# Patient Record
Sex: FEMALE | Race: WHITE | NOT HISPANIC OR LATINO | Employment: OTHER | URBAN - METROPOLITAN AREA
[De-identification: names, ages, dates, MRNs, and addresses within clinical notes are randomized per-mention and may not be internally consistent; named-entity substitution may affect disease eponyms.]

---

## 2017-01-04 ENCOUNTER — HOSPITAL ENCOUNTER (OUTPATIENT)
Dept: PHYSICAL THERAPY | Facility: CLINIC | Age: 62
Setting detail: THERAPIES SERIES
End: 2017-01-04
Attending: ORTHOPAEDIC SURGERY
Payer: MEDICARE

## 2017-01-04 PROCEDURE — 97110 THERAPEUTIC EXERCISES: CPT | Mod: GP | Performed by: PHYSICAL THERAPIST

## 2017-01-04 PROCEDURE — 40000718 ZZHC STATISTIC PT DEPARTMENT ORTHO VISIT: Performed by: PHYSICAL THERAPIST

## 2017-02-13 ENCOUNTER — COMMUNICATION - HEALTHEAST (OUTPATIENT)
Dept: FAMILY MEDICINE | Facility: CLINIC | Age: 62
End: 2017-02-13

## 2017-02-13 DIAGNOSIS — E03.9 HYPOTHYROIDISM: ICD-10-CM

## 2017-03-31 NOTE — ADDENDUM NOTE
Encounter addended by: Hoenk, Kris, PT on: 3/31/2017  9:34 AM<BR>     Actions taken: Pend clinical note, Flowsheet accepted, Sign clinical note, Episode resolved

## 2017-03-31 NOTE — PROGRESS NOTES
Jackie BULLOCK Andrez   PHYSICAL THERAPY EVALUATION    01/04/17 1100   Signing Clinician's Name / Credentials   Signing clinician's name / credentials Kris Hoenk, PT   Session Number   Session Number 8 , seen from 11/16/16 to 1/4/17   Progress Note/Recertification   Recertification Due Date 01/11/17   Adult Goals   PT Ortho Eval Goals 1;2;3;4   Ortho Goal 1   Goal Identifier HEP   Goal Description Patient will be independent and 75% complaint in progressive HEP to continue improving strength outside of therapy session   Target Date 01/11/17   Date Met 01/04/17   Ortho Goal 2   Goal Identifier ROM   Goal Description Patient will demonstrate at least 110* shoulder flexion in order to perform overhead reach  (to 105*)   Target Date 12/14/16 not met   Ortho Goal 3   Goal Identifier PSFS Hair   Goal Description Patient will report improvement in ability to reach overhead and style hair to at least 7/10 on Patient Specific Functional Scale  (able to blow dry hair with min/mod difficulty)   Target Date 01/11/17   Date Met 12/14/16   Ortho Goal 4   Goal Identifier DC goal   Goal Description Patient will report improvement on Shoulder Pain and Disability Index to less than 40% to decrease level of disability   Target Date 01/11/17   Subjective Report   Subjective Report can reach into cupboards, do hair easier, does not think it will ever get as strong as the other   Objective Measure 1   Details ROM shld flex 105*a/115*p, abd 90* p   Treatment Interventions   Interventions Manual Therapy   Therapeutic Procedure/exercise   Minutes 25   Skilled Intervention progression of strength and ROM to iprove function   Patient Response ER fairly easy, tolerated a weight   Treatment Detail UBE 3min, pulleys, active flex x10,, ADD SL ER `1# x20,  , PROM L shld all planes,  wrist supin, elbow ext   Manual Therapy   Minutes 4   Skilled Intervention jt mobs   Patient Response no pain   Treatment Detail gentle L GH mobs post and inf lgides GR  III   Plan   Plan for next session see next week decide if wnat to extend referral, pt scheduled no further appts following this one.  WIll DC to HEP , goals partially met   Total Session Time   Total Session Time 29   Kris Hoenk, PT #[id number]  [facility]

## 2017-04-26 PROBLEM — G24.1 DYSTONIA, TORSION, FRAGMENTS OF: Status: ACTIVE | Noted: 2017-04-26

## 2017-04-26 PROBLEM — G24.3 CERVICAL DYSTONIA: Status: ACTIVE | Noted: 2017-04-26

## 2017-05-25 ENCOUNTER — TELEPHONE (OUTPATIENT)
Dept: RHEUMATOLOGY | Facility: CLINIC | Age: 62
End: 2017-05-25

## 2017-05-25 NOTE — TELEPHONE ENCOUNTER
Spoke with pt and discussed that pt has not been seen in clinic since 2011 and to follow up with her PCP to see if they were able to refill her prednisone until she can be seen in clinic. Also advised that pt have her medical records sent to clinic prior to her appt. Pt verbalized understanding.  Karla BOLTON RN BSN PHN  [facility]

## 2017-05-25 NOTE — TELEPHONE ENCOUNTER
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 She will be seeing Dr Chinchilla on . Sh would like to know if she can get a refill on the prednisone 5 mg daily until her appt.     Please call    Jess Ibarra  Specialty CSS

## 2017-06-01 ENCOUNTER — OFFICE VISIT - HEALTHEAST (OUTPATIENT)
Dept: FAMILY MEDICINE | Facility: CLINIC | Age: 62
End: 2017-06-01

## 2017-06-01 DIAGNOSIS — M21.962 FOOT DEFORMITY, LEFT: ICD-10-CM

## 2017-06-01 DIAGNOSIS — L40.50 PSORIATIC ARTHROPATHY (H): ICD-10-CM

## 2017-06-01 ASSESSMENT — MIFFLIN-ST. JEOR: SCORE: 1435.39

## 2017-07-13 ENCOUNTER — OFFICE VISIT (OUTPATIENT)
Dept: RHEUMATOLOGY | Facility: CLINIC | Age: 62
End: 2017-07-13

## 2017-07-13 ENCOUNTER — TELEPHONE (OUTPATIENT)
Dept: RHEUMATOLOGY | Facility: CLINIC | Age: 62
End: 2017-07-13

## 2017-07-13 VITALS
TEMPERATURE: 98.1 F | WEIGHT: 185 LBS | SYSTOLIC BLOOD PRESSURE: 132 MMHG | HEART RATE: 103 BPM | BODY MASS INDEX: 28.97 KG/M2 | DIASTOLIC BLOOD PRESSURE: 79 MMHG

## 2017-07-13 DIAGNOSIS — M06.09 RHEUMATOID ARTHRITIS OF MULTIPLE SITES WITHOUT RHEUMATOID FACTOR (H): Primary | ICD-10-CM

## 2017-07-13 DIAGNOSIS — L40.9 PSORIASIS: ICD-10-CM

## 2017-07-13 PROCEDURE — 99204 OFFICE O/P NEW MOD 45 MIN: CPT | Performed by: INTERNAL MEDICINE

## 2017-07-13 RX ORDER — DIPHENHYDRAMINE HCL 25 MG
25 CAPSULE ORAL ONCE
Status: CANCELLED
Start: 2017-07-17 | End: 2017-07-17

## 2017-07-13 RX ORDER — ACETAMINOPHEN 325 MG/1
650 TABLET ORAL ONCE
Status: CANCELLED
Start: 2017-07-17 | End: 2017-07-17

## 2017-07-13 RX ORDER — PREDNISONE 1 MG/1
5 TABLET ORAL DAILY
Qty: 90 TABLET | Refills: 1 | Status: SHIPPED | OUTPATIENT
Start: 2017-07-13 | End: 2018-02-26

## 2017-07-13 NOTE — NURSING NOTE
"Chief Complaint   Patient presents with     Consult     Ref. self  Initial /79  Pulse 103  Temp 98.1  F (36.7  C)  Wt 185 lb (83.9 kg)  BMI 28.97 kg/m2 Estimated body mass index is 28.97 kg/(m^2) as calculated from the following:    Height as of 9/27/16: 5' 7.01\" (1.702 m).    Weight as of this encounter: 185 lb (83.9 kg).      Patient prefers to be contacted via at Home.   Okay to leave detailed message: Yes  Patient uses MyChart: No    Lacy VOGEL LPN    "

## 2017-07-13 NOTE — PATIENT INSTRUCTIONS
Abatacept Solution for injection  What is this medicine?  ABATACEPT (a ba TA sept) is used to treat moderate to severe active rheumatoid arthritis in adults. This medicine is also used to treat juvenile idiopathic arthritis.  This medicine may be used for other purposes; ask your health care provider or pharmacist if you have questions.  What should I tell my health care provider before I take this medicine?  They need to know if you have any of these conditions:    are taking other medicines to treat rheumatoid arthritis    COPD    diabetes    infection or history of infections    recently received or scheduled to receive a vaccine    scheduled to have surgery    tuberculosis, a positive skin test for tuberculosis or have recently been in close contact with someone who has tuberculosis    viral hepatitis    an unusual or allergic reaction to abatacept, other medicines, foods, dyes, or preservatives    pregnant or trying to get pregnant    breast-feeding  How should I use this medicine?  This medicine is for infusion into a vein or for injection under the skin. Infusions are given by a health care professional in a hospital or clinic setting. If you are to give your own medicine at home, you will be taught how to prepare and give this medicine under the skin. Use exactly as directed. Take your medicine at regular intervals. Do not take your medicine more often than directed.  It is important that you put your used needles and syringes in a special sharps container. Do not put them in a trash can. If you do not have a sharps container, call your pharmacist or healthcare provider to get one.  Talk to your pediatrician regarding the use of this medicine in children. While infusions in a clinic may be prescribed for children as young as 6 years for selected conditions, precautions do apply.  Overdosage: If you think you've taken too much of this medicine contact a poison control center or emergency room at  once.  NOTE: This medicine is only for you. Do not share this medicine with others.  What if I miss a dose?  This medicine is used once a week if given by injection under the skin. If you miss a dose, take it as soon as you can. If it is almost time for your next dose, take only that dose. Do not take double or extra doses.  If you are to be given an infusion, it is important not to miss your dose. Doses are usually every 4 weeks. Call your doctor or health care professional if you are unable to keep an appointment.  What may interact with this medicine?  Do not take this medicine with any of the following medications:    adalimumab    anakinra    certolizumab    etanercept    golimumab    infliximab    live virus vaccines    rituximab    tocilizumab  This medicine may also interact with the following medications:    vaccines  This list may not describe all possible interactions. Give your health care provider a list of all the medicines, herbs, non-prescription drugs, or dietary supplements you use. Also tell them if you smoke, drink alcohol, or use illegal drugs. Some items may interact with your medicine.  What should I watch for while using this medicine?  Visit your doctor for regular check ups while you are taking this medicine. Tell your doctor or healthcare professional if your symptoms do not start to get better or if they get worse.  Call your doctor or health care professional if you get a cold or other infection while receiving this medicine. Do not treat yourself. This medicine may decrease your body's ability to fight infection. Try to avoid being around people who are sick.  What side effects may I notice from receiving this medicine?  Side effects that you should report to your doctor or health care professional as soon as possible:    allergic reactions like skin rash, itching or hives, swelling of the face, lips, or tongue    breathing problems    chest pain    signs of infection - fever or chills,  cough, unusual tiredness, pain or trouble passing urine, or warm, red or painful skin  Side effects that usually do not require medical attention (Report these to your doctor or health care professional if they continue or are bothersome.):    dizziness    headache    nausea, vomiting    sore throat    stomach upset  This list may not describe all possible side effects. Call your doctor for medical advice about side effects. You may report side effects to FDA at 1-103-FDA-3513.  Where should I keep my medicine?  Infusions will be given in a hospital or clinic and will not be stored at home.  Storage for syringes given under the skin and stored at home:  Keep out of the reach of children. Store in a refrigerator between 2 and 8 degrees C (36 and 46 degrees F). Keep this medicine in the original container. Protect from light. Do not freeze. Throw away any unused medicine after the expiration date.  NOTE: This sheet is a summary. It may not cover all possible information. If you have questions about this medicine, talk to your doctor, pharmacist, or health care provider.  NOTE:This sheet is a summary. It may not cover all possible information. If you have questions about this medicine, talk to your doctor, pharmacist, or health care provider. Copyright  2016 Gold Standard

## 2017-07-13 NOTE — TELEPHONE ENCOUNTER
"Call from Janell in infusion,    Patient Orencia infusion is covered under her Medicare B  However cost is >$1000   and will go higher once she reaches [facility].  They verified patient has \"Specialty Benefits\"  And will most likely be covered at a lower cost if patient does self injection and patient would like to go that route.    Please send an Rx to [facility] Inject Orencia to begin benefit coverage process for patient.    Tamika Humphrey RN  [facility]    "

## 2017-07-13 NOTE — MR AVS SNAPSHOT
After Visit Summary   7/13/2017    Jackie Maguire    MRN: [id number]           Patient Information     Date Of Birth          1955        Visit Information        Provider Department      7/13/2017 10:00 AM Cabrera Chinchilla MD [facility]        Today's Diagnoses     Rheumatoid arthritis of multiple sites without rheumatoid factor (H)    -  1    Psoriasis          Care Instructions      Abatacept Solution for injection  What is this medicine?  ABATACEPT (a ba TA sept) is used to treat moderate to severe active rheumatoid arthritis in adults. This medicine is also used to treat juvenile idiopathic arthritis.  This medicine may be used for other purposes; ask your health care provider or pharmacist if you have questions.  What should I tell my health care provider before I take this medicine?  They need to know if you have any of these conditions:    are taking other medicines to treat rheumatoid arthritis    COPD    diabetes    infection or history of infections    recently received or scheduled to receive a vaccine    scheduled to have surgery    tuberculosis, a positive skin test for tuberculosis or have recently been in close contact with someone who has tuberculosis    viral hepatitis    an unusual or allergic reaction to abatacept, other medicines, foods, dyes, or preservatives    pregnant or trying to get pregnant    breast-feeding  How should I use this medicine?  This medicine is for infusion into a vein or for injection under the skin. Infusions are given by a health care professional in a hospital or clinic setting. If you are to give your own medicine at home, you will be taught how to prepare and give this medicine under the skin. Use exactly as directed. Take your medicine at regular intervals. Do not take your medicine more often than directed.  It is important that you put your used needles and syringes in a special sharps container. Do not put them in a trash  can. If you do not have a sharps container, call your pharmacist or healthcare provider to get one.  Talk to your pediatrician regarding the use of this medicine in children. While infusions in a clinic may be prescribed for children as young as 6 years for selected conditions, precautions do apply.  Overdosage: If you think you've taken too much of this medicine contact a poison control center or emergency room at once.  NOTE: This medicine is only for you. Do not share this medicine with others.  What if I miss a dose?  This medicine is used once a week if given by injection under the skin. If you miss a dose, take it as soon as you can. If it is almost time for your next dose, take only that dose. Do not take double or extra doses.  If you are to be given an infusion, it is important not to miss your dose. Doses are usually every 4 weeks. Call your doctor or health care professional if you are unable to keep an appointment.  What may interact with this medicine?  Do not take this medicine with any of the following medications:    adalimumab    anakinra    certolizumab    etanercept    golimumab    infliximab    live virus vaccines    rituximab    tocilizumab  This medicine may also interact with the following medications:    vaccines  This list may not describe all possible interactions. Give your health care provider a list of all the medicines, herbs, non-prescription drugs, or dietary supplements you use. Also tell them if you smoke, drink alcohol, or use illegal drugs. Some items may interact with your medicine.  What should I watch for while using this medicine?  Visit your doctor for regular check ups while you are taking this medicine. Tell your doctor or healthcare professional if your symptoms do not start to get better or if they get worse.  Call your doctor or health care professional if you get a cold or other infection while receiving this medicine. Do not treat yourself. This medicine may decrease  your body's ability to fight infection. Try to avoid being around people who are sick.  What side effects may I notice from receiving this medicine?  Side effects that you should report to your doctor or health care professional as soon as possible:    allergic reactions like skin rash, itching or hives, swelling of the face, lips, or tongue    breathing problems    chest pain    signs of infection - fever or chills, cough, unusual tiredness, pain or trouble passing urine, or warm, red or painful skin  Side effects that usually do not require medical attention (Report these to your doctor or health care professional if they continue or are bothersome.):    dizziness    headache    nausea, vomiting    sore throat    stomach upset  This list may not describe all possible side effects. Call your doctor for medical advice about side effects. You may report side effects to FDA at 5-159-FDA-6093.  Where should I keep my medicine?  Infusions will be given in a hospital or clinic and will not be stored at home.  Storage for syringes given under the skin and stored at home:  Keep out of the reach of children. Store in a refrigerator between 2 and 8 degrees C (36 and 46 degrees F). Keep this medicine in the original container. Protect from light. Do not freeze. Throw away any unused medicine after the expiration date.  NOTE: This sheet is a summary. It may not cover all possible information. If you have questions about this medicine, talk to your doctor, pharmacist, or health care provider.  NOTE:This sheet is a summary. It may not cover all possible information. If you have questions about this medicine, talk to your doctor, pharmacist, or health care provider. Copyright  2016 Gold Standard                Follow-ups after your visit        Follow-up notes from your care team     Return in about 2 months (around 9/13/2017).      Future tests that were ordered for you today     Open Standing Orders        Priority Remaining  "Interval Expires Ordered    CBC with platelets differential Routine  4 months 2018    Comprehensive metabolic panel Routine 3/3 4 months 2018            Who to contact     If you have questions or need follow up information about today's clinic visit or your schedule please contact [facility] directly at [phone].  Normal or non-critical lab and imaging results will be communicated to you by MyChart, letter or phone within 4 business days after the clinic has received the results. If you do not hear from us within 7 days, please contact the clinic through [facility] or phone. If you have a critical or abnormal lab result, we will notify you by phone as soon as possible.  Submit refill requests through [facility] or call your pharmacy and they will forward the refill request to us. Please allow 3 business days for your refill to be completed.          Additional Information About Your Visit        [facility] Information     [facility] lets you send messages to your doctor, view your test results, renew your prescriptions, schedule appointments and more. To sign up, go to [email or web address] . Click on \"Log in\" on the left side of the screen, which will take you to the Welcome page. Then click on \"Sign up Now\" on the right side of the page.     You will be asked to enter the access code listed below, as well as some personal information. Please follow the directions to create your username and password.     Your access code is: [email or web address]  Expires: 10/11/2017 10:25 AM     Your access code will  in 90 days. If you need help or a new code, please call your [facility] or [phone].        Care EveryWhere ID     This is your Care EveryWhere ID. This could be used by other organizations to access your [place] medical records  [id number]        Your Vitals Were     Pulse Temperature BMI (Body Mass Index)             103 98.1  F (36.7  C) 28.97 kg/m2          " Blood Pressure from Last 3 Encounters:   07/13/17 132/79   10/17/16 146/88   10/06/16 122/79    Weight from Last 3 Encounters:   07/13/17 185 lb (83.9 kg)   10/17/16 197 lb (89.4 kg)   10/06/16 188 lb (85.3 kg)                 Where to get your medicines      These medications were sent to [facility] [address]   [address] [facility] [address]     Phone:  [phone]     predniSONE 1 MG tablet          Primary Care Provider Office Phone # Fax #    Talia Mejía [phone]       [facility] [id number] [place] [address]        Equal Access to Services     RAMEZ MARIN : Yany perezo Soarmando, waaxda luqadaha, qaybta kaalmada adeegyada, yarely molina. So [facility] [phone].    ATENCIÓN: Si habla español, tiene a uribe disposición servicios gratuitos de asistencia lingüística. [facility] [phone].    We comply with applicable federal civil rights laws and [place] laws. We do not discriminate on the basis of race, color, national origin, age, disability sex, sexual orientation or gender identity.            Thank you!     Thank you for choosing [facility]  for your care. Our goal is always to provide you with excellent care. Hearing back from our patients is one way we can continue to improve our services. Please take a few minutes to complete the written survey that you may receive in the mail after your visit with us. Thank you!             Your Updated Medication List - Protect others around you: Learn how to safely use, store and throw away your medicines at www.disposemymeds.org.          This list is accurate as of: 7/13/17 10:25 AM.  Always use your most recent med list.                   Brand Name Dispense Instructions for use Diagnosis    acetaminophen 325 MG tablet    TYLENOL    100 tablet    650 mg po QID and BId PRN    Fracture of left shoulder, closed, initial encounter, Closed  fracture of distal ends of left radius and ulna, initial encounter       aspirin 81 MG tablet     42 tablet    Take 1 tablet (81 mg) by mouth daily    Fracture of left shoulder, closed, initial encounter, Closed fracture of distal ends of left radius and ulna, initial encounter       LEVOTHYROXINE SODIUM PO      Take 137 mcg by mouth daily        melatonin 1 MG Tabs tablet     30 tablet    Take 1 tablet (1 mg) by mouth nightly as needed for sleep    Adjustment insomnia       predniSONE 1 MG tablet    DELTASONE    90 tablet    Take 5 tablets (5 mg) by mouth daily    Rheumatoid arthritis of multiple sites without rheumatoid factor (H)       senna-docusate 8.6-50 MG per tablet    SENOKOT-S;PERICOLACE    60 tablet    1 tab po q day and 1 tab po q day PRN    Narcotic drug use       VITAMIN D (CHOLECALCIFEROL) PO      Take by mouth daily

## 2017-07-19 ENCOUNTER — TELEPHONE (OUTPATIENT)
Dept: RHEUMATOLOGY | Facility: CLINIC | Age: 62
End: 2017-07-19

## 2017-07-19 NOTE — TELEPHONE ENCOUNTER
Prior Authorization Approval    Authorization Effective Date: 7/19/2017  Authorization Expiration Date: 12/31/2017  Medication: orencia ( approved until 12/31/2017)  Approved Dose/Quantity:qs  Reference #: Verbal auth   Insurance Company: [employer] [phone] Fax [phone]  Expected CoPay:       CoPay Card Available:      Foundation Assistance Needed:    Which Pharmacy is filling the prescription (Not needed for infusion/clinic administered): [facility] - [place], [place] - [facility] [address]  Pharmacy Notified:  Yes   Patient Notified:  yes

## 2017-07-19 NOTE — PROGRESS NOTES
PRIMARY CARE PHYSICIAN:  Talia Mejía MD    CHIEF COMPLAINT:  Establish care for rheumatoid arthritis.      History of present illness:  This 62-year-old female with an inflammatory arthritis that could be either psoriatic arthritis or rheumatoid arthritis, first treated by Dr. Tony dumont in 2011.  Subsequently seen by Dr. James.  Dr. James unfortunately has retired and she is here now to consider further treatment options.  Unfortunately her history is complicated by the use of multiple oral medications and side effects to many of them.  She most recently was on Plaquenil but cannot afford the 100 dollar co-pay.  She was on methotrexate which began in 02/2011 which was not effective, leflunomide in 09/2012 which caused mouth sores, azathioprine in 10/2012 which caused itching, Humira not effective, and then Remicade which caused 2 episodes of syncope.  She currently is only on prednisone 5 mg daily and obviously still having active disease.  She has some small patches of psoriasis but these were controlled with the use of topical medication, steroid creams.    I should clarify that the problem with the Humira actually was a lupus-like reaction.    She is stiff in the morning for an hour.  She has had pain and swelling involving her hands and feet and is becoming more and more disabled.  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  No signs of inflammation or pannus formation on the CT scan that was performed in 2016.    She denies having any history of heart or lung problems.  There are no kidney or liver problems.  She does have a history of malignant melanoma that was resected and felt to be cured.    Past medical history:  Hypothyroidism, inflammatory arthritis, malignant melanoma, hyperlipidemia, left proximal humeral fracture, osteoporosis, cervical dystonia, psoriasis.    MEDICATIONS:     1.  Prednisone 5 mg daily.   2.  Tylenol.   3.  Aspirin.   4.  Melatonin.   5.  Levothyroxine 137 mcg daily.    DRUG ALLERGIES:  Azathioprine, leflunomide, Compazine, _____ and sulfa.      SOCIAL HISTORY:  Does not smoke or drink.    FAMILY HISTORY:  There is nobody in her family with rheumatoid arthritis.    REVIEW OF SYSTEMS:  Ten point review of system otherwise negative.    PHYSICAL EXAM:    Vital signs:  Blood pressure 132/79, pulse 103, temperature 98.1.  Weight 185.    HEENT:  She is normocephalic and atraumatic.  Sclerae are clear and moist.  Oropharynx is without lesions.    Neck:  Supple without lymphadenopathy.    Lungs:  Clear to auscultation bilaterally.    Heart:  Regular rate and rhythm without murmur or rub.    Abdomen:  Nontender.  Normal bowel sounds.    Joints:  There is a large synovial cyst over the right wrist.  There is synovitis, moderate-to-severe degree, of bilateral 2 and 3 MCP joints, also the right third and fourth PIP joint.  There is no synovitis of the elbows or shoulders or the knees.  There is mild-to-moderate synovitis both ankles.  Skin:  Without lesions.  Neurologic:  Strength reveals a weak .    IMPRESSION:    1.  Rheumatoid arthritis/psoriatic arthritis.    2.  Multiple medication failures and/or intolerances.    Recommendation:    1.  Continue prednisone 5 mg daily.  So unfortunately we really do need to do something otherwise she is certainly not going to get any better than this and will have ongoing progression of the disease.  I certainly understand her reluctance to be on any further medications but she does agree we need to do something.  I think the drug that would have the least risk associated would be Orencia given as an infusion monthly, 750 mg.  Side effects of infusion reactions, infection, malignancies discussed.  First I would like to see her back in approximately 2 months.    2.  She then tells me she is going to be having surgery on her feet which do  have significant deformities.  I suggest we hold off on starting the Orencia until after the surgery.        Cabrera Chinchilla MD    D:  07/13/2017 12:12 T:  07/19/2017 09:06  Document:  [id number] \[id number]    cc: Talia Mejía MD

## 2017-08-08 ENCOUNTER — TELEPHONE (OUTPATIENT)
Dept: RHEUMATOLOGY | Facility: CLINIC | Age: 62
End: 2017-08-08

## 2017-08-08 NOTE — TELEPHONE ENCOUNTER
Called patient to offer the Orencia IV and she remembers being told that it would be more expensive than the plain injections.  She is unable to afford the injections and doesn't qualify for the Orencia assitance due to being on Medicare D.  Patient was told that Dr. Chinchilla would be notified and to see what the next step would be.  Talia Magana LPN

## 2017-08-08 NOTE — TELEPHONE ENCOUNTER
Reason for Call:  Other     Detailed comments: Pt said the co-pay for the Orencia is $1900. She was referred to a place to see if she could get funding, but was told she was not eligible because she is on Medicare (contact them the end of July). They told her they would send her something to fill out to try to get assistance, but she has not received anything yet and doesn't have the phone # to contact them again.     Phone Number Patient can be reached at: Home number on file [phone] (home)    Best Time: Any    Can we leave a detailed message on this number? YES    Call taken on 8/8/2017 at 1:03 PM by Denise Behrendt

## 2017-08-09 ENCOUNTER — TELEPHONE (OUTPATIENT)
Dept: RHEUMATOLOGY | Facility: CLINIC | Age: 62
End: 2017-08-09

## 2017-08-09 NOTE — TELEPHONE ENCOUNTER
Patient advised.  Given [place] [facility] assistance contact number as she may be able to get some assistance to reduce the high monthly cost of her MTx or Plaquenil as that was still $100 or more per month as well.  Tamika Humphrey RN  [facility] [place]

## 2017-08-09 NOTE — TELEPHONE ENCOUNTER
Forms mailed to patient.  I've advised her to watch for them.  She will return them to us when complete, and we can have Dr. Chinchilla sign and fax them.  I've kept a copy just in case the form is lost in transit.

## 2017-08-09 NOTE — TELEPHONE ENCOUNTER
There aren't a lot of choices---go back on the plaquenil  Try methotrexate again  Maybe [employer] will have a better assistance program but all the injections or intravenous are going to be expensive

## 2017-08-10 ENCOUNTER — OFFICE VISIT - HEALTHEAST (OUTPATIENT)
Dept: FAMILY MEDICINE | Facility: CLINIC | Age: 62
End: 2017-08-10

## 2017-08-10 DIAGNOSIS — E03.9 HYPOTHYROID: ICD-10-CM

## 2017-08-10 DIAGNOSIS — Z01.818 PREOP EXAMINATION: ICD-10-CM

## 2017-08-10 DIAGNOSIS — M21.969 FOOT DEFORMITY: ICD-10-CM

## 2017-08-10 ASSESSMENT — MIFFLIN-ST. JEOR: SCORE: 1417.25

## 2017-08-16 ENCOUNTER — ANESTHESIA EVENT (OUTPATIENT)
Dept: SURGERY | Facility: CLINIC | Age: 62
End: 2017-08-16
Payer: MEDICARE

## 2017-08-16 NOTE — ANESTHESIA PREPROCEDURE EVALUATION
Anesthesia Evaluation     . Pt has had prior anesthetic. Type: General    No history of anesthetic complications          ROS/MED HX    ENT/Pulmonary:  - neg pulmonary ROS     Neurologic:  - neg neurologic ROS     Cardiovascular:     (+) Dyslipidemia, ----. : . . . :. . Previous cardiac testing date:results:date: results:ECG reviewed date:9/26/16 results:Sinus Rhythm   -Old anterior infarct.     ABNORMAL    date: results:          METS/Exercise Tolerance:     Hematologic:         Musculoskeletal:   (+) arthritis, , , other musculoskeletal-       GI/Hepatic:  - neg GI/hepatic ROS       Renal/Genitourinary:  - ROS Renal section negative       Endo:     (+) thyroid problem hypothyroidism, .      Psychiatric:  - neg psychiatric ROS       Infectious Disease:  - neg infectious disease ROS       Malignancy:   (+) Malignancy History of Skin  Skin CA Remission status post Surgery,         Other:    - neg other ROS                 Physical Exam  Normal systems: cardiovascular, pulmonary and dental    Airway   Mallampati: II  TM distance: >3 FB  Neck ROM: full    Dental     Cardiovascular   Rhythm and rate: regular and normal      Pulmonary    breath sounds clear to auscultation                    Anesthesia Plan      History & Physical Review  History and physical reviewed and following examination; no interval change.    ASA Status:  2 .    NPO Status:  > 8 hours    Plan for MAC Reason for MAC:  Deep or markedly invasive procedure (G8)         Postoperative Care      Consents  Anesthetic plan, risks, benefits and alternatives discussed with:  Patient..                          .

## 2017-08-17 ENCOUNTER — ANESTHESIA (OUTPATIENT)
Dept: SURGERY | Facility: CLINIC | Age: 62
End: 2017-08-17
Payer: MEDICARE

## 2017-08-17 ENCOUNTER — APPOINTMENT (OUTPATIENT)
Dept: GENERAL RADIOLOGY | Facility: CLINIC | Age: 62
End: 2017-08-17
Attending: PODIATRIST
Payer: MEDICARE

## 2017-08-17 ENCOUNTER — HOSPITAL ENCOUNTER (OUTPATIENT)
Facility: CLINIC | Age: 62
Discharge: HOME | End: 2017-08-17
Attending: PODIATRIST | Admitting: PODIATRIST
Payer: MEDICARE

## 2017-08-17 ENCOUNTER — SURGERY (OUTPATIENT)
Age: 62
End: 2017-08-17

## 2017-08-17 VITALS
SYSTOLIC BLOOD PRESSURE: 148 MMHG | RESPIRATION RATE: 16 BRPM | DIASTOLIC BLOOD PRESSURE: 88 MMHG | OXYGEN SATURATION: 96 % | WEIGHT: 185 LBS | HEIGHT: 66 IN | TEMPERATURE: 98 F | BODY MASS INDEX: 29.73 KG/M2

## 2017-08-17 PROCEDURE — C1713 ANCHOR/SCREW BN/BN,TIS/BN: HCPCS | Performed by: PODIATRIST

## 2017-08-17 PROCEDURE — 25000125 ZZHC RX 250: Performed by: PODIATRIST

## 2017-08-17 PROCEDURE — A9270 NON-COVERED ITEM OR SERVICE: HCPCS | Mod: GY | Performed by: PODIATRIST

## 2017-08-17 PROCEDURE — 40000277 XR SURGERY CARM FLUORO LESS THAN 5 MIN W STILLS

## 2017-08-17 PROCEDURE — 25000128 H RX IP 250 OP 636: Performed by: NURSE ANESTHETIST, CERTIFIED REGISTERED

## 2017-08-17 PROCEDURE — 25000128 H RX IP 250 OP 636: Performed by: PODIATRIST

## 2017-08-17 PROCEDURE — 40000306 ZZH STATISTIC PRE PROC ASSESS II: Performed by: PODIATRIST

## 2017-08-17 PROCEDURE — 37000009 ZZH ANESTHESIA TECHNICAL FEE, EACH ADDTL 15 MIN: Performed by: PODIATRIST

## 2017-08-17 PROCEDURE — S0020 INJECTION, BUPIVICAINE HYDRO: HCPCS | Performed by: PODIATRIST

## 2017-08-17 PROCEDURE — 37000008 ZZH ANESTHESIA TECHNICAL FEE, 1ST 30 MIN: Performed by: PODIATRIST

## 2017-08-17 PROCEDURE — 71000027 ZZH RECOVERY PHASE 2 EACH 15 MINS: Performed by: PODIATRIST

## 2017-08-17 PROCEDURE — 25000125 ZZHC RX 250: Performed by: NURSE ANESTHETIST, CERTIFIED REGISTERED

## 2017-08-17 PROCEDURE — 27210794 ZZH OR GENERAL SUPPLY STERILE: Performed by: PODIATRIST

## 2017-08-17 PROCEDURE — 36000060 ZZH SURGERY LEVEL 3 W FLUORO 1ST 30 MIN: Performed by: PODIATRIST

## 2017-08-17 PROCEDURE — 25000132 ZZH RX MED GY IP 250 OP 250 PS 637: Mod: GY | Performed by: PODIATRIST

## 2017-08-17 PROCEDURE — 36000058 ZZH SURGERY LEVEL 3 EA 15 ADDTL MIN: Performed by: PODIATRIST

## 2017-08-17 DEVICE — IMP SCR ARTHREX MTP LP CORTICAL 3X16MM TI AR-8933-16: Type: IMPLANTABLE DEVICE | Site: FOOT | Status: FUNCTIONAL

## 2017-08-17 DEVICE — IMP PIN ARTHREX BIO TRIM IT 2.0X100MM AR-4152DS: Type: IMPLANTABLE DEVICE | Site: FOOT | Status: FUNCTIONAL

## 2017-08-17 DEVICE — IMPLANTABLE DEVICE: Type: IMPLANTABLE DEVICE | Site: FOOT | Status: FUNCTIONAL

## 2017-08-17 DEVICE — IMP SCR ARTHREX CORTICAL LP 3X14MM TI AR-8933-14: Type: IMPLANTABLE DEVICE | Site: FOOT | Status: FUNCTIONAL

## 2017-08-17 DEVICE — IMP SCR ARTHREX VAL 3.0X14MM TI AR-8933V-14: Type: IMPLANTABLE DEVICE | Site: FOOT | Status: FUNCTIONAL

## 2017-08-17 DEVICE — IMP SCR ARTHREX QUICKFIX CANC PT 3.0X24MM TI AR-8730-24PT: Type: IMPLANTABLE DEVICE | Site: FOOT | Status: FUNCTIONAL

## 2017-08-17 RX ORDER — ONDANSETRON 2 MG/ML
4 INJECTION INTRAMUSCULAR; INTRAVENOUS EVERY 30 MIN PRN
Status: DISCONTINUED | OUTPATIENT
Start: 2017-08-17 | End: 2017-08-17 | Stop reason: HOSPADM

## 2017-08-17 RX ORDER — SODIUM CHLORIDE, SODIUM LACTATE, POTASSIUM CHLORIDE, CALCIUM CHLORIDE 600; 310; 30; 20 MG/100ML; MG/100ML; MG/100ML; MG/100ML
INJECTION, SOLUTION INTRAVENOUS CONTINUOUS
Status: DISCONTINUED | OUTPATIENT
Start: 2017-08-17 | End: 2017-08-17 | Stop reason: HOSPADM

## 2017-08-17 RX ORDER — LIDOCAINE HYDROCHLORIDE 20 MG/ML
INJECTION, SOLUTION INFILTRATION; PERINEURAL PRN
Status: DISCONTINUED | OUTPATIENT
Start: 2017-08-17 | End: 2017-08-17 | Stop reason: HOSPADM

## 2017-08-17 RX ORDER — FENTANYL CITRATE 50 UG/ML
INJECTION, SOLUTION INTRAMUSCULAR; INTRAVENOUS PRN
Status: DISCONTINUED | OUTPATIENT
Start: 2017-08-17 | End: 2017-08-17

## 2017-08-17 RX ORDER — KETOROLAC TROMETHAMINE 30 MG/ML
30 INJECTION, SOLUTION INTRAMUSCULAR; INTRAVENOUS EVERY 6 HOURS PRN
Status: DISCONTINUED | OUTPATIENT
Start: 2017-08-17 | End: 2017-08-17 | Stop reason: HOSPADM

## 2017-08-17 RX ORDER — FENTANYL CITRATE 50 UG/ML
25-50 INJECTION, SOLUTION INTRAMUSCULAR; INTRAVENOUS
Status: DISCONTINUED | OUTPATIENT
Start: 2017-08-17 | End: 2017-08-17 | Stop reason: HOSPADM

## 2017-08-17 RX ORDER — BUPIVACAINE HYDROCHLORIDE 5 MG/ML
INJECTION, SOLUTION PERINEURAL PRN
Status: DISCONTINUED | OUTPATIENT
Start: 2017-08-17 | End: 2017-08-17 | Stop reason: HOSPADM

## 2017-08-17 RX ORDER — PROPOFOL 10 MG/ML
INJECTION, EMULSION INTRAVENOUS CONTINUOUS PRN
Status: DISCONTINUED | OUTPATIENT
Start: 2017-08-17 | End: 2017-08-17

## 2017-08-17 RX ORDER — CEFAZOLIN SODIUM 1 G/3ML
1 INJECTION, POWDER, FOR SOLUTION INTRAMUSCULAR; INTRAVENOUS SEE ADMIN INSTRUCTIONS
Status: DISCONTINUED | OUTPATIENT
Start: 2017-08-17 | End: 2017-08-17 | Stop reason: HOSPADM

## 2017-08-17 RX ORDER — MEPERIDINE HYDROCHLORIDE 25 MG/ML
12.5 INJECTION INTRAMUSCULAR; INTRAVENOUS; SUBCUTANEOUS
Status: DISCONTINUED | OUTPATIENT
Start: 2017-08-17 | End: 2017-08-17 | Stop reason: HOSPADM

## 2017-08-17 RX ORDER — CEPHALEXIN 500 MG/1
500 CAPSULE ORAL 3 TIMES DAILY
Qty: 30 CAPSULE | Refills: 0 | Status: SHIPPED | OUTPATIENT
Start: 2017-08-17 | End: 2017-10-12

## 2017-08-17 RX ORDER — LIDOCAINE 40 MG/G
CREAM TOPICAL
Status: DISCONTINUED | OUTPATIENT
Start: 2017-08-17 | End: 2017-08-17 | Stop reason: HOSPADM

## 2017-08-17 RX ORDER — FENTANYL CITRATE 50 UG/ML
25-50 INJECTION, SOLUTION INTRAMUSCULAR; INTRAVENOUS
Status: CANCELLED | OUTPATIENT
Start: 2017-08-17

## 2017-08-17 RX ORDER — ONDANSETRON 4 MG/1
4 TABLET, ORALLY DISINTEGRATING ORAL EVERY 30 MIN PRN
Status: DISCONTINUED | OUTPATIENT
Start: 2017-08-17 | End: 2017-08-17 | Stop reason: HOSPADM

## 2017-08-17 RX ORDER — OXYCODONE AND ACETAMINOPHEN 5; 325 MG/1; MG/1
1-2 TABLET ORAL
Status: COMPLETED | OUTPATIENT
Start: 2017-08-17 | End: 2017-08-17

## 2017-08-17 RX ORDER — CEFAZOLIN SODIUM 2 G/100ML
2 INJECTION, SOLUTION INTRAVENOUS
Status: COMPLETED | OUTPATIENT
Start: 2017-08-17 | End: 2017-08-17

## 2017-08-17 RX ORDER — NALOXONE HYDROCHLORIDE 0.4 MG/ML
.1-.4 INJECTION, SOLUTION INTRAMUSCULAR; INTRAVENOUS; SUBCUTANEOUS
Status: DISCONTINUED | OUTPATIENT
Start: 2017-08-17 | End: 2017-08-17 | Stop reason: HOSPADM

## 2017-08-17 RX ORDER — HYDROXYZINE HYDROCHLORIDE 25 MG/1
25 TABLET, FILM COATED ORAL EVERY 4 HOURS PRN
Qty: 36 TABLET | Refills: 0 | Status: SHIPPED | OUTPATIENT
Start: 2017-08-17 | End: 2023-10-05

## 2017-08-17 RX ORDER — HYDROMORPHONE HYDROCHLORIDE 1 MG/ML
.3-.5 INJECTION, SOLUTION INTRAMUSCULAR; INTRAVENOUS; SUBCUTANEOUS EVERY 10 MIN PRN
Status: DISCONTINUED | OUTPATIENT
Start: 2017-08-17 | End: 2017-08-17 | Stop reason: HOSPADM

## 2017-08-17 RX ORDER — OXYCODONE AND ACETAMINOPHEN 5; 325 MG/1; MG/1
1-2 TABLET ORAL EVERY 4 HOURS PRN
Qty: 38 TABLET | Refills: 0 | Status: SHIPPED | OUTPATIENT
Start: 2017-08-17 | End: 2023-10-05

## 2017-08-17 RX ADMIN — FENTANYL CITRATE 50 MCG: 50 INJECTION, SOLUTION INTRAMUSCULAR; INTRAVENOUS at 13:48

## 2017-08-17 RX ADMIN — OXYCODONE HYDROCHLORIDE AND ACETAMINOPHEN 1 TABLET: 5; 325 TABLET ORAL at 14:58

## 2017-08-17 RX ADMIN — FENTANYL CITRATE 100 MCG: 50 INJECTION, SOLUTION INTRAMUSCULAR; INTRAVENOUS at 12:32

## 2017-08-17 RX ADMIN — CEFAZOLIN SODIUM 2 G: 2 INJECTION, SOLUTION INTRAVENOUS at 12:26

## 2017-08-17 RX ADMIN — LIDOCAINE HYDROCHLORIDE 1 ML: 10 INJECTION, SOLUTION EPIDURAL; INFILTRATION; INTRACAUDAL; PERINEURAL at 11:53

## 2017-08-17 RX ADMIN — MIDAZOLAM HYDROCHLORIDE 1 MG: 1 INJECTION, SOLUTION INTRAMUSCULAR; INTRAVENOUS at 12:32

## 2017-08-17 RX ADMIN — MIDAZOLAM HYDROCHLORIDE 2 MG: 1 INJECTION, SOLUTION INTRAMUSCULAR; INTRAVENOUS at 12:28

## 2017-08-17 RX ADMIN — BUPIVACAINE HYDROCHLORIDE 15 ML: 5 INJECTION, SOLUTION PERINEURAL at 12:54

## 2017-08-17 RX ADMIN — MIDAZOLAM HYDROCHLORIDE 2 MG: 1 INJECTION, SOLUTION INTRAMUSCULAR; INTRAVENOUS at 12:30

## 2017-08-17 RX ADMIN — FENTANYL CITRATE 50 MCG: 50 INJECTION, SOLUTION INTRAMUSCULAR; INTRAVENOUS at 12:59

## 2017-08-17 RX ADMIN — SODIUM CHLORIDE, POTASSIUM CHLORIDE, SODIUM LACTATE AND CALCIUM CHLORIDE: 600; 310; 30; 20 INJECTION, SOLUTION INTRAVENOUS at 11:53

## 2017-08-17 RX ADMIN — PROPOFOL 100 MCG/KG/MIN: 10 INJECTION, EMULSION INTRAVENOUS at 12:32

## 2017-08-17 RX ADMIN — KETOROLAC TROMETHAMINE 30 MG: 30 INJECTION, SOLUTION INTRAMUSCULAR at 14:54

## 2017-08-17 RX ADMIN — Medication 15 ML: at 12:54

## 2017-08-17 NOTE — BRIEF OP NOTE
[facility] OR   Brief Operative Note    Pre-operative diagnosis: Left  hallux rigidus, 2nd & 3rd toe contractures   Post-operative diagnosis * No post-op diagnosis entered *   Procedure: Procedure(s):  Left foot 1st metatarsophalangeal realignment fusion, 2nd & 3rd digital corrections - Wound Class: I-Clean   Surgeon: Jason Lamar DPM   Anesthesia: Monitor Anesthesia Care    Estimated blood loss: Less than 10 ml   Blood transfusion: No transfusion was given during surgery   Drains: None   Specimens: None   Findings: Advanced hallux valgus with 1st MTPJ degenerative changes appreciated.  Lesser digital contractures and abduction appreciated with MTPJ contractures secondary to force of 1st MTPJ.   Complications: None   Condition: Stable   Comments: See dictated operative report for full details.           Jason Lamar DPM, [facility]  Foot & Ankle Surgeon/Specialist  [facility]

## 2017-08-17 NOTE — IP AVS SNAPSHOT
[facility] PreOP/Phase II    [address] [address]    Phone:  [phone]    Fax:  [phone]                                       After Visit Summary   8/17/2017    Jackie Maguire    MRN: [id number]           After Visit Summary Signature Page     I have received my discharge instructions, and my questions have been answered. I have discussed any challenges I see with this plan with the nurse or doctor.    ..........................................................................................................................................  Patient/Patient Representative Signature      ..........................................................................................................................................  Patient Representative Print Name and Relationship to Patient    ..................................................               ................................................  Date                                            Time    ..........................................................................................................................................  Reviewed by Signature/Title    ...................................................              ..............................................  Date                                                            Time

## 2017-08-17 NOTE — OP NOTE
[facility] ORTHOPEDICS OPERATIVE REPORT  Operative Report - Orthopedics  Jackie Maguire,  1955, MRN [id number]    Surgery Date: 17    PCP: Talia Mejía, [phone]   Code status:  Prior       OPERATION SITE:  [facility] Operating Room       OPERATIVE REPORT  DR. JASON SALCEDO  FOOT & ANKLE SURGEON  [facility] ORTHOPEDICS    DATE OF PROCEDURE: 17    SITE: [facility] Operating Room    SURGEON: Dr. Jason Salcedo - [facility]    ASSISTANT: Sharita Crocker, PGY III    PREOPERATIVE DIAGNOSES:  1. Left foot advanced hallux valgus and arthrosis in setting of rheumatoid arthritis  2. Left lesser digital abduction contractures at the MTPJ level, primarily second and third    POSTOPERATIVE DIAGNOSES:  1. Left foot advanced hallux valgus and arthrosis in setting of rheumatoid arthritis  2. Left lesser digital abduction contractures at the MTPJ level, primarily second and third    PROCEDURES PERFORMED:  1. Left first MTPJ realignment arthrodesis and bone grafting  2. Left second and third distal metatarsal partial excision/resection  3. Left second MTPJ capsular release and capsulorrhaphy  4. Left third MTPJ capsular release and capsulorrhaphy  5. Left second toe flexor tenotomy/release  6. Intraoperative fluoroscopy    ANESTHESIA: Monitored anesthesia care with local and regional    POSITION: Supine    HEMOSTASIS: Left ankle tourniquet applied at 250 mmHg for the duration of the procedure    ESTIMATED BLOOD LOSS: 10 mL    FINDINGS: Advanced hallux valgus on the left side secondary to rheumatoid arthritis. Also significant degenerative changes and hallux abductus contracture about the first MTPJ. Second and third MTPJ also demonstrated abduction contracture with capsular involvement. To improve alignment lesser metatarsal head partial excisions/resections were necessary.    IMPLANTS: Arthrex first MTPJ fusion system utilized with a 3.0 compression screw and dorsal anatomic recon  plate to stabilize  First MTPJ in the setting of osteoporosis. Additionally, 2.0 bio absorbable trim it pins in utilized to stabilize the digital and MTPJ margins within all inside technique.    SPECIMENS: None.    INDICATIONS FOR THE OPERATION:   62 year old female has been seen and evaluated for the previously listed diagnoses.   Both operative and non operative care options have been reviewed for this condition.  They are aware of the operation implications and associated pros, cons, risks and benefits.  They were made aware of the post-operative demands and details.  She gives verbal and written consent to the above mentioned procedures. She is aware of how her history contributes to the current situation. She is aware of the direct and associated risks with such condition. She is also aware of her limited bone quality.      DESCRIPTION OF THE PROCEDURE:  The patient was identified in the preoperative holding area.  The surgical site was marked.  The operative extremity was initialed.  The History and Physical examination was reviewed and/or updated as necessary.  The operative consent was reviewed, confirmed and signed by the patient and procedural details were again reviewed with patient and family members present.    The patient was then taken to the operating room assigned and was positioned on the operative table.  Anesthesia was then administered and the airway was maintained.  IV antibiotics were administered.  The tourniquet was applied.  Left foot and ankle margins were then prepped and draped in a sterile manner/aseptic technique.  A surgical time-out was then performed to identify the proper patient, surgical site(s) and the procedures to be performed.  Following this, local anesthetic was administered about the operative site utilizing a mixture of 1:1 2% Lidocaine plain and 0.5% Marcaine plain, for a total of 35 mL's.  The esmarch was then utilized to exsanguinate the patient's extremity and the  tourniquet was inflated for the duration of the procedures.    Attention was then directed to the left foot. Dorsal medial first MTP incision was made, with a 15 blade approximately 10 cm in linear length. This was dissected in layers with neurovascular identification and retraction. The dorsomedial and medial capsular structures and periosteal tissues were reflected. The first MTPJ was inspected and found to be significantly contracted into abduction with osteophytic changes about the first MTPJ. The joint was then prepared for arthrodesis. A sagittal saw was utilized to remove the articular and subchondral bone plates about the proximal phalanx perpendicular to its long axis. Then the joint was released in the first MTPJ was aligned. The distal aspect of the first metatarsal was removed with a sagittal saw removing the bone plate and allowing for alignment of first MTPJ. Hypertrophic bone was removed about the first metatarsal and some of this bone was then utilized for otitis bone grafting and the first MTPJ site. With intraoperative fluoroscopic assistance the first MTPJ was aligned. A compression screw was placed from medial distal to more proximal lateral. A dorsal anatomic alignment reconstruction plate was then applied. Patient did have significant osteoporotic bone assess it hitting the long construct with locking and the locking screws. Intraoperative fluoroscopic assistance revealed the first MTPJ with adequate screw placement and alignment. The site was thoroughly irrigated and closed in anatomic layers with 2-0 & 3-0 Vicryl and 3-0 nylon. Attention is then directed to the interval between the second and third metatarsophalangeal joints. #15 blade utilized to make another 10 cm incision. This was then dissected down in layers with neurovascular identification and retraction. The second MTPJ was opened up sparing the extensor tendon. The was found to be significantly contracted into abduction with flexor  tendon involvement. It was determined that distal partial metatarsal head excisions were necessary. These were conducted, measured it with the parabolic length with the sagittal saw and proximal release was McGlamry elevator. This was done about both the second metatarsal head and the third metatarsal head. The second metatarsal plantarly was found to be with flexor contracture. This was released through a central plantar incision about the second toe. To stabilize the second MTP 2.0 bio composite trim it pins utilized with an all inside technique about the MTPJ. It was retrograded through the cortical margins of the toe and placed back into the second and third metatarsals retrospectively. The cortical margin remained intact to prevent backing out. This was checked with intraoperative fluoroscopic assistance and anatomic alignment was found to be within reasonable limits given her preoperative deformity. This site was then irrigated capsular raphe for closure of both the second MTPJ and 30 cm conducted with 2-0 Vicryl. Closure with 3-0 Vicryl and 3-0 nylon.    A sterile compressive, layered dressing was then applied.  Vascular status was intact after deflation of the tourniquet.    COMPLICATIONS: No direct complications were  encountered throughout the procedures.    She tolerated these procedures well and was transferred from the operative table to the transport cart and taken from the OR to the PACU.  In PACU, patient and staff given orders and instructions for post-operative care in the following areas: post op pain management, weight-bearing status, dressing/splint care, weight-bearing assistive devices, elevation of the extremity, ice/cold therapy, DVT/blood clot prevention, nutrition and post-operative concerns to be aware.  Case and post-operative care measures were reviewed with the patient and family members present.  A post operative instruction sheet was provided.  If concerns or questions arise they  will contact our clinic.  If acute concerns develop they will present emergently to a nearby medical center.            Dr. Jason Lamar DPM, [facility] HospitalFAS  Foot & Ankle Surgeon/Specialist  [facility] ORTH[facility]

## 2017-08-17 NOTE — IP AVS SNAPSHOT
MRN:[id number]                      After Visit Summary   8/17/2017    Jackie Maguire    MRN: [id number]           Thank you!     Thank you for choosing [facility] for your care. Our goal is always to provide you with excellent care. Hearing back from our patients is one way we can continue to improve our services. Please take a few minutes to complete the written survey that you may receive in the mail after you visit with us. Thank you!        Patient Information     Date Of Birth          1955        About your hospital stay     You were admitted on:  August 17, 2017 You last received care in the:  [facility] PreOP/Phase II    You were discharged on:  August 17, 2017       Who to Call     For medical emergencies, please call [phone].  For non-urgent questions about your medical care, please call your primary care provider or clinic, [phone]  For questions related to your surgery, please call your surgery clinic        Attending Provider     Provider Jason Carreno DPM Podiatry       Primary Care Provider Office Phone # Fax #    Talia GRIJALVA Alfonzo [phone]      After Care Instructions     Discharge Instructions       Review discharge instructions as directed by Provider.            Discharge Instructions       Patient to be seen in 1 week.    Please call [facility] at [phone] to make/confirm appointment.            Elevate affected extremity           Ice to affected area       Ice pack to affected area PRN (as needed).            No dressing change       until follow up clinic appointment.            Weight bearing status - As tolerated           Weight bearing status - Foot flat                 Your next 10 appointments already scheduled     Oct 12, 2017 11:00 AM CDT   Return Visit with Cabrera Chinchilla MD   [facility]    [address] [address] [phone]               Further instructions from your care team                           Same Day Surgery Discharge Instructions  Special Precautions After Surgery - Adult    1. It is not unusual to feel lightheaded or faint, up to 24 hours after surgery or while taking pain medication.  If you have these symptoms; sit for a few minutes before standing and have someone assist you when getting up.  2. You should rest and relax for the next 24 hours and must have someone stay with you for at least 24 hours after your discharge.  3. DO NOT DRIVE any vehicle or operate mechanical equipment for 24 hours following the end of your surgery.  DO NOT DRIVE while taking narcotic pain medications that have been prescribed by your physician.  If you had a limb operated on, you must be able to use it fully to drive.  4. DO NOT drink alcoholic beverages for 24 hours following surgery or while taking prescription pain medication.  5. Drink clear liquids (apple juice, ginger ale, broth, 7-Up, etc.).  Progress to your regular diet as you feel able.  6. Any questions call your physician and do not make important decisions for 24 hours.    ACTIVITY  ? Off your feet as much as possible for a few days then up as tolerated, elevate foot near or above heart level when sitting down     INCISIONAL CARE  ? Cover food with a plastic bag to keep dry to shower.     Call for an appointment to return to the clinic.    Medications:  ? Had 1 pain pill at 3PM.  May want to set an alarm thru the 1st night for every 4 hours to check on patient & give a pain pill, then may take them as necessary the next day.  May have some Ibuprofen or Aleve anytime after 9pm to help with pain control if needed.     Additional discharge instructions: Cold pack behind knee off & on to cool blood going to the knee to decrease swelling & pain.  __________________________________________________________________________________________________________________________________  IMPORTANT  "NUMBERS:    [facility] Main Number:  [phone], [phone]  Pharmacy:  [phone]  Same Day Surgery:  [phone], Monday - Friday until 8:30 p.m.  Urgent Care:  [phone]  Emergency Room:  [phone]      [facility]:  [phone]                                                                             [facility]:  [phone] option 1  [facility]:  [phone]     OB Clinic:  [phone]   Surgery Specialty Clinic:  [phone]   Home Medical Equipment: [phone]  [facility]:  [phone]        Pending Results     Date and Time Order Name Status Description    2017 0300 XR Surgery DANIEL Fluoro L/T 5 Min w Stills In process             Admission Information     Date & Time Provider Department Dept. Phone    2017 Jason Lamar, ANNEL [facility] PreOP/Phase [phone]      Your Vitals Were     Blood Pressure Temperature Respirations Height Weight Pulse Oximetry    148/88 98  F (36.7  C) (Oral) 16 1.676 m (5' 6\") 83.9 kg (185 lb) 96%    BMI (Body Mass Index)                   29.86 kg/m2           [facility] Information     [facility] lets you send messages to your doctor, view your test results, renew your prescriptions, schedule appointments and more. To sign up, go to [email or web address] . Click on \"Log in\" on the left side of the screen, which will take you to the Welcome page. Then click on \"Sign up Now\" on the right side of the page.     You will be asked to enter the access code listed below, as well as some personal information. Please follow the directions to create your username and password.     Your access code is: [email or web address]  Expires: 10/11/2017 10:25 AM     Your access code will  in 90 days. If you need help or a new code, please call your [facility] or [phone].        Care EveryWhere ID     This is your Care EveryWhere ID. This could be used by other organizations to access your [place] " medical records  [id number]        Equal Access to Services     RAMEZ MARIN : Hadii aad ku hadvashtimorris Dangelo, walichada lukvngadaha, qaybta maidatamaratavon ornelas, yarely molina. So [facility] [phone].    ATENCIÓN: Si habla español, tiene a uribe disposición servicios gratuitos de asistencia lingüística. Llame al [phone].    We comply with applicable federal civil rights laws and [place] laws. We do not discriminate on the basis of race, color, national origin, age, disability sex, sexual orientation or gender identity.               Review of your medicines      START taking        Dose / Directions    cephALEXin 500 MG capsule   Commonly known as:  KEFLEX        Dose:  500 mg   Take 1 capsule (500 mg) by mouth 3 times daily   Quantity:  30 capsule   Refills:  0       hydrOXYzine 25 MG tablet   Commonly known as:  ATARAX        Dose:  25 mg   Take 1 tablet (25 mg) by mouth every 4 hours as needed for itching (and nausea)   Quantity:  36 tablet   Refills:  0       oxyCODONE-acetaminophen 5-325 MG per tablet   Commonly known as:  PERCOCET        Dose:  1-2 tablet   Take 1-2 tablets by mouth every 4 hours as needed for pain (moderate to severe)   Quantity:  38 tablet   Refills:  0         CONTINUE these medicines which have NOT CHANGED        Dose / Directions    acetaminophen 325 MG tablet   Commonly known as:  TYLENOL   Used for:  Fracture of left shoulder, closed, initial encounter, Closed fracture of distal ends of left radius and ulna, initial encounter        650 mg po QID and BId PRN   Quantity:  100 tablet   Refills:  0       aspirin 81 MG tablet   Used for:  Fracture of left shoulder, closed, initial encounter, Closed fracture of distal ends of left radius and ulna, initial encounter        Dose:  81 mg   Take 1 tablet (81 mg) by mouth daily   Quantity:  42 tablet   Refills:  0       LEVOTHYROXINE SODIUM PO        Dose:  137 mcg   Take 137 mcg by mouth daily   Refills:  0        predniSONE 1 MG tablet   Commonly known as:  DELTASONE   Used for:  Rheumatoid arthritis of multiple sites without rheumatoid factor (H)        Dose:  5 mg   Take 5 tablets (5 mg) by mouth daily   Quantity:  90 tablet   Refills:  1       VITAMIN D (CHOLECALCIFEROL) PO        Take by mouth daily   Refills:  0            Where to get your medicines      Some of these will need a paper prescription and others can be bought over the counter. Ask your nurse if you have questions.     Bring a paper prescription for each of these medications     cephALEXin 500 MG capsule    hydrOXYzine 25 MG tablet    oxyCODONE-acetaminophen 5-325 MG per tablet                Protect others around you: Learn how to safely use, store and throw away your medicines at www.disposemymeds.org.             Medication List: This is a list of all your medications and when to take them. Check marks below indicate your daily home schedule. Keep this list as a reference.      Medications           Morning Afternoon Evening Bedtime As Needed    acetaminophen 325 MG tablet   Commonly known as:  TYLENOL   650 mg po QID and BId PRN                                aspirin 81 MG tablet   Take 1 tablet (81 mg) by mouth daily                                cephALEXin 500 MG capsule   Commonly known as:  KEFLEX   Take 1 capsule (500 mg) by mouth 3 times daily                                hydrOXYzine 25 MG tablet   Commonly known as:  ATARAX   Take 1 tablet (25 mg) by mouth every 4 hours as needed for itching (and nausea)                                LEVOTHYROXINE SODIUM PO   Take 137 mcg by mouth daily                                oxyCODONE-acetaminophen 5-325 MG per tablet   Commonly known as:  PERCOCET   Take 1-2 tablets by mouth every 4 hours as needed for pain (moderate to severe)   Last time this was given:  1 tablet on 8/17/2017  2:58 PM                                predniSONE 1 MG tablet   Commonly known as:  DELTASONE   Take 5 tablets (5 mg) by  mouth daily                                VITAMIN D (CHOLECALCIFEROL) PO   Take by mouth daily

## 2017-08-17 NOTE — ANESTHESIA POSTPROCEDURE EVALUATION
Patient: Jackie Maguire    Procedure(s):  Left foot 1st metatarsophalangeal realignment fusion, 2nd & 3rd digital corrections - Wound Class: I-Clean    Diagnosis:Left  hallux rigidus, 2nd & 3rd toe contractures  Diagnosis Additional Information: No value filed.    Anesthesia Type:  MAC    Note:  Anesthesia Post Evaluation    Patient location during evaluation: Phase 2 and Bedside  Patient participation: Able to fully participate in evaluation  Level of consciousness: awake and alert  Pain management: adequate  Airway patency: patent  Cardiovascular status: acceptable  Respiratory status: acceptable  Hydration status: acceptable  PONV: none     Anesthetic complications: None          Last vitals:  Vitals:    08/17/17 1117 08/17/17 1357 08/17/17 1421   BP: (!) 168/101 (!) 142/91 150/80   Resp: 18 16 16   Temp: 36.7  C (98  F)     SpO2: 98% 94% 95%         Electronically Signed By: Praneeth Barrios CRNA, APRN CRNA  August 17, 2017  2:34 PM

## 2017-08-17 NOTE — ANESTHESIA CARE TRANSFER NOTE
Patient: Jackie Maguire    Procedure(s):  Left foot 1st metatarsophalangeal realignment fusion, 2nd & 3rd digital corrections - Wound Class: I-Clean    Diagnosis: Left  hallux rigidus, 2nd & 3rd toe contractures  Diagnosis Additional Information: No value filed.    Anesthesia Type:   MAC     Note:  Airway :Nasal Cannula  Patient transferred to:Phase II        Vitals: (Last set prior to Anesthesia Care Transfer)    CRNA VITALS  8/17/2017 1325 - 8/17/2017 1358      8/17/2017             Pulse: 89    SpO2: 96 %                Electronically Signed By: Praneeth Barrios CRNA, APRN CRNA  August 17, 2017  1:58 PM

## 2017-08-17 NOTE — DISCHARGE INSTRUCTIONS
Same Day Surgery Discharge Instructions  Special Precautions After Surgery - Adult    1. It is not unusual to feel lightheaded or faint, up to 24 hours after surgery or while taking pain medication.  If you have these symptoms; sit for a few minutes before standing and have someone assist you when getting up.  2. You should rest and relax for the next 24 hours and must have someone stay with you for at least 24 hours after your discharge.  3. DO NOT DRIVE any vehicle or operate mechanical equipment for 24 hours following the end of your surgery.  DO NOT DRIVE while taking narcotic pain medications that have been prescribed by your physician.  If you had a limb operated on, you must be able to use it fully to drive.  4. DO NOT drink alcoholic beverages for 24 hours following surgery or while taking prescription pain medication.  5. Drink clear liquids (apple juice, ginger ale, broth, 7-Up, etc.).  Progress to your regular diet as you feel able.  6. Any questions call your physician and do not make important decisions for 24 hours.    ACTIVITY  ? Off your feet as much as possible for a few days then up as tolerated, elevate foot near or above heart level when sitting down     INCISIONAL CARE  ? Cover food with a plastic bag to keep dry to shower.     Call for an appointment to return to the clinic.    Medications:  ? Had 1 pain pill at 3PM.  May want to set an alarm thru the 1st night for every 4 hours to check on patient & give a pain pill, then may take them as necessary the next day.  May have some Ibuprofen or Aleve anytime after 9pm to help with pain control if needed.     Additional discharge instructions: Cold pack behind knee off & on to cool blood going to the knee to decrease swelling & pain.  __________________________________________________________________________________________________________________________________  IMPORTANT NUMBERS:    [facility] [place]:  [phone],  [phone]  Pharmacy:  [phone]  Same Day Surgery:  [phone], Monday - Friday until 8:30 p.m.  Urgent Care:  [phone]  Emergency Room:  [phone]      [facility]:  [phone]                                                                             [facility]:  [phone] option 1  [facility]:  [phone]     OB Clinic:  [phone]   Surgery Specialty Clinic:  [phone]   Home Medical Equipment: [phone]  [facility]:  [phone]

## 2017-08-17 NOTE — TELEPHONE ENCOUNTER
Forms completed and faxed back to [facility] Access Support for Orencia.  Talia Magana LPN

## 2017-10-03 ENCOUNTER — TELEPHONE (OUTPATIENT)
Dept: RHEUMATOLOGY | Facility: CLINIC | Age: 62
End: 2017-10-03

## 2017-10-03 NOTE — TELEPHONE ENCOUNTER
Reason for Call:  Other appointment    Detailed comments: pt calling stating she is scheduled to be seen on 10/12. she was to have had a couple of orencia infusions at this time however has not been able to get any funding for it so she has not had them done. She would like to know if she should still keep her appt on the 12th?    Phone Number Patient can be reached at: Home number on file [phone] (home)    Best Time: any    Can we leave a detailed message on this number? YES    Call taken on 10/3/2017 at 9:35 AM by Jess Ibarra

## 2017-10-03 NOTE — TELEPHONE ENCOUNTER
Depends on how she is doing----if not well, then should come in to discuss other options as she is likely not going to get the orencia

## 2017-10-04 ENCOUNTER — TELEPHONE (OUTPATIENT)
Dept: RHEUMATOLOGY | Facility: CLINIC | Age: 62
End: 2017-10-04

## 2017-10-04 NOTE — TELEPHONE ENCOUNTER
Pt assist rep from [facility] needs script for Orencia sub Q, also wrong application sent to them, they are faxing the correct one, call [phone] , fax [phone].

## 2017-10-05 ENCOUNTER — RECORDS - HEALTHEAST (OUTPATIENT)
Dept: ADMINISTRATIVE | Facility: OTHER | Age: 62
End: 2017-10-05

## 2017-10-05 NOTE — TELEPHONE ENCOUNTER
Orencia infusion order and physician statement and treatment plan faxed back to [employer] for patient assistance.  Talia Magana LPN

## 2017-10-10 ENCOUNTER — AMBULATORY - HEALTHEAST (OUTPATIENT)
Dept: NURSING | Facility: CLINIC | Age: 62
End: 2017-10-10

## 2017-10-10 DIAGNOSIS — Z23 NEEDS FLU SHOT: ICD-10-CM

## 2017-10-12 ENCOUNTER — OFFICE VISIT (OUTPATIENT)
Dept: RHEUMATOLOGY | Facility: CLINIC | Age: 62
End: 2017-10-12
Payer: MEDICARE

## 2017-10-12 VITALS
SYSTOLIC BLOOD PRESSURE: 135 MMHG | WEIGHT: 188 LBS | HEART RATE: 98 BPM | BODY MASS INDEX: 30.34 KG/M2 | DIASTOLIC BLOOD PRESSURE: 79 MMHG

## 2017-10-12 DIAGNOSIS — M06.09 RHEUMATOID ARTHRITIS OF MULTIPLE SITES WITHOUT RHEUMATOID FACTOR (H): Primary | ICD-10-CM

## 2017-10-12 PROCEDURE — 99214 OFFICE O/P EST MOD 30 MIN: CPT | Performed by: INTERNAL MEDICINE

## 2017-10-12 NOTE — NURSING NOTE
Patient completed her portion of assistance application.  Forms and rx faxed to [employer].  Talia Magana LPN

## 2017-10-12 NOTE — MR AVS SNAPSHOT
"              After Visit Summary   10/12/2017    Jackie Maguire    MRN: [id number]           Patient Information     Date Of Birth          1955        Visit Information        Provider Department      10/12/2017 11:00 AM Cabrera Chinchilla MD [facility]        Today's Diagnoses     Rheumatoid arthritis of multiple sites without rheumatoid factor (H)    -  1       Follow-ups after your visit        Who to contact     If you have questions or need follow up information about today's clinic visit or your schedule please contact [facility] directly at [phone].  Normal or non-critical lab and imaging results will be communicated to you by enrich-inhart, letter or phone within 4 business days after the clinic has received the results. If you do not hear from us within 7 days, please contact the clinic through enrich-inhart or phone. If you have a critical or abnormal lab result, we will notify you by phone as soon as possible.  Submit refill requests through [employer] or call your pharmacy and they will forward the refill request to us. Please allow 3 business days for your refill to be completed.          Additional Information About Your Visit        MyChart Information     [facility] lets you send messages to your doctor, view your test results, renew your prescriptions, schedule appointments and more. To sign up, go to [email or web address] . Click on \"Log in\" on the left side of the screen, which will take you to the Welcome page. Then click on \"Sign up Now\" on the right side of the page.     You will be asked to enter the access code listed below, as well as some personal information. Please follow the directions to create your username and password.     Your access code is: [email or web address]  Expires: 1/10/2018  2:15 PM     Your access code will  in 90 days. If you need help or a new code, please call your [facility] or [phone].        Care EveryWhere ID     This is your " Care EveryWhere ID. This could be used by other organizations to access your [place] medical records  [id number]        Your Vitals Were     Pulse BMI (Body Mass Index)                98 30.34 kg/m2           Blood Pressure from Last 3 Encounters:   10/12/17 135/79   08/17/17 (!) (P) 155/97   07/13/17 132/79    Weight from Last 3 Encounters:   10/12/17 188 lb (85.3 kg)   08/17/17 185 lb (83.9 kg)   07/13/17 185 lb (83.9 kg)              Today, you had the following     No orders found for display         Today's Medication Changes          These changes are accurate as of: 10/12/17  2:15 PM.  If you have any questions, ask your nurse or doctor.               Start taking these medicines.        Dose/Directions    Abatacept 125 MG/ML Soaj   Used for:  Rheumatoid arthritis of multiple sites without rheumatoid factor (H)        Dose:  125 mg   Inject 125 mg Subcutaneous every 7 days   Quantity:  4 mL   Refills:  3            Where to get your medicines      Some of these will need a paper prescription and others can be bought over the counter.  Ask your nurse if you have questions.     Bring a paper prescription for each of these medications     Abatacept 125 MG/ML Soaj                Primary Care Provider Office Phone # Fax #    Talia GRIJALVA Alfonzo [phone]       [place] [id number]        Equal Access to Services     RAMEZ MARIN AH: Hadii rashid perezo Soarmando, waaxda luqadaha, qaybta kaalmada johnson, yarely molina. So [facility] [phone].    ATENCIÓN: Si habla español, tiene a uribe disposición servicios gratuitos de asistencia lingüística. Osiris al [phone].    We comply with applicable federal civil rights laws and [place] laws. We do not discriminate on the basis of race, color, national origin, age, disability, sex, sexual orientation, or gender identity.            Thank you!     Thank you for choosing [facility]   for your care. Our goal is always to provide you with excellent care. Hearing back from our patients is one way we can continue to improve our services. Please take a few minutes to complete the written survey that you may receive in the mail after your visit with us. Thank you!             Your Updated Medication List - Protect others around you: Learn how to safely use, store and throw away your medicines at www.disposemymeds.org.          This list is accurate as of: 10/12/17  2:15 PM.  Always use your most recent med list.                   Brand Name Dispense Instructions for use Diagnosis    Abatacept 125 MG/ML Soaj     4 mL    Inject 125 mg Subcutaneous every 7 days    Rheumatoid arthritis of multiple sites without rheumatoid factor (H)       acetaminophen 325 MG tablet    TYLENOL    100 tablet    650 mg po QID and BId PRN    Fracture of left shoulder, closed, initial encounter, Closed fracture of distal ends of left radius and ulna, initial encounter       aspirin 81 MG tablet     42 tablet    Take 1 tablet (81 mg) by mouth daily    Fracture of left shoulder, closed, initial encounter, Closed fracture of distal ends of left radius and ulna, initial encounter       hydrOXYzine 25 MG tablet    ATARAX    36 tablet    Take 1 tablet (25 mg) by mouth every 4 hours as needed for itching (and nausea)        LEVOTHYROXINE SODIUM PO      Take 137 mcg by mouth daily        oxyCODONE-acetaminophen 5-325 MG per tablet    PERCOCET    38 tablet    Take 1-2 tablets by mouth every 4 hours as needed for pain (moderate to severe)        predniSONE 1 MG tablet    DELTASONE    90 tablet    Take 5 tablets (5 mg) by mouth daily    Rheumatoid arthritis of multiple sites without rheumatoid factor (H)       VITAMIN D (CHOLECALCIFEROL) PO      Take by mouth daily

## 2017-10-12 NOTE — NURSING NOTE
"Chief Complaint   Patient presents with     RECHECK       Initial /79  Pulse 98  Wt 188 lb (85.3 kg)  BMI 30.34 kg/m2 Estimated body mass index is 30.34 kg/(m^2) as calculated from the following:    Height as of 8/17/17: 5' 6\" (1.676 m).    Weight as of this encounter: 188 lb (85.3 kg).      Patient prefers to be contacted via at Home.   Okay to leave detailed message: Yes  Patient uses MyChart: Caroline ROJAS LPN    "

## 2017-10-13 NOTE — PROGRESS NOTES
HISTORY:  Ms. Damon Yañez is seen back in followup for rheumatoid arthritis.  We are still not doing well.  She has not been able to get the Orencia because of cost, so basically she is only on 5 mg a day of prednisone.  On this dose she is having significant swelling and pain involving her wrists, her hands, her knees and her feet.  She has paperwork for me today to fill out in an attempt to get her Orencia hopefully from [employer].  Apparently the injection is going to be to the cheaper way to go.      PHYSICAL EXAMINATION:   VITAL SIGNS:  Blood pressure 135/79, pulse 98, weight 188.   NECK:  Supple, without lymphadenopathy.   LUNGS:  Clear.   HEART:  Regular.   MUSCULOSKELETAL:  Joint exam there is clearly moderate synovitis of both wrists, mild to moderate synovitis of bilateral 2, 3, 4 MCP joints.  Bilateral third and fourth PIP joints.   SKIN:  Without lesions.      IMPRESSION:  Seropositive rheumatoid arthritis with multiple treatment failures.      RECOMMENDATIONS:   1.  I filled out paperwork to hopefully get her Orencia from the company at low cost.   2.  Continue Prednisone 5 mg daily.   3.  If we cannot get the Orencia I am not certain what we are going to be able to treat her with as everything is prohibitively expensive she basically has tried all of the cheaper oral medications.   4.  Follow up with me in 2-3 months.         BAKARI VICZAINO MD             D: 10/12/2017 14:12   T: 10/13/2017 07:12   MT: [id number]      Name:     RUI YAÑEZ   MRN:      [id number]        Account:      [id number]   :      1955           Visit Date:   10/12/2017      Document: [id number]

## 2017-10-17 ENCOUNTER — TELEPHONE (OUTPATIENT)
Dept: RHEUMATOLOGY | Facility: CLINIC | Age: 62
End: 2017-10-17

## 2017-10-17 NOTE — TELEPHONE ENCOUNTER
Patient was denied for patient assistance due to being covered by insurance and income being to high.  Talia Magana LPN

## 2017-10-17 NOTE — TELEPHONE ENCOUNTER
Unfortunately then she's left with going back on plaquenil with or without methotrexate again or things like that.  I don't know how to get any of the drugs at an affordable price

## 2017-10-18 NOTE — TELEPHONE ENCOUNTER
Spoke to patient let her know that if she wants to go back on methotrexate or plaquenil to let us know.  Also that she could be fit in the schedule if she wanted to discuss options.  Patient will think about it and let us know.  Talia Magana LPN

## 2017-11-13 ENCOUNTER — COMMUNICATION - HEALTHEAST (OUTPATIENT)
Dept: FAMILY MEDICINE | Facility: CLINIC | Age: 62
End: 2017-11-13

## 2017-11-13 DIAGNOSIS — E03.9 HYPOTHYROIDISM: ICD-10-CM

## 2017-12-22 ENCOUNTER — COMMUNICATION - HEALTHEAST (OUTPATIENT)
Dept: FAMILY MEDICINE | Facility: CLINIC | Age: 62
End: 2017-12-22

## 2017-12-22 DIAGNOSIS — L40.50 PSORIATIC ARTHROPATHY (H): ICD-10-CM

## 2018-02-26 ENCOUNTER — OFFICE VISIT (OUTPATIENT)
Dept: RHEUMATOLOGY | Facility: CLINIC | Age: 63
End: 2018-02-26
Payer: MEDICARE

## 2018-02-26 VITALS
SYSTOLIC BLOOD PRESSURE: 135 MMHG | WEIGHT: 181 LBS | HEART RATE: 86 BPM | DIASTOLIC BLOOD PRESSURE: 86 MMHG | BODY MASS INDEX: 29.09 KG/M2 | HEIGHT: 66 IN | OXYGEN SATURATION: 96 %

## 2018-02-26 DIAGNOSIS — M06.09 RHEUMATOID ARTHRITIS OF MULTIPLE SITES WITHOUT RHEUMATOID FACTOR (H): Primary | ICD-10-CM

## 2018-02-26 DIAGNOSIS — Z79.899 HIGH RISK MEDICATIONS (NOT ANTICOAGULANTS) LONG-TERM USE: ICD-10-CM

## 2018-02-26 LAB
ALBUMIN SERPL-MCNC: 4 G/DL (ref 3.4–5)
ALP SERPL-CCNC: 79 U/L (ref 40–150)
ALT SERPL W P-5'-P-CCNC: 24 U/L (ref 0–50)
ANION GAP SERPL CALCULATED.3IONS-SCNC: 6 MMOL/L (ref 3–14)
AST SERPL W P-5'-P-CCNC: 20 U/L (ref 0–45)
BILIRUB SERPL-MCNC: 0.4 MG/DL (ref 0.2–1.3)
BUN SERPL-MCNC: 19 MG/DL (ref 7–30)
CALCIUM SERPL-MCNC: 9.1 MG/DL (ref 8.5–10.1)
CHLORIDE SERPL-SCNC: 107 MMOL/L (ref 94–109)
CO2 SERPL-SCNC: 27 MMOL/L (ref 20–32)
CREAT SERPL-MCNC: 0.78 MG/DL (ref 0.52–1.04)
CRP SERPL-MCNC: 8.6 MG/L (ref 0–8)
ERYTHROCYTE [SEDIMENTATION RATE] IN BLOOD BY WESTERGREN METHOD: 7 MM/H (ref 0–30)
GFR SERPL CREATININE-BSD FRML MDRD: 75 ML/MIN/1.7M2
GLUCOSE SERPL-MCNC: 93 MG/DL (ref 70–99)
HBV CORE AB SERPL QL IA: NONREACTIVE
HBV SURFACE AG SERPL QL IA: NONREACTIVE
HCV AB SERPL QL IA: NONREACTIVE
POTASSIUM SERPL-SCNC: 3.7 MMOL/L (ref 3.4–5.3)
PROT SERPL-MCNC: 7.1 G/DL (ref 6.8–8.8)
SODIUM SERPL-SCNC: 140 MMOL/L (ref 133–144)

## 2018-02-26 PROCEDURE — 86038 ANTINUCLEAR ANTIBODIES: CPT | Performed by: INTERNAL MEDICINE

## 2018-02-26 PROCEDURE — 80053 COMPREHEN METABOLIC PANEL: CPT | Performed by: INTERNAL MEDICINE

## 2018-02-26 PROCEDURE — 86235 NUCLEAR ANTIGEN ANTIBODY: CPT | Performed by: INTERNAL MEDICINE

## 2018-02-26 PROCEDURE — 99214 OFFICE O/P EST MOD 30 MIN: CPT | Performed by: INTERNAL MEDICINE

## 2018-02-26 PROCEDURE — 85025 COMPLETE CBC W/AUTO DIFF WBC: CPT | Performed by: INTERNAL MEDICINE

## 2018-02-26 PROCEDURE — 85652 RBC SED RATE AUTOMATED: CPT | Performed by: INTERNAL MEDICINE

## 2018-02-26 PROCEDURE — G0472 HEP C SCREEN HIGH RISK/OTHER: HCPCS | Performed by: INTERNAL MEDICINE

## 2018-02-26 PROCEDURE — 86704 HEP B CORE ANTIBODY TOTAL: CPT | Performed by: INTERNAL MEDICINE

## 2018-02-26 PROCEDURE — G0499 HEPB SCREEN HIGH RISK INDIV: HCPCS | Performed by: INTERNAL MEDICINE

## 2018-02-26 PROCEDURE — 86225 DNA ANTIBODY NATIVE: CPT | Performed by: INTERNAL MEDICINE

## 2018-02-26 PROCEDURE — 86140 C-REACTIVE PROTEIN: CPT | Performed by: INTERNAL MEDICINE

## 2018-02-26 PROCEDURE — 36415 COLL VENOUS BLD VENIPUNCTURE: CPT | Performed by: INTERNAL MEDICINE

## 2018-02-26 RX ORDER — PREDNISONE 5 MG/1
5 TABLET ORAL DAILY
Qty: 90 TABLET | Refills: 1 | Status: SHIPPED | OUTPATIENT
Start: 2018-02-26 | End: 2018-06-11

## 2018-02-26 RX ORDER — FOLIC ACID 1 MG/1
1 TABLET ORAL DAILY
Qty: 100 TABLET | Refills: 3 | Status: SHIPPED | OUTPATIENT
Start: 2018-02-26 | End: 2018-11-05

## 2018-02-26 RX ORDER — METHOTREXATE 2.5 MG/1
TABLET ORAL
Qty: 32 TABLET | Refills: 3 | Status: SHIPPED | OUTPATIENT
Start: 2018-02-26 | End: 2018-06-11

## 2018-02-26 NOTE — MR AVS SNAPSHOT
After Visit Summary   2018    Jackie Maguire    MRN: [id number]           Patient Information     Date Of Birth          1955        Visit Information        Provider Department      2018 8:00 AM Maurilio Hayes MD [place] [facility] [place]        Today's Diagnoses     Rheumatoid arthritis of multiple sites without rheumatoid factor (H)          Care Instructions    Dr. Hayes s [facility] Clinics  Locations Clinic Hours Telephone Number     [place]  [address]  [place] [address]     Wednesday: 7:20AM - 4:00PM  Thursday:     7:20AM - 4:00PM     Friday:          7:20AM - 11:00AM       To schedule an appointment with  Dr. Hayes,  please contact  Specialty [phone]       [place]  [address]  [place] [address]       Monday:       7:20AM - 4:00PM        [place]  [address]  [place] [address]       Tuesday:      7:20AM - 4:00PM          Thank you!    Elizabeth Haley CMA              Follow-ups after your visit        Your next 10 appointments already scheduled     Mar 28, 2018  9:15 AM CDT   LAB with BE LAB   [facility] [place] ([facility]    [address]  [place] [address]   [phone]           Please do not eat 10-12 hours before your appointment if you are coming in fasting for labs on lipids, cholesterol, or glucose (sugar). This does not apply to pregnant women. Water, hot tea and black coffee (with nothing added) are okay. Do not drink other fluids, diet soda or chew gum.            2018  9:15 AM CDT   LAB with BE LAB   [facility] [place] ([facility])    [address] [address]   [phone]           Please do not eat 10-12 hours before your appointment if you are coming in fasting for labs on lipids, cholesterol, or glucose (sugar). This does not apply to pregnant women. Water, hot tea and black coffee  (with nothing added) are okay. Do not drink other fluids, diet soda or chew gum.            May 30, 2018  9:15 AM CDT   LAB with BE LAB   [place] [facility] ([facility])    [address]  [place] [address]   [phone]           Please do not eat 10-12 hours before your appointment if you are coming in fasting for labs on lipids, cholesterol, or glucose (sugar). This does not apply to pregnant women. Water, hot tea and black coffee (with nothing added) are okay. Do not drink other fluids, diet soda or chew gum.            Jun 11, 2018 10:40 AM CDT   Return Visit with MD [facility] [place] [facility] ([facility] [place])    [address]  [place] [address]   [phone]              Future tests that were ordered for you today     Open Standing Orders        Priority Remaining Interval Expires Ordered    CBC with platelets differential Routine 2/2 Every 4 Weeks 8/25/2018 2/26/2018    Creatinine Routine 2/2 Every 4 Weeks 8/25/2018 2/26/2018    Hepatic panel Routine 2/2 Every 4 Weeks 8/25/2018 2/26/2018          Open Future Orders        Priority Expected Expires Ordered    Erythrocyte sedimentation rate auto Routine 5/23/2018 6/11/2018 2/26/2018    CRP inflammation Routine 5/23/2018 6/11/2018 2/26/2018    Hepatic panel Routine 5/23/2018 6/11/2018 2/26/2018    CBC with platelets differential Routine 5/23/2018 6/11/2018 2/26/2018    Creatinine Routine 5/23/2018 6/11/2018 2/26/2018            Who to contact     If you have questions or need follow up information about today's clinic visit or your schedule please contact [facility] directly at [phone].  Normal or non-critical lab and imaging results will be communicated to you by MyChart, letter or phone within 4 business days after the clinic has received the results. If you do not hear from us within 7 days, please contact the clinic through MyChart or phone. If you have a critical or  "abnormal lab result, we will notify you by phone as soon as possible.  Submit refill requests through "[employer] or call your pharmacy and they will forward the refill request to us. Please allow 3 business days for your refill to be completed.          Additional Information About Your Visit        [employer] Information     "[employer] lets you send messages to your doctor, view your test results, renew your prescriptions, schedule appointments and more. To sign up, go to [email or web address]/"[email or web address] . Click on \"Log in\" on the left side of the screen, which will take you to the Welcome page. Then click on \"Sign up Now\" on the right side of the page.     You will be asked to enter the access code listed below, as well as some personal information. Please follow the directions to create your username and password.     Your access code is: [email or web address]  Expires: 2018  8:44 AM     Your access code will  in 90 days. If you need help or a new code, please call your [facility] clinic or [phone].        Care EveryWhere ID     This is your Care EveryWhere ID. This could be used by other organizations to access your [facility] medical records  [id number]        Your Vitals Were     Pulse Height Pulse Oximetry BMI (Body Mass Index)          86 1.676 m (5' 6\") 96% 29.21 kg/m2         Blood Pressure from Last 3 Encounters:   18 135/86   10/12/17 135/79   17 (!) (P) 155/97    Weight from Last 3 Encounters:   18 82.1 kg (181 lb)   10/12/17 85.3 kg (188 lb)   17 83.9 kg (185 lb)              We Performed the Following     Anti Nuclear Sonia IgG by IFA with Reflex     CBC with platelets differential     Comprehensive metabolic panel     CRP inflammation     DNA double stranded antibodies     ALEXY antibody panel     Erythrocyte sedimentation rate auto     Hepatitis B core antibody     Hepatitis B surface antigen     Hepatitis C Screen Reflex to HCV RNA Quant and Genotype          Today's Medication Changes       "    These changes are accurate as of 2/26/18  8:44 AM.  If you have any questions, ask your nurse or doctor.               Start taking these medicines.        Dose/Directions    folic acid 1 MG tablet   Commonly known as:  FOLVITE   Used for:  Rheumatoid arthritis of multiple sites without rheumatoid factor (H)   Started by:  Maurilio Hayes MD        Dose:  1 mg   Take 1 tablet (1 mg) by mouth daily   Quantity:  100 tablet   Refills:  3       methotrexate sodium 2.5 MG Tabs   Used for:  Rheumatoid arthritis of multiple sites without rheumatoid factor (H)   Started by:  Maurilio Hayes MD        10mg (4 tabs) once weekly x1 week, then increase by 2.5mg (1 tab) each week until taking the goal weekly dose of 20mg (8 tabs) once weekly   Quantity:  32 tablet   Refills:  3         These medicines have changed or have updated prescriptions.        Dose/Directions    predniSONE 5 MG tablet   Commonly known as:  DELTASONE   This may have changed:  medication strength   Used for:  Rheumatoid arthritis of multiple sites without rheumatoid factor (H)   Changed by:  Maurilio Hayes MD        Dose:  5 mg   Take 1 tablet (5 mg) by mouth daily   Quantity:  90 tablet   Refills:  1         Stop taking these medicines if you haven't already. Please contact your care team if you have questions.     Abatacept 125 MG/ML Soaj auto-injector   Commonly known as:  ORENCIA   Stopped by:  Maurilio Hayes MD                Where to get your medicines      These medications were sent to [facility] [place], [place] - [address]   [address] [facility] [address]     Phone:  [phone]     folic acid 1 MG tablet    methotrexate sodium 2.5 MG Tabs    predniSONE 5 MG tablet                Primary Care Provider Office Phone # Fax #    Talia Mejía [phone]       [address] [address]        Equal Access to Services     RAMEZ MARIN AH: judson Gutierrez  atif laenjennifer beyyarely berg ah. [facility] [phone].    ATENCIÓN: Si padmaja salas, tiene a uribe disposición servicios gratuitos de asistencia lingüística. Osiris al [phone].    We comply with applicable federal civil rights laws and [place] laws. We do not discriminate on the basis of race, color, national origin, age, disability, sex, sexual orientation, or gender identity.            Thank you!     Thank you for choosing [facility]  for your care. Our goal is always to provide you with excellent care. Hearing back from our patients is one way we can continue to improve our services. Please take a few minutes to complete the written survey that you may receive in the mail after your visit with us. Thank you!             Your Updated Medication List - Protect others around you: Learn how to safely use, store and throw away your medicines at www.disposemymeds.org.          This list is accurate as of 2/26/18  8:44 AM.  Always use your most recent med list.                   Brand Name Dispense Instructions for use Diagnosis    acetaminophen 325 MG tablet    TYLENOL    100 tablet    650 mg po QID and BId PRN    Fracture of left shoulder, closed, initial encounter, Closed fracture of distal ends of left radius and ulna, initial encounter       aspirin 81 MG tablet     42 tablet    Take 1 tablet (81 mg) by mouth daily    Fracture of left shoulder, closed, initial encounter, Closed fracture of distal ends of left radius and ulna, initial encounter       folic acid 1 MG tablet    FOLVITE    100 tablet    Take 1 tablet (1 mg) by mouth daily    Rheumatoid arthritis of multiple sites without rheumatoid factor (H)       hydrOXYzine 25 MG tablet    ATARAX    36 tablet    Take 1 tablet (25 mg) by mouth every 4 hours as needed for itching (and nausea)        LEVOTHYROXINE SODIUM PO      Take 137 mcg by mouth daily        methotrexate sodium 2.5 MG Tabs     32  tablet    10mg (4 tabs) once weekly x1 week, then increase by 2.5mg (1 tab) each week until taking the goal weekly dose of 20mg (8 tabs) once weekly    Rheumatoid arthritis of multiple sites without rheumatoid factor (H)       oxyCODONE-acetaminophen 5-325 MG per tablet    PERCOCET    38 tablet    Take 1-2 tablets by mouth every 4 hours as needed for pain (moderate to severe)        predniSONE 5 MG tablet    DELTASONE    90 tablet    Take 1 tablet (5 mg) by mouth daily    Rheumatoid arthritis of multiple sites without rheumatoid factor (H)       VITAMIN D (CHOLECALCIFEROL) PO      Take by mouth daily

## 2018-02-26 NOTE — PROGRESS NOTES
Rheumatology Clinic Visit      Jackie Maguire MRN# [id number]   YOB: 1955 Age: 62 year old      Date of visit: 2/26/18   PCP: Dr. Talia Mejía at [facility]    Chief Complaint   Patient presents with:  Consult: RA, patient states she has tried several medications but nothing has worked. Prednisone 5 mg a day.       Assessment and Plan     1. Rheumatoid Arthritis (CCP low positive, then negative): From record review: dx'd 2012; has followed with [facility] Cano, and [facility]; hx of +DULCE, +dsDNA, CCP low positive then negative; hx of psoriasis; hx of C-spine fusion; initial presentation of pain/stiff/swollen joints involving the knees, feet, ankles, neck, back.  Previously on methotrexate (ineffective per patient but from record review it was effective and reduced only when biologic DMARDs were being added), leflunomide (mouth sores), HCQ (ineffective), azathioprine (itching), Humira (ineffective at q7day and q14 days dosings), Remicade (syncopes).  Sulfa allergy.  Currently on prednisone 5mg daily.  Active symmetric synovitis on exam today.  Start MTX given that she responded to it in the past.  Reportedly all biologic DMARDs are too expensive, and she makes too much money to receive free medication.   - Start Methotrexate 10mg once weekly for 1 week, then increase by 2.5mg each week until taking 20mg once weekly; then continue 20mg once weekly thereafter.  - Start folic acid 1mg daily  - Continue prednisone 5mg daily  - Labs today: CBC, Creatinine, Hepatic panel, ESR, CRP, hepatitis B/C  - Labs monthly ×2 months: CBC, creatinine, hepatic panel  - Labs 2-3 days prior to the next rheumatology clinic visit: CBC, Creatinine, Hepatic Panel, ESR, CRP    # Methotrexate Risks and Benefits: The risks and benefits of methotrexate were discussed in detail and the patient verbalized understanding.  The risks discussed include, but are not limited to, the risk for hypersensitivity, anaphylaxis, anaphylactoid  reactions, infections, bone marrow suppression, renal toxicity, hepatotoxicity, pulmonary toxicity, malignancy, impaired fertility, GI upset, alopecia, and oral and nasal sores.  Folic acid supplementation is recommended during methotrexate therapy to help prevent some of the side effects. Pregnancy prevention and planning was discussed; it is recommended that women of childbearing potential use reliable contraception during therapy.  The risks of taking both methotrexate and alcohol were reviewed; complete alcohol avoidance was discussed.  Routine laboratory monitoring is required during methotrexate therapy. Taking MTX once weekly, all within a 24 hour period was stressed and the patient verbalized this instruction back to me.  I encouraged reviewing the package insert and asking any questions about the medication.    # Prednisone Risks and Benefits: The risks and benefits of prednisone were discussed in detail and the patient verbalized understanding.  The risks discussed include, but are not limited to, weight gain, fluid retention, impaired wound healing, hyperglycemia, adrenal suppression, GI upset, peptic ulcer, hepatotoxicity, aseptic necrosis of the femoral and humeral heads, osteoporosis, myopathy, tendon rupture (particularly Achilles tendon), ocular changes including an increased intraocular pressure.  I encouraged reviewing the package insert and asking any questions about the medication.      2. History of Positive DULCE and dsDNA: with inflammatory arthritis, will re-evaluate with labs as noted below.  - Labs today: DULCE, ALEXY, dsDNA, CBC, CMP    Ms. Maguire verbalized agreement with and understanding of the rational for the diagnosis and treatment plan.  All questions were answered to best of my ability and the patient's satisfaction. Ms. Maguire was advised to contact the clinic with any questions that may arise after the clinic visit.      Thank you for involving me in the care of the patient    Return  to clinic: 3 months      HPI   Jackie Maguire is a 62 year old female with a past medical history significant for hyperlipidemia, hypothyroidism, history of melanoma, history of humerus fracture, osteoporosis, psoriasis, and rheumatoid arthritis who presents for follow-up of rheumatoid arthritis. Note that this is Ms. Maguire's first clinic visit with me.      10/12/2017 rheumatology clinic note by Dr. Cabrera Chinchilla documents that the patient has rheumatoid arthritis with multiple treatment failures.  He discusses Orencia and prednisone 5 mg daily.    A consult note from Dr. Wendy James documents that the patient is a 61-year-old female with positive DULCE, positive dsDNA, positive CCP (low positive in 2012, then negative), psoriasis, fusion of some of the cervical facet levels.  Initially evaluated 9/2012 for history of acute onset swollen joints, stiffness; joints affected: Knees, feet, hands, ankles, neck, back.  No preceding illness or trauma.  Failed methotrexate (ineffective), leflunomide (mouth sores), azathioprine (itching), Humira (ineffective at q7day and q14 days dosings).  Hydroxychloroquine and prednisone 10 mg daily with some improvement.    Additional chart review shows that MTX was effective and only reduced when a biologic DMARD was being added.     Today, Ms. Maguire reports that she has RA, first thought to be PsA because of psoriasis that has since used a steroid cream that resolved the psoriasis.  Has tried many medications as noted above.  She also notes Remicade that caused syncope.  Current joints involved: everything.  Cannot walk very far because of her knees.  If on her feet too long then her feet hurts and her back hurts too. Reduced  strength.  Currently on prednisone 5mg daily that is helpful; was on higher doses in the past that was effective. Recently tried for Orencia but could not get funding for either SQ or IV.  HCQ was stopped because of $100 copay; per patient it was  ineffective.    Denies fevers, chills, nausea, vomiting, constipation, diarrhea. No abdominal pain. No chest pain/pressure, palpitations, or shortness of breath. No LE swelling. No neck pain. No oral or nasal sores.  No rash. No sicca symptoms. No photosensitivity or photophobia. No eye pain or redness. No history of inflammatory eye disease.  No history of DVT or pulmonary embolism; one still birth, 3 grown children.  No history of serositis.  No history of Raynaud's Phenomenon.  No seizure history.  No known renal disorder.      Tobacco: none  EtOH: none  Drugs: none    ROS   GEN: No fevers, chills, night sweats, or weight change  SKIN: No itching, rashes, sores  HEENT: No oral or nasal ulcers.  CV: No chest pain, pressure, palpitations, or dyspnea on exertion.  PULM: No SOB, wheeze, cough.  GI: No nausea, vomiting, constipation, diarrhea. No blood in stool. No abdominal pain.  : No blood in urine.  MSK: See HPI.  NEURO: No numbness, tingling, or weakness.  ENDO: No heat/cold intolerance.  EXT: No LE swelling  PSYCH: Negative    Active Problem List     Patient Active Problem List   Diagnosis     Hypothyroidism     Hyperlipidemia LDL goal <160     Inflammatory arthritis     Fracture, pelvis closed (H)     Malignant melanoma of skin (H)     Distal radius fracture     Closed fracture of part of humerus     Proximal humerus fracture     Aftercare for healing traumatic fracture of upper arm     Osteopetrosis     Cervical dystonia     Dystonia, torsion, fragments of     Psoriasis     Rheumatoid arthritis of multiple sites without rheumatoid factor (H)     Past Medical History     Past Medical History:   Diagnosis Date     Hyperlipidemia LDL goal <160 1/5/2011     Hypothyroidism 10/1/2010     Inflammatory arthritis 1/20/2011     Past Surgical History     Past Surgical History:   Procedure Laterality Date     APPENDECTOMY       ARTHRODESIS TOE(S) Left 8/17/2017    Procedure: ARTHRODESIS TOE(S);  Left foot 1st  metatarsophalangeal realignment fusion, 2nd & 3rd digital corrections;  Surgeon: Jason Lamar DPM;  Location: [facility]     OPEN REDUCTION INTERNAL FIXATION HUMERUS PROXIMAL Left 9/30/2016    Procedure: OPEN REDUCTION INTERNAL FIXATION HUMERUS PROXIMAL;  Surgeon: Charly Bernal MD;  Location: [facility]     Allergy     Allergies   Allergen Reactions     Azathioprine Itching     Leflunomide Other (See Comments)     Mouth sores     Compazine Anxiety     Restless, anxious     Prochlorperazine Anxiety     Restless, anxious     Sulfa Drugs Rash     Current Medication List     Current Outpatient Prescriptions   Medication Sig     predniSONE (DELTASONE) 1 MG tablet Take 5 tablets (5 mg) by mouth daily     VITAMIN D, CHOLECALCIFEROL, PO Take by mouth daily     LEVOTHYROXINE SODIUM PO Take 137 mcg by mouth daily      Abatacept 125 MG/ML SOAJ Inject 125 mg Subcutaneous every 7 days (Patient not taking: Reported on 2/26/2018)     oxyCODONE-acetaminophen (PERCOCET) 5-325 MG per tablet Take 1-2 tablets by mouth every 4 hours as needed for pain (moderate to severe) (Patient not taking: Reported on 2/26/2018)     hydrOXYzine (ATARAX) 25 MG tablet Take 1 tablet (25 mg) by mouth every 4 hours as needed for itching (and nausea) (Patient not taking: Reported on 2/26/2018)     acetaminophen (TYLENOL) 325 MG tablet 650 mg po QID and BId PRN (Patient not taking: Reported on 2/26/2018)     aspirin 81 MG tablet Take 1 tablet (81 mg) by mouth daily (Patient not taking: Reported on 2/26/2018)     No current facility-administered medications for this visit.          Social History   See HPI    Family History     Family History   Problem Relation Age of Onset     CANCER Father        Physical Exam     Temp Readings from Last 3 Encounters:   08/17/17 98  F (36.7  C) (Oral)   07/13/17 98.1  F (36.7  C)   10/17/16 98  F (36.7  C)     BP Readings from Last 5 Encounters:   02/26/18 135/86   10/12/17 135/79   08/17/17 (!) (P) 155/97   07/13/17  "132/79   10/17/16 146/88     Pulse Readings from Last 1 Encounters:   02/26/18 86     Resp Readings from Last 1 Encounters:   08/17/17 16     Estimated body mass index is 29.21 kg/(m^2) as calculated from the following:    Height as of this encounter: 1.676 m (5' 6\").    Weight as of this encounter: 82.1 kg (181 lb).    GEN: NAD  HEENT: MMM. No oral lesions.  Anicteric, noninjected sclera.  Eyes appear to have good moisture by gross examination.  CV: S1, S2. RRR. No m/r/g.  PULM: CTA bilaterally. No w/c.  ABD: +BS.  MSK: Synovial swelling and tenderness palpation of the bilateral second-fifth MCPs.  Tenderness palpation with synovial swelling of the bilateral second PIPs.  Third-fifth PIPs tender to palpation but without swelling.  Wrists, elbows, and shoulders mildly tender to palpation but without swelling.  Hips nontender to palpation.  Knees, ankles, and MTPs without swelling or tenderness to palpation.   NEURO: UE and LE strengths 5/5 and equal bilaterally; except for  strength that is reduced bilaterally  SKIN: No rash  EXT: No LE edema  PSYCH: Alert. Appropriate.    Labs / Imaging (select studies)     RF/CCP  Recent Labs   Lab Test  09/29/10   0938   CYCLICCITPEP  <2 Interpretation:  Negative   RHF  12     DULCE  Recent Labs   Lab Test  09/29/10   0938   FALLON  <1.0 Interpretation:  Negative     CBC  Recent Labs   Lab Test  10/04/16   0745  10/01/16   0625  09/30/16   0640  09/29/16   0626  09/28/16   0630  09/27/16   0605  09/26/16   0615   WBC   --    --   5.3  6.4  8.6  7.1  8.2   RBC   --    --   4.07  4.14  4.33  3.90  3.76*   HGB  11.0*  10.5*  12.4  12.5  13.4  12.1  11.6*   HCT   --    --   38.4  39.0  39.8  37.2  35.8   MCV   --    --   94  94  92  95  95   RDW   --    --   12.5  12.6  12.4  12.4  12.4   PLT   --    --   185  173  91*  136*  165   MCH   --    --   30.5  30.2  30.9  31.0  30.9   MCHC   --    --   32.3  32.1  33.7  32.5  32.4   NEUTROPHIL   --    --    --    --   67.0  69.5  75.0 "   LYMPH   --    --    --    --   10.8  11.2  10.4   MONOCYTE   --    --    --    --   15.2  13.2  12.0   EOSINOPHIL   --    --    --    --   6.2  4.9  2.2   BASOPHIL   --    --    --    --   0.2  0.6  0.2   ANEU   --    --    --    --   5.8  5.0  6.1   ALYM   --    --    --    --   0.9  0.8  0.9   KAREN   --    --    --    --   1.3  0.9  1.0   AEOS   --    --    --    --   0.5  0.4  0.2   ABAS   --    --    --    --   0.0  0.0  0.0     CMP  Recent Labs   Lab Test  10/01/16   0625 09/27/16   0605 09/26/16   0615  09/25/16   1700  04/13/13   1530  04/02/13   0955  10/12/11   0858   09/29/10   0938   NA   --   140  140  139  138  140   --    < >   --    POTASSIUM   --   3.9  3.8  3.9  4.1  4.1   --    < >   --    CHLORIDE   --   106  107  106  100  104   --    < >   --    CO2   --   29  26  25  27  26   --    < >   --    ANIONGAP   --   5  7  8  10  10   --    < >   --    GLC  112*  100*  90  133*  119*  101*   --    < >   --    BUN   --   11  14  18  15  14   --    < >   --    CR   --   0.65  0.66  0.64  0.81  0.63  0.78   < >  0.80   GFRESTIMATED   --   >90  Non  GFR Calc    >90  Non  GFR Calc    >90  Non  GFR Calc    73  >90  77   < >  75   GFRESTBLACK   --   >90   GFR Calc    >90   GFR Calc    >90   GFR Calc    89  >90  >90   < >  >90   HAZEL   --   8.7  8.5  9.1  9.2  9.1   --    < >   --    BILITOTAL   --    --   0.4  0.3   --   0.5   --    --   0.3   ALBUMIN   --    --   3.0*  3.6   --   4.0   --    --   4.5   PROTTOTAL   --    --   5.9*  6.6*   --   7.3   --    --   7.7   ALKPHOS   --    --   63  73   --   100   --    --   97   AST   --    --   18  20   --   28  28   < >  31   ALT   --    --   21  26   --   31  26   < >  22    < > = values in this interval not displayed.     Calcium/VitaminD  Recent Labs   Lab Test  10/05/16   0831  09/27/16   0605  09/26/16   0615  09/25/16   1700   HAZEL   --   8.7  8.5  9.1   VITDT   29   --    --    --      ESR/CRP  Recent Labs   Lab Test  04/13/13   1530  04/02/13   0955  09/29/10   0938   SED  10  13  17   CRP   --   19.0*  9.9*     TSH/T4  Recent Labs   Lab Test  10/05/16   0831  09/22/11   0958  03/01/11   0953  01/04/11   0940  11/09/10   1050  09/29/10   0940   TSH  4.58*  0.20*  0.55  9.99*  56.50*   --    T4   --    --    --   1.21  0.79  0.15*     Recent Labs   Lab Test  07/13/11   1318   HCVAB  Negative     Tuberculosis Screening  Recent Labs   Lab Test  07/13/11   1318   TBRSLT  Negative   TBAGN  0.00     UA  Recent Labs   Lab Test  09/25/16   2120  10/21/12   1807   COLOR  Yellow  Yellow   APPEARANCE  Clear  Clear   URINEGLC  Negative  Negative   URINEBILI  Negative  Negative   SG  1.020  1.026   URINEPH  6.0  6.0   PROTEIN  30*  30*   NITRITE  Negative  Negative   UBLD  Negative  Negative   LEUKEST  Negative  Trace*   WBCU  <1  2   RBCU  1  0   MUCOUS  Present*  Present*     Urine Microscopic  Recent Labs   Lab Test  09/25/16   2120  10/21/12   1807   WBCU  <1  2   RBCU  1  0   MUCOUS  Present*  Present*     Immunization History     Immunization History   Administered Date(s) Administered     Influenza Vaccine IM 3yrs+ 4 Valent IIV4 10/01/2016          Chart documentation done in part with Dragon Voice recognition Software. Although reviewed after completion, some word and grammatical error may remain.    Maurilio Hayes MD

## 2018-02-26 NOTE — PATIENT INSTRUCTIONS
Dr. Hayes s Rheumatology Clinics  Locations Clinic Hours Telephone Number     [facility] [place]  [address]  [place] [address]     Wednesday: 7:20AM - 4:00PM  Thursday:     7:20AM - 4:00PM     Friday:          7:20AM - 11:00AM       To schedule an appointment with  Dr. Hayes,  please contact  Specialty [phone]       [facility] [place]  [address]  [place] [id number]       Monday:       7:20AM - 4:00PM        [facility] [place]  [address]  [place] [address]       Tuesday:      7:20AM - 4:00PM          Thank you!    Elizabeth Haley CMA

## 2018-02-26 NOTE — NURSING NOTE
"Chief Complaint   Patient presents with     Consult     RA, patient states she has tried several medications but nothing has worked. Prednisone 5 mg a day.        Initial /86 (BP Location: Left arm, Patient Position: Chair, Cuff Size: Adult Regular)  Pulse 86  Ht 1.676 m (5' 6\")  Wt 82.1 kg (181 lb)  SpO2 96%  BMI 29.21 kg/m2 Estimated body mass index is 29.21 kg/(m^2) as calculated from the following:    Height as of this encounter: 1.676 m (5' 6\").    Weight as of this encounter: 82.1 kg (181 lb).  BP completed using cuff size: regular         RAPID3 (0-30) Cumulative Score  9.4          RAPID3 Weighted Score (divide #4 by 3 and that is the weighted score)  3.13         "

## 2018-02-27 LAB
ANA SER QL IF: NEGATIVE
BASOPHILS # BLD AUTO: 0.1 10E9/L (ref 0–0.2)
BASOPHILS NFR BLD AUTO: 0.9 %
DIFFERENTIAL METHOD BLD: ABNORMAL
ENA RNP IGG SER IA-ACNC: 0.2 AI (ref 0–0.9)
ENA SCL70 IGG SER IA-ACNC: <0.2 AI (ref 0–0.9)
ENA SM IGG SER-ACNC: <0.2 AI (ref 0–0.9)
ENA SS-A IGG SER IA-ACNC: <0.2 AI (ref 0–0.9)
ENA SS-B IGG SER IA-ACNC: <0.2 AI (ref 0–0.9)
EOSINOPHIL # BLD AUTO: 0.3 10E9/L (ref 0–0.7)
EOSINOPHIL NFR BLD AUTO: 2.4 %
ERYTHROCYTE [DISTWIDTH] IN BLOOD BY AUTOMATED COUNT: 14 % (ref 10–15)
HCT VFR BLD AUTO: 47.5 % (ref 35–47)
HGB BLD-MCNC: 15.2 G/DL (ref 11.7–15.7)
LYMPHOCYTES # BLD AUTO: 1.1 10E9/L (ref 0.8–5.3)
LYMPHOCYTES NFR BLD AUTO: 10.5 %
MCH RBC QN AUTO: 30.5 PG (ref 26.5–33)
MCHC RBC AUTO-ENTMCNC: 32 G/DL (ref 31.5–36.5)
MCV RBC AUTO: 95 FL (ref 78–100)
MONOCYTES # BLD AUTO: 0.9 10E9/L (ref 0–1.3)
MONOCYTES NFR BLD AUTO: 8.3 %
NEUTROPHILS # BLD AUTO: 8.3 10E9/L (ref 1.6–8.3)
NEUTROPHILS NFR BLD AUTO: 77.9 %
PLATELET # BLD AUTO: 237 10E9/L (ref 150–450)
RBC # BLD AUTO: 4.98 10E12/L (ref 3.8–5.2)
WBC # BLD AUTO: 10.7 10E9/L (ref 4–11)

## 2018-02-28 LAB — DSDNA AB SER-ACNC: 35 IU/ML

## 2018-03-01 NOTE — PROGRESS NOTES
Rheumatology team: Please call to notify Ms. Maguire that her labs show a persistently positive double stranded DNA antibody, but other labs were okay and therefore less likely to have a connective tissue disease in addition to rheumatoid arthritis.  No change to management based on these labs.    Maurilio Hayes MD  3/1/2018 12:54 PM

## 2018-03-29 DIAGNOSIS — M06.09 RHEUMATOID ARTHRITIS OF MULTIPLE SITES WITHOUT RHEUMATOID FACTOR (H): ICD-10-CM

## 2018-03-29 LAB
ALBUMIN SERPL-MCNC: 3.8 G/DL (ref 3.4–5)
ALP SERPL-CCNC: 72 U/L (ref 40–150)
ALT SERPL W P-5'-P-CCNC: 22 U/L (ref 0–50)
AST SERPL W P-5'-P-CCNC: 21 U/L (ref 0–45)
BASOPHILS # BLD AUTO: 0 10E9/L (ref 0–0.2)
BASOPHILS NFR BLD AUTO: 0.4 %
BILIRUB DIRECT SERPL-MCNC: <0.1 MG/DL (ref 0–0.2)
BILIRUB SERPL-MCNC: 0.6 MG/DL (ref 0.2–1.3)
CREAT SERPL-MCNC: 0.81 MG/DL (ref 0.52–1.04)
DIFFERENTIAL METHOD BLD: NORMAL
EOSINOPHIL # BLD AUTO: 0.2 10E9/L (ref 0–0.7)
EOSINOPHIL NFR BLD AUTO: 2.9 %
ERYTHROCYTE [DISTWIDTH] IN BLOOD BY AUTOMATED COUNT: 13.9 % (ref 10–15)
GFR SERPL CREATININE-BSD FRML MDRD: 72 ML/MIN/1.7M2
HCT VFR BLD AUTO: 45.2 % (ref 35–47)
HGB BLD-MCNC: 14.8 G/DL (ref 11.7–15.7)
LYMPHOCYTES # BLD AUTO: 1.2 10E9/L (ref 0.8–5.3)
LYMPHOCYTES NFR BLD AUTO: 18.2 %
MCH RBC QN AUTO: 30.9 PG (ref 26.5–33)
MCHC RBC AUTO-ENTMCNC: 32.7 G/DL (ref 31.5–36.5)
MCV RBC AUTO: 94 FL (ref 78–100)
MONOCYTES # BLD AUTO: 0.7 10E9/L (ref 0–1.3)
MONOCYTES NFR BLD AUTO: 10.9 %
NEUTROPHILS # BLD AUTO: 4.6 10E9/L (ref 1.6–8.3)
NEUTROPHILS NFR BLD AUTO: 67.6 %
PLATELET # BLD AUTO: 207 10E9/L (ref 150–450)
PROT SERPL-MCNC: 7 G/DL (ref 6.8–8.8)
RBC # BLD AUTO: 4.79 10E12/L (ref 3.8–5.2)
WBC # BLD AUTO: 6.8 10E9/L (ref 4–11)

## 2018-03-29 PROCEDURE — 36415 COLL VENOUS BLD VENIPUNCTURE: CPT | Performed by: INTERNAL MEDICINE

## 2018-03-29 PROCEDURE — 80076 HEPATIC FUNCTION PANEL: CPT | Performed by: INTERNAL MEDICINE

## 2018-03-29 PROCEDURE — 82565 ASSAY OF CREATININE: CPT | Performed by: INTERNAL MEDICINE

## 2018-03-29 PROCEDURE — 85025 COMPLETE CBC W/AUTO DIFF WBC: CPT | Performed by: INTERNAL MEDICINE

## 2018-03-29 NOTE — LETTER
[address] [place] [address]    April 5, 2018    Jackie Maguire  [address] [place] [phone]          Dear Jackie,    Your labs are normal.     Please let me know if you have any questions.     Enclosed is a copy of your results.     Results for orders placed or performed in visit on 03/29/18   CBC with platelets differential   Result Value Ref Range    WBC 6.8 4.0 - 11.0 10e9/L    RBC Count 4.79 3.8 - 5.2 10e12/L    Hemoglobin 14.8 11.7 - 15.7 g/dL    Hematocrit 45.2 35.0 - 47.0 %    MCV 94 78 - 100 fl    MCH 30.9 26.5 - 33.0 pg    MCHC 32.7 31.5 - 36.5 g/dL    RDW 13.9 10.0 - 15.0 %    Platelet Count 207 150 - 450 10e9/L    Diff Method Automated Method     % Neutrophils 67.6 %    % Lymphocytes 18.2 %    % Monocytes 10.9 %    % Eosinophils 2.9 %    % Basophils 0.4 %    Absolute Neutrophil 4.6 1.6 - 8.3 10e9/L    Absolute Lymphocytes 1.2 0.8 - 5.3 10e9/L    Absolute Monocytes 0.7 0.0 - 1.3 10e9/L    Absolute Eosinophils 0.2 0.0 - 0.7 10e9/L    Absolute Basophils 0.0 0.0 - 0.2 10e9/L   Creatinine   Result Value Ref Range    Creatinine 0.81 0.52 - 1.04 mg/dL    GFR Estimate 72 >60 mL/min/1.7m2    GFR Estimate If Black 87 >60 mL/min/1.7m2   Hepatic panel   Result Value Ref Range    Bilirubin Direct <0.1 0.0 - 0.2 mg/dL    Bilirubin Total 0.6 0.2 - 1.3 mg/dL    Albumin 3.8 3.4 - 5.0 g/dL    Protein Total 7.0 6.8 - 8.8 g/dL    Alkaline Phosphatase 72 40 - 150 U/L    ALT 22 0 - 50 U/L    AST 21 0 - 45 U/L       If you have any questions or concerns, please call myself or my nurse at [phone].      Sincerely,        Maurilio Hayes MD/singh

## 2018-04-05 NOTE — PROGRESS NOTES
"Please mail Ms. Maguire her results with the following message.    \"Ms. Maguire,    Your labs are normal.    Please let me know if you have any questions.    Sincerely,  Maurilio Hayes MD  4/5/2018 12:28 AM\"  "

## 2018-04-25 DIAGNOSIS — M06.09 RHEUMATOID ARTHRITIS OF MULTIPLE SITES WITHOUT RHEUMATOID FACTOR (H): ICD-10-CM

## 2018-04-25 LAB
ALBUMIN SERPL-MCNC: 3.8 G/DL (ref 3.4–5)
ALP SERPL-CCNC: 83 U/L (ref 40–150)
ALT SERPL W P-5'-P-CCNC: 28 U/L (ref 0–50)
AST SERPL W P-5'-P-CCNC: 23 U/L (ref 0–45)
BASOPHILS # BLD AUTO: 0.1 10E9/L (ref 0–0.2)
BASOPHILS NFR BLD AUTO: 1 %
BILIRUB DIRECT SERPL-MCNC: <0.1 MG/DL (ref 0–0.2)
BILIRUB SERPL-MCNC: 0.5 MG/DL (ref 0.2–1.3)
CREAT SERPL-MCNC: 0.81 MG/DL (ref 0.52–1.04)
DIFFERENTIAL METHOD BLD: NORMAL
EOSINOPHIL # BLD AUTO: 0.2 10E9/L (ref 0–0.7)
EOSINOPHIL NFR BLD AUTO: 3.6 %
ERYTHROCYTE [DISTWIDTH] IN BLOOD BY AUTOMATED COUNT: 14.5 % (ref 10–15)
GFR SERPL CREATININE-BSD FRML MDRD: 72 ML/MIN/1.7M2
HCT VFR BLD AUTO: 44.8 % (ref 35–47)
HGB BLD-MCNC: 14.7 G/DL (ref 11.7–15.7)
LYMPHOCYTES # BLD AUTO: 1.2 10E9/L (ref 0.8–5.3)
LYMPHOCYTES NFR BLD AUTO: 19.9 %
MCH RBC QN AUTO: 31.4 PG (ref 26.5–33)
MCHC RBC AUTO-ENTMCNC: 32.8 G/DL (ref 31.5–36.5)
MCV RBC AUTO: 96 FL (ref 78–100)
MONOCYTES # BLD AUTO: 0.7 10E9/L (ref 0–1.3)
MONOCYTES NFR BLD AUTO: 11.6 %
NEUTROPHILS # BLD AUTO: 3.9 10E9/L (ref 1.6–8.3)
NEUTROPHILS NFR BLD AUTO: 63.9 %
PLATELET # BLD AUTO: 208 10E9/L (ref 150–450)
PROT SERPL-MCNC: 6.9 G/DL (ref 6.8–8.8)
RBC # BLD AUTO: 4.68 10E12/L (ref 3.8–5.2)
WBC # BLD AUTO: 6 10E9/L (ref 4–11)

## 2018-04-25 PROCEDURE — 36415 COLL VENOUS BLD VENIPUNCTURE: CPT | Performed by: INTERNAL MEDICINE

## 2018-04-25 PROCEDURE — 82565 ASSAY OF CREATININE: CPT | Performed by: INTERNAL MEDICINE

## 2018-04-25 PROCEDURE — 80076 HEPATIC FUNCTION PANEL: CPT | Performed by: INTERNAL MEDICINE

## 2018-04-25 PROCEDURE — 85025 COMPLETE CBC W/AUTO DIFF WBC: CPT | Performed by: INTERNAL MEDICINE

## 2018-04-25 NOTE — LETTER
[facility]  [address] [place] [address]. [place] [address]    May 1, 2018    Jackie Maguire  [address] [place] [phone]          Dear Ms. Maguire,     Your labs are normal.     Enclosed is a copy of your results.     Results for orders placed or performed in visit on 04/25/18   CBC with platelets differential   Result Value Ref Range    WBC 6.0 4.0 - 11.0 10e9/L    RBC Count 4.68 3.8 - 5.2 10e12/L    Hemoglobin 14.7 11.7 - 15.7 g/dL    Hematocrit 44.8 35.0 - 47.0 %    MCV 96 78 - 100 fl    MCH 31.4 26.5 - 33.0 pg    MCHC 32.8 31.5 - 36.5 g/dL    RDW 14.5 10.0 - 15.0 %    Platelet Count 208 150 - 450 10e9/L    Diff Method Automated Method     % Neutrophils 63.9 %    % Lymphocytes 19.9 %    % Monocytes 11.6 %    % Eosinophils 3.6 %    % Basophils 1.0 %    Absolute Neutrophil 3.9 1.6 - 8.3 10e9/L    Absolute Lymphocytes 1.2 0.8 - 5.3 10e9/L    Absolute Monocytes 0.7 0.0 - 1.3 10e9/L    Absolute Eosinophils 0.2 0.0 - 0.7 10e9/L    Absolute Basophils 0.1 0.0 - 0.2 10e9/L   Creatinine   Result Value Ref Range    Creatinine 0.81 0.52 - 1.04 mg/dL    GFR Estimate 72 >60 mL/min/1.7m2    GFR Estimate If Black 87 >60 mL/min/1.7m2   Hepatic panel   Result Value Ref Range    Bilirubin Direct <0.1 0.0 - 0.2 mg/dL    Bilirubin Total 0.5 0.2 - 1.3 mg/dL    Albumin 3.8 3.4 - 5.0 g/dL    Protein Total 6.9 6.8 - 8.8 g/dL    Alkaline Phosphatase 83 40 - 150 U/L    ALT 28 0 - 50 U/L    AST 23 0 - 45 U/L       If you have any questions or concerns, please call myself or my nurse at [phone].      Sincerely,        Maurilio Hayes MD /pb

## 2018-04-30 NOTE — PROGRESS NOTES
"Please mail Ms. Maguire her results with the following message.    \"Ms. Maguire,    Your labs are normal.    Please let me know if you have any questions.    Sincerely,  Maurilio Hayes MD  4/30/2018 5:51 PM\"  "

## 2018-05-07 ENCOUNTER — OFFICE VISIT - HEALTHEAST (OUTPATIENT)
Dept: FAMILY MEDICINE | Facility: CLINIC | Age: 63
End: 2018-05-07

## 2018-05-07 DIAGNOSIS — R22.0 FACIAL SWELLING: ICD-10-CM

## 2018-05-07 ASSESSMENT — MIFFLIN-ST. JEOR: SCORE: 1378.69

## 2018-05-30 DIAGNOSIS — M06.09 RHEUMATOID ARTHRITIS OF MULTIPLE SITES WITHOUT RHEUMATOID FACTOR (H): ICD-10-CM

## 2018-05-30 LAB
ALBUMIN SERPL-MCNC: 3.7 G/DL (ref 3.4–5)
ALP SERPL-CCNC: 78 U/L (ref 40–150)
ALT SERPL W P-5'-P-CCNC: 26 U/L (ref 0–50)
AST SERPL W P-5'-P-CCNC: 19 U/L (ref 0–45)
BASOPHILS # BLD AUTO: 0 10E9/L (ref 0–0.2)
BASOPHILS NFR BLD AUTO: 0.7 %
BILIRUB DIRECT SERPL-MCNC: 0.1 MG/DL (ref 0–0.2)
BILIRUB SERPL-MCNC: 0.6 MG/DL (ref 0.2–1.3)
CREAT SERPL-MCNC: 0.82 MG/DL (ref 0.52–1.04)
CRP SERPL-MCNC: <2.9 MG/L (ref 0–8)
DIFFERENTIAL METHOD BLD: NORMAL
EOSINOPHIL # BLD AUTO: 0.3 10E9/L (ref 0–0.7)
EOSINOPHIL NFR BLD AUTO: 4.1 %
ERYTHROCYTE [DISTWIDTH] IN BLOOD BY AUTOMATED COUNT: 14.5 % (ref 10–15)
ERYTHROCYTE [SEDIMENTATION RATE] IN BLOOD BY WESTERGREN METHOD: 9 MM/H (ref 0–30)
GFR SERPL CREATININE-BSD FRML MDRD: 71 ML/MIN/1.7M2
HCT VFR BLD AUTO: 42.3 % (ref 35–47)
HGB BLD-MCNC: 14.1 G/DL (ref 11.7–15.7)
LYMPHOCYTES # BLD AUTO: 1.3 10E9/L (ref 0.8–5.3)
LYMPHOCYTES NFR BLD AUTO: 20.9 %
MCH RBC QN AUTO: 32.3 PG (ref 26.5–33)
MCHC RBC AUTO-ENTMCNC: 33.3 G/DL (ref 31.5–36.5)
MCV RBC AUTO: 97 FL (ref 78–100)
MONOCYTES # BLD AUTO: 0.7 10E9/L (ref 0–1.3)
MONOCYTES NFR BLD AUTO: 11.6 %
NEUTROPHILS # BLD AUTO: 3.8 10E9/L (ref 1.6–8.3)
NEUTROPHILS NFR BLD AUTO: 62.7 %
PLATELET # BLD AUTO: 204 10E9/L (ref 150–450)
PROT SERPL-MCNC: 6.9 G/DL (ref 6.8–8.8)
RBC # BLD AUTO: 4.36 10E12/L (ref 3.8–5.2)
WBC # BLD AUTO: 6 10E9/L (ref 4–11)

## 2018-05-30 PROCEDURE — 80076 HEPATIC FUNCTION PANEL: CPT | Performed by: INTERNAL MEDICINE

## 2018-05-30 PROCEDURE — 36415 COLL VENOUS BLD VENIPUNCTURE: CPT | Performed by: INTERNAL MEDICINE

## 2018-05-30 PROCEDURE — 85652 RBC SED RATE AUTOMATED: CPT | Performed by: INTERNAL MEDICINE

## 2018-05-30 PROCEDURE — 82565 ASSAY OF CREATININE: CPT | Performed by: INTERNAL MEDICINE

## 2018-05-30 PROCEDURE — 86140 C-REACTIVE PROTEIN: CPT | Performed by: INTERNAL MEDICINE

## 2018-05-30 PROCEDURE — 85025 COMPLETE CBC W/AUTO DIFF WBC: CPT | Performed by: INTERNAL MEDICINE

## 2018-06-11 ENCOUNTER — RECORDS - HEALTHEAST (OUTPATIENT)
Dept: ADMINISTRATIVE | Facility: OTHER | Age: 63
End: 2018-06-11

## 2018-06-11 ENCOUNTER — OFFICE VISIT (OUTPATIENT)
Dept: RHEUMATOLOGY | Facility: CLINIC | Age: 63
End: 2018-06-11
Payer: MEDICARE

## 2018-06-11 VITALS
WEIGHT: 174.2 LBS | SYSTOLIC BLOOD PRESSURE: 125 MMHG | BODY MASS INDEX: 28 KG/M2 | OXYGEN SATURATION: 98 % | RESPIRATION RATE: 16 BRPM | HEART RATE: 76 BPM | DIASTOLIC BLOOD PRESSURE: 85 MMHG | HEIGHT: 66 IN | TEMPERATURE: 98.4 F

## 2018-06-11 DIAGNOSIS — M06.09 RHEUMATOID ARTHRITIS OF MULTIPLE SITES WITHOUT RHEUMATOID FACTOR (H): Primary | ICD-10-CM

## 2018-06-11 DIAGNOSIS — Z79.899 HIGH RISK MEDICATION USE: ICD-10-CM

## 2018-06-11 PROCEDURE — 99213 OFFICE O/P EST LOW 20 MIN: CPT | Performed by: INTERNAL MEDICINE

## 2018-06-11 RX ORDER — METHOTREXATE 2.5 MG/1
20 TABLET ORAL WEEKLY
Qty: 96 TABLET | Refills: 1 | Status: SHIPPED | OUTPATIENT
Start: 2018-06-11 | End: 2018-11-05

## 2018-06-11 RX ORDER — PREDNISONE 2.5 MG/1
2.5 TABLET ORAL DAILY
Qty: 30 TABLET | Refills: 0 | Status: SHIPPED | OUTPATIENT
Start: 2018-06-11 | End: 2019-03-11

## 2018-06-11 NOTE — MR AVS SNAPSHOT
After Visit Summary   6/11/2018    Jackie Maguire    MRN: [id number]           Patient Information     Date Of Birth          1955        Visit Information        Provider Department      6/11/2018 10:40 AM Maurilio Hayes MD [facility]        Today's Diagnoses     Rheumatoid arthritis of multiple sites without rheumatoid factor (H)    -  1    High risk medication use          Care Instructions    Rheumatology    Dr. Maurilio Hayes         [facility]   (Monday)  [address]  [place] [address]       [facility]   (Tuesday)  [address] [place] [address]    [facility]   (Wed., Thurs., and Friday)  [address]  [place] [address]    Phone number: [phone]  Thank you for choosing [facility].  Elizabeth Haley CMA            Follow-ups after your visit        Follow-up notes from your care team     Return in about 4 months (around 10/11/2018).      Your next 10 appointments already scheduled     Sep 12, 2018  8:30 AM CDT   LAB with [facility]   [facility] ([facility])    [address] [address]   [phone]           Please do not eat 10-12 hours before your appointment if you are coming in fasting for labs on lipids, cholesterol, or glucose (sugar). This does not apply to pregnant women. Water, hot tea and black coffee (with nothing added) are okay. Do not drink other fluids, diet soda or chew gum.            Nov 05, 2018 11:40 AM CST   Return Visit with Maurilio Hayes MD   [facility] ([facility])    [address] [address]   [phone]              Future tests that were ordered for you today     Open Future Orders        Priority Expected Expires Ordered    CBC with platelets differential Routine 9/5/2018 10/9/2018 6/11/2018    Comprehensive metabolic panel Routine 9/5/2018 10/9/2018 6/11/2018    UA reflex to Microscopic and Culture  "Routine 2018 10/9/2018 2018    Protein  random urine with Creat Ratio Routine 2018 10/9/2018 2018            Who to contact     If you have questions or need follow up information about today's clinic visit or your schedule please contact [facility] directly at [phone].  Normal or non-critical lab and imaging results will be communicated to you by MyChart, letter or phone within 4 business days after the clinic has received the results. If you do not hear from us within 7 days, please contact the clinic through Fromographyhart or phone. If you have a critical or abnormal lab result, we will notify you by phone as soon as possible.  Submit refill requests through LUVHAN or call your pharmacy and they will forward the refill request to us. Please allow 3 business days for your refill to be completed.          Additional Information About Your Visit        LUVHAN Information     LUVHAN lets you send messages to your doctor, view your test results, renew your prescriptions, schedule appointments and more. To sign up, go to [email or web address] . Click on \"Log in\" on the left side of the screen, which will take you to the Welcome page. Then click on \"Sign up Now\" on the right side of the page.     You will be asked to enter the access code listed below, as well as some personal information. Please follow the directions to create your username and password.     Your access code is: [email or web address]  Expires: 2018 11:09 AM     Your access code will  in 90 days. If you need help or a new code, please call your [facility] clinic or [phone].        Care EveryWhere ID     This is your Care EveryWhere ID. This could be used by other organizations to access your [facility] medical records  [id number]        Your Vitals Were     Pulse Temperature Respirations Height Pulse Oximetry BMI (Body Mass Index)    76 98.4  F (36.9  C) (Oral) 16 1.676 m (5' 6\") 98% 28.12 kg/m2       Blood Pressure " from Last 3 Encounters:   06/11/18 125/85   02/26/18 135/86   10/12/17 135/79    Weight from Last 3 Encounters:   06/11/18 79 kg (174 lb 3.2 oz)   02/26/18 82.1 kg (181 lb)   10/12/17 85.3 kg (188 lb)                 Today's Medication Changes          These changes are accurate as of 6/11/18 11:15 AM.  If you have any questions, ask your nurse or doctor.               These medicines have changed or have updated prescriptions.        Dose/Directions    methotrexate sodium 2.5 MG Tabs   This may have changed:    - how much to take  - how to take this  - when to take this  - additional instructions   Used for:  Rheumatoid arthritis of multiple sites without rheumatoid factor (H), High risk medication use   Changed by:  Maurilio Hayes MD        Dose:  20 mg   Take 20 mg by mouth once a week . Take all 8 tablets on the same day of each week.   Quantity:  96 tablet   Refills:  1       predniSONE 2.5 MG tablet   Commonly known as:  DELTASONE   This may have changed:    - medication strength  - how much to take   Used for:  Rheumatoid arthritis of multiple sites without rheumatoid factor (H), High risk medication use   Changed by:  Maurilio Hayes MD        Dose:  2.5 mg   Take 1 tablet (2.5 mg) by mouth daily   Quantity:  30 tablet   Refills:  0            Where to get your medicines      These medications were sent to [facility] - [place], [place] - [address]  [address] [facility] [address]     Phone:  [phone]     methotrexate sodium 2.5 MG Tabs    predniSONE 2.5 MG tablet                Primary Care Provider Office Phone # Fax #    Talia GRIJALVA Alfonzo [phone]       [facility] [address] [place] [address]        Equal Access to Services     HARESH MARIN AH: Yany Dangelo, judson srivastava, melvin ornelas, yarely molina. So [facility] [phone].    ATENCIÓN: Si habla español, tiene a uribe disposición servicios  hieu de asistencia lingüística. Osiris lugo [phone].    We comply with applicable federal civil rights laws and [place] laws. We do not discriminate on the basis of race, color, national origin, age, disability, sex, sexual orientation, or gender identity.            Thank you!     Thank you for choosing [facility]  for your care. Our goal is always to provide you with excellent care. Hearing back from our patients is one way we can continue to improve our services. Please take a few minutes to complete the written survey that you may receive in the mail after your visit with us. Thank you!             Your Updated Medication List - Protect others around you: Learn how to safely use, store and throw away your medicines at www.disposemymeds.org.          This list is accurate as of 6/11/18 11:15 AM.  Always use your most recent med list.                   Brand Name Dispense Instructions for use Diagnosis    acetaminophen 325 MG tablet    TYLENOL    100 tablet    650 mg po QID and BId PRN    Fracture of left shoulder, closed, initial encounter, Closed fracture of distal ends of left radius and ulna, initial encounter       aspirin 81 MG tablet     42 tablet    Take 1 tablet (81 mg) by mouth daily    Fracture of left shoulder, closed, initial encounter, Closed fracture of distal ends of left radius and ulna, initial encounter       folic acid 1 MG tablet    FOLVITE    100 tablet    Take 1 tablet (1 mg) by mouth daily    Rheumatoid arthritis of multiple sites without rheumatoid factor (H)       hydrOXYzine 25 MG tablet    ATARAX    36 tablet    Take 1 tablet (25 mg) by mouth every 4 hours as needed for itching (and nausea)        LEVOTHYROXINE SODIUM PO      Take 137 mcg by mouth daily        methotrexate sodium 2.5 MG Tabs     96 tablet    Take 20 mg by mouth once a week . Take all 8 tablets on the same day of each week.    Rheumatoid arthritis of multiple sites without rheumatoid factor (H),  High risk medication use       oxyCODONE-acetaminophen 5-325 MG per tablet    PERCOCET    38 tablet    Take 1-2 tablets by mouth every 4 hours as needed for pain (moderate to severe)        predniSONE 2.5 MG tablet    DELTASONE    30 tablet    Take 1 tablet (2.5 mg) by mouth daily    Rheumatoid arthritis of multiple sites without rheumatoid factor (H), High risk medication use       VITAMIN D (CHOLECALCIFEROL) PO      Take by mouth daily

## 2018-06-11 NOTE — PROGRESS NOTES
Rheumatology Clinic Visit      Jackie Maguire MRN# [id number]   YOB: 1955 Age: 63 year old      Date of visit: 6/11/18   PCP: Dr. Talia Mejía at [facility]    Chief Complaint   Patient presents with:  Arthritis: RA, patients neck is painful and stiff, and knees also hurt      Assessment and Plan     1. Rheumatoid Arthritis (CCP low positive, then negative): From record review: dx'd 2012; has followed with [facility], and [facility]; hx of +DULCE, +dsDNA, CCP low positive then negative; hx of psoriasis; hx of C-spine fusion; initial presentation of pain/stiff/swollen joints involving the knees, feet, ankles, neck, back.  Previously on methotrexate (ineffective per patient but from record review it was effective and reduced only when biologic DMARDs were being added), leflunomide (mouth sores), HCQ (ineffective), azathioprine (itching), Humira (ineffective at q7day and q14 days dosings), Remicade (syncopes).  Sulfa allergy.  Currently on prednisone 5mg daily.  Active symmetric synovitis on exam at the last clinic visit that has improved significantly since starting methotrexate.  Continue methotrexate and taper off prednisone.  - Continue methotrexate 20 mg once weekly   - Continue folic acid 1mg daily  - Reduce prednisone to 2.5mg daily x30days, then stop  - Labs in 3 months: CBC, CMP, UA, Uprotein:creatinine    2. History of Positive DULCE and dsDNA: with inflammatory arthritis.  Symptoms fit better for seropositive rheumatoid arthritis.  See #1.  Labs as noted in #1.    Ms. Maguire verbalized agreement with and understanding of the rational for the diagnosis and treatment plan.  All questions were answered to best of my ability and the patient's satisfaction. Ms. Maguire was advised to contact the clinic with any questions that may arise after the clinic visit.      Thank you for involving me in the care of the patient    Return to clinic: 3-4 months      HPI   Jackie Maguire is a 63 year old  female with a past medical history significant for hyperlipidemia, hypothyroidism, history of melanoma, history of humerus fracture, osteoporosis, psoriasis, and rheumatoid arthritis who presents for follow-up of rheumatoid arthritis.     Previously reviewed hx:    10/12/2017 rheumatology clinic note by Dr. Cabrera Chinchilla documents that the patient has rheumatoid arthritis with multiple treatment failures.  He discusses Orencia and prednisone 5 mg daily.    A consult note from Dr. Wendy James documents that the patient is a 61-year-old female with positive DULCE, positive dsDNA, positive CCP (low positive in 2012, then negative), psoriasis, fusion of some of the cervical facet levels.  Initially evaluated 9/2012 for history of acute onset swollen joints, stiffness; joints affected: Knees, feet, hands, ankles, neck, back.  No preceding illness or trauma.  Failed methotrexate (ineffective), leflunomide (mouth sores), azathioprine (itching), Humira (ineffective at q7day and q14 days dosings).  Hydroxychloroquine and prednisone 10 mg daily with some improvement.    Additional chart review shows that MTX was effective and only reduced when a biologic DMARD was being added.     Today, Ms. Maguire reports that she had neck spasms several years ago that was dx'd as dystonia at the [facility]; had several imaging studies especially given the RA dx where degenerative changes were seen.  Also saw a spine surgeon at [facility].  PT ineffective.  Neck pain has been stable.  She says that her knees don't bother her much; but if walking a longer distance they may hurt.  Morning stiffness for about 20 minutes. No gelling phenomenon.  Overall feels like she is doing much better since starting methotrexate.    Denies fevers, chills, nausea, vomiting, constipation, diarrhea. No abdominal pain. No chest pain/pressure, palpitations, or shortness of breath. No LE swelling. No neck pain. No oral or nasal sores.  No rash. No sicca symptoms.  No photosensitivity or photophobia. No eye pain or redness. No history of inflammatory eye disease.  No history of DVT or pulmonary embolism; one still birth, 3 grown children.  No history of serositis.  No history of Raynaud's Phenomenon.  No seizure history.  No known renal disorder.      Tobacco: none  EtOH: none  Drugs: none    ROS   GEN: No fevers, chills, night sweats, or weight change  SKIN: No itching, rashes, sores  HEENT: No oral or nasal ulcers.  CV: No chest pain, pressure, palpitations, or dyspnea on exertion.  PULM: No SOB, wheeze, cough.  GI: No nausea, vomiting, constipation, diarrhea. No blood in stool. No abdominal pain.  : No blood in urine.  MSK: See HPI.  NEURO: No numbness, tingling, or weakness.  ENDO: No heat/cold intolerance.  EXT: No LE swelling  PSYCH: Negative    Active Problem List     Patient Active Problem List   Diagnosis     Hypothyroidism     Hyperlipidemia LDL goal <160     Inflammatory arthritis     Fracture, pelvis closed (H)     Malignant melanoma of skin (H)     Distal radius fracture     Closed fracture of part of humerus     Proximal humerus fracture     Aftercare for healing traumatic fracture of upper arm     Osteopetrosis     Cervical dystonia     Dystonia, torsion, fragments of     Psoriasis     Rheumatoid arthritis of multiple sites without rheumatoid factor (H)     Past Medical History     Past Medical History:   Diagnosis Date     Hyperlipidemia LDL goal <160 1/5/2011     Hypothyroidism 10/1/2010     Inflammatory arthritis 1/20/2011     Past Surgical History     Past Surgical History:   Procedure Laterality Date     APPENDECTOMY       ARTHRODESIS TOE(S) Left 8/17/2017    Procedure: ARTHRODESIS TOE(S);  Left foot 1st metatarsophalangeal realignment fusion, 2nd & 3rd digital corrections;  Surgeon: Jason Lamar DPM;  Location: [facility]     OPEN REDUCTION INTERNAL FIXATION HUMERUS PROXIMAL Left 9/30/2016    Procedure: OPEN REDUCTION INTERNAL FIXATION HUMERUS  PROXIMAL;  Surgeon: Charly Bernal MD;  Location: [facility] OR     Allergy     Allergies   Allergen Reactions     Azathioprine Itching     Leflunomide Other (See Comments)     Mouth sores     Compazine Anxiety     Restless, anxious     Prochlorperazine Anxiety     Restless, anxious     Sulfa Drugs Rash     Current Medication List     Current Outpatient Prescriptions   Medication Sig     folic acid (FOLVITE) 1 MG tablet Take 1 tablet (1 mg) by mouth daily     LEVOTHYROXINE SODIUM PO Take 137 mcg by mouth daily      methotrexate sodium 2.5 MG TABS 10mg (4 tabs) once weekly x1 week, then increase by 2.5mg (1 tab) each week until taking the goal weekly dose of 20mg (8 tabs) once weekly     predniSONE (DELTASONE) 5 MG tablet Take 1 tablet (5 mg) by mouth daily     VITAMIN D, CHOLECALCIFEROL, PO Take by mouth daily     acetaminophen (TYLENOL) 325 MG tablet 650 mg po QID and BId PRN (Patient not taking: Reported on 2/26/2018)     aspirin 81 MG tablet Take 1 tablet (81 mg) by mouth daily (Patient not taking: Reported on 2/26/2018)     hydrOXYzine (ATARAX) 25 MG tablet Take 1 tablet (25 mg) by mouth every 4 hours as needed for itching (and nausea) (Patient not taking: Reported on 6/11/2018)     oxyCODONE-acetaminophen (PERCOCET) 5-325 MG per tablet Take 1-2 tablets by mouth every 4 hours as needed for pain (moderate to severe) (Patient not taking: Reported on 2/26/2018)     No current facility-administered medications for this visit.          Social History   See HPI    Family History     Family History   Problem Relation Age of Onset     CANCER Father        Physical Exam     Temp Readings from Last 3 Encounters:   06/11/18 98.4  F (36.9  C) (Oral)   08/17/17 98  F (36.7  C) (Oral)   07/13/17 98.1  F (36.7  C)     BP Readings from Last 5 Encounters:   06/11/18 125/85   02/26/18 135/86   10/12/17 135/79   08/17/17 (!) (P) 155/97   07/13/17 132/79     Pulse Readings from Last 1 Encounters:   06/11/18 76     Resp Readings from  "Last 1 Encounters:   06/11/18 16     Estimated body mass index is 28.12 kg/(m^2) as calculated from the following:    Height as of this encounter: 1.676 m (5' 6\").    Weight as of this encounter: 79 kg (174 lb 3.2 oz).    GEN: NAD  HEENT: MMM. No oral lesions.  Anicteric, noninjected sclera.  Eyes appear to have good moisture by gross examination.  CV: S1, S2. RRR. No m/r/g.  PULM: CTA bilaterally. No w/c.  ABD: +BS.  MSK: Synovial swelling without tenderness palpation of the right second-fourth MCPs and right wrist, that were nontender to palpation.  Left second MCP with synovial swelling but no tenderness to palpation.  Left wrist without swelling or tenderness to palpation.  Elbows, shoulders, knees, ankles, and MTPs without swelling or tenderness to palpation.  Hips nontender to palpation.    SKIN: No rash  EXT: No LE edema  PSYCH: Alert. Appropriate.    Labs / Imaging (select studies)   RF/CCP  Recent Labs   Lab Test  09/29/10   0938   CYCLICCITPEP  <2 Interpretation:  Negative   RHF  12     DULCE  Recent Labs   Lab Test  02/26/18   0846  09/29/10   0938   FALLON   --   <1.0 Interpretation:  Negative   SHALINI  Negative   --      RNP/Sm/SSA/SSB  Recent Labs   Lab Test  02/26/18   0846   RNPIGG  0.2   SMIGG  <0.2   SSAIGG  <0.2   SSBIGG  <0.2   SCLIGG  <0.2     dsDNA  Recent Labs   Lab Test  02/26/18   0846   DNA  35*     CBC  Recent Labs   Lab Test  05/30/18   0913  04/25/18   0906  03/29/18   0946   WBC  6.0  6.0  6.8   RBC  4.36  4.68  4.79   HGB  14.1  14.7  14.8   HCT  42.3  44.8  45.2   MCV  97  96  94   RDW  14.5  14.5  13.9   PLT  204  208  207   MCH  32.3  31.4  30.9   MCHC  33.3  32.8  32.7   NEUTROPHIL  62.7  63.9  67.6   LYMPH  20.9  19.9  18.2   MONOCYTE  11.6  11.6  10.9   EOSINOPHIL  4.1  3.6  2.9   BASOPHIL  0.7  1.0  0.4   ANEU  3.8  3.9  4.6   ALYM  1.3  1.2  1.2   KAREN  0.7  0.7  0.7   AEOS  0.3  0.2  0.2   ABAS  0.0  0.1  0.0     CMP  Recent Labs   Lab Test  05/30/18   0913  04/25/18   0906  " 03/29/18   0946  02/26/18   0846  10/01/16   0625  09/27/16   0605  09/26/16   0615  09/25/16   1700  04/13/13   1530   NA   --    --    --   140   --   140  140  139  138   POTASSIUM   --    --    --   3.7   --   3.9  3.8  3.9  4.1   CHLORIDE   --    --    --   107   --   106  107  106  100   CO2   --    --    --   27   --   29  26  25  27   ANIONGAP   --    --    --   6   --   5  7  8  10   GLC   --    --    --   93  112*  100*  90  133*  119*   BUN   --    --    --   19   --   11  14  18  15   CR  0.82  0.81  0.81  0.78   --   0.65  0.66  0.64  0.81   GFRESTIMATED  71  72  72  75   --   >90  Non  GFR Calc    >90  Non  GFR Calc    >90  Non  GFR Calc    73   GFRESTBLACK  86  87  87  >90   --   >90   GFR Calc    >90   GFR Calc    >90   GFR Calc    89   HAZEL   --    --    --   9.1   --   8.7  8.5  9.1  9.2   BILITOTAL  0.6  0.5  0.6  0.4   --    --   0.4  0.3   --    ALBUMIN  3.7  3.8  3.8  4.0   --    --   3.0*  3.6   --    PROTTOTAL  6.9  6.9  7.0  7.1   --    --   5.9*  6.6*   --    ALKPHOS  78  83  72  79   --    --   63  73   --    AST  19  23  21  20   --    --   18  20   --    ALT  26  28  22  24   --    --   21  26   --      Calcium/VitaminD  Recent Labs   Lab Test  02/26/18   0846  10/05/16   0831  09/27/16   0605  09/26/16   0615   HAZEL  9.1   --   8.7  8.5   VITDT   --   29   --    --      ESR/CRP  Recent Labs   Lab Test  05/30/18   0913  02/26/18   0846  04/13/13   1530  04/02/13   0955   SED  9  7  10  13   CRP  <2.9  8.6*   --   19.0*     TSH/T4  Recent Labs   Lab Test  10/05/16   0831  09/22/11   0958  03/01/11   0953  01/04/11   0940  11/09/10   1050  09/29/10   0940   TSH  4.58*  0.20*  0.55  9.99*  56.50*   --    T4   --    --    --   1.21  0.79  0.15*     Hepatitis B  Recent Labs   Lab Test  02/26/18   0846   HBCAB  Nonreactive   HEPBANG  Nonreactive     Hepatitis C  Recent Labs   Lab Test  02/26/18   0846   07/13/11   1318   HCVAB  Nonreactive  Negative     Tuberculosis Screening  Recent Labs   Lab Test  07/13/11   1318   TBRSLT  Negative   TBAGN  0.00     Immunization History     Immunization History   Administered Date(s) Administered     Influenza Vaccine IM 3yrs+ 4 Valent IIV4 10/01/2016          Chart documentation done in part with Dragon Voice recognition Software. Although reviewed after completion, some word and grammatical error may remain.    Maurilio Hayes MD

## 2018-06-11 NOTE — PATIENT INSTRUCTIONS
Rheumatology    Dr. Maurilio Hayes         [facility]   (Monday)  [address]  [place] [address]       [facility]   (Tuesday)  [address]  [place] [address]    [facility]   (Wed., Thurs., and Friday)  [address] [place] [address]    Phone number: [phone]  Thank you for choosing [facility].  Elizabeth Haley CMA

## 2018-06-18 ENCOUNTER — COMMUNICATION - HEALTHEAST (OUTPATIENT)
Dept: SCHEDULING | Facility: CLINIC | Age: 63
End: 2018-06-18

## 2018-06-20 ENCOUNTER — OFFICE VISIT - HEALTHEAST (OUTPATIENT)
Dept: FAMILY MEDICINE | Facility: CLINIC | Age: 63
End: 2018-06-20

## 2018-06-20 ENCOUNTER — COMMUNICATION - HEALTHEAST (OUTPATIENT)
Dept: SCHEDULING | Facility: CLINIC | Age: 63
End: 2018-06-20

## 2018-06-20 DIAGNOSIS — L03.211 CELLULITIS, FACE: ICD-10-CM

## 2018-06-20 ASSESSMENT — MIFFLIN-ST. JEOR
SCORE: 1357.14
SCORE: 1372.17

## 2018-06-21 ASSESSMENT — MIFFLIN-ST. JEOR: SCORE: 1358.73

## 2018-06-22 ENCOUNTER — SURGERY - HEALTHEAST (OUTPATIENT)
Dept: SURGERY | Facility: HOSPITAL | Age: 63
End: 2018-06-22

## 2018-06-22 ENCOUNTER — ANESTHESIA - HEALTHEAST (OUTPATIENT)
Dept: SURGERY | Facility: HOSPITAL | Age: 63
End: 2018-06-22

## 2018-06-22 ASSESSMENT — MIFFLIN-ST. JEOR: SCORE: 1355.1

## 2018-06-23 ASSESSMENT — MIFFLIN-ST. JEOR: SCORE: 1353.74

## 2018-06-24 ASSESSMENT — MIFFLIN-ST. JEOR: SCORE: 1369.62

## 2018-06-25 ENCOUNTER — COMMUNICATION - HEALTHEAST (OUTPATIENT)
Dept: FAMILY MEDICINE | Facility: CLINIC | Age: 63
End: 2018-06-25

## 2018-06-25 DIAGNOSIS — L02.91 ABSCESS: ICD-10-CM

## 2018-06-28 ENCOUNTER — OFFICE VISIT - HEALTHEAST (OUTPATIENT)
Dept: FAMILY MEDICINE | Facility: CLINIC | Age: 63
End: 2018-06-28

## 2018-06-28 DIAGNOSIS — M06.9 RA (RHEUMATOID ARTHRITIS) (H): ICD-10-CM

## 2018-06-28 DIAGNOSIS — Z09 HOSPITAL DISCHARGE FOLLOW-UP: ICD-10-CM

## 2018-06-28 DIAGNOSIS — M27.2 ABSCESS OF LEFT JAW: ICD-10-CM

## 2018-06-28 DIAGNOSIS — E03.9 HYPOTHYROIDISM: ICD-10-CM

## 2018-06-28 ASSESSMENT — MIFFLIN-ST. JEOR: SCORE: 1346.94

## 2018-07-02 ENCOUNTER — RECORDS - HEALTHEAST (OUTPATIENT)
Dept: ADMINISTRATIVE | Facility: OTHER | Age: 63
End: 2018-07-02

## 2018-07-12 ENCOUNTER — TELEPHONE (OUTPATIENT)
Dept: RHEUMATOLOGY | Facility: CLINIC | Age: 63
End: 2018-07-12

## 2018-07-12 NOTE — TELEPHONE ENCOUNTER
Reason for Call:  Other prescription    Detailed comments: Patient was at [facility] and they took her off Methotrexate. She had a tooth infection. She has been taking 8 pills every Wednesday and she has not taken medication for the past 3 weeks and wondering when she should start up again and how many to start with?    Phone Number Patient can be reached at: Home number on file [phone] (home)    Best Time: any    Can we leave a detailed message on this number? YES    Call taken on 7/12/2018 at 11:48 AM by Vandana Argueta

## 2018-07-13 NOTE — TELEPHONE ENCOUNTER
Rheumatology team: Please call to notify Ms. Maguire that from what I can see in the records they want her off methotrexate until the infection is completely resolved.  Therefore, do not restart methotrexate until the infection is determined to be resolved by either her PCP or dentist, and that the antibiotic course has been completed for at least a week without recurrence of infection symptoms.    Maurilio Hayes MD  7/13/2018 6:58 AM

## 2018-07-24 ENCOUNTER — COMMUNICATION - HEALTHEAST (OUTPATIENT)
Dept: FAMILY MEDICINE | Facility: CLINIC | Age: 63
End: 2018-07-24

## 2018-07-24 ENCOUNTER — OFFICE VISIT - HEALTHEAST (OUTPATIENT)
Dept: FAMILY MEDICINE | Facility: CLINIC | Age: 63
End: 2018-07-24

## 2018-07-24 DIAGNOSIS — E03.9 HYPOTHYROIDISM: ICD-10-CM

## 2018-07-24 DIAGNOSIS — M06.9 RA (RHEUMATOID ARTHRITIS) (H): ICD-10-CM

## 2018-07-24 DIAGNOSIS — M27.2 ABSCESS OF LEFT JAW: ICD-10-CM

## 2018-07-24 LAB — TSH SERPL DL<=0.005 MIU/L-ACNC: 0.37 UIU/ML (ref 0.3–5)

## 2018-07-24 ASSESSMENT — MIFFLIN-ST. JEOR: SCORE: 1356.01

## 2018-08-03 ENCOUNTER — COMMUNICATION - HEALTHEAST (OUTPATIENT)
Dept: FAMILY MEDICINE | Facility: CLINIC | Age: 63
End: 2018-08-03

## 2018-08-03 ENCOUNTER — TELEPHONE (OUTPATIENT)
Dept: RHEUMATOLOGY | Facility: CLINIC | Age: 63
End: 2018-08-03

## 2018-08-03 DIAGNOSIS — M19.90 INFLAMMATORY ARTHRITIS: Primary | Chronic | ICD-10-CM

## 2018-08-03 NOTE — TELEPHONE ENCOUNTER
Reason for Call:  Other call back    Detailed comments: Patient states she had an infection and taken off methotrexate awhile ago. She started up on Wednesday and states she is stiff now. She is wondering if there is a way she can be on prednisone for a couple of weeks as it is hard for patient to walk.     Pharmacy: [facility] in [place]    Phone Number Patient can be reached at: Home number on file [phone] (home)    Best Time: any    Can we leave a detailed message on this number? YES    Call taken on 8/3/2018 at 9:54 AM by Roger Curiel

## 2018-08-03 NOTE — TELEPHONE ENCOUNTER
Pt c/o stiffness, swelling, and pain in wrists, hands, knees, and ankles x 2-3 wks in AM. Has resulted in difficulty walking. She restarted MTX this week and is wondering if she can get some prednisone to help w/symptoms. Advised her that Dr. Hayes is out of office today however her request will be forwarded to him and we will call her back next week. Patient verbalized understanding.    (vm ok)    Lenny Shahid RN....8/3/2018 10:59 AM

## 2018-08-06 RX ORDER — PREDNISONE 5 MG/1
TABLET ORAL
Qty: 75 TABLET | Refills: 0 | Status: SHIPPED | OUTPATIENT
Start: 2018-08-06 | End: 2018-10-19

## 2018-08-06 NOTE — TELEPHONE ENCOUNTER
Patient is calling to check her refill request to get her swelling down and to move again. Please call patient to discuss further. VM OK.   Thank-you,  .Carisa Peña

## 2018-08-06 NOTE — TELEPHONE ENCOUNTER
Rheumatology Telephone Note:    I called and spoke with Ms. Maguire.    Infection cleared. She has restarted MTX.  More joint pain and swelling affective ambulation and daily activities.     - Start Prednisone 20mg daily x5days, then 15mg daily x5days, then 10mg daily x5days, then 5mg daily x5days, then stop    # Prednisone Risks and Benefits: The risks and benefits of prednisone were discussed in detail and the patient verbalized understanding.  The risks discussed include, but are not limited to, weight gain, fluid retention, impaired wound healing, hyperglycemia, adrenal suppression, GI upset, peptic ulcer, hepatotoxicity, aseptic necrosis of the femoral and humeral heads, osteoporosis, myopathy, tendon rupture (particularly Achilles tendon), ocular changes including an increased intraocular pressure.  I encouraged reviewing the package insert and asking any questions about the medication.      All questions were answered and she thanked me for the call.     Maurilio Hayes MD  8/6/2018 4:44 PM

## 2018-09-12 DIAGNOSIS — Z79.899 HIGH RISK MEDICATION USE: ICD-10-CM

## 2018-09-12 DIAGNOSIS — M06.09 RHEUMATOID ARTHRITIS OF MULTIPLE SITES WITHOUT RHEUMATOID FACTOR (H): ICD-10-CM

## 2018-09-12 LAB
ALBUMIN SERPL-MCNC: 3.6 G/DL (ref 3.4–5)
ALBUMIN UR-MCNC: NEGATIVE MG/DL
ALP SERPL-CCNC: 94 U/L (ref 40–150)
ALT SERPL W P-5'-P-CCNC: 35 U/L (ref 0–50)
ANION GAP SERPL CALCULATED.3IONS-SCNC: 7 MMOL/L (ref 3–14)
APPEARANCE UR: CLEAR
AST SERPL W P-5'-P-CCNC: 27 U/L (ref 0–45)
BASOPHILS # BLD AUTO: 0.1 10E9/L (ref 0–0.2)
BASOPHILS NFR BLD AUTO: 0.8 %
BILIRUB SERPL-MCNC: 0.5 MG/DL (ref 0.2–1.3)
BILIRUB UR QL STRIP: NEGATIVE
BUN SERPL-MCNC: 15 MG/DL (ref 7–30)
CALCIUM SERPL-MCNC: 9.4 MG/DL (ref 8.5–10.1)
CHLORIDE SERPL-SCNC: 109 MMOL/L (ref 94–109)
CO2 SERPL-SCNC: 28 MMOL/L (ref 20–32)
COLOR UR AUTO: YELLOW
CREAT SERPL-MCNC: 0.91 MG/DL (ref 0.52–1.04)
CREAT UR-MCNC: 268 MG/DL
DIFFERENTIAL METHOD BLD: NORMAL
EOSINOPHIL # BLD AUTO: 0.2 10E9/L (ref 0–0.7)
EOSINOPHIL NFR BLD AUTO: 2.7 %
ERYTHROCYTE [DISTWIDTH] IN BLOOD BY AUTOMATED COUNT: 14.1 % (ref 10–15)
GFR SERPL CREATININE-BSD FRML MDRD: 63 ML/MIN/1.7M2
GLUCOSE SERPL-MCNC: 102 MG/DL (ref 70–99)
GLUCOSE UR STRIP-MCNC: NEGATIVE MG/DL
HCT VFR BLD AUTO: 44.1 % (ref 35–47)
HGB BLD-MCNC: 14.2 G/DL (ref 11.7–15.7)
HGB UR QL STRIP: NEGATIVE
KETONES UR STRIP-MCNC: NEGATIVE MG/DL
LEUKOCYTE ESTERASE UR QL STRIP: NEGATIVE
LYMPHOCYTES # BLD AUTO: 1.3 10E9/L (ref 0.8–5.3)
LYMPHOCYTES NFR BLD AUTO: 14.7 %
MCH RBC QN AUTO: 31.4 PG (ref 26.5–33)
MCHC RBC AUTO-ENTMCNC: 32.2 G/DL (ref 31.5–36.5)
MCV RBC AUTO: 98 FL (ref 78–100)
MONOCYTES # BLD AUTO: 0.8 10E9/L (ref 0–1.3)
MONOCYTES NFR BLD AUTO: 9.1 %
NEUTROPHILS # BLD AUTO: 6.5 10E9/L (ref 1.6–8.3)
NEUTROPHILS NFR BLD AUTO: 72.7 %
NITRATE UR QL: NEGATIVE
PH UR STRIP: 5.5 PH (ref 5–7)
PLATELET # BLD AUTO: 198 10E9/L (ref 150–450)
POTASSIUM SERPL-SCNC: 3.8 MMOL/L (ref 3.4–5.3)
PROT SERPL-MCNC: 6.9 G/DL (ref 6.8–8.8)
PROT UR-MCNC: 0.15 G/L
PROT/CREAT 24H UR: 0.05 G/G CR (ref 0–0.2)
RBC # BLD AUTO: 4.52 10E12/L (ref 3.8–5.2)
SODIUM SERPL-SCNC: 144 MMOL/L (ref 133–144)
SOURCE: NORMAL
SP GR UR STRIP: >1.03 (ref 1–1.03)
UROBILINOGEN UR STRIP-ACNC: 0.2 EU/DL (ref 0.2–1)
WBC # BLD AUTO: 9 10E9/L (ref 4–11)

## 2018-09-12 PROCEDURE — 80053 COMPREHEN METABOLIC PANEL: CPT | Performed by: INTERNAL MEDICINE

## 2018-09-12 PROCEDURE — 85025 COMPLETE CBC W/AUTO DIFF WBC: CPT | Performed by: INTERNAL MEDICINE

## 2018-09-12 PROCEDURE — 36415 COLL VENOUS BLD VENIPUNCTURE: CPT | Performed by: INTERNAL MEDICINE

## 2018-09-12 PROCEDURE — 81003 URINALYSIS AUTO W/O SCOPE: CPT | Performed by: INTERNAL MEDICINE

## 2018-09-12 PROCEDURE — 84156 ASSAY OF PROTEIN URINE: CPT | Performed by: INTERNAL MEDICINE

## 2018-09-12 NOTE — LETTER
[facility]  [address] [place] [address]    September 17, 2018    Jackie Maguire  [address] [place] [id number]          Dear Jackie,    Your labs are normal except for an elevated glucose that is just above the normal range and likely due to prednisone use.     Please let me know if you have any questions    Enclosed is a copy of your results.     Results for orders placed or performed in visit on 09/12/18   CBC with platelets differential   Result Value Ref Range    WBC 9.0 4.0 - 11.0 10e9/L    RBC Count 4.52 3.8 - 5.2 10e12/L    Hemoglobin 14.2 11.7 - 15.7 g/dL    Hematocrit 44.1 35.0 - 47.0 %    MCV 98 78 - 100 fl    MCH 31.4 26.5 - 33.0 pg    MCHC 32.2 31.5 - 36.5 g/dL    RDW 14.1 10.0 - 15.0 %    Platelet Count 198 150 - 450 10e9/L    % Neutrophils 72.7 %    % Lymphocytes 14.7 %    % Monocytes 9.1 %    % Eosinophils 2.7 %    % Basophils 0.8 %    Absolute Neutrophil 6.5 1.6 - 8.3 10e9/L    Absolute Lymphocytes 1.3 0.8 - 5.3 10e9/L    Absolute Monocytes 0.8 0.0 - 1.3 10e9/L    Absolute Eosinophils 0.2 0.0 - 0.7 10e9/L    Absolute Basophils 0.1 0.0 - 0.2 10e9/L    Diff Method Automated Method    Comprehensive metabolic panel   Result Value Ref Range    Sodium 144 133 - 144 mmol/L    Potassium 3.8 3.4 - 5.3 mmol/L    Chloride 109 94 - 109 mmol/L    Carbon Dioxide 28 20 - 32 mmol/L    Anion Gap 7 3 - 14 mmol/L    Glucose 102 (H) 70 - 99 mg/dL    Urea Nitrogen 15 7 - 30 mg/dL    Creatinine 0.91 0.52 - 1.04 mg/dL    GFR Estimate 63 >60 mL/min/1.7m2    GFR Estimate If Black 76 >60 mL/min/1.7m2    Calcium 9.4 8.5 - 10.1 mg/dL    Bilirubin Total 0.5 0.2 - 1.3 mg/dL    Albumin 3.6 3.4 - 5.0 g/dL    Protein Total 6.9 6.8 - 8.8 g/dL    Alkaline Phosphatase 94 40 - 150 U/L    ALT 35 0 - 50 U/L    AST 27 0 - 45 U/L   UA reflex to Microscopic and Culture   Result Value Ref Range    Color Urine Yellow     Appearance Urine Clear      Glucose Urine Negative NEG^Negative mg/dL    Bilirubin Urine Negative NEG^Negative    Ketones Urine Negative NEG^Negative mg/dL    Specific Gravity Urine >1.030 1.003 - 1.035    Blood Urine Negative NEG^Negative    pH Urine 5.5 5.0 - 7.0 pH    Protein Albumin Urine Negative NEG^Negative mg/dL    Urobilinogen Urine 0.2 0.2 - 1.0 EU/dL    Nitrite Urine Negative NEG^Negative    Leukocyte Esterase Urine Negative NEG^Negative    Source Midstream Urine    Protein  random urine with Creat Ratio   Result Value Ref Range    Protein Random Urine 0.15 g/L    Protein Total Urine g/gr Creatinine 0.05 0 - 0.2 g/g Cr   Creatinine urine calculation only   Result Value Ref Range    Creatinine Urine 268 mg/dL       If you have any questions or concerns, please call myself or my nurse at [phone].      Sincerely,        Maurilio Hayes MD /singh

## 2018-09-15 NOTE — PROGRESS NOTES
"Please mail Ms. Maguire her results with the following message.    \"Ms. Maguire,    Your labs are normal except for an elevated glucose that is just above the normal range and likely due to prednisone use.     Please let me know if you have any questions.    Sincerely,  Maurilio Hayes MD  9/15/2018 6:20 AM\"  "

## 2018-09-26 ENCOUNTER — OFFICE VISIT - HEALTHEAST (OUTPATIENT)
Dept: FAMILY MEDICINE | Facility: CLINIC | Age: 63
End: 2018-09-26

## 2018-09-26 ENCOUNTER — COMMUNICATION - HEALTHEAST (OUTPATIENT)
Dept: FAMILY MEDICINE | Facility: CLINIC | Age: 63
End: 2018-09-26

## 2018-09-26 DIAGNOSIS — R05.9 COUGH: ICD-10-CM

## 2018-09-26 DIAGNOSIS — J06.9 URI, ACUTE: ICD-10-CM

## 2018-10-18 ENCOUNTER — TELEPHONE (OUTPATIENT)
Dept: RHEUMATOLOGY | Facility: CLINIC | Age: 63
End: 2018-10-18

## 2018-10-18 ENCOUNTER — AMBULATORY - HEALTHEAST (OUTPATIENT)
Dept: NURSING | Facility: CLINIC | Age: 63
End: 2018-10-18

## 2018-10-18 DIAGNOSIS — M19.90 INFLAMMATORY ARTHRITIS: Chronic | ICD-10-CM

## 2018-10-18 NOTE — TELEPHONE ENCOUNTER
Pt reports stiffness, pain and swelling of wrists, knees, and feet x2-3 wks  The swelling in her feet has resulted in difficulty walking  Pt has an apt on 11/5/18 and is wondering if she could get a course of prednisone to help until then  Will discuss with provider to see if this is a possibility   Did mention that he will be out of town for the next week  Recommended she elevate and ice the affected areas and take Ibuprofen PRN  Pt verbalized understanding, has no other questions    (vm okay)    Lenny Shahid RN....10/18/2018 11:52 AM

## 2018-10-18 NOTE — TELEPHONE ENCOUNTER
Patient has a question about her medication.  She is stating that she is not able to walk very good.  Please call to advise.  Thank you

## 2018-10-19 RX ORDER — PREDNISONE 5 MG/1
TABLET ORAL
Qty: 50 TABLET | Refills: 0 | Status: SHIPPED | OUTPATIENT
Start: 2018-10-19 | End: 2018-11-05

## 2018-10-19 NOTE — TELEPHONE ENCOUNTER
Per verbal okay from Dr. Hayes, prednisone rx was sent to [facility] pharmacy in [place].  Patient was informed.    Lenny Shahid RN....10/19/2018 10:26 AM

## 2018-11-05 ENCOUNTER — OFFICE VISIT (OUTPATIENT)
Dept: RHEUMATOLOGY | Facility: CLINIC | Age: 63
End: 2018-11-05
Payer: MEDICARE

## 2018-11-05 VITALS
HEART RATE: 79 BPM | SYSTOLIC BLOOD PRESSURE: 141 MMHG | WEIGHT: 171 LBS | HEIGHT: 66 IN | OXYGEN SATURATION: 98 % | DIASTOLIC BLOOD PRESSURE: 90 MMHG | BODY MASS INDEX: 27.48 KG/M2

## 2018-11-05 DIAGNOSIS — Z79.899 HIGH RISK MEDICATION USE: ICD-10-CM

## 2018-11-05 DIAGNOSIS — M06.09 RHEUMATOID ARTHRITIS OF MULTIPLE SITES WITHOUT RHEUMATOID FACTOR (H): ICD-10-CM

## 2018-11-05 PROCEDURE — 99213 OFFICE O/P EST LOW 20 MIN: CPT | Performed by: INTERNAL MEDICINE

## 2018-11-05 RX ORDER — PREDNISONE 5 MG/1
5 TABLET ORAL DAILY
Qty: 30 TABLET | Refills: 3 | Status: SHIPPED | OUTPATIENT
Start: 2018-11-05 | End: 2019-03-06

## 2018-11-05 RX ORDER — FOLIC ACID 1 MG/1
1 TABLET ORAL DAILY
Qty: 100 TABLET | Refills: 3 | Status: SHIPPED | OUTPATIENT
Start: 2018-11-05 | End: 2019-07-22

## 2018-11-05 RX ORDER — METHOTREXATE 2.5 MG/1
20 TABLET ORAL WEEKLY
Qty: 32 TABLET | Refills: 3 | Status: SHIPPED | OUTPATIENT
Start: 2018-11-05 | End: 2019-03-11

## 2018-11-05 NOTE — MR AVS SNAPSHOT
After Visit Summary   11/5/2018    Jackie Maguire    MRN: [id number]           Patient Information     Date Of Birth          1955        Visit Information        Provider Department      11/5/2018 11:40 AM Maurilio Hayes MD [facility]        Today's Diagnoses     Rheumatoid arthritis of multiple sites without rheumatoid factor (H)        High risk medication use           Follow-ups after your visit        Your next 10 appointments already scheduled     Dec 19, 2018  9:00 AM CST   LAB with [facility] LAB   [facility] ([facility] [place])    [address]  [place] [address]   [phone]           Please do not eat 10-12 hours before your appointment if you are coming in fasting for labs on lipids, cholesterol, or glucose (sugar). This does not apply to pregnant women. Water, hot tea and black coffee (with nothing added) are okay. Do not drink other fluids, diet soda or chew gum.            Mar 06, 2019  9:00 AM CST   LAB with BE LAB   [facility] ([facility] [place])    [address]  [place] [address]   [phone]           Please do not eat 10-12 hours before your appointment if you are coming in fasting for labs on lipids, cholesterol, or glucose (sugar). This does not apply to pregnant women. Water, hot tea and black coffee (with nothing added) are okay. Do not drink other fluids, diet soda or chew gum.            Mar 11, 2019  3:40 PM CDT   Return Visit with [facility] ([facility] [place])    [address]  [place] [address]   [phone]              Future tests that were ordered for you today     Open Standing Orders        Priority Remaining Interval Expires Ordered    CBC with platelets differential Routine 4/4 Every 3 Months 10/31/2019 11/5/2018    Creatinine Routine 4/4 Every 3 Months 10/31/2019 11/5/2018    Hepatic panel Routine 4/4 Every 3 Months  "10/31/2019 11/5/2018            Who to contact     If you have questions or need follow up information about today's clinic visit or your schedule please contact [facility] directly at [phone].  Normal or non-critical lab and imaging results will be communicated to you by MyChart, letter or phone within 4 business days after the clinic has received the results. If you do not hear from us within 7 days, please contact the clinic through MyChart or phone. If you have a critical or abnormal lab result, we will notify you by phone as soon as possible.  Submit refill requests through [employer] or call your pharmacy and they will forward the refill request to us. Please allow 3 business days for your refill to be completed.          Additional Information About Your Visit        Care EveryWhere ID     This is your Care EveryWhere ID. This could be used by other organizations to access your [facility] medical records  [id number]        Your Vitals Were     Pulse Height Pulse Oximetry BMI (Body Mass Index)          79 1.676 m (5' 6\") 98% 27.6 kg/m2         Blood Pressure from Last 3 Encounters:   11/05/18 141/90   06/11/18 125/85   02/26/18 135/86    Weight from Last 3 Encounters:   11/05/18 77.6 kg (171 lb)   06/11/18 79 kg (174 lb 3.2 oz)   02/26/18 82.1 kg (181 lb)                 Today's Medication Changes          These changes are accurate as of 11/5/18 12:17 PM.  If you have any questions, ask your nurse or doctor.               These medicines have changed or have updated prescriptions.        Dose/Directions    predniSONE 5 MG tablet   Commonly known as:  DELTASONE   This may have changed:    - how much to take  - how to take this  - when to take this  - additional instructions   Used for:  Rheumatoid arthritis of multiple sites without rheumatoid factor (H), High risk medication use   Changed by:  Maurilio Hayes MD        Dose:  5 mg   Take 1 tablet (5 mg) by mouth daily   Quantity:  30 tablet "   Refills:  3            Where to get your medicines      These medications were sent to [id number] - [place], [place] - [address]  [address], [facility] [address]     Phone:  [phone]     folic acid 1 MG tablet    methotrexate sodium 2.5 MG Tabs    predniSONE 5 MG tablet                Primary Care Provider Office Phone # Fax #    Talia Mejía [phone]       [facility] [address] [address]        Equal Access to Services     RAMEZ MARIN : Hadii aad ku hadasho Soomaali, waaxda luqadaha, qaybta kaalmada adeegyada, waxay idiin hayaan adeeg kharatyler mata . So [facility] [phone].    ATENCIÓN: Si habla español, tiene a uribe disposición servicios gratuitos de asistencia lingüística. [facility] [phone].    We comply with applicable federal civil rights laws and [place] laws. We do not discriminate on the basis of race, color, national origin, age, disability, sex, sexual orientation, or gender identity.            Thank you!     Thank you for choosing [facility]  for your care. Our goal is always to provide you with excellent care. Hearing back from our patients is one way we can continue to improve our services. Please take a few minutes to complete the written survey that you may receive in the mail after your visit with us. Thank you!             Your Updated Medication List - Protect others around you: Learn how to safely use, store and throw away your medicines at www.disposemymeds.org.          This list is accurate as of 11/5/18 12:17 PM.  Always use your most recent med list.                   Brand Name Dispense Instructions for use Diagnosis    acetaminophen 325 MG tablet    TYLENOL    100 tablet    650 mg po QID and BId PRN    Fracture of left shoulder, closed, initial encounter, Closed fracture of distal ends of left radius and ulna, initial encounter       aspirin 81 MG tablet     42 tablet    Take 1 tablet (81 mg) by  mouth daily    Fracture of left shoulder, closed, initial encounter, Closed fracture of distal ends of left radius and ulna, initial encounter       folic acid 1 MG tablet    FOLVITE    100 tablet    Take 1 tablet (1 mg) by mouth daily    Rheumatoid arthritis of multiple sites without rheumatoid factor (H), High risk medication use       hydrOXYzine 25 MG tablet    ATARAX    36 tablet    Take 1 tablet (25 mg) by mouth every 4 hours as needed for itching (and nausea)        LEVOTHYROXINE SODIUM PO      Take 137 mcg by mouth daily        methotrexate sodium 2.5 MG Tabs     32 tablet    Take 8 tablets (20 mg) by mouth once a week . Take all 8 tablets on the same day of each week.    Rheumatoid arthritis of multiple sites without rheumatoid factor (H), High risk medication use       oxyCODONE-acetaminophen 5-325 MG per tablet    PERCOCET    38 tablet    Take 1-2 tablets by mouth every 4 hours as needed for pain (moderate to severe)        predniSONE 5 MG tablet    DELTASONE    30 tablet    Take 1 tablet (5 mg) by mouth daily    Rheumatoid arthritis of multiple sites without rheumatoid factor (H), High risk medication use       VITAMIN D (CHOLECALCIFEROL) PO      Take by mouth daily

## 2018-11-05 NOTE — PROGRESS NOTES
"Chief Complaint   Patient presents with     Arthritis     RA follow up       Initial /90  Pulse 79  Ht 1.676 m (5' 6\")  Wt 77.6 kg (171 lb)  SpO2 98%  BMI 27.6 kg/m2 Estimated body mass index is 27.6 kg/(m^2) as calculated from the following:    Height as of this encounter: 1.676 m (5' 6\").    Weight as of this encounter: 77.6 kg (171 lb).  BP completed using cuff size: regular         RAPID3 (0-30) Cumulative Score  5.3          RAPID3 Weighted Score (divide #4 by 3 and that is the weighted score)  1.76         "

## 2018-11-05 NOTE — PROGRESS NOTES
Rheumatology Clinic Visit      Jackie Maguire MRN# [id number]   YOB: 1955 Age: 63 year old      Date of visit: 11/05/18   PCP: Dr. Talia Mejía at [facility]    Chief Complaint   Patient presents with:  Arthritis: RA follow up      Assessment and Plan     1. Rheumatoid Arthritis (CCP low positive, then negative): From record review: dx'd 2012; has followed with Kayley Jimenez, Jacob, and mAor; hx of +DULCE, +dsDNA, CCP low positive then negative; hx of psoriasis; hx of C-spine fusion; initial presentation of pain/stiff/swollen joints involving the knees, feet, ankles, neck, back.  Previously on methotrexate (ineffective per patient but from record review it was effective and reduced only when biologic DMARDs were being added), leflunomide (mouth sores), HCQ (ineffective), azathioprine (itching), Humira (ineffective at q7day and q14 days dosings), Remicade (syncopes).  Sulfa allergy.  Currently on MTX 20mg wkly. Changes from RA and mild synovitis on exam today.  She prefers to restart prednisone 5mg daily with plans to consider a biologic such as Orencia or Xeljanz at follow-up  Note that she has a hx of trying to get Orencia but found it was cost-prohibitive, but was a while ago when last investigated.   - Continue methotrexate 20 mg once weekly   - Continue folic acid 1mg daily  - Start prednisone 5mg daily  - Labs every 3 months: CBC, Creatinine, Hepatic Panel, ESR, CRP    # Prednisone Risks and Benefits: The risks and benefits of prednisone were discussed in detail and the patient verbalized understanding.  The risks discussed include, but are not limited to, weight gain, fluid retention, impaired wound healing, hyperglycemia, adrenal suppression, GI upset, peptic ulcer, hepatotoxicity, aseptic necrosis of the femoral and humeral heads, osteoporosis, myopathy, tendon rupture (particularly Achilles tendon), ocular changes including an increased intraocular pressure.  I encouraged reviewing the  package insert and asking any questions about the medication.      2. History of Positive DULCE and dsDNA: with inflammatory arthritis.  Symptoms fit better for seropositive rheumatoid arthritis.  See #1.  Labs as noted in #1.    3. Neck pain hx: Neck spasms several years ago that was dx'd as dystonia at the [facility]; had several imaging studies especially given the RA dx where degenerative changes were seen.  Also saw a spine surgeon at [place].  PT ineffective.  Neck pain has been stable.    4. Elevated blood pressure: Patient to follow up with Primary Care provider regarding elevated blood pressure.     Ms. Maguire verbalized agreement with and understanding of the rational for the diagnosis and treatment plan.  All questions were answered to best of my ability and the patient's satisfaction. Ms. Maguire was advised to contact the clinic with any questions that may arise after the clinic visit.      Thank you for involving me in the care of the patient    Return to clinic: 3-4 months      HPI   Jackie Maguire is a 63 year old female with a past medical history significant for hyperlipidemia, hypothyroidism, history of melanoma, history of humerus fracture, osteoporosis, psoriasis, and rheumatoid arthritis who presents for follow-up of rheumatoid arthritis.     Previously reviewed hx:    10/12/2017 rheumatology clinic note by Dr. Cabrera Chinchilla documents that the patient has rheumatoid arthritis with multiple treatment failures.  He discusses Orencia and prednisone 5 mg daily.    A consult note from Dr. Wendy James documents that the patient is a 61-year-old female with positive DULCE, positive dsDNA, positive CCP (low positive in 2012, then negative), psoriasis, fusion of some of the cervical facet levels.  Initially evaluated 9/2012 for history of acute onset swollen joints, stiffness; joints affected: Knees, feet, hands, ankles, neck, back.  No preceding illness or trauma.  Failed methotrexate (ineffective),  leflunomide (mouth sores), azathioprine (itching), Humira (ineffective at q7day and q14 days dosings).  Hydroxychloroquine and prednisone 10 mg daily with some improvement.    Additional chart review shows that MTX was effective and only reduced when a biologic DMARD was being added.     Today, Ms. Maguire reports that she does not tolerate prednisone dose reductions; feels much better with prednisone 5mg daily.  Jaw infection resolved s/p abx and 3 teeth removed.  Felt much better with recent prednisone taper.     Denies fevers, chills, nausea, vomiting, constipation, diarrhea. No abdominal pain. No chest pain/pressure, palpitations, or shortness of breath. No LE swelling. No neck pain. No oral or nasal sores.  No rash. No sicca symptoms. No photosensitivity or photophobia. No eye pain or redness. No history of inflammatory eye disease.  No history of DVT or pulmonary embolism; one still birth, 3 grown children.  No history of serositis.  No history of Raynaud's Phenomenon.  No seizure history.  No known renal disorder.      Tobacco: none  EtOH: none  Drugs: none    ROS   GEN: No fevers, chills, night sweats, or weight change  SKIN: No itching, rashes, sores  HEENT: No oral or nasal ulcers.  CV: No chest pain, pressure, palpitations, or dyspnea on exertion.  PULM: No SOB, wheeze, cough.  GI: No nausea, vomiting, constipation, diarrhea. No blood in stool. No abdominal pain.  : No blood in urine.  MSK: See HPI.  NEURO: No numbness, tingling, or weakness.  ENDO: No heat/cold intolerance.  EXT: No LE swelling  PSYCH: Negative    Active Problem List     Patient Active Problem List   Diagnosis     Hypothyroidism     Hyperlipidemia LDL goal <160     Inflammatory arthritis     Fracture, pelvis closed (H)     Malignant melanoma of skin (H)     Distal radius fracture     Closed fracture of part of humerus     Proximal humerus fracture     Aftercare for healing traumatic fracture of upper arm     Osteopetrosis     Cervical  dystonia     Dystonia, torsion, fragments of     Psoriasis     Rheumatoid arthritis of multiple sites without rheumatoid factor (H)     Past Medical History     Past Medical History:   Diagnosis Date     Hyperlipidemia LDL goal <160 1/5/2011     Hypothyroidism 10/1/2010     Inflammatory arthritis 1/20/2011     Past Surgical History     Past Surgical History:   Procedure Laterality Date     APPENDECTOMY       ARTHRODESIS TOE(S) Left 8/17/2017    Procedure: ARTHRODESIS TOE(S);  Left foot 1st metatarsophalangeal realignment fusion, 2nd & 3rd digital corrections;  Surgeon: Jason Lamar DPM;  Location: [facility]     OPEN REDUCTION INTERNAL FIXATION HUMERUS PROXIMAL Left 9/30/2016    Procedure: OPEN REDUCTION INTERNAL FIXATION HUMERUS PROXIMAL;  Surgeon: Charly Bernal MD;  Location: [facility] OR     Allergy     Allergies   Allergen Reactions     Azathioprine Itching     Leflunomide Other (See Comments)     Mouth sores     Compazine Anxiety     Restless, anxious     Prochlorperazine Anxiety     Restless, anxious     Sulfa Drugs Rash     Current Medication List     Current Outpatient Prescriptions   Medication Sig     acetaminophen (TYLENOL) 325 MG tablet 650 mg po QID and BId PRN     aspirin 81 MG tablet Take 1 tablet (81 mg) by mouth daily     folic acid (FOLVITE) 1 MG tablet Take 1 tablet (1 mg) by mouth daily     hydrOXYzine (ATARAX) 25 MG tablet Take 1 tablet (25 mg) by mouth every 4 hours as needed for itching (and nausea)     LEVOTHYROXINE SODIUM PO Take 137 mcg by mouth daily      methotrexate sodium 2.5 MG TABS Take 20 mg by mouth once a week . Take all 8 tablets on the same day of each week.     oxyCODONE-acetaminophen (PERCOCET) 5-325 MG per tablet Take 1-2 tablets by mouth every 4 hours as needed for pain (moderate to severe)     predniSONE (DELTASONE) 5 MG tablet Prednisone 20mg daily x5days, then 15mg daily x5days, then 10mg daily x5days, then 5mg daily x5days,     VITAMIN D, CHOLECALCIFEROL, PO Take by  "mouth daily     No current facility-administered medications for this visit.          Social History   See HPI    Family History     Family History   Problem Relation Age of Onset     Cancer Father        Physical Exam     Temp Readings from Last 3 Encounters:   06/11/18 98.4  F (36.9  C) (Oral)   08/17/17 98  F (36.7  C) (Oral)   07/13/17 98.1  F (36.7  C)     BP Readings from Last 5 Encounters:   11/05/18 141/90   06/11/18 125/85   02/26/18 135/86   10/12/17 135/79   08/17/17 (!) (P) 155/97     Pulse Readings from Last 1 Encounters:   11/05/18 79     Resp Readings from Last 1 Encounters:   06/11/18 16     Estimated body mass index is 27.6 kg/(m^2) as calculated from the following:    Height as of this encounter: 1.676 m (5' 6\").    Weight as of this encounter: 77.6 kg (171 lb).    GEN: NAD  HEENT: MMM. No oral lesions.  Anicteric, noninjected sclera.  Eyes appear to have good moisture by gross examination.  CV: S1, S2. RRR. No m/r/g.  PULM: CTA bilaterally. No w/c.  ABD: +BS.  MSK: Synovial swelling without tenderness palpation of the right second-fourth MCPs and right wrist, that were nontender to palpation.  Left second MCP with synovial swelling but no tenderness to palpation.  Left wrist without swelling or tenderness to palpation.  Elbows, shoulders, knees, ankles, and MTPs without swelling or tenderness to palpation.  Hips nontender to palpation.    SKIN: No rash  EXT: No LE edema  PSYCH: Alert. Appropriate.    Labs / Imaging (select studies)   RF/CCP  Recent Labs   Lab Test  09/29/10   0938   CYCLICCITPEP  <2 Interpretation:  Negative   RHF  12     DULCE  Recent Labs   Lab Test  02/26/18   0846  09/29/10   0938   FALLON   --   <1.0 Interpretation:  Negative   SHALINI  Negative   --      RNP/Sm/SSA/SSB  Recent Labs   Lab Test  02/26/18   0846   RNPIGG  0.2   SMIGG  <0.2   SSAIGG  <0.2   SSBIGG  <0.2   SCLIGG  <0.2     dsDNA  Recent Labs   Lab Test  02/26/18   0846   DNA  35*     CBC  Recent Labs   Lab Test  " 09/12/18 0923 05/30/18   0913  04/25/18   0906   WBC  9.0  6.0  6.0   RBC  4.52  4.36  4.68   HGB  14.2  14.1  14.7   HCT  44.1  42.3  44.8   MCV  98  97  96   RDW  14.1  14.5  14.5   PLT  198  204  208   MCH  31.4  32.3  31.4   MCHC  32.2  33.3  32.8   NEUTROPHIL  72.7  62.7  63.9   LYMPH  14.7  20.9  19.9   MONOCYTE  9.1  11.6  11.6   EOSINOPHIL  2.7  4.1  3.6   BASOPHIL  0.8  0.7  1.0   ANEU  6.5  3.8  3.9   ALYM  1.3  1.3  1.2   KAREN  0.8  0.7  0.7   AEOS  0.2  0.3  0.2   ABAS  0.1  0.0  0.1     CMP  Recent Labs   Lab Test  09/12/18   0923  05/30/18   0913  04/25/18   0906  03/29/18   0946  02/26/18   0846  10/01/16   0625  09/27/16   0605  09/26/16   0615  09/25/16   1700   NA  144   --    --    --   140   --   140  140  139   POTASSIUM  3.8   --    --    --   3.7   --   3.9  3.8  3.9   CHLORIDE  109   --    --    --   107   --   106  107  106   CO2  28   --    --    --   27   --   29  26  25   ANIONGAP  7   --    --    --   6   --   5  7  8   GLC  102*   --    --    --   93  112*  100*  90  133*   BUN  15   --    --    --   19   --   11  14  18   CR  0.91  0.82  0.81  0.81  0.78   --   0.65  0.66  0.64   GFRESTIMATED  63  71  72  72  75   --   >90  Non  GFR Calc    >90  Non  GFR Calc    >90  Non  GFR Calc     GFRESTBLACK  76  86  87  87  >90   --   >90   GFR Calc    >90   GFR Calc    >90   GFR Calc     HAZEL  9.4   --    --    --   9.1   --   8.7  8.5  9.1   BILITOTAL  0.5  0.6  0.5  0.6  0.4   --    --   0.4  0.3   ALBUMIN  3.6  3.7  3.8  3.8  4.0   --    --   3.0*  3.6   PROTTOTAL  6.9  6.9  6.9  7.0  7.1   --    --   5.9*  6.6*   ALKPHOS  94  78  83  72  79   --    --   63  73   AST  27  19  23  21  20   --    --   18  20   ALT  35  26  28  22  24   --    --   21  26     Calcium/VitaminD  Recent Labs   Lab Test  09/12/18   0923  02/26/18   0846  10/05/16   0831  09/27/16   0605   HAZEL  9.4  9.1   --   8.7   VITDT    --    --   29   --      ESR/CRP  Recent Labs   Lab Test  05/30/18   0913  02/26/18   0846  04/13/13   1530  04/02/13   0955   SED  9  7  10  13   CRP  <2.9  8.6*   --   19.0*     TSH/T4  Recent Labs   Lab Test  10/05/16   0831  09/22/11   0958  03/01/11   0953  01/04/11   0940  11/09/10   1050  09/29/10   0940   TSH  4.58*  0.20*  0.55  9.99*  56.50*   --    T4   --    --    --   1.21  0.79  0.15*     Hepatitis B  Recent Labs   Lab Test  02/26/18   0846   HBCAB  Nonreactive   HEPBANG  Nonreactive     Hepatitis C  Recent Labs   Lab Test  02/26/18   0846  07/13/11   1318   HCVAB  Nonreactive  Negative     Tuberculosis Screening  Recent Labs   Lab Test  07/13/11   1318   TBRSLT  Negative   TBAGN  0.00     UA  Recent Labs   Lab Test  09/12/18   0936  09/25/16 2120  10/21/12   1807   COLOR  Yellow  Yellow  Yellow   APPEARANCE  Clear  Clear  Clear   URINEGLC  Negative  Negative  Negative   URINEBILI  Negative  Negative  Negative   SG  >1.030  1.020  1.026   URINEPH  5.5  6.0  6.0   PROTEIN  Negative  30*  30*   UROBILINOGEN  0.2   --    --    NITRITE  Negative  Negative  Negative   UBLD  Negative  Negative  Negative   LEUKEST  Negative  Negative  Trace*   WBCU   --   <1  2   RBCU   --   1  0   MUCOUS   --   Present*  Present*     Urine Microscopic  Recent Labs   Lab Test  09/25/16   2120  10/21/12   1807   WBCU  <1  2   RBCU  1  0   MUCOUS  Present*  Present*     Urine Protein  Recent Labs   Lab Test  09/12/18   0936   UTP  0.15   UTPG  0.05   UCRR  268     Immunization History     Immunization History   Administered Date(s) Administered     Influenza Quad, Recombinant, p-free (RIV4) 10/18/2018     Influenza Vaccine IM 3yrs+ 4 Valent IIV4 10/01/2016          Chart documentation done in part with Dragon Voice recognition Software. Although reviewed after completion, some word and grammatical error may remain.    Maurilio Hayes MD

## 2018-12-19 DIAGNOSIS — M06.09 RHEUMATOID ARTHRITIS OF MULTIPLE SITES WITHOUT RHEUMATOID FACTOR (H): ICD-10-CM

## 2018-12-19 DIAGNOSIS — Z79.899 HIGH RISK MEDICATION USE: ICD-10-CM

## 2018-12-19 LAB
ALBUMIN SERPL-MCNC: 3.6 G/DL (ref 3.4–5)
ALP SERPL-CCNC: 91 U/L (ref 40–150)
ALT SERPL W P-5'-P-CCNC: 30 U/L (ref 0–50)
AST SERPL W P-5'-P-CCNC: 21 U/L (ref 0–45)
BASOPHILS # BLD AUTO: 0.1 10E9/L (ref 0–0.2)
BASOPHILS NFR BLD AUTO: 0.8 %
BILIRUB DIRECT SERPL-MCNC: 0.1 MG/DL (ref 0–0.2)
BILIRUB SERPL-MCNC: 0.5 MG/DL (ref 0.2–1.3)
CREAT SERPL-MCNC: 0.87 MG/DL (ref 0.52–1.04)
DIFFERENTIAL METHOD BLD: NORMAL
EOSINOPHIL # BLD AUTO: 0.2 10E9/L (ref 0–0.7)
EOSINOPHIL NFR BLD AUTO: 3.7 %
ERYTHROCYTE [DISTWIDTH] IN BLOOD BY AUTOMATED COUNT: 14.8 % (ref 10–15)
GFR SERPL CREATININE-BSD FRML MDRD: 71 ML/MIN/{1.73_M2}
HCT VFR BLD AUTO: 44.4 % (ref 35–47)
HGB BLD-MCNC: 14.3 G/DL (ref 11.7–15.7)
LYMPHOCYTES # BLD AUTO: 1.3 10E9/L (ref 0.8–5.3)
LYMPHOCYTES NFR BLD AUTO: 19.2 %
MCH RBC QN AUTO: 31 PG (ref 26.5–33)
MCHC RBC AUTO-ENTMCNC: 32.2 G/DL (ref 31.5–36.5)
MCV RBC AUTO: 96 FL (ref 78–100)
MONOCYTES # BLD AUTO: 0.7 10E9/L (ref 0–1.3)
MONOCYTES NFR BLD AUTO: 11.1 %
NEUTROPHILS # BLD AUTO: 4.3 10E9/L (ref 1.6–8.3)
NEUTROPHILS NFR BLD AUTO: 65.2 %
PLATELET # BLD AUTO: 216 10E9/L (ref 150–450)
PROT SERPL-MCNC: 6.8 G/DL (ref 6.8–8.8)
RBC # BLD AUTO: 4.61 10E12/L (ref 3.8–5.2)
WBC # BLD AUTO: 6.6 10E9/L (ref 4–11)

## 2018-12-19 PROCEDURE — 82565 ASSAY OF CREATININE: CPT | Performed by: INTERNAL MEDICINE

## 2018-12-19 PROCEDURE — 85025 COMPLETE CBC W/AUTO DIFF WBC: CPT | Performed by: INTERNAL MEDICINE

## 2018-12-19 PROCEDURE — 36415 COLL VENOUS BLD VENIPUNCTURE: CPT | Performed by: INTERNAL MEDICINE

## 2018-12-19 PROCEDURE — 80076 HEPATIC FUNCTION PANEL: CPT | Performed by: INTERNAL MEDICINE

## 2018-12-19 NOTE — LETTER
[address] [place] [address]    December 26, 2018    Jackie Maguire  [address] [place] [id number]          Dear Jackie,    Your labs are normal.     Please let me know if you have any questions    Enclosed is a copy of your results.     Results for orders placed or performed in visit on 12/19/18   Hepatic panel   Result Value Ref Range    Bilirubin Direct 0.1 0.0 - 0.2 mg/dL    Bilirubin Total 0.5 0.2 - 1.3 mg/dL    Albumin 3.6 3.4 - 5.0 g/dL    Protein Total 6.8 6.8 - 8.8 g/dL    Alkaline Phosphatase 91 40 - 150 U/L    ALT 30 0 - 50 U/L    AST 21 0 - 45 U/L   Creatinine   Result Value Ref Range    Creatinine 0.87 0.52 - 1.04 mg/dL    GFR Estimate 71 >60 mL/min/[1.73_m2]    GFR Estimate If Black 83 >60 mL/min/[1.73_m2]   CBC with platelets differential   Result Value Ref Range    WBC 6.6 4.0 - 11.0 10e9/L    RBC Count 4.61 3.8 - 5.2 10e12/L    Hemoglobin 14.3 11.7 - 15.7 g/dL    Hematocrit 44.4 35.0 - 47.0 %    MCV 96 78 - 100 fl    MCH 31.0 26.5 - 33.0 pg    MCHC 32.2 31.5 - 36.5 g/dL    RDW 14.8 10.0 - 15.0 %    Platelet Count 216 150 - 450 10e9/L    % Neutrophils 65.2 %    % Lymphocytes 19.2 %    % Monocytes 11.1 %    % Eosinophils 3.7 %    % Basophils 0.8 %    Absolute Neutrophil 4.3 1.6 - 8.3 10e9/L    Absolute Lymphocytes 1.3 0.8 - 5.3 10e9/L    Absolute Monocytes 0.7 0.0 - 1.3 10e9/L    Absolute Eosinophils 0.2 0.0 - 0.7 10e9/L    Absolute Basophils 0.1 0.0 - 0.2 10e9/L    Diff Method Automated Method        If you have any questions or concerns, please call myself or my nurse at [phone].      Sincerely,        Maurilio Hayes MD/singh

## 2018-12-21 NOTE — RESULT ENCOUNTER NOTE
"Please mail Ms. Maguire her results with the following message.    \"Ms. Maguire,    Your labs are normal.    Please let me know if you have any questions.    Sincerely,  Maurilio Hayes MD  12/21/2018 2:51 PM\"  "

## 2019-03-04 DIAGNOSIS — M06.09 RHEUMATOID ARTHRITIS OF MULTIPLE SITES WITHOUT RHEUMATOID FACTOR (H): ICD-10-CM

## 2019-03-04 DIAGNOSIS — Z79.899 HIGH RISK MEDICATION USE: ICD-10-CM

## 2019-03-04 NOTE — TELEPHONE ENCOUNTER
Prednisone 5MG      Last Written Prescription Date:  01/31/19  Last Fill Quantity: 30,   # refills: 3  Last Office Visit: 11/05/18  AMOS Hayes  Future Office visit:    Next 5 appointments (look out 90 days)    Mar 11, 2019  3:40 PM CDT  Return Visit with Maurilio Hayes MD  [facility] [place] ([facility] [address] [facility]  [place] [address]  [phone]           Routing refill request to provider for review/approval because:  Drug not on the [facility], [facility] or [facility] refill protocol or controlled substance

## 2019-03-06 DIAGNOSIS — M06.09 RHEUMATOID ARTHRITIS OF MULTIPLE SITES WITHOUT RHEUMATOID FACTOR (H): ICD-10-CM

## 2019-03-06 DIAGNOSIS — Z79.899 HIGH RISK MEDICATION USE: ICD-10-CM

## 2019-03-06 LAB
ALBUMIN SERPL-MCNC: 3.7 G/DL (ref 3.4–5)
ALP SERPL-CCNC: 87 U/L (ref 40–150)
ALT SERPL W P-5'-P-CCNC: 41 U/L (ref 0–50)
AST SERPL W P-5'-P-CCNC: 26 U/L (ref 0–45)
BASOPHILS # BLD AUTO: 0 10E9/L (ref 0–0.2)
BASOPHILS NFR BLD AUTO: 0.6 %
BILIRUB DIRECT SERPL-MCNC: <0.1 MG/DL (ref 0–0.2)
BILIRUB SERPL-MCNC: 0.5 MG/DL (ref 0.2–1.3)
CREAT SERPL-MCNC: 0.76 MG/DL (ref 0.52–1.04)
DIFFERENTIAL METHOD BLD: NORMAL
EOSINOPHIL # BLD AUTO: 0.2 10E9/L (ref 0–0.7)
EOSINOPHIL NFR BLD AUTO: 2.8 %
ERYTHROCYTE [DISTWIDTH] IN BLOOD BY AUTOMATED COUNT: 14.7 % (ref 10–15)
GFR SERPL CREATININE-BSD FRML MDRD: 85 ML/MIN/{1.73_M2}
HCT VFR BLD AUTO: 44.6 % (ref 35–47)
HGB BLD-MCNC: 14.4 G/DL (ref 11.7–15.7)
LYMPHOCYTES # BLD AUTO: 1.2 10E9/L (ref 0.8–5.3)
LYMPHOCYTES NFR BLD AUTO: 18.1 %
MCH RBC QN AUTO: 31.5 PG (ref 26.5–33)
MCHC RBC AUTO-ENTMCNC: 32.3 G/DL (ref 31.5–36.5)
MCV RBC AUTO: 98 FL (ref 78–100)
MONOCYTES # BLD AUTO: 0.8 10E9/L (ref 0–1.3)
MONOCYTES NFR BLD AUTO: 12.3 %
NEUTROPHILS # BLD AUTO: 4.4 10E9/L (ref 1.6–8.3)
NEUTROPHILS NFR BLD AUTO: 66.2 %
PLATELET # BLD AUTO: 208 10E9/L (ref 150–450)
PROT SERPL-MCNC: 6.8 G/DL (ref 6.8–8.8)
RBC # BLD AUTO: 4.57 10E12/L (ref 3.8–5.2)
WBC # BLD AUTO: 6.7 10E9/L (ref 4–11)

## 2019-03-06 PROCEDURE — 85025 COMPLETE CBC W/AUTO DIFF WBC: CPT | Performed by: INTERNAL MEDICINE

## 2019-03-06 PROCEDURE — 80076 HEPATIC FUNCTION PANEL: CPT | Performed by: INTERNAL MEDICINE

## 2019-03-06 PROCEDURE — 82565 ASSAY OF CREATININE: CPT | Performed by: INTERNAL MEDICINE

## 2019-03-06 PROCEDURE — 36415 COLL VENOUS BLD VENIPUNCTURE: CPT | Performed by: INTERNAL MEDICINE

## 2019-03-06 RX ORDER — PREDNISONE 5 MG/1
5 TABLET ORAL DAILY
Qty: 30 TABLET | Refills: 0 | Status: SHIPPED | OUTPATIENT
Start: 2019-03-06 | End: 2019-03-11

## 2019-03-06 NOTE — TELEPHONE ENCOUNTER
Rheumatology team: Please call to notify Ms. Maguire that prednisone has been refilled.    Maurilio Hayes MD  3/6/2019 5:17 PM

## 2019-03-06 NOTE — TELEPHONE ENCOUNTER
Contacted Pt, letting her know Prednisone has been refilled.  Pt had no questions or concerns, agrees and understands.    Jenn Dougherty, ARDEN  3/6/2019  5:46 PM

## 2019-03-06 NOTE — TELEPHONE ENCOUNTER
Spoke with patient who has 1 more pill left. She has an upcoming apt on 3/11/19 and needs a refill to last her up until her apt.    Lenny Shahid RN....3/6/2019 12:45 PM

## 2019-03-11 ENCOUNTER — OFFICE VISIT (OUTPATIENT)
Dept: RHEUMATOLOGY | Facility: CLINIC | Age: 64
End: 2019-03-11
Payer: MEDICARE

## 2019-03-11 VITALS
DIASTOLIC BLOOD PRESSURE: 84 MMHG | HEART RATE: 86 BPM | OXYGEN SATURATION: 92 % | WEIGHT: 178.8 LBS | SYSTOLIC BLOOD PRESSURE: 138 MMHG | BODY MASS INDEX: 28.73 KG/M2 | HEIGHT: 66 IN

## 2019-03-11 DIAGNOSIS — M54.50 MIDLINE LOW BACK PAIN WITHOUT SCIATICA, UNSPECIFIED CHRONICITY: ICD-10-CM

## 2019-03-11 DIAGNOSIS — Z23 NEED FOR PROPHYLACTIC VACCINATION AGAINST STREPTOCOCCUS PNEUMONIAE (PNEUMOCOCCUS): ICD-10-CM

## 2019-03-11 DIAGNOSIS — M06.09 RHEUMATOID ARTHRITIS OF MULTIPLE SITES WITHOUT RHEUMATOID FACTOR (H): Primary | ICD-10-CM

## 2019-03-11 DIAGNOSIS — Z79.899 HIGH RISK MEDICATION USE: ICD-10-CM

## 2019-03-11 PROCEDURE — G0009 ADMIN PNEUMOCOCCAL VACCINE: HCPCS | Performed by: INTERNAL MEDICINE

## 2019-03-11 PROCEDURE — 90732 PPSV23 VACC 2 YRS+ SUBQ/IM: CPT | Performed by: INTERNAL MEDICINE

## 2019-03-11 PROCEDURE — 99214 OFFICE O/P EST MOD 30 MIN: CPT | Mod: 25 | Performed by: INTERNAL MEDICINE

## 2019-03-11 RX ORDER — PREDNISONE 5 MG/1
5 TABLET ORAL DAILY
Qty: 90 TABLET | Refills: 1 | Status: SHIPPED | OUTPATIENT
Start: 2019-03-11 | End: 2019-07-22

## 2019-03-11 RX ORDER — METHOTREXATE 25 MG/ML
20 INJECTION, SOLUTION INTRA-ARTERIAL; INTRAMUSCULAR; INTRAVENOUS
Qty: 4 VIAL | Refills: 3 | Status: SHIPPED | OUTPATIENT
Start: 2019-03-11 | End: 2019-06-20

## 2019-03-11 RX ORDER — SYRINGE-NEEDLE,INSULIN,0.5 ML 27GX1/2"
SYRINGE, EMPTY DISPOSABLE MISCELLANEOUS
Qty: 10 EACH | Refills: 3 | Status: SHIPPED | OUTPATIENT
Start: 2019-03-11 | End: 2019-07-22

## 2019-03-11 ASSESSMENT — MIFFLIN-ST. JEOR: SCORE: 1387.78

## 2019-03-11 NOTE — NURSING NOTE
Noomie to follow up with Primary Care provider regarding elevated blood pressure.    Blood pressure rechecked after visit 138/84  Elizabeth Haley CMA Rheumatology  3/11/2019 4:20 PM

## 2019-03-11 NOTE — PROGRESS NOTES
Rheumatology Clinic Visit      Jackie Maguire MRN# [id number]   YOB: 1955 Age: 64 year old      Date of visit: 3/11/19   PCP: Dr. Talia Mejía at [facility]    Chief Complaint   Patient presents with:  Arthritis: RA, patient staters she feels about the same, back has been getting worse.      Assessment and Plan     1. Rheumatoid Arthritis (CCP low positive, then negative): From record review: dx'd 2012; has followed with Jacob Cano, and [facility]; hx of +DULCE, +dsDNA, CCP low positive then negative; hx of psoriasis; hx of C-spine fusion; initial presentation of pain/stiff/swollen joints involving the knees, feet, ankles, neck, back.  Previously on leflunomide (mouth sores), HCQ (ineffective), AZA (itching), Humira (ineffective at q7day and q14 days dosings), Remicade (syncopes).  Sulfa allergy.  Currently on MTX 20mg wkly and prednisone 5mg daily.  Active synovitis and chronic changes of RA.  I advised that we start a biologic DMARD but she is resistant to this idea because time.  We discussed other option of changing methotrexate to a subcutaneous route to increase bioavailability and she would like to try this first.  If active disease at her next clinic visit then she says that she will plan to start a biologic DMARD.   - Change methotrexate from 20 mg PO once weekly; to 20mg SQ once weekly  - Continue folic acid 1mg daily  - Continue prednisone 5mg daily  - Labs monthly [id number]: CBC, Cr, Hepatic Panel  - Labs every 3 months: CBC, Creatinine, Hepatic Panel, ESR, CRP    2. History of Positive DULCE and dsDNA: with inflammatory arthritis.  Symptoms fit better for seropositive rheumatoid arthritis.  See #1.  Labs as noted in #1.    3. Neck pain hx: Neck spasms several years ago that was dx'd as dystonia at the [facility]; had several imaging studies especially given the RA dx where degenerative changes were seen.  Also saw a spine surgeon at [place].  PT ineffective.  Neck pain has been  stable.    4.  Lower back pain: She reports getting lower back pain when she is picking up her grandchild, and this improves with rest.  No radiation of pain.  Suspect degenerative changes and have advised physical therapy.  She says that she is going to try doing exercises on her own at home and if it does not improve then she will consider formal physical therapy.      5.  Vaccinations: Vaccinations reviewed with Ms. Maguire.  Risks and benefits of vaccinations were discussed.   CDC stance on shingrix when on moderate to high immunosuppression was reviewed.   - Influenza: encouraged yearly vaccination  - [id number]: received 10/5/2016 per [place] Immunization Information [employer]   - [id number]: received 10/31/2013 per [place] Immunization Information [employer], repeat today  - Shingrix: Patient plans to receive at the pharmacy; Rx called to the [facility] pharmacy in [place], [place] at her request    Ms. Maguire verbalized agreement with and understanding of the rational for the diagnosis and treatment plan.  All questions were answered to best of my ability and the patient's satisfaction. Ms. Maguire was advised to contact the clinic with any questions that may arise after the clinic visit.      Thank you for involving me in the care of the patient    Return to clinic: 3-4 months      HPI   Jackie Maguire is a 64 year old female with a past medical history significant for hyperlipidemia, hypothyroidism, history of melanoma, history of humerus fracture, osteoporosis, psoriasis, and rheumatoid arthritis who presents for follow-up of rheumatoid arthritis.     Previously reviewed hx:    10/12/2017 rheumatology clinic note by Dr. Cabrera Chinchilla documents that the patient has rheumatoid arthritis with multiple treatment failures.  He discusses Orencia and prednisone 5 mg daily.    A consult note from Dr. Wendy James documents that the patient is a 61-year-old female with positive DULCE, positive dsDNA,  positive CCP (low positive in 2012, then negative), psoriasis, fusion of some of the cervical facet levels.  Initially evaluated 9/2012 for history of acute onset swollen joints, stiffness; joints affected: Knees, feet, hands, ankles, neck, back.  No preceding illness or trauma.  Failed methotrexate (ineffective), leflunomide (mouth sores), azathioprine (itching), Humira (ineffective at q7day and q14 days dosings).  Hydroxychloroquine and prednisone 10 mg daily with some improvement.    Additional chart review shows that MTX was effective and only reduced when a biologic DMARD was being added.     Today, Ms. Maguire reports that she feels better on prednisone but still has active joint pain.  Joint pain is worse in her hands and wrists.  Hand wrist pain is worse in the morning and improves with activity.  Positive gelling phenomenon.  Morning stiffness for at least 1 hour, generally lasting all day to a low level.  She also reports having lower back pain when she is picking up her grandchild repeatedly and this improves with rest and not picking up her grandchild.  No radiation of her back pain.    Denies fevers, chills, nausea, vomiting, constipation, diarrhea. No abdominal pain. No chest pain/pressure, palpitations, or shortness of breath. No LE swelling. No neck pain. No oral or nasal sores.  No rash. No sicca symptoms. No photosensitivity or photophobia. No eye pain or redness. No history of inflammatory eye disease.  No history of DVT or pulmonary embolism; one still birth, 3 grown children.  No history of serositis.  No history of Raynaud's Phenomenon.  No seizure history.  No known renal disorder.      Tobacco: none  EtOH: none  Drugs: none    ROS   GEN: No fevers, chills, night sweats, or weight change  SKIN: No itching, rashes, sores  HEENT: No oral or nasal ulcers.  CV: No chest pain, pressure, palpitations, or dyspnea on exertion.  PULM: No SOB, wheeze, cough.  GI: No nausea, vomiting, constipation,  diarrhea. No blood in stool. No abdominal pain.  : No blood in urine.  MSK: See HPI.  NEURO: No numbness, tingling, or weakness.  ENDO: No heat/cold intolerance.  EXT: No LE swelling  PSYCH: Negative    Active Problem List     Patient Active Problem List   Diagnosis     Hypothyroidism     Hyperlipidemia LDL goal <160     Inflammatory arthritis     Fracture, pelvis closed (H)     Malignant melanoma of skin (H)     Distal radius fracture     Closed fracture of part of humerus     Proximal humerus fracture     Aftercare for healing traumatic fracture of upper arm     Osteopetrosis     Cervical dystonia     Dystonia, torsion, fragments of     Psoriasis     Rheumatoid arthritis of multiple sites without rheumatoid factor (H)     Past Medical History     Past Medical History:   Diagnosis Date     Hyperlipidemia LDL goal <160 1/5/2011     Hypothyroidism 10/1/2010     Inflammatory arthritis 1/20/2011     Past Surgical History     Past Surgical History:   Procedure Laterality Date     APPENDECTOMY       ARTHRODESIS TOE(S) Left 8/17/2017    Procedure: ARTHRODESIS TOE(S);  Left foot 1st metatarsophalangeal realignment fusion, 2nd & 3rd digital corrections;  Surgeon: Jason Lamar DPM;  Location: [facility]     OPEN REDUCTION INTERNAL FIXATION HUMERUS PROXIMAL Left 9/30/2016    Procedure: OPEN REDUCTION INTERNAL FIXATION HUMERUS PROXIMAL;  Surgeon: Charly Bernal MD;  Location: [facility] OR     Allergy     Allergies   Allergen Reactions     Azathioprine Itching     Leflunomide Other (See Comments)     Mouth sores     Compazine Anxiety     Restless, anxious     Prochlorperazine Anxiety     Restless, anxious     Sulfa Drugs Rash     Current Medication List     Current Outpatient Medications   Medication Sig     acetaminophen (TYLENOL) 325 MG tablet 650 mg po QID and BId PRN     aspirin 81 MG tablet Take 1 tablet (81 mg) by mouth daily     folic acid (FOLVITE) 1 MG tablet Take 1 tablet (1 mg) by mouth daily     hydrOXYzine  "(ATARAX) 25 MG tablet Take 1 tablet (25 mg) by mouth every 4 hours as needed for itching (and nausea)     LEVOTHYROXINE SODIUM PO Take 137 mcg by mouth daily      methotrexate sodium 2.5 MG TABS Take 8 tablets (20 mg) by mouth once a week . Take all 8 tablets on the same day of each week.     oxyCODONE-acetaminophen (PERCOCET) 5-325 MG per tablet Take 1-2 tablets by mouth every 4 hours as needed for pain (moderate to severe)     predniSONE (DELTASONE) 2.5 MG tablet Take 1 tablet (2.5 mg) by mouth daily     predniSONE (DELTASONE) 5 MG tablet Take 5 mg by mouth daily.     VITAMIN D, CHOLECALCIFEROL, PO Take by mouth daily     No current facility-administered medications for this visit.          Social History   See HPI    Family History     Family History   Problem Relation Age of Onset     Cancer Father        Physical Exam     Temp Readings from Last 3 Encounters:   06/11/18 98.4  F (36.9  C) (Oral)   08/17/17 98  F (36.7  C) (Oral)   07/13/17 98.1  F (36.7  C)     BP Readings from Last 5 Encounters:   11/05/18 141/90   06/11/18 125/85   02/26/18 135/86   10/12/17 135/79   08/17/17 (!) (P) 155/97     Pulse Readings from Last 1 Encounters:   11/05/18 79     Resp Readings from Last 1 Encounters:   06/11/18 16     Estimated body mass index is 27.6 kg/m  as calculated from the following:    Height as of 11/5/18: 1.676 m (5' 6\").    Weight as of 11/5/18: 77.6 kg (171 lb).    GEN: NAD  HEENT: MMM. No oral lesions.  Anicteric, noninjected sclera.  Eyes appear to have good moisture by gross examination.  CV: S1, S2. RRR. No m/r/g.  PULM: CTA bilaterally. No w/c.  MSK: Synovial swelling and tenderness to palpation of the bilateral second-fourth MCPs and wrists.  Bilateral wrists with subluxation.  PIPs, elbows, shoulders, knees, ankles, and MTPs without swelling or tenderness to palpation.  Hips nontender to palpation.    SKIN: No rash  EXT: No LE edema  PSYCH: Alert. Appropriate.    Labs / Imaging (select studies) "     DULCE  Recent Labs   Lab Test 02/26/18  0846   SHALINI Negative     RNP/Sm/SSA/SSB  Recent Labs   Lab Test 02/26/18  0846   RNPIGG 0.2   SMIGG <0.2   SSAIGG <0.2   SSBIGG <0.2   SCLIGG <0.2     dsDNA  Recent Labs   Lab Test 02/26/18  0846   DNA 35*     CBC  Recent Labs   Lab Test 03/06/19  0847 12/19/18  0852 09/12/18  0923   WBC 6.7 6.6 9.0   RBC 4.57 4.61 4.52   HGB 14.4 14.3 14.2   HCT 44.6 44.4 44.1   MCV 98 96 98   RDW 14.7 14.8 14.1    216 198   MCH 31.5 31.0 31.4   MCHC 32.3 32.2 32.2   NEUTROPHIL 66.2 65.2 72.7   LYMPH 18.1 19.2 14.7   MONOCYTE 12.3 11.1 9.1   EOSINOPHIL 2.8 3.7 2.7   BASOPHIL 0.6 0.8 0.8   ANEU 4.4 4.3 6.5   ALYM 1.2 1.3 1.3   KAREN 0.8 0.7 0.8   AEOS 0.2 0.2 0.2   ABAS 0.0 0.1 0.1     CMP  Recent Labs   Lab Test 03/06/19  0847 12/19/18  0852 09/12/18  0923 05/30/18  0913  02/26/18  0846 10/01/16  0625 09/27/16  0605 09/26/16  0615 09/25/16  1700   NA  --   --  144  --   --  140  --  140 140 139   POTASSIUM  --   --  3.8  --   --  3.7  --  3.9 3.8 3.9   CHLORIDE  --   --  109  --   --  107  --  106 107 106   CO2  --   --  28  --   --  27  --  29 26 25   ANIONGAP  --   --  7  --   --  6  --  5 7 8   GLC  --   --  102*  --   --  93 112* 100* 90 133*   BUN  --   --  15  --   --  19  --  11 14 18   CR 0.76 0.87 0.91 0.82   < > 0.78  --  0.65 0.66 0.64   GFRESTIMATED 85 71 63 71   < > 75  --  >90  Non  GFR Calc   >90  Non  GFR Calc   >90  Non  GFR Calc     GFRESTBLACK >90 83 76 86   < > >90  --  >90   GFR Calc   >90   GFR Calc   >90   GFR Calc     HAZEL  --   --  9.4  --   --  9.1  --  8.7 8.5 9.1   BILITOTAL 0.5 0.5 0.5 0.6   < > 0.4  --   --  0.4 0.3   ALBUMIN 3.7 3.6 3.6 3.7   < > 4.0  --   --  3.0* 3.6   PROTTOTAL 6.8 6.8 6.9 6.9   < > 7.1  --   --  5.9* 6.6*   ALKPHOS 87 91 94 78   < > 79  --   --  63 73   AST 26 21 27 19   < > 20  --   --  18 20   ALT 41 30 35 26   < > 24  --   --  21 26    <  > = values in this interval not displayed.     Calcium/VitaminD  Recent Labs   Lab Test 09/12/18  0923 02/26/18  0846 10/05/16  0831 09/27/16  0605   HAZEL 9.4 9.1  --  8.7   VITDT  --   --  29  --      ESR/CRP  Recent Labs   Lab Test 05/30/18  0913 02/26/18  0846 04/13/13  1530 04/02/13  0955   SED 9 7 10 13   CRP <2.9 8.6*  --  19.0*     TSH/T4  Recent Labs   Lab Test 10/05/16  0831 09/22/11  0958 03/01/11  0953 01/04/11  0940   TSH 4.58* 0.20* 0.55 9.99*   T4  --   --   --  1.21     Hepatitis B  Recent Labs   Lab Test 02/26/18  0846   HBCAB Nonreactive   HEPBANG Nonreactive     Hepatitis C  Recent Labs   Lab Test 02/26/18  0846 07/13/11  1318   HCVAB Nonreactive Negative     Tuberculosis Screening  Recent Labs   Lab Test 07/13/11  1318   TBRSLT Negative   TBAGN 0.00     UA  Recent Labs   Lab Test 09/12/18  0936 09/25/16 2120 10/21/12  1807   COLOR Yellow Yellow Yellow   APPEARANCE Clear Clear Clear   URINEGLC Negative Negative Negative   URINEBILI Negative Negative Negative   SG >1.030 1.020 1.026   URINEPH 5.5 6.0 6.0   PROTEIN Negative 30* 30*   UROBILINOGEN 0.2  --   --    NITRITE Negative Negative Negative   UBLD Negative Negative Negative   LEUKEST Negative Negative Trace*   WBCU  --  <1 2   RBCU  --  1 0   MUCOUS  --  Present* Present*     Urine Microscopic  Recent Labs   Lab Test 09/25/16  2120 10/21/12  1807   WBCU <1 2   RBCU 1 0   MUCOUS Present* Present*     Urine Protein  Recent Labs   Lab Test 09/12/18  0936   UTP 0.15   UTPG 0.05   UCRR 268     Immunization History     Immunization History   Administered Date(s) Administered     Influenza Quad, Recombinant, p-free (RIV4) 10/18/2018     Influenza Vaccine IM 3yrs+ 4 Valent IIV4 10/01/2016          Chart documentation done in part with Dragon Voice recognition Software. Although reviewed after completion, some word and grammatical error may remain.    Maurilio Hayes MD

## 2019-04-09 DIAGNOSIS — M06.09 RHEUMATOID ARTHRITIS OF MULTIPLE SITES WITHOUT RHEUMATOID FACTOR (H): ICD-10-CM

## 2019-04-09 DIAGNOSIS — Z79.899 HIGH RISK MEDICATION USE: ICD-10-CM

## 2019-04-09 LAB
ALBUMIN SERPL-MCNC: 4 G/DL (ref 3.4–5)
ALP SERPL-CCNC: 87 U/L (ref 40–150)
ALT SERPL W P-5'-P-CCNC: 34 U/L (ref 0–50)
AST SERPL W P-5'-P-CCNC: 21 U/L (ref 0–45)
BASOPHILS # BLD AUTO: 0 10E9/L (ref 0–0.2)
BASOPHILS NFR BLD AUTO: 0.4 %
BILIRUB DIRECT SERPL-MCNC: <0.1 MG/DL (ref 0–0.2)
BILIRUB SERPL-MCNC: 0.5 MG/DL (ref 0.2–1.3)
CREAT SERPL-MCNC: 0.78 MG/DL (ref 0.52–1.04)
DIFFERENTIAL METHOD BLD: NORMAL
EOSINOPHIL # BLD AUTO: 0.1 10E9/L (ref 0–0.7)
EOSINOPHIL NFR BLD AUTO: 1.3 %
ERYTHROCYTE [DISTWIDTH] IN BLOOD BY AUTOMATED COUNT: 14.4 % (ref 10–15)
GFR SERPL CREATININE-BSD FRML MDRD: 81 ML/MIN/{1.73_M2}
HCT VFR BLD AUTO: 44.9 % (ref 35–47)
HGB BLD-MCNC: 14.7 G/DL (ref 11.7–15.7)
LYMPHOCYTES # BLD AUTO: 0.9 10E9/L (ref 0.8–5.3)
LYMPHOCYTES NFR BLD AUTO: 9 %
MCH RBC QN AUTO: 32.2 PG (ref 26.5–33)
MCHC RBC AUTO-ENTMCNC: 32.7 G/DL (ref 31.5–36.5)
MCV RBC AUTO: 98 FL (ref 78–100)
MONOCYTES # BLD AUTO: 0.8 10E9/L (ref 0–1.3)
MONOCYTES NFR BLD AUTO: 7.8 %
NEUTROPHILS # BLD AUTO: 8.3 10E9/L (ref 1.6–8.3)
NEUTROPHILS NFR BLD AUTO: 81.5 %
PLATELET # BLD AUTO: 208 10E9/L (ref 150–450)
PROT SERPL-MCNC: 7 G/DL (ref 6.8–8.8)
RBC # BLD AUTO: 4.57 10E12/L (ref 3.8–5.2)
WBC # BLD AUTO: 10.2 10E9/L (ref 4–11)

## 2019-04-09 PROCEDURE — 82565 ASSAY OF CREATININE: CPT | Performed by: INTERNAL MEDICINE

## 2019-04-09 PROCEDURE — 36415 COLL VENOUS BLD VENIPUNCTURE: CPT | Performed by: INTERNAL MEDICINE

## 2019-04-09 PROCEDURE — 85025 COMPLETE CBC W/AUTO DIFF WBC: CPT | Performed by: INTERNAL MEDICINE

## 2019-04-09 PROCEDURE — 80076 HEPATIC FUNCTION PANEL: CPT | Performed by: INTERNAL MEDICINE

## 2019-04-19 NOTE — RESULT ENCOUNTER NOTE
Rheumatology team: Please call to inform Ms. Maguire that her labs do not show evidence for medication toxicity.    Maurilio Hayes MD  4/19/2019 2:36 AM

## 2019-05-07 ENCOUNTER — COMMUNICATION - HEALTHEAST (OUTPATIENT)
Dept: FAMILY MEDICINE | Facility: CLINIC | Age: 64
End: 2019-05-07

## 2019-05-07 DIAGNOSIS — M06.09 RHEUMATOID ARTHRITIS OF MULTIPLE SITES WITHOUT RHEUMATOID FACTOR (H): ICD-10-CM

## 2019-05-07 DIAGNOSIS — Z79.899 HIGH RISK MEDICATION USE: ICD-10-CM

## 2019-05-07 LAB
ALBUMIN SERPL-MCNC: 3.9 G/DL (ref 3.4–5)
ALP SERPL-CCNC: 84 U/L (ref 40–150)
ALT SERPL W P-5'-P-CCNC: 27 U/L (ref 0–50)
AST SERPL W P-5'-P-CCNC: 19 U/L (ref 0–45)
BASOPHILS # BLD AUTO: 0.1 10E9/L (ref 0–0.2)
BASOPHILS NFR BLD AUTO: 0.7 %
BILIRUB DIRECT SERPL-MCNC: <0.1 MG/DL (ref 0–0.2)
BILIRUB SERPL-MCNC: 0.5 MG/DL (ref 0.2–1.3)
CREAT SERPL-MCNC: 0.71 MG/DL (ref 0.52–1.04)
DIFFERENTIAL METHOD BLD: ABNORMAL
EOSINOPHIL # BLD AUTO: 0.1 10E9/L (ref 0–0.7)
EOSINOPHIL NFR BLD AUTO: 1.1 %
ERYTHROCYTE [DISTWIDTH] IN BLOOD BY AUTOMATED COUNT: 14.2 % (ref 10–15)
GFR SERPL CREATININE-BSD FRML MDRD: >90 ML/MIN/{1.73_M2}
HCT VFR BLD AUTO: 43.7 % (ref 35–47)
HGB BLD-MCNC: 14.4 G/DL (ref 11.7–15.7)
LYMPHOCYTES # BLD AUTO: 0.7 10E9/L (ref 0.8–5.3)
LYMPHOCYTES NFR BLD AUTO: 8 %
MCH RBC QN AUTO: 32.3 PG (ref 26.5–33)
MCHC RBC AUTO-ENTMCNC: 33 G/DL (ref 31.5–36.5)
MCV RBC AUTO: 98 FL (ref 78–100)
MONOCYTES # BLD AUTO: 0.6 10E9/L (ref 0–1.3)
MONOCYTES NFR BLD AUTO: 7.3 %
NEUTROPHILS # BLD AUTO: 7.1 10E9/L (ref 1.6–8.3)
NEUTROPHILS NFR BLD AUTO: 82.9 %
PLATELET # BLD AUTO: 204 10E9/L (ref 150–450)
PROT SERPL-MCNC: 6.9 G/DL (ref 6.8–8.8)
RBC # BLD AUTO: 4.46 10E12/L (ref 3.8–5.2)
WBC # BLD AUTO: 8.5 10E9/L (ref 4–11)

## 2019-05-07 PROCEDURE — 82565 ASSAY OF CREATININE: CPT | Performed by: INTERNAL MEDICINE

## 2019-05-07 PROCEDURE — 80076 HEPATIC FUNCTION PANEL: CPT | Performed by: INTERNAL MEDICINE

## 2019-05-07 PROCEDURE — 36415 COLL VENOUS BLD VENIPUNCTURE: CPT | Performed by: INTERNAL MEDICINE

## 2019-05-07 PROCEDURE — 85025 COMPLETE CBC W/AUTO DIFF WBC: CPT | Performed by: INTERNAL MEDICINE

## 2019-06-04 DIAGNOSIS — M06.09 RHEUMATOID ARTHRITIS OF MULTIPLE SITES WITHOUT RHEUMATOID FACTOR (H): ICD-10-CM

## 2019-06-04 DIAGNOSIS — Z79.899 HIGH RISK MEDICATION USE: ICD-10-CM

## 2019-06-04 LAB
ALBUMIN SERPL-MCNC: 3.9 G/DL (ref 3.4–5)
ALP SERPL-CCNC: 87 U/L (ref 40–150)
ALT SERPL W P-5'-P-CCNC: 26 U/L (ref 0–50)
AST SERPL W P-5'-P-CCNC: 21 U/L (ref 0–45)
BASOPHILS # BLD AUTO: 0 10E9/L (ref 0–0.2)
BASOPHILS NFR BLD AUTO: 0.4 %
BILIRUB DIRECT SERPL-MCNC: <0.1 MG/DL (ref 0–0.2)
BILIRUB SERPL-MCNC: 0.4 MG/DL (ref 0.2–1.3)
CREAT SERPL-MCNC: 0.84 MG/DL (ref 0.52–1.04)
CRP SERPL-MCNC: <2.9 MG/L (ref 0–8)
DIFFERENTIAL METHOD BLD: NORMAL
EOSINOPHIL # BLD AUTO: 0.1 10E9/L (ref 0–0.7)
EOSINOPHIL NFR BLD AUTO: 1.4 %
ERYTHROCYTE [DISTWIDTH] IN BLOOD BY AUTOMATED COUNT: 14.2 % (ref 10–15)
ERYTHROCYTE [SEDIMENTATION RATE] IN BLOOD BY WESTERGREN METHOD: 8 MM/H (ref 0–30)
GFR SERPL CREATININE-BSD FRML MDRD: 74 ML/MIN/{1.73_M2}
HCT VFR BLD AUTO: 42.9 % (ref 35–47)
HGB BLD-MCNC: 13.8 G/DL (ref 11.7–15.7)
LYMPHOCYTES # BLD AUTO: 0.8 10E9/L (ref 0.8–5.3)
LYMPHOCYTES NFR BLD AUTO: 9.3 %
MCH RBC QN AUTO: 31.9 PG (ref 26.5–33)
MCHC RBC AUTO-ENTMCNC: 32.2 G/DL (ref 31.5–36.5)
MCV RBC AUTO: 99 FL (ref 78–100)
MONOCYTES # BLD AUTO: 0.8 10E9/L (ref 0–1.3)
MONOCYTES NFR BLD AUTO: 8.8 %
NEUTROPHILS # BLD AUTO: 6.8 10E9/L (ref 1.6–8.3)
NEUTROPHILS NFR BLD AUTO: 80.1 %
PLATELET # BLD AUTO: 201 10E9/L (ref 150–450)
PROT SERPL-MCNC: 6.7 G/DL (ref 6.8–8.8)
RBC # BLD AUTO: 4.33 10E12/L (ref 3.8–5.2)
WBC # BLD AUTO: 8.5 10E9/L (ref 4–11)

## 2019-06-04 PROCEDURE — 85025 COMPLETE CBC W/AUTO DIFF WBC: CPT | Performed by: INTERNAL MEDICINE

## 2019-06-04 PROCEDURE — 36415 COLL VENOUS BLD VENIPUNCTURE: CPT | Performed by: INTERNAL MEDICINE

## 2019-06-04 PROCEDURE — 82565 ASSAY OF CREATININE: CPT | Performed by: INTERNAL MEDICINE

## 2019-06-04 PROCEDURE — 86140 C-REACTIVE PROTEIN: CPT | Performed by: INTERNAL MEDICINE

## 2019-06-04 PROCEDURE — 85652 RBC SED RATE AUTOMATED: CPT | Performed by: INTERNAL MEDICINE

## 2019-06-04 PROCEDURE — 80076 HEPATIC FUNCTION PANEL: CPT | Performed by: INTERNAL MEDICINE

## 2019-06-04 NOTE — LETTER
[address] [place] [address]    June 5, 2019    Jackie Maguire  [address] [place] [id number]          Dear Jackie,    Your labs are normal.     Please let me know if you have any questions.     Enclosed is a copy of your results.     Results for orders placed or performed in visit on 06/04/19   Hepatic panel   Result Value Ref Range    Bilirubin Direct <0.1 0.0 - 0.2 mg/dL    Bilirubin Total 0.4 0.2 - 1.3 mg/dL    Albumin 3.9 3.4 - 5.0 g/dL    Protein Total 6.7 (L) 6.8 - 8.8 g/dL    Alkaline Phosphatase 87 40 - 150 U/L    ALT 26 0 - 50 U/L    AST 21 0 - 45 U/L   CRP inflammation   Result Value Ref Range    CRP Inflammation <2.9 0.0 - 8.0 mg/L   Erythrocyte sedimentation rate auto   Result Value Ref Range    Sed Rate 8 0 - 30 mm/h   Creatinine   Result Value Ref Range    Creatinine 0.84 0.52 - 1.04 mg/dL    GFR Estimate 74 >60 mL/min/[1.73_m2]    GFR Estimate If Black 86 >60 mL/min/[1.73_m2]   CBC with platelets differential   Result Value Ref Range    WBC 8.5 4.0 - 11.0 10e9/L    RBC Count 4.33 3.8 - 5.2 10e12/L    Hemoglobin 13.8 11.7 - 15.7 g/dL    Hematocrit 42.9 35.0 - 47.0 %    MCV 99 78 - 100 fl    MCH 31.9 26.5 - 33.0 pg    MCHC 32.2 31.5 - 36.5 g/dL    RDW 14.2 10.0 - 15.0 %    Platelet Count 201 150 - 450 10e9/L    % Neutrophils 80.1 %    % Lymphocytes 9.3 %    % Monocytes 8.8 %    % Eosinophils 1.4 %    % Basophils 0.4 %    Absolute Neutrophil 6.8 1.6 - 8.3 10e9/L    Absolute Lymphocytes 0.8 0.8 - 5.3 10e9/L    Absolute Monocytes 0.8 0.0 - 1.3 10e9/L    Absolute Eosinophils 0.1 0.0 - 0.7 10e9/L    Absolute Basophils 0.0 0.0 - 0.2 10e9/L    Diff Method Automated Method        If you have any questions or concerns, please call myself or my nurse at [phone].      Sincerely,        Maurilio Hayes MD /samantha

## 2019-06-05 NOTE — RESULT ENCOUNTER NOTE
"Please mail Ms. Maguire her results with the following message.    \"Ms. Maguire,    Your labs are normal.    Please let me know if you have any questions.    Sincerely,  Maurilio Hayes MD  6/5/2019 1:01 AM\"  "

## 2019-06-19 DIAGNOSIS — M06.09 RHEUMATOID ARTHRITIS OF MULTIPLE SITES WITHOUT RHEUMATOID FACTOR (H): ICD-10-CM

## 2019-06-19 NOTE — TELEPHONE ENCOUNTER
Requested Prescriptions   Pending Prescriptions Disp Refills     methotrexate 50 MG/2ML injection CHEMO 4 vial 3     Sig: Inject 0.8 mLs (20 mg) Subcutaneous every 7 days       There is no refill protocol information for this order      Last Written Prescription Date:  1-23-19  Last Fill Quantity: 32,  # refills: 3   Last office visit: 3/11/2019 with prescribing provider:  3-11-19   Future Office Visit:   Next 5 appointments (look out 90 days)    Jul 22, 2019  7:40 AM CDT  Return Visit with Maurilio Hayes MD  [facility] [place] ([facility] [place]) [address] [address]  [place] [address]  [phone]

## 2019-06-20 RX ORDER — METHOTREXATE 25 MG/ML
20 INJECTION, SOLUTION INTRA-ARTERIAL; INTRAMUSCULAR; INTRAVENOUS
Qty: 4 VIAL | Refills: 1 | Status: SHIPPED | OUTPATIENT
Start: 2019-06-20 | End: 2019-07-22

## 2019-06-20 NOTE — TELEPHONE ENCOUNTER
Contacted Pt, notified Ms. Maguire that methotrexate has been refilled. Pt had no questions or concerns, agrees and understands.    Jenn Dougherty, [place]  6/20/2019  8:45 AM

## 2019-06-20 NOTE — TELEPHONE ENCOUNTER
Rheumatology team: Please call to notify Ms. Maguire that methotrexate has been refilled.   Maurilio Hayes MD  6/20/2019 12:18 AM

## 2019-07-22 ENCOUNTER — OFFICE VISIT (OUTPATIENT)
Dept: RHEUMATOLOGY | Facility: CLINIC | Age: 64
End: 2019-07-22
Payer: MEDICARE

## 2019-07-22 ENCOUNTER — ANCILLARY PROCEDURE (OUTPATIENT)
Dept: GENERAL RADIOLOGY | Facility: CLINIC | Age: 64
End: 2019-07-22
Attending: INTERNAL MEDICINE
Payer: MEDICARE

## 2019-07-22 VITALS
WEIGHT: 180 LBS | OXYGEN SATURATION: 97 % | HEART RATE: 73 BPM | HEIGHT: 66 IN | SYSTOLIC BLOOD PRESSURE: 123 MMHG | DIASTOLIC BLOOD PRESSURE: 79 MMHG | BODY MASS INDEX: 28.93 KG/M2

## 2019-07-22 DIAGNOSIS — Z79.899 HIGH RISK MEDICATION USE: ICD-10-CM

## 2019-07-22 DIAGNOSIS — M06.09 RHEUMATOID ARTHRITIS OF MULTIPLE SITES WITHOUT RHEUMATOID FACTOR (H): Primary | ICD-10-CM

## 2019-07-22 DIAGNOSIS — M06.09 RHEUMATOID ARTHRITIS OF MULTIPLE SITES WITHOUT RHEUMATOID FACTOR (H): ICD-10-CM

## 2019-07-22 DIAGNOSIS — R76.8 ANA POSITIVE: ICD-10-CM

## 2019-07-22 LAB
ALBUMIN SERPL-MCNC: 3.8 G/DL (ref 3.4–5)
ALBUMIN UR-MCNC: NEGATIVE MG/DL
ALP SERPL-CCNC: 86 U/L (ref 40–150)
ALT SERPL W P-5'-P-CCNC: 30 U/L (ref 0–50)
ANION GAP SERPL CALCULATED.3IONS-SCNC: 7 MMOL/L (ref 3–14)
APPEARANCE UR: CLEAR
AST SERPL W P-5'-P-CCNC: 22 U/L (ref 0–45)
BACTERIA #/AREA URNS HPF: ABNORMAL /HPF
BASOPHILS # BLD AUTO: 0.1 10E9/L (ref 0–0.2)
BASOPHILS NFR BLD AUTO: 0.6 %
BILIRUB SERPL-MCNC: 0.3 MG/DL (ref 0.2–1.3)
BILIRUB UR QL STRIP: NEGATIVE
BUN SERPL-MCNC: 15 MG/DL (ref 7–30)
C3 SERPL-MCNC: 109 MG/DL (ref 76–169)
C4 SERPL-MCNC: 19 MG/DL (ref 15–50)
CALCIUM SERPL-MCNC: 9.1 MG/DL (ref 8.5–10.1)
CHLORIDE SERPL-SCNC: 111 MMOL/L (ref 94–109)
CHOLEST SERPL-MCNC: 236 MG/DL
CO2 SERPL-SCNC: 23 MMOL/L (ref 20–32)
COLOR UR AUTO: YELLOW
CREAT SERPL-MCNC: 0.72 MG/DL (ref 0.52–1.04)
CREAT UR-MCNC: 139 MG/DL
DIFFERENTIAL METHOD BLD: NORMAL
EOSINOPHIL # BLD AUTO: 0.1 10E9/L (ref 0–0.7)
EOSINOPHIL NFR BLD AUTO: 1.5 %
ERYTHROCYTE [DISTWIDTH] IN BLOOD BY AUTOMATED COUNT: 14.2 % (ref 10–15)
GFR SERPL CREATININE-BSD FRML MDRD: 90 ML/MIN/{1.73_M2}
GLUCOSE SERPL-MCNC: 89 MG/DL (ref 70–99)
GLUCOSE UR STRIP-MCNC: NEGATIVE MG/DL
HCT VFR BLD AUTO: 44 % (ref 35–47)
HDLC SERPL-MCNC: 48 MG/DL
HGB BLD-MCNC: 14.3 G/DL (ref 11.7–15.7)
HGB UR QL STRIP: NEGATIVE
KETONES UR STRIP-MCNC: NEGATIVE MG/DL
LDLC SERPL CALC-MCNC: 135 MG/DL
LEUKOCYTE ESTERASE UR QL STRIP: ABNORMAL
LYMPHOCYTES # BLD AUTO: 1 10E9/L (ref 0.8–5.3)
LYMPHOCYTES NFR BLD AUTO: 10.8 %
MCH RBC QN AUTO: 32.4 PG (ref 26.5–33)
MCHC RBC AUTO-ENTMCNC: 32.5 G/DL (ref 31.5–36.5)
MCV RBC AUTO: 100 FL (ref 78–100)
MONOCYTES # BLD AUTO: 0.8 10E9/L (ref 0–1.3)
MONOCYTES NFR BLD AUTO: 8.2 %
MUCOUS THREADS #/AREA URNS LPF: PRESENT /LPF
NEUTROPHILS # BLD AUTO: 7.5 10E9/L (ref 1.6–8.3)
NEUTROPHILS NFR BLD AUTO: 78.9 %
NITRATE UR QL: NEGATIVE
NON-SQ EPI CELLS #/AREA URNS LPF: ABNORMAL /LPF
NONHDLC SERPL-MCNC: 188 MG/DL
PH UR STRIP: 5.5 PH (ref 5–7)
PLATELET # BLD AUTO: 191 10E9/L (ref 150–450)
POTASSIUM SERPL-SCNC: 4.1 MMOL/L (ref 3.4–5.3)
PROT SERPL-MCNC: 6.8 G/DL (ref 6.8–8.8)
PROT UR-MCNC: 0.11 G/L
PROT/CREAT 24H UR: 0.08 G/G CR (ref 0–0.2)
RBC # BLD AUTO: 4.41 10E12/L (ref 3.8–5.2)
RBC #/AREA URNS AUTO: ABNORMAL /HPF
SODIUM SERPL-SCNC: 141 MMOL/L (ref 133–144)
SOURCE: ABNORMAL
SP GR UR STRIP: 1.02 (ref 1–1.03)
TRIGL SERPL-MCNC: 266 MG/DL
UROBILINOGEN UR STRIP-ACNC: 0.2 EU/DL (ref 0.2–1)
WBC # BLD AUTO: 9.6 10E9/L (ref 4–11)
WBC #/AREA URNS AUTO: ABNORMAL /HPF

## 2019-07-22 PROCEDURE — 81001 URINALYSIS AUTO W/SCOPE: CPT | Performed by: INTERNAL MEDICINE

## 2019-07-22 PROCEDURE — 86225 DNA ANTIBODY NATIVE: CPT | Performed by: INTERNAL MEDICINE

## 2019-07-22 PROCEDURE — 86160 COMPLEMENT ANTIGEN: CPT | Performed by: INTERNAL MEDICINE

## 2019-07-22 PROCEDURE — 80053 COMPREHEN METABOLIC PANEL: CPT | Performed by: INTERNAL MEDICINE

## 2019-07-22 PROCEDURE — 85025 COMPLETE CBC W/AUTO DIFF WBC: CPT | Performed by: INTERNAL MEDICINE

## 2019-07-22 PROCEDURE — 84156 ASSAY OF PROTEIN URINE: CPT | Performed by: INTERNAL MEDICINE

## 2019-07-22 PROCEDURE — 99214 OFFICE O/P EST MOD 30 MIN: CPT | Performed by: INTERNAL MEDICINE

## 2019-07-22 PROCEDURE — 86481 TB AG RESPONSE T-CELL SUSP: CPT | Performed by: INTERNAL MEDICINE

## 2019-07-22 PROCEDURE — 71046 X-RAY EXAM CHEST 2 VIEWS: CPT

## 2019-07-22 PROCEDURE — 80061 LIPID PANEL: CPT | Performed by: INTERNAL MEDICINE

## 2019-07-22 PROCEDURE — 36415 COLL VENOUS BLD VENIPUNCTURE: CPT | Performed by: INTERNAL MEDICINE

## 2019-07-22 RX ORDER — PREDNISONE 5 MG/1
5 TABLET ORAL DAILY
Qty: 90 TABLET | Refills: 1 | Status: SHIPPED | OUTPATIENT
Start: 2019-07-22 | End: 2019-11-25

## 2019-07-22 RX ORDER — METHOTREXATE 25 MG/ML
20 INJECTION, SOLUTION INTRA-ARTERIAL; INTRAMUSCULAR; INTRAVENOUS
Qty: 4 VIAL | Refills: 1 | Status: SHIPPED | OUTPATIENT
Start: 2019-07-22 | End: 2019-11-25

## 2019-07-22 RX ORDER — SYRINGE-NEEDLE,INSULIN,0.5 ML 27GX1/2"
SYRINGE, EMPTY DISPOSABLE MISCELLANEOUS
Qty: 10 EACH | Refills: 3 | Status: SHIPPED | OUTPATIENT
Start: 2019-07-22 | End: 2019-12-20

## 2019-07-22 RX ORDER — FOLIC ACID 1 MG/1
1 TABLET ORAL DAILY
Qty: 100 TABLET | Refills: 3 | Status: SHIPPED | OUTPATIENT
Start: 2019-07-22 | End: 2020-02-17

## 2019-07-22 ASSESSMENT — MIFFLIN-ST. JEOR: SCORE: 1393.22

## 2019-07-22 NOTE — PROGRESS NOTES
Rheumatology Clinic Visit      Jackie Maguire MRN# [id number]   YOB: 1955 Age: 64 year old      Date of visit: 7/22/19   PCP: Dr. Talia Mejía at [facility]    Chief Complaint   Patient presents with:  Arthritis: RA. Patient has been feeling a llittle stronger, has been able to do more.    Assessment and Plan     1. Rheumatoid Arthritis (CCP low positive, then negative): From record review: dx'd 2012; has followed with Jacob Cano, and [facility]; hx of +DULCE, +dsDNA, CCP low positive then negative; hx of psoriasis; hx of C-spine fusion; initial presentation of pain/stiff/swollen joints involving the knees, feet, ankles, neck, back.  Previously on leflunomide (mouth sores), HCQ (ineffective), AZA (itching), Humira (ineffective at q7day and q14 days dosings), Remicade (associated with syncopal episodes).  Sulfa allergy.  Currently on MTX 25mg SQ wkly and prednisone 5mg daily.  Improved since changing methotrexate from oral to SQ, but still with active synovitis and chronic changes of rheumatoid arthritis.  We again discussed adding a biologic or csDMARD and today she is willing to do so. Orencia avoided because of melanoma hx.  Risk for melanoma with DMARDs reviewed and she would like to proceed.  Start Xeljanz if labs and x-rays are okay.   - Continue methotrexate from 20 mg SQ once weekly  - Continue folic acid 1mg daily  - Continue prednisone 5mg daily  - Start Xeljanz if labs and x-rays are okay  - Labs today: CBC, CMP, quantiferon TB gold plus, lipids  - CXR today  - Labs in 3 months: CBC, Creatinine, Hepatic Panel, ESR, CRP    # Tofacitinib (Xeljanz) Risks and Benefits: The risks and benefits of Xeljanz were discussed in detail and the patient verbalized understanding.  The risks discussed include, but are not limited to, the risk for hypersensitivity, anaphylaxis, anaphylactoid reactions, an increased risk for serious infections leading to hospitalization or death, increased risk for  lymphoma and other malignancies, an increased risk for gastrointestinal perforation, worsening triglycerides.  The most common adverse reactions were upper respiratory tract infections, headache, diarrhea, and nasopharyngitis.  In addition to routine laboratory monitoring during therapy, labs including CBC, CMP, and lipid panel will be required at the start of therapy and 8 weeks after starting therapy.  I encouraged reviewing the package insert and asking any questions about the medication.     2. History of Positive DULCE and dsDNA: with inflammatory arthritis.  Symptoms fit better for seropositive rheumatoid arthritis.  See #1.  Labs as noted in #1 and below.  - Labs today: dsDNA, C3, C4, UA, Uprotein:creatinine    3. Neck pain hx: Neck spasms several years ago that was dx'd as dystonia at the [facility]; had several imaging studies especially given the RA dx where degenerative changes were seen.  Also saw a spine surgeon at [place].  PT ineffective.  Neck pain has been stable.    4.  Lower back pain: She reports getting lower back pain when she is picking up her grandchild, and this improves with rest.  No radiation of pain.  Suspect degenerative changes and have advised physical therapy.  She says that she is going to try doing exercises on her own at home and if it does not improve then she will consider formal physical therapy.      5.  Vaccinations: Vaccinations reviewed with Ms. Maguire.  Risks and benefits of vaccinations were discussed.   CDC stance on shingrix when on moderate to high immunosuppression was reviewed.   - Influenza: encouraged yearly vaccination  - [id number]: received 10/5/2016 per [place] Immunization Information Connection   - [id number]: up to date  - Shingrix: Patient plans to receive at the pharmacy; Rx previously called to the [facility] pharmacy in [place], [place] at her request    MsRicardo Andrez verbalized agreement with and understanding of the rational for the diagnosis and treatment  plan.  All questions were answered to best of my ability and the patient's satisfaction. Ms. Maguire was advised to contact the clinic with any questions that may arise after the clinic visit.      Thank you for involving me in the care of the patient    Return to clinic: 3-4 months      HPI   Jackie Maguire is a 64 year old female with a past medical history significant for hyperlipidemia, hypothyroidism, history of melanoma, history of humerus fracture, osteoporosis, psoriasis, and rheumatoid arthritis who presents for follow-up of rheumatoid arthritis.     Previously reviewed hx:    10/12/2017 rheumatology clinic note by Dr. Cabrera Chinchilla documents that the patient has rheumatoid arthritis with multiple treatment failures.  He discusses Orencia and prednisone 5 mg daily.    A consult note from Dr. Wendy James documents that the patient is a 61-year-old female with positive DULCE, positive dsDNA, positive CCP (low positive in 2012, then negative), psoriasis, fusion of some of the cervical facet levels.  Initially evaluated 9/2012 for history of acute onset swollen joints, stiffness; joints affected: Knees, feet, hands, ankles, neck, back.  No preceding illness or trauma.  Failed methotrexate (ineffective), leflunomide (mouth sores), azathioprine (itching), Humira (ineffective at q7day and q14 days dosings).  Hydroxychloroquine and prednisone 10 mg daily with some improvement.    Additional chart review shows that MTX was effective and only reduced when a biologic DMARD was being added.     Today, Ms. Maguire reports that she is feeling stronger since changing methotrexate from oral to SQ.  Still with swelling in her MCPs and wrists.  Morning stiffness for most of the day that reaches a baseline after 1 hour; severity is significantly improved.  Tolerating medications.  Happy with how she is doing but feels like she could improve some.     Denies fevers, chills, nausea, vomiting, constipation, diarrhea. No  abdominal pain. No chest pain/pressure, palpitations, or shortness of breath. No LE swelling. No oral or nasal sores.  No rash. No sicca symptoms. No photosensitivity or photophobia. No eye pain or redness. No history of inflammatory eye disease.  No history of DVT or pulmonary embolism; one still birth, 3 grown children.  No history of serositis.  No history of Raynaud's Phenomenon.  No seizure history.  No known renal disorder.      Tobacco: none  EtOH: none  Drugs: none    ROS   GEN: No fevers, chills, night sweats, or weight change  SKIN: No itching, rashes, sores  HEENT: No oral or nasal ulcers.  CV: No chest pain, pressure, palpitations, or dyspnea on exertion.  PULM: No SOB, wheeze, cough.  GI: No nausea, vomiting, constipation, diarrhea. No blood in stool. No abdominal pain.  : No blood in urine.  MSK: See HPI.  NEURO: No numbness, tingling, or weakness.  ENDO: No heat/cold intolerance.  EXT: No LE swelling  PSYCH: Negative    Active Problem List     Patient Active Problem List   Diagnosis     Hypothyroidism     Hyperlipidemia LDL goal <160     Inflammatory arthritis     Fracture, pelvis closed (H)     Malignant melanoma of skin (H)     Distal radius fracture     Closed fracture of part of humerus     Proximal humerus fracture     Aftercare for healing traumatic fracture of upper arm     Osteopetrosis     Cervical dystonia     Dystonia, torsion, fragments of     Psoriasis     Rheumatoid arthritis of multiple sites without rheumatoid factor (H)     Past Medical History     Past Medical History:   Diagnosis Date     Hyperlipidemia LDL goal <160 1/5/2011     Hypothyroidism 10/1/2010     Inflammatory arthritis 1/20/2011     Past Surgical History     Past Surgical History:   Procedure Laterality Date     APPENDECTOMY       ARTHRODESIS TOE(S) Left 8/17/2017    Procedure: ARTHRODESIS TOE(S);  Left foot 1st metatarsophalangeal realignment fusion, 2nd & 3rd digital corrections;  Surgeon: Jason Lamar DPM;   "Location: [facility] OR     OPEN REDUCTION INTERNAL FIXATION HUMERUS PROXIMAL Left 9/30/2016    Procedure: OPEN REDUCTION INTERNAL FIXATION HUMERUS PROXIMAL;  Surgeon: Chraly Bernal MD;  Location: [facility] OR     Allergy     Allergies   Allergen Reactions     Azathioprine Itching     Leflunomide Other (See Comments)     Mouth sores     Compazine Anxiety     Restless, anxious     Prochlorperazine Anxiety     Restless, anxious     Sulfa Drugs Rash     Current Medication List     Current Outpatient Medications   Medication Sig     acetaminophen (TYLENOL) 325 MG tablet 650 mg po QID and BId PRN     aspirin 81 MG tablet Take 1 tablet (81 mg) by mouth daily     folic acid (FOLVITE) 1 MG tablet Take 1 tablet (1 mg) by mouth daily     hydrOXYzine (ATARAX) 25 MG tablet Take 1 tablet (25 mg) by mouth every 4 hours as needed for itching (and nausea)     LEVOTHYROXINE SODIUM PO Take 137 mcg by mouth daily      methotrexate 50 MG/2ML injection CHEMO Inject 0.8 mLs (20 mg) Subcutaneous every 7 days     oxyCODONE-acetaminophen (PERCOCET) 5-325 MG per tablet Take 1-2 tablets by mouth every 4 hours as needed for pain (moderate to severe)     predniSONE (DELTASONE) 5 MG tablet Take 5 mg by mouth daily.     VITAMIN D, CHOLECALCIFEROL, PO Take by mouth daily     Insulin Syringe-Needle U-100 (BD INSULIN SYRINGE) 27G X 1/2\" 1 ML MISC For weekly methotrexate administration.     No current facility-administered medications for this visit.          Social History   See HPI    Family History     Family History   Problem Relation Age of Onset     Cancer Father        Physical Exam     Temp Readings from Last 3 Encounters:   06/11/18 98.4  F (36.9  C) (Oral)   08/17/17 98  F (36.7  C) (Oral)   07/13/17 98.1  F (36.7  C)     BP Readings from Last 5 Encounters:   07/22/19 123/79   03/11/19 138/84   11/05/18 141/90   06/11/18 125/85   02/26/18 135/86     Pulse Readings from Last 1 Encounters:   07/22/19 73     Resp Readings from Last 1 Encounters: " "  06/11/18 16     Estimated body mass index is 29.05 kg/m  as calculated from the following:    Height as of this encounter: 1.676 m (5' 6\").    Weight as of this encounter: 81.6 kg (180 lb).    GEN: NAD  HEENT: MMM. No oral lesions.  Anicteric, noninjected sclera.  Eyes appear to have good moisture by gross examination.  CV: S1, S2. RRR. No m/r/g.  PULM: CTA bilaterally. No w/c.  MSK: Synovial swelling and tenderness to palpation of the bilateral second-fourth MCPs and wrists.  Bilateral wrists with subluxation.  PIPs, elbows, shoulders, knees, ankles, and MTPs without swelling or tenderness to palpation.  Hips nontender to palpation.    SKIN: No rash.  No nail pitting.  EXT: No LE edema  PSYCH: Alert. Appropriate.    Labs / Imaging (select studies)     DULCE  Recent Labs   Lab Test 02/26/18  0846   SHALINI Negative     RNP/Sm/SSA/SSB  Recent Labs   Lab Test 02/26/18  0846   RNPIGG 0.2   SMIGG <0.2   SSAIGG <0.2   SSBIGG <0.2   SCLIGG <0.2     dsDNA  Recent Labs   Lab Test 02/26/18  0846   DNA 35*     CBC  Recent Labs   Lab Test 06/04/19  1018 05/07/19  0952 04/09/19  0956   WBC 8.5 8.5 10.2   RBC 4.33 4.46 4.57   HGB 13.8 14.4 14.7   HCT 42.9 43.7 44.9   MCV 99 98 98   RDW 14.2 14.2 14.4    204 208   MCH 31.9 32.3 32.2   MCHC 32.2 33.0 32.7   NEUTROPHIL 80.1 82.9 81.5   LYMPH 9.3 8.0 9.0   MONOCYTE 8.8 7.3 7.8   EOSINOPHIL 1.4 1.1 1.3   BASOPHIL 0.4 0.7 0.4   ANEU 6.8 7.1 8.3   ALYM 0.8 0.7* 0.9   KAREN 0.8 0.6 0.8   AEOS 0.1 0.1 0.1   ABAS 0.0 0.1 0.0     CMP  Recent Labs   Lab Test 06/04/19  1018 05/07/19  0952 04/09/19  0956 03/06/19  0847  09/12/18  0923  02/26/18  0846 10/01/16  0625 09/27/16  0605 09/26/16 0615 09/25/16  1700   NA  --   --   --   --   --  144  --  140  --  140 140 139   POTASSIUM  --   --   --   --   --  3.8  --  3.7  --  3.9 3.8 3.9   CHLORIDE  --   --   --   --   --  109  --  107  --  106 107 106   CO2  --   --   --   --   --  28  --  27  --  29 26 25   ANIONGAP  --   --   --   --   --  " 7  --  6  --  5 7 8   GLC  --   --   --   --   --  102*  --  93 112* 100* 90 133*   BUN  --   --   --   --   --  15  --  19  --  11 14 18   CR 0.84 0.71 0.78 0.76   < > 0.91   < > 0.78  --  0.65 0.66 0.64   GFRESTIMATED 74 >90 81 85   < > 63   < > 75  --  >90  Non  GFR Calc   >90  Non  GFR Calc   >90  Non  GFR Calc     GFRESTBLACK 86 >90 >90 >90   < > 76   < > >90  --  >90   GFR Calc   >90   GFR Calc   >90   GFR Calc     HAZEL  --   --   --   --   --  9.4  --  9.1  --  8.7 8.5 9.1   BILITOTAL 0.4 0.5 0.5 0.5   < > 0.5   < > 0.4  --   --  0.4 0.3   ALBUMIN 3.9 3.9 4.0 3.7   < > 3.6   < > 4.0  --   --  3.0* 3.6   PROTTOTAL 6.7* 6.9 7.0 6.8   < > 6.9   < > 7.1  --   --  5.9* 6.6*   ALKPHOS 87 84 87 87   < > 94   < > 79  --   --  63 73   AST 21 19 21 26   < > 27   < > 20  --   --  18 20   ALT 26 27 34 41   < > 35   < > 24  --   --  21 26    < > = values in this interval not displayed.     Calcium/VitaminD  Recent Labs   Lab Test 09/12/18  0923 02/26/18  0846 10/05/16  0831 09/27/16  0605   HAZEL 9.4 9.1  --  8.7   VITDT  --   --  29  --      ESR/CRP  Recent Labs   Lab Test 06/04/19  1018 05/30/18  0913 02/26/18  0846   SED 8 9 7   CRP <2.9 <2.9 8.6*     TSH/T4  Recent Labs   Lab Test 10/05/16  0831 09/22/11  0958   TSH 4.58* 0.20*     Hepatitis B  Recent Labs   Lab Test 02/26/18  0846   HBCAB Nonreactive   HEPBANG Nonreactive     Hepatitis C  Recent Labs   Lab Test 02/26/18  0846 07/13/11  1318   HCVAB Nonreactive Negative     Tuberculosis Screening  Recent Labs   Lab Test 07/13/11  1318   TBRSLT Negative   TBAGN 0.00     UA  Recent Labs   Lab Test 09/12/18  0936 09/25/16  2120 10/21/12  1807   COLOR Yellow Yellow Yellow   APPEARANCE Clear Clear Clear   URINEGLC Negative Negative Negative   URINEBILI Negative Negative Negative   SG >1.030 1.020 1.026   URINEPH 5.5 6.0 6.0   PROTEIN Negative 30* 30*   UROBILINOGEN 0.2  --   --     NITRITE Negative Negative Negative   UBLD Negative Negative Negative   LEUKEST Negative Negative Trace*   WBCU  --  <1 2   RBCU  --  1 0   MUCOUS  --  Present* Present*     Urine Microscopic  Recent Labs   Lab Test 09/25/16  2120 10/21/12  1807   WBCU <1 2   RBCU 1 0   MUCOUS Present* Present*     Urine Protein  Recent Labs   Lab Test 09/12/18  0936   UTP 0.15   UTPG 0.05   UCRR 268     Immunization History     Immunization History   Administered Date(s) Administered     Influenza Quad, Recombinant, p-free (RIV4) 10/18/2018     Influenza Vaccine IM 3yrs+ 4 Valent IIV4 10/01/2016     Pneumococcal 23 valent 03/11/2019          Chart documentation done in part with Dragon Voice recognition Software. Although reviewed after completion, some word and grammatical error may remain.    Maurilio Hayes MD

## 2019-07-22 NOTE — NURSING NOTE
RAPID3 (0-30) Cumulative Score  8.3          RAPID3 Weighted Score (divide #4 by 3 and that is the weighted score)  2.8     Elizabeth Haley [facility] Rheumatology  7/22/2019 7:42 AM

## 2019-07-22 NOTE — PATIENT INSTRUCTIONS
Rheumatology    Dr. Maurilio Hayes         [facility]   (Monday)  [address]  [place] [address]       [facility]   (Tuesday)  [address]  [place] [address]    [facility]   (Wed., Thurs., and Friday)  [address] [place] [address]    Phone number: [phone]  Thank you for choosing [facility].  Elizabeth Haley CMA

## 2019-07-23 LAB
DSDNA AB SER-ACNC: 1 IU/ML
GAMMA INTERFERON BACKGROUND BLD IA-ACNC: 0.04 IU/ML
M TB IFN-G BLD-IMP: NEGATIVE
M TB IFN-G CD4+ BCKGRND COR BLD-ACNC: 1.39 IU/ML
MITOGEN IGNF BCKGRD COR BLD-ACNC: 0 IU/ML
MITOGEN IGNF BCKGRD COR BLD-ACNC: 0 IU/ML

## 2019-08-08 DIAGNOSIS — M06.09 RHEUMATOID ARTHRITIS OF MULTIPLE SITES WITHOUT RHEUMATOID FACTOR (H): Primary | ICD-10-CM

## 2019-08-08 DIAGNOSIS — Z79.899 HIGH RISK MEDICATION USE: ICD-10-CM

## 2019-08-08 NOTE — RESULT ENCOUNTER NOTE
Rheumatology team: Please call to notify Ms. Maguire that lipids are elevated and will need to monitor.  Xeljanz has been prescribed to the specialty pharmacy.     Maurilio Hayes MD  8/8/2019 6:41 PM

## 2019-08-09 ENCOUNTER — TELEPHONE (OUTPATIENT)
Dept: RHEUMATOLOGY | Facility: CLINIC | Age: 64
End: 2019-08-09

## 2019-08-09 NOTE — TELEPHONE ENCOUNTER
Prior Authorization Approval    Authorization Effective Date: 12/30/2018  Authorization Expiration Date: 12/31/2019  Medication: xeljanz pa approved  Approved Dose/Quantity: 30/30ds  Reference #: [id number]   Insurance Company: [employer] [phone] Fax [phone]  Expected CoPay: $1503.19     CoPay Card Available: No    Foundation Assistance Needed: yes  Which Pharmacy is filling the prescription (Not needed for infusion/clinic administered):    Pharmacy Notified:    Patient Notified:

## 2019-08-09 NOTE — TELEPHONE ENCOUNTER
Free Drug Application Initiated  Medication-Xeljanz  [employer]or[employer]   Phone #   Fax #   Additional Information-free drug application fxd [place] [facility] [place]

## 2019-08-09 NOTE — TELEPHONE ENCOUNTER
PA Initiation    Medication: xeljanz pa pending   Insurance Company: [employer] - Phone [phone] Fax [phone]  Pharmacy Filling the Rx:    Filling Pharmacy Phone:    Filling Pharmacy Fax:    Start Date: 8/9/2019

## 2019-08-14 ENCOUNTER — TELEPHONE (OUTPATIENT)
Dept: RHEUMATOLOGY | Facility: CLINIC | Age: 64
End: 2019-08-14

## 2019-08-14 NOTE — TELEPHONE ENCOUNTER
Free Drug Application Initiated  Medication-xeljanz  [employer]or[employer]  Phone # [phone]  Fax # [phone]  Additional Information jairo faxed to [facility] 8/22          Patient called me because she received her free drug jairo for Xeljanz but she was under the impression she was being prescribed Orencia. Read her the chart notes regarding Orencia being avoided due to melanoma history and that she was to start Xeljanz. She did not recall this being discussed and has concerns about taking Xeljanz after reading about it online. She would like a call back to discuss.

## 2019-08-15 NOTE — TELEPHONE ENCOUNTER
Rheumatology Telephone Note:    I called and spoke with Ms. Maguire. Reviewed information documented in the A&P of the last clinic note. Reviewed orencia versus xeljanz.  Reviewed risks and side effects of xeljanz.  She said that she understands and remembers talking about a lot of things during the clinic visit but that there was a lot of information.  She says that she is happy to try xeljanz and will send in the free drug application forms.    All questions were answered and she thanked me for the call.     Maurilio Hayes MD  8/15/2019 5:14 PM

## 2019-09-03 ENCOUNTER — COMMUNICATION - HEALTHEAST (OUTPATIENT)
Dept: FAMILY MEDICINE | Facility: CLINIC | Age: 64
End: 2019-09-03

## 2019-09-03 DIAGNOSIS — E03.9 HYPOTHYROIDISM: ICD-10-CM

## 2019-09-03 NOTE — TELEPHONE ENCOUNTER
Free Drug Application Approved  Effective Dates 8/26/19-12/31/19  Patient notified? yes  Additional Information

## 2019-09-23 ENCOUNTER — TELEPHONE (OUTPATIENT)
Dept: RHEUMATOLOGY | Facility: CLINIC | Age: 64
End: 2019-09-23

## 2019-09-23 DIAGNOSIS — M06.09 RHEUMATOID ARTHRITIS OF MULTIPLE SITES WITHOUT RHEUMATOID FACTOR (H): ICD-10-CM

## 2019-09-23 NOTE — TELEPHONE ENCOUNTER
Message left for patient to call back to Rheumatology.  Elizabeth Haley CMA Rheumatology  9/23/2019 3:55 PM

## 2019-09-23 NOTE — TELEPHONE ENCOUNTER
Xeljanz Rx sent to the [facility] contracted pharmacy; please notify patient so she can let us know if she doesn't hear from them within the next week or so.     Maurilio Hayes MD  9/23/2019 3:51 PM

## 2019-09-23 NOTE — TELEPHONE ENCOUNTER
Reason for Call:  Other prescription    Detailed comments: Per specialty/surgery voice mail-Need ok to fill xeljanz(tofacitinib) 11mg. Please call [phone] for a verbal ok, fax [phone] or escribe.    Phone Number Patient can be reached at: Other phone number:  [phone]    Best Time: any    Can we leave a detailed message on this number? YES    Call taken on 9/23/2019 at 3:27 PM by Marlene Patel

## 2019-09-25 NOTE — TELEPHONE ENCOUNTER
Patient has been taking the medication and does receive the medication from [facility].  Elizabeth Haley CMA Rheumatology  9/25/2019 9:52 AM

## 2019-10-01 ENCOUNTER — AMBULATORY - HEALTHEAST (OUTPATIENT)
Dept: NURSING | Facility: CLINIC | Age: 64
End: 2019-10-01

## 2019-10-25 DIAGNOSIS — Z79.899 HIGH RISK MEDICATION USE: ICD-10-CM

## 2019-10-25 DIAGNOSIS — M06.09 RHEUMATOID ARTHRITIS OF MULTIPLE SITES WITHOUT RHEUMATOID FACTOR (H): ICD-10-CM

## 2019-10-25 LAB
ALBUMIN SERPL-MCNC: 4.1 G/DL (ref 3.4–5)
ALP SERPL-CCNC: 72 U/L (ref 40–150)
ALT SERPL W P-5'-P-CCNC: 70 U/L (ref 0–50)
AST SERPL W P-5'-P-CCNC: 50 U/L (ref 0–45)
BASOPHILS # BLD AUTO: 0 10E9/L (ref 0–0.2)
BASOPHILS NFR BLD AUTO: 0.5 %
BILIRUB DIRECT SERPL-MCNC: <0.1 MG/DL (ref 0–0.2)
BILIRUB SERPL-MCNC: 0.7 MG/DL (ref 0.2–1.3)
CREAT SERPL-MCNC: 0.75 MG/DL (ref 0.52–1.04)
CRP SERPL-MCNC: <2.9 MG/L (ref 0–8)
DIFFERENTIAL METHOD BLD: NORMAL
EOSINOPHIL # BLD AUTO: 0.2 10E9/L (ref 0–0.7)
EOSINOPHIL NFR BLD AUTO: 1.9 %
ERYTHROCYTE [DISTWIDTH] IN BLOOD BY AUTOMATED COUNT: 14.3 % (ref 10–15)
ERYTHROCYTE [SEDIMENTATION RATE] IN BLOOD BY WESTERGREN METHOD: 6 MM/H (ref 0–30)
GFR SERPL CREATININE-BSD FRML MDRD: 86 ML/MIN/{1.73_M2}
HCT VFR BLD AUTO: 44.6 % (ref 35–47)
HGB BLD-MCNC: 14.8 G/DL (ref 11.7–15.7)
LYMPHOCYTES # BLD AUTO: 0.9 10E9/L (ref 0.8–5.3)
LYMPHOCYTES NFR BLD AUTO: 10.5 %
MCH RBC QN AUTO: 32.7 PG (ref 26.5–33)
MCHC RBC AUTO-ENTMCNC: 33.2 G/DL (ref 31.5–36.5)
MCV RBC AUTO: 99 FL (ref 78–100)
MONOCYTES # BLD AUTO: 0.4 10E9/L (ref 0–1.3)
MONOCYTES NFR BLD AUTO: 5 %
NEUTROPHILS # BLD AUTO: 7.1 10E9/L (ref 1.6–8.3)
NEUTROPHILS NFR BLD AUTO: 82.1 %
PLATELET # BLD AUTO: 210 10E9/L (ref 150–450)
PROT SERPL-MCNC: 6.9 G/DL (ref 6.8–8.8)
RBC # BLD AUTO: 4.52 10E12/L (ref 3.8–5.2)
WBC # BLD AUTO: 8.6 10E9/L (ref 4–11)

## 2019-10-25 PROCEDURE — 86140 C-REACTIVE PROTEIN: CPT | Performed by: INTERNAL MEDICINE

## 2019-10-25 PROCEDURE — 85652 RBC SED RATE AUTOMATED: CPT | Performed by: INTERNAL MEDICINE

## 2019-10-25 PROCEDURE — 36415 COLL VENOUS BLD VENIPUNCTURE: CPT | Performed by: INTERNAL MEDICINE

## 2019-10-25 PROCEDURE — 82565 ASSAY OF CREATININE: CPT | Performed by: INTERNAL MEDICINE

## 2019-10-25 PROCEDURE — 85025 COMPLETE CBC W/AUTO DIFF WBC: CPT | Performed by: INTERNAL MEDICINE

## 2019-10-25 PROCEDURE — 80076 HEPATIC FUNCTION PANEL: CPT | Performed by: INTERNAL MEDICINE

## 2019-11-06 ENCOUNTER — TELEPHONE (OUTPATIENT)
Dept: RHEUMATOLOGY | Facility: CLINIC | Age: 64
End: 2019-11-06

## 2019-11-06 NOTE — TELEPHONE ENCOUNTER
Free Drug Application Initiated    Medication-xeljanz    Sponsor[employer]    Phone # [phone]    Fax # [phone]    Additional Information-all forms received-awaiting determination in january

## 2019-11-25 ENCOUNTER — OFFICE VISIT (OUTPATIENT)
Dept: RHEUMATOLOGY | Facility: CLINIC | Age: 64
End: 2019-11-25
Payer: MEDICARE

## 2019-11-25 VITALS
WEIGHT: 183.4 LBS | BODY MASS INDEX: 29.47 KG/M2 | HEART RATE: 86 BPM | OXYGEN SATURATION: 97 % | SYSTOLIC BLOOD PRESSURE: 124 MMHG | DIASTOLIC BLOOD PRESSURE: 82 MMHG | HEIGHT: 66 IN

## 2019-11-25 DIAGNOSIS — M06.09 RHEUMATOID ARTHRITIS OF MULTIPLE SITES WITHOUT RHEUMATOID FACTOR (H): Primary | ICD-10-CM

## 2019-11-25 DIAGNOSIS — Z79.899 HIGH RISK MEDICATION USE: ICD-10-CM

## 2019-11-25 PROCEDURE — 99213 OFFICE O/P EST LOW 20 MIN: CPT | Performed by: INTERNAL MEDICINE

## 2019-11-25 RX ORDER — METHOTREXATE 25 MG/ML
20 INJECTION, SOLUTION INTRA-ARTERIAL; INTRAMUSCULAR; INTRAVENOUS
Qty: 4 VIAL | Refills: 3 | Status: SHIPPED | OUTPATIENT
Start: 2019-11-25 | End: 2020-02-17

## 2019-11-25 RX ORDER — PREDNISONE 5 MG/1
5 TABLET ORAL DAILY
Qty: 90 TABLET | Refills: 1 | Status: SHIPPED | OUTPATIENT
Start: 2019-11-25 | End: 2020-02-17

## 2019-11-25 ASSESSMENT — MIFFLIN-ST. JEOR: SCORE: 1408.65

## 2019-11-25 NOTE — PATIENT INSTRUCTIONS
Rheumatology    Dr. Maurilio Hayes       [facility] in [place]   (Monday)  [address]  [place] [address]       [facility] in [place]   (Tuesday)  [address] [place] [address]    [facility] in [place]   (Wed., Thurs., and Friday)  [address] [place] [address]    Phone number: [phone]  Thank you for choosing [facility].  Elizabeth Haley CMA

## 2019-11-25 NOTE — PROGRESS NOTES
Rheumatology Clinic Visit      Jackie Maguire MRN# [id number]   YOB: 1955 Age: 64 year old      Date of visit: 11/25/19   PCP: Dr. Talia Mejía at [facility]    Chief Complaint   Patient presents with:  Arthritis: RA. Been walking better and does more things since on xeljanz    Assessment and Plan     1. Rheumatoid Arthritis (CCP low positive, then negative): From record review: dx'd 2012; has followed with Kayley Jimenez, Jacob, and Amor; hx of +DULCE, +dsDNA, CCP low positive then negative; hx of psoriasis; hx of C-spine fusion; initial presentation of pain/stiff/swollen joints involving the knees, feet, ankles, neck, back.  Previously on leflunomide (mouth sores), HCQ (ineffective), AZA (itching), Humira (ineffective at q7day and q14 days dosings), Remicade (associated with syncopal episodes).  Sulfa allergy.  Avoiding Orencia because of melanoma history.  Currently on MTX 25mg SQ wkly (improvement when changed from oral to SQ), prednisone 5mg daily, and Xeljanz XR 11mg daily.  Significant improvement since starting Xeljanz.  However, liver enzymes were slightly elevated and her previous lipid panel showed elevations.  Recheck hepatic panel and fasting lipids within the next 2 weeks; if lipids remain elevated and she would like to start a statin or other cholesterol medications with her primary care provider as she finds Xeljanz is very effective; we also discussed the idea of reducing Xeljanz but she prefers not to do this because she has improved with it.  If liver enzymes are elevated then could be a combination of methotrexate plus Xeljanz or Xeljanz alone; because of her improvement with Xeljanz would prefer to reduce methotrexate.   - Continue methotrexate 20 g SQ every 7 days  - Continue folic acid 1mg daily  - Continue prednisone 5mg daily  - Continue Xeljanz XR 11 mg daily   - Fasting labs within 2 weeks: Hepatic panel, lipid panel  - Labs in 3 months: CBC, Creatinine, Hepatic Panel, ESR,  CRP              Rapid 3, cumulative scores                      11/25/2019:  8 (MTX 20mg SQ wkly, prednisone 5mg daily, Xeljanz XR 11mg daily)    2. History of Positive DULCE and dsDNA (repeats negative): with inflammatory arthritis.  Symptoms fit better for seropositive rheumatoid arthritis; see #1.  DULCE and dsDNA have since been rechecked and were negative.     3. Neck pain hx: Neck spasms several years ago that was dx'd as dystonia at the [facility]; had several imaging studies especially given the RA dx where degenerative changes were seen.  Also saw a spine surgeon at [facility].  PT ineffective.  Neck pain has been stable.    4.  Lower back pain: She reports getting lower back pain when she is picking up her grandchild, and this improves with rest.  No radiation of pain.  Suspect degenerative changes and have advised physical therapy.  She says that she is going to try doing exercises on her own at home and if it does not improve then she will consider formal physical therapy.      5.  Vaccinations: Vaccinations reviewed with Ms. Maguire.  Risks and benefits of vaccinations were discussed.   CDC stance on shingrix when on moderate to high immunosuppression was reviewed.   - Influenza: encouraged yearly vaccination  - [id number]: received 10/5/2016 per [place] Immunization Information Connection   - [id number]: up to date   - Shingrix: Patient plans to receive at the pharmacy    Ms. Maguire verbalized agreement with and understanding of the rational for the diagnosis and treatment plan.  All questions were answered to best of my ability and the patient's satisfaction. Ms. Maguire was advised to contact the clinic with any questions that may arise after the clinic visit.      Thank you for involving me in the care of the patient    Return to clinic: 3-4 months      HPI   Jackie Maguire is a 64 year old female with a past medical history significant for hyperlipidemia, hypothyroidism, history of melanoma, history  of humerus fracture, osteoporosis, psoriasis, and rheumatoid arthritis who presents for follow-up of rheumatoid arthritis.     Previously reviewed hx:    10/12/2017 rheumatology clinic note by Dr. Cabrera Chinchilla documents that the patient has rheumatoid arthritis with multiple treatment failures.  He discusses Orencia and prednisone 5 mg daily.    A consult note from Dr. Wendy James documents that the patient is a 61-year-old female with positive DULCE, positive dsDNA, positive CCP (low positive in 2012, then negative), psoriasis, fusion of some of the cervical facet levels.  Initially evaluated 9/2012 for history of acute onset swollen joints, stiffness; joints affected: Knees, feet, hands, ankles, neck, back.  No preceding illness or trauma.  Failed methotrexate (ineffective), leflunomide (mouth sores), azathioprine (itching), Humira (ineffective at q7day and q14 days dosings).  Hydroxychloroquine and prednisone 10 mg daily with some improvement.    Additional chart review shows that MTX was effective and only reduced when a biologic DMARD was being added.     7/22/2018: She reported feeling stronger since changing methotrexate from oral to SQ.  Still with swelling in her MCPs and wrists.  Morning stiffness for most of the day that reaches a baseline after 1 hour; severity is significantly improved.  Tolerating medications.  Happy with how she is doing but feels like she could improve some.     Today, 11/25/2019: Feels much better on Xeljanz.  She says that her right wrist has been swollen for many years and the swelling is gone.  She says that the arthritis is improved so much that her ring is starting to fall off her fingers.  Morning stiffness for no more than 5-10 minutes.  Tolerating medications well and very happy with how much she has improved.    Denies fevers, chills, nausea, vomiting, constipation, diarrhea. No abdominal pain. No chest pain/pressure, palpitations, or shortness of breath. No LE  swelling. No oral or nasal sores.  No rash. No sicca symptoms. No photosensitivity or photophobia. No eye pain or redness. No history of inflammatory eye disease.  No history of DVT or pulmonary embolism; one still birth, 3 grown children.  No history of serositis.  No history of Raynaud's Phenomenon.  No seizure history.  No known renal disorder.      Tobacco: none  EtOH: none  Drugs: none    ROS   GEN: No fevers, chills, night sweats, or weight change  SKIN: No itching, rashes, sores  HEENT: No oral or nasal ulcers.  CV: No chest pain, pressure, palpitations, or dyspnea on exertion.  PULM: No SOB, wheeze, cough.  GI: No nausea, vomiting, constipation, diarrhea. No blood in stool. No abdominal pain.  : No blood in urine.  MSK: See HPI.  NEURO: No numbness, tingling, or weakness.  ENDO: No heat/cold intolerance.  EXT: No LE swelling  PSYCH: Negative    Active Problem List     Patient Active Problem List   Diagnosis     Hypothyroidism     Hyperlipidemia LDL goal <160     Inflammatory arthritis     Fracture, pelvis closed (H)     Malignant melanoma of skin (H)     Distal radius fracture     Closed fracture of part of humerus     Proximal humerus fracture     Aftercare for healing traumatic fracture of upper arm     Osteopetrosis     Cervical dystonia     Dystonia, torsion, fragments of     Psoriasis     Rheumatoid arthritis of multiple sites without rheumatoid factor (H)     Past Medical History     Past Medical History:   Diagnosis Date     Hyperlipidemia LDL goal <160 1/5/2011     Hypothyroidism 10/1/2010     Inflammatory arthritis 1/20/2011     Past Surgical History     Past Surgical History:   Procedure Laterality Date     APPENDECTOMY       ARTHRODESIS TOE(S) Left 8/17/2017    Procedure: ARTHRODESIS TOE(S);  Left foot 1st metatarsophalangeal realignment fusion, 2nd & 3rd digital corrections;  Surgeon: Jason Lamar DPM;  Location: [facility] OR     OPEN REDUCTION INTERNAL FIXATION HUMERUS PROXIMAL Left 9/30/2016  "   Procedure: OPEN REDUCTION INTERNAL FIXATION HUMERUS PROXIMAL;  Surgeon: Charly Bernal MD;  Location: [facility] OR     Allergy     Allergies   Allergen Reactions     Azathioprine Itching     Leflunomide Other (See Comments)     Mouth sores     Compazine Anxiety     Restless, anxious     Prochlorperazine Anxiety     Restless, anxious     Sulfa Drugs Rash     Current Medication List     Current Outpatient Medications   Medication Sig     acetaminophen (TYLENOL) 325 MG tablet 650 mg po QID and BId PRN     folic acid (FOLVITE) 1 MG tablet Take 1 tablet (1 mg) by mouth daily     LEVOTHYROXINE SODIUM PO Take 137 mcg by mouth daily      methotrexate 50 MG/2ML injection CHEMO Inject 0.8 mLs (20 mg) Subcutaneous every 7 days     predniSONE (DELTASONE) 5 MG tablet Take 1 tablet (5 mg) by mouth daily     tofacitinib (XELJANZ XR) 11 MG 24 hr tablet Take 1 tablet (11 mg) by mouth daily Hold for signs of infection, then seek medical attention.     VITAMIN D, CHOLECALCIFEROL, PO Take by mouth daily     aspirin 81 MG tablet Take 1 tablet (81 mg) by mouth daily (Patient not taking: Reported on 11/25/2019)     hydrOXYzine (ATARAX) 25 MG tablet Take 1 tablet (25 mg) by mouth every 4 hours as needed for itching (and nausea) (Patient not taking: Reported on 11/25/2019)     Insulin Syringe-Needle U-100 (BD INSULIN SYRINGE) 27G X 1/2\" 1 ML MISC For weekly methotrexate administration.     oxyCODONE-acetaminophen (PERCOCET) 5-325 MG per tablet Take 1-2 tablets by mouth every 4 hours as needed for pain (moderate to severe)     No current facility-administered medications for this visit.          Social History   See HPI    Family History     Family History   Problem Relation Age of Onset     Cancer Father        Physical Exam     Temp Readings from Last 3 Encounters:   06/11/18 98.4  F (36.9  C) (Oral)   08/17/17 98  F (36.7  C) (Oral)   07/13/17 98.1  F (36.7  C)     BP Readings from Last 5 Encounters:   11/25/19 124/82   07/22/19 123/79 " "  03/11/19 138/84   11/05/18 141/90   06/11/18 125/85     Pulse Readings from Last 1 Encounters:   11/25/19 86     Resp Readings from Last 1 Encounters:   06/11/18 16     Estimated body mass index is 29.6 kg/m  as calculated from the following:    Height as of this encounter: 1.676 m (5' 6\").    Weight as of this encounter: 83.2 kg (183 lb 6.4 oz).    GEN: NAD  HEENT: MMM. No oral lesions.  Anicteric, noninjected sclera.  Eyes appear to have good moisture by gross examination.  CV: S1, S2. RRR. No m/r/g.  PULM: CTA bilaterally. No w/c.  MSK: MCPs, PIPs, DIPs, wrists, elbows, shoulders, knees, ankles, and MTPs without swelling or tenderness palpation.  Both wrists with subluxation.  Hypertrophy over the bilateral pes anserine bursae that were mildly tender to palpation.  Hips nontender to palpation.  SKIN: No rash.    EXT: No LE edema  PSYCH: Alert. Appropriate.    Labs / Imaging (select studies)     DULCE  Recent Labs   Lab Test 02/26/18  0846   SHALINI Negative     RNP/Sm/SSA/SSB  Recent Labs   Lab Test 02/26/18  0846   RNPIGG 0.2   SMIGG <0.2   SSAIGG <0.2   SSBIGG <0.2   SCLIGG <0.2     dsDNA  Recent Labs   Lab Test 07/22/19  0805 02/26/18  0846   DNA 1 35*     C3/C4  Recent Labs   Lab Test 07/22/19  0805   X3UGUUR 109   F5EHSUN 19       CBC  Recent Labs   Lab Test 10/25/19  1018 07/22/19  0805 06/04/19  1018   WBC 8.6 9.6 8.5   RBC 4.52 4.41 4.33   HGB 14.8 14.3 13.8   HCT 44.6 44.0 42.9   MCV 99 100 99   RDW 14.3 14.2 14.2    191 201   MCH 32.7 32.4 31.9   MCHC 33.2 32.5 32.2   NEUTROPHIL 82.1 78.9 80.1   LYMPH 10.5 10.8 9.3   MONOCYTE 5.0 8.2 8.8   EOSINOPHIL 1.9 1.5 1.4   BASOPHIL 0.5 0.6 0.4   ANEU 7.1 7.5 6.8   ALYM 0.9 1.0 0.8   KAREN 0.4 0.8 0.8   AEOS 0.2 0.1 0.1   ABAS 0.0 0.1 0.0     CMP  Recent Labs   Lab Test 10/25/19  1018 07/22/19  0805 06/04/19  1018 05/07/19  0952  09/12/18  0923  02/26/18  0846 10/01/16  0625 09/27/16  0605 09/26/16  0615   NA  --  141  --   --   --  144  --  140  --  140 140 "   POTASSIUM  --  4.1  --   --   --  3.8  --  3.7  --  3.9 3.8   CHLORIDE  --  111*  --   --   --  109  --  107  --  106 107   CO2  --  23  --   --   --  28  --  27  --  29 26   ANIONGAP  --  7  --   --   --  7  --  6  --  5 7   GLC  --  89  --   --   --  102*  --  93 112* 100* 90   BUN  --  15  --   --   --  15  --  19  --  11 14   CR 0.75 0.72 0.84 0.71   < > 0.91   < > 0.78  --  0.65 0.66   GFRESTIMATED 86 90 74 >90   < > 63   < > 75  --  >90  Non  GFR Calc   >90  Non  GFR Calc     GFRESTBLACK >90 >90 86 >90   < > 76   < > >90  --  >90   GFR Calc   >90   GFR Calc     HAZEL  --  9.1  --   --   --  9.4  --  9.1  --  8.7 8.5   BILITOTAL 0.7 0.3 0.4 0.5   < > 0.5   < > 0.4  --   --  0.4   ALBUMIN 4.1 3.8 3.9 3.9   < > 3.6   < > 4.0  --   --  3.0*   PROTTOTAL 6.9 6.8 6.7* 6.9   < > 6.9   < > 7.1  --   --  5.9*   ALKPHOS 72 86 87 84   < > 94   < > 79  --   --  63   AST 50* 22 21 19   < > 27   < > 20  --   --  18   ALT 70* 30 26 27   < > 35   < > 24  --   --  21    < > = values in this interval not displayed.     Calcium/VitaminD  Recent Labs   Lab Test 07/22/19  0805 09/12/18  0923 02/26/18  0846 10/05/16  0831   HAZEL 9.1 9.4 9.1  --    VITDT  --   --   --  29     ESR/CRP  Recent Labs   Lab Test 10/25/19  1018 06/04/19  1018 05/30/18  0913   SED 6 8 9   CRP <2.9 <2.9 <2.9     TSH/T4  Recent Labs   Lab Test 10/05/16  0831 09/22/11  0958   TSH 4.58* 0.20*     Lipid Panel  Recent Labs   Lab Test 07/22/19  0805   CHOL 236*   TRIG 266*   HDL 48*   *   NHDL 188*     Hepatitis B  Recent Labs   Lab Test 02/26/18  0846   HBCAB Nonreactive   HEPBANG Nonreactive     Hepatitis C  Recent Labs   Lab Test 02/26/18  0846   HCVAB Nonreactive       Tuberculosis Screening  Recent Labs   Lab Test 07/22/19  0805   TBRES Negative     UA  Recent Labs   Lab Test 07/22/19  0804 09/12/18  0936 09/25/16  2120 10/21/12  1807   COLOR Yellow Yellow Yellow Yellow   APPEARANCE Clear  Clear Clear Clear   URINEGLC Negative Negative Negative Negative   URINEBILI Negative Negative Negative Negative   SG 1.020 >1.030 1.020 1.026   URINEPH 5.5 5.5 6.0 6.0   PROTEIN Negative Negative 30* 30*   UROBILINOGEN 0.2 0.2  --   --    NITRITE Negative Negative Negative Negative   UBLD Negative Negative Negative Negative   LEUKEST Trace* Negative Negative Trace*   WBCU 5-10*  --  <1 2   RBCU O - 2  --  1 0   SQUAMOUSEPI Few  --   --   --    BACTERIA Moderate*  --   --   --    MUCOUS Present*  --  Present* Present*     Urine Microscopic  Recent Labs   Lab Test 07/22/19  0804 09/25/16  2120 10/21/12  1807   WBCU 5-10* <1 2   RBCU O - 2 1 0   SQUAMOUSEPI Few  --   --    BACTERIA Moderate*  --   --    MUCOUS Present* Present* Present*     Urine Protein  Recent Labs   Lab Test 07/22/19  0804 09/12/18  0936   UTP 0.11 0.15   UTPG 0.08 0.05   UCRR 139 268     Immunization History     Immunization History   Administered Date(s) Administered     Influenza Quad, Recombinant, p-free (RIV4) 10/18/2018     Influenza Vaccine IM > 6 months Valent IIV4 10/01/2016     Pneumococcal 23 valent 03/11/2019          Chart documentation done in part with Dragon Voice recognition Software. Although reviewed after completion, some word and grammatical error may remain.    Maurilio Hayes MD

## 2019-11-25 NOTE — NURSING NOTE
RAPID3 (0-30) Cumulative Score  8.0          RAPID3 Weighted Score (divide #4 by 3 and that is the weighted score)  2.7       Elizabeth Haley [facility] Rheumatology  11/25/2019 9:09 AM

## 2019-12-05 DIAGNOSIS — M06.09 RHEUMATOID ARTHRITIS OF MULTIPLE SITES WITHOUT RHEUMATOID FACTOR (H): ICD-10-CM

## 2019-12-05 DIAGNOSIS — Z79.899 HIGH RISK MEDICATION USE: ICD-10-CM

## 2019-12-05 LAB
ALBUMIN SERPL-MCNC: 4.1 G/DL (ref 3.4–5)
ALP SERPL-CCNC: 67 U/L (ref 40–150)
ALT SERPL W P-5'-P-CCNC: 51 U/L (ref 0–50)
AST SERPL W P-5'-P-CCNC: 35 U/L (ref 0–45)
BILIRUB DIRECT SERPL-MCNC: 0.1 MG/DL (ref 0–0.2)
BILIRUB SERPL-MCNC: 0.7 MG/DL (ref 0.2–1.3)
CHOLEST SERPL-MCNC: 310 MG/DL
HDLC SERPL-MCNC: 61 MG/DL
LDLC SERPL CALC-MCNC: 215 MG/DL
NONHDLC SERPL-MCNC: 249 MG/DL
PROT SERPL-MCNC: 6.8 G/DL (ref 6.8–8.8)
TRIGL SERPL-MCNC: 171 MG/DL

## 2019-12-05 PROCEDURE — 36415 COLL VENOUS BLD VENIPUNCTURE: CPT | Performed by: INTERNAL MEDICINE

## 2019-12-05 PROCEDURE — 80076 HEPATIC FUNCTION PANEL: CPT | Performed by: INTERNAL MEDICINE

## 2019-12-05 PROCEDURE — 80061 LIPID PANEL: CPT | Performed by: INTERNAL MEDICINE

## 2019-12-10 ENCOUNTER — COMMUNICATION - HEALTHEAST (OUTPATIENT)
Dept: FAMILY MEDICINE | Facility: CLINIC | Age: 64
End: 2019-12-10

## 2019-12-10 DIAGNOSIS — E03.9 HYPOTHYROIDISM: ICD-10-CM

## 2019-12-18 DIAGNOSIS — Z79.899 HIGH RISK MEDICATION USE: ICD-10-CM

## 2019-12-18 DIAGNOSIS — M06.09 RHEUMATOID ARTHRITIS OF MULTIPLE SITES WITHOUT RHEUMATOID FACTOR (H): ICD-10-CM

## 2019-12-18 NOTE — TELEPHONE ENCOUNTER
"B-D ins syringe 1cc/25g  Last Written Prescription Date:  7/22/19  Last Fill Quantity: 10,  # refills: 3   Last office visit: 11/25/2019 with prescribing provider:  KS   Future Office Visit:   Next 5 appointments (look out 90 days)    Feb 17, 2020 10:40 AM CST  Return Visit with Maurilio Hayes MD  [facility] [address] [facility] [address]  [phone]         Requested Prescriptions   Pending Prescriptions Disp Refills     Insulin Syringe-Needle U-100 (BD INSULIN SYRINGE) 27G X 1/2\" 1 ML MISC 10 each 3     Sig: For weekly methotrexate administration.       There is no refill protocol information for this order          "

## 2019-12-20 RX ORDER — SYRINGE-NEEDLE,INSULIN,0.5 ML 27GX1/2"
SYRINGE, EMPTY DISPOSABLE MISCELLANEOUS
Qty: 10 EACH | Refills: 6 | Status: SHIPPED | OUTPATIENT
Start: 2019-12-20 | End: 2020-06-01

## 2019-12-20 NOTE — TELEPHONE ENCOUNTER
Rheumatology team: Please call to notify Ms. Maguire that needle/syringe combination has been refilled.  Maurilio Hayes MD  12/20/2019 10:36 AM

## 2019-12-20 NOTE — TELEPHONE ENCOUNTER
Medication:   B-D syringe  Last written on:   7/22/2019  Quantity:   10    Refills:   3    Last office visit:   11/25/2019  Next office visit:   2/17/2020  Last labs:   12/5/2019    Elizabeth Haley CMA Rheumatology  12/20/2019 7:58 AM

## 2020-01-07 NOTE — TELEPHONE ENCOUNTER
Free Drug Application Approved  Effective Dates 1/1/20 to 12/31/20  Patient notified? yes  Additional Information-

## 2020-01-27 ENCOUNTER — TELEPHONE (OUTPATIENT)
Dept: RHEUMATOLOGY | Facility: CLINIC | Age: 65
End: 2020-01-27

## 2020-01-27 NOTE — TELEPHONE ENCOUNTER
Prior Authorization Approval    Authorization Effective Date: 1/24/2020  Authorization Expiration Date:    Medication: xeljosez pa renewal   Approved Dose/Quantity: 30/30ds  Reference #: [id number]   Insurance Company: [employer] - Phone [phone] Fax [phone]  Expected CoPay: $1584.90     CoPay Card Available: No    Foundation Assistance Needed: yes  Which Pharmacy is filling the prescription (Not needed for infusion/clinic administered): [facility] - [place], [place] - [id number]  Pharmacy Notified: Yes  Patient Notified:

## 2020-02-13 DIAGNOSIS — M06.09 RHEUMATOID ARTHRITIS OF MULTIPLE SITES WITHOUT RHEUMATOID FACTOR (H): ICD-10-CM

## 2020-02-13 DIAGNOSIS — Z79.899 HIGH RISK MEDICATION USE: ICD-10-CM

## 2020-02-13 LAB
ALBUMIN SERPL-MCNC: 4.1 G/DL (ref 3.4–5)
ALP SERPL-CCNC: 68 U/L (ref 40–150)
ALT SERPL W P-5'-P-CCNC: 30 U/L (ref 0–50)
AST SERPL W P-5'-P-CCNC: 19 U/L (ref 0–45)
BASOPHILS # BLD AUTO: 0 10E9/L (ref 0–0.2)
BASOPHILS NFR BLD AUTO: 0.7 %
BILIRUB DIRECT SERPL-MCNC: <0.1 MG/DL (ref 0–0.2)
BILIRUB SERPL-MCNC: 0.7 MG/DL (ref 0.2–1.3)
CREAT SERPL-MCNC: 0.93 MG/DL (ref 0.52–1.04)
CRP SERPL-MCNC: <2.9 MG/L (ref 0–8)
DIFFERENTIAL METHOD BLD: ABNORMAL
EOSINOPHIL # BLD AUTO: 0.1 10E9/L (ref 0–0.7)
EOSINOPHIL NFR BLD AUTO: 2.3 %
ERYTHROCYTE [DISTWIDTH] IN BLOOD BY AUTOMATED COUNT: 13.4 % (ref 10–15)
ERYTHROCYTE [SEDIMENTATION RATE] IN BLOOD BY WESTERGREN METHOD: 5 MM/H (ref 0–30)
GFR SERPL CREATININE-BSD FRML MDRD: 65 ML/MIN/{1.73_M2}
HCT VFR BLD AUTO: 45.5 % (ref 35–47)
HGB BLD-MCNC: 15.1 G/DL (ref 11.7–15.7)
LYMPHOCYTES # BLD AUTO: 0.9 10E9/L (ref 0.8–5.3)
LYMPHOCYTES NFR BLD AUTO: 16.3 %
MCH RBC QN AUTO: 33.3 PG (ref 26.5–33)
MCHC RBC AUTO-ENTMCNC: 33.2 G/DL (ref 31.5–36.5)
MCV RBC AUTO: 100 FL (ref 78–100)
MONOCYTES # BLD AUTO: 0.7 10E9/L (ref 0–1.3)
MONOCYTES NFR BLD AUTO: 12.7 %
NEUTROPHILS # BLD AUTO: 3.9 10E9/L (ref 1.6–8.3)
NEUTROPHILS NFR BLD AUTO: 68 %
PLATELET # BLD AUTO: 211 10E9/L (ref 150–450)
PROT SERPL-MCNC: 6.8 G/DL (ref 6.8–8.8)
RBC # BLD AUTO: 4.54 10E12/L (ref 3.8–5.2)
WBC # BLD AUTO: 5.8 10E9/L (ref 4–11)

## 2020-02-13 PROCEDURE — 36415 COLL VENOUS BLD VENIPUNCTURE: CPT | Performed by: INTERNAL MEDICINE

## 2020-02-13 PROCEDURE — 85025 COMPLETE CBC W/AUTO DIFF WBC: CPT | Performed by: INTERNAL MEDICINE

## 2020-02-13 PROCEDURE — 86140 C-REACTIVE PROTEIN: CPT | Performed by: INTERNAL MEDICINE

## 2020-02-13 PROCEDURE — 85652 RBC SED RATE AUTOMATED: CPT | Performed by: INTERNAL MEDICINE

## 2020-02-13 PROCEDURE — 80076 HEPATIC FUNCTION PANEL: CPT | Performed by: INTERNAL MEDICINE

## 2020-02-13 PROCEDURE — 82565 ASSAY OF CREATININE: CPT | Performed by: INTERNAL MEDICINE

## 2020-02-17 ENCOUNTER — OFFICE VISIT (OUTPATIENT)
Dept: RHEUMATOLOGY | Facility: CLINIC | Age: 65
End: 2020-02-17
Payer: MEDICARE

## 2020-02-17 VITALS
HEART RATE: 85 BPM | BODY MASS INDEX: 29.83 KG/M2 | WEIGHT: 185.6 LBS | OXYGEN SATURATION: 97 % | DIASTOLIC BLOOD PRESSURE: 88 MMHG | SYSTOLIC BLOOD PRESSURE: 140 MMHG | HEIGHT: 66 IN

## 2020-02-17 DIAGNOSIS — M06.09 RHEUMATOID ARTHRITIS OF MULTIPLE SITES WITHOUT RHEUMATOID FACTOR (H): Primary | ICD-10-CM

## 2020-02-17 DIAGNOSIS — Z79.899 HIGH RISK MEDICATION USE: ICD-10-CM

## 2020-02-17 PROCEDURE — 99213 OFFICE O/P EST LOW 20 MIN: CPT | Performed by: INTERNAL MEDICINE

## 2020-02-17 RX ORDER — PREDNISONE 2.5 MG/1
2.5 TABLET ORAL DAILY
Qty: 30 TABLET | Refills: 0 | Status: SHIPPED | OUTPATIENT
Start: 2020-02-17 | End: 2020-06-01

## 2020-02-17 RX ORDER — FOLIC ACID 1 MG/1
1 TABLET ORAL DAILY
Qty: 100 TABLET | Refills: 3 | Status: SHIPPED | OUTPATIENT
Start: 2020-02-17 | End: 2020-06-01

## 2020-02-17 RX ORDER — METHOTREXATE 25 MG/ML
20 INJECTION, SOLUTION INTRA-ARTERIAL; INTRAMUSCULAR; INTRAVENOUS
Qty: 4 VIAL | Refills: 3 | Status: SHIPPED | OUTPATIENT
Start: 2020-02-17 | End: 2020-06-01

## 2020-02-17 ASSESSMENT — MIFFLIN-ST. JEOR: SCORE: 1418.63

## 2020-02-17 NOTE — PROGRESS NOTES
Rheumatology Clinic Visit      Jackie Maguire MRN# [id number]   YOB: 1955 Age: 64 year old      Date of visit: 2/17/20   PCP: Dr. Talia Mejía at [facility]    Chief Complaint   Patient presents with:  Arthritis: RA. Patient has been doing more, getting out more. Swelling better in hands and knees    Assessment and Plan     1. Rheumatoid Arthritis (CCP low positive, then negative): From record review: dx'd 2012; has followed with Kayley Jimenez, Jacob, and [facility]; hx of +DULCE, +dsDNA, CCP low positive then negative; hx of psoriasis; hx of C-spine fusion; initial presentation of pain/stiff/swollen joints involving the knees, feet, ankles, neck, back.  Previously on leflunomide (mouth sores), HCQ (ineffective), AZA (itching), Humira (ineffective at q7day and q14 days dosings), Remicade (associated with syncopal episodes).  Sulfa allergy.  Avoiding Orencia because of melanoma history.  Currently on MTX 25mg SQ wkly (improvement when changed from oral to SQ), prednisone 5mg daily, and Xeljanz XR 11mg daily.  Significant improvement since starting Xeljanz.  However, the lipid panel is not ideal.  We discussed that Xeljanz could be stopped because of the abnormal lipid panel but most likely she would need treatment for her dyslipidemia regardless; after a thorough conversation she says that she would like to start a medication to lower her cholesterol and will speak to her PCP regarding this.  - Continue methotrexate 20 g SQ every 7 days  - Continue folic acid 1mg daily  - Reduce prednisone from 5mg daily; to 2.5mg daily x30days, then stop  - Continue Xeljanz XR 11 mg daily   - Labs in 3 months: CBC, Creatinine, Hepatic Panel, ESR, CRP              Rapid 3, cumulative scores                      02/17/2020:  7.3 (MTX 20mg SQ wkly, prednisone 5mg daily, Xeljanz XR 11mg daily)                      11/25/2019:  8    (MTX 20mg SQ wkly, prednisone 5mg daily, Xeljanz XR 11mg daily)    2. History of Positive DULCE  and dsDNA (repeats negative): with inflammatory arthritis.  Symptoms fit better for seropositive rheumatoid arthritis; see #1.  DULCE and dsDNA have since been rechecked and were negative.     3. Neck pain hx: Neck spasms several years ago that was dx'd as dystonia at the [facility]; had several imaging studies especially given the RA dx where degenerative changes were seen.  Also saw a spine surgeon at [facility].  PT ineffective.  Neck pain has been stable.    4.  Lower back pain: She reports getting lower back pain when she is picking up her grandchild, and this improves with rest.  No radiation of pain.  Suspect degenerative changes and have advised physical therapy; she preferred doing exercises on her own and she says it is not much of an issue today.    5.  Vaccinations: Vaccinations reviewed with Ms. Maguire.  Risks and benefits of vaccinations were discussed.    - Influenza: encouraged yearly vaccination  - [id number]: received 10/5/2016 per [place] Immunization Information Connection   - [id number]: up to date   - Shingrix: She says that she will consider receiving in the future.  Increased risk for shingles when on DMARDs reviewed.    6. Elevated blood pressure:  Carolineomie to follow up with Primary Care provider regarding elevated blood pressure.     Ms. Maguire verbalized agreement with and understanding of the rational for the diagnosis and treatment plan.  All questions were answered to best of my ability and the patient's satisfaction. Ms. Maguire was advised to contact the clinic with any questions that may arise after the clinic visit.      Thank you for involving me in the care of the patient    Return to clinic: 3-4 months      HPI   Jackie Maguire is a 64 year old female with a past medical history significant for hyperlipidemia, hypothyroidism, history of melanoma, history of humerus fracture, osteoporosis, psoriasis, and rheumatoid arthritis who presents for follow-up of rheumatoid arthritis.      Previously reviewed hx:    10/12/2017 rheumatology clinic note by Dr. Cabrera Chinchilla documents that the patient has rheumatoid arthritis with multiple treatment failures.  He discusses Orencia and prednisone 5 mg daily.    A consult note from Dr. Wendy James documents that the patient is a 61-year-old female with positive DULCE, positive dsDNA, positive CCP (low positive in 2012, then negative), psoriasis, fusion of some of the cervical facet levels.  Initially evaluated 9/2012 for history of acute onset swollen joints, stiffness; joints affected: Knees, feet, hands, ankles, neck, back.  No preceding illness or trauma.  Failed methotrexate (ineffective), leflunomide (mouth sores), azathioprine (itching), Humira (ineffective at q7day and q14 days dosings).  Hydroxychloroquine and prednisone 10 mg daily with some improvement.    Additional chart review shows that MTX was effective and only reduced when a biologic DMARD was being added.     7/22/2018: She reported feeling stronger since changing methotrexate from oral to SQ.  Still with swelling in her MCPs and wrists.  Morning stiffness for most of the day that reaches a baseline after 1 hour; severity is significantly improved.  Tolerating medications.  Happy with how she is doing but feels like she could improve some.     11/25/2019: Feels much better on Xeljanz.  She says that her right wrist has been swollen for many years and the swelling is gone.  She says that the arthritis is improved so much that her ring is starting to fall off her fingers.  Morning stiffness for no more than 5-10 minutes.  Tolerating medications well and very happy with how much she has improved.    Today, 2/17/2020: reports that her BP is up and she is very stressed because her son is on suicide watch at this time; her son's child was born about 1 yrs ago and then he had a work injury that went to his mind and then he went downhill. She says that her son is getting mental health care  at [facility] on a daily basis and things have been stable but it is still very stressful for her; she says that he is on medication for schizophrenia.  She says that otherwise she is doing well.  Joints are doing much better and the swelling in her right wrist and MCPs is nearly resolved.  She believes the medication is working well and does not want to change it.  She knows that her lipids are elevated and says that she will talk with her primary care provider regarding cholesterol-lowering medication.  Morning stiffness for no more than 20 minutes.  Positive gelling phenomenon that resolves quickly.    Denies fevers, chills, nausea, vomiting, constipation, diarrhea. No abdominal pain. No chest pain/pressure, palpitations, or shortness of breath. No LE swelling. No oral or nasal sores.  No rash. No sicca symptoms. No photosensitivity or photophobia. No eye pain or redness. No history of inflammatory eye disease.  No history of DVT or pulmonary embolism; one still birth, 3 grown children.  No history of serositis.  No history of Raynaud's Phenomenon.  No seizure history.  No known renal disorder.      Tobacco: none  EtOH: none  Drugs: none    ROS   GEN: No fevers, chills, night sweats, or weight change  SKIN: No itching, rashes, sores  HEENT: No oral or nasal ulcers.  CV: No chest pain, pressure, palpitations, or dyspnea on exertion.  PULM: No SOB, wheeze, cough.  GI: No nausea, vomiting, constipation, diarrhea. No blood in stool. No abdominal pain.  : No blood in urine.  MSK: See HPI.  NEURO: No numbness, tingling, or weakness.  ENDO: No heat/cold intolerance.  EXT: No LE swelling  PSYCH: Negative    Active Problem List     Patient Active Problem List   Diagnosis     Hypothyroidism     Hyperlipidemia LDL goal <160     Inflammatory arthritis     Fracture, pelvis closed (H)     Malignant melanoma of skin (H)     Distal radius fracture     Closed fracture of part of humerus     Proximal humerus fracture      Aftercare for healing traumatic fracture of upper arm     Osteopetrosis     Cervical dystonia     Dystonia, torsion, fragments of     Psoriasis     Rheumatoid arthritis of multiple sites without rheumatoid factor (H)     Past Medical History     Past Medical History:   Diagnosis Date     Hyperlipidemia LDL goal <160 1/5/2011     Hypothyroidism 10/1/2010     Inflammatory arthritis 1/20/2011     Past Surgical History     Past Surgical History:   Procedure Laterality Date     APPENDECTOMY       ARTHRODESIS TOE(S) Left 8/17/2017    Procedure: ARTHRODESIS TOE(S);  Left foot 1st metatarsophalangeal realignment fusion, 2nd & 3rd digital corrections;  Surgeon: Jason Lamar DPM;  Location: [facility]     OPEN REDUCTION INTERNAL FIXATION HUMERUS PROXIMAL Left 9/30/2016    Procedure: OPEN REDUCTION INTERNAL FIXATION HUMERUS PROXIMAL;  Surgeon: Charly Bernal MD;  Location: [facility] OR     Allergy     Allergies   Allergen Reactions     Azathioprine Itching     Leflunomide Other (See Comments)     Mouth sores     Compazine Anxiety     Restless, anxious     Prochlorperazine Anxiety     Restless, anxious     Sulfa Drugs Rash     Current Medication List     Current Outpatient Medications   Medication Sig     acetaminophen (TYLENOL) 325 MG tablet 650 mg po QID and BId PRN     folic acid (FOLVITE) 1 MG tablet Take 1 tablet (1 mg) by mouth daily     LEVOTHYROXINE SODIUM PO Take 137 mcg by mouth daily      methotrexate 50 MG/2ML injection Inject 0.8 mLs (20 mg) Subcutaneous every 7 days     predniSONE (DELTASONE) 5 MG tablet Take 1 tablet (5 mg) by mouth daily     tofacitinib (XELJANZ XR) 11 MG 24 hr tablet Take 1 tablet (11 mg) by mouth daily Hold for signs of infection, then seek medical attention.     VITAMIN D, CHOLECALCIFEROL, PO Take by mouth daily     aspirin 81 MG tablet Take 1 tablet (81 mg) by mouth daily (Patient not taking: Reported on 11/25/2019)     hydrOXYzine (ATARAX) 25 MG tablet Take 1 tablet (25 mg) by mouth every  "4 hours as needed for itching (and nausea) (Patient not taking: Reported on 11/25/2019)     Insulin Syringe-Needle U-100 (BD INSULIN SYRINGE) 27G X 1/2\" 1 ML MISC For weekly methotrexate administration.     oxyCODONE-acetaminophen (PERCOCET) 5-325 MG per tablet Take 1-2 tablets by mouth every 4 hours as needed for pain (moderate to severe) (Patient not taking: Reported on 2/17/2020)     No current facility-administered medications for this visit.          Social History   See HPI    Family History     Family History   Problem Relation Age of Onset     Cancer Father        Physical Exam     Temp Readings from Last 3 Encounters:   06/11/18 98.4  F (36.9  C) (Oral)   08/17/17 98  F (36.7  C) (Oral)   07/13/17 98.1  F (36.7  C)     BP Readings from Last 5 Encounters:   02/17/20 (!) 160/96   11/25/19 124/82   07/22/19 123/79   03/11/19 138/84   11/05/18 141/90     Pulse Readings from Last 1 Encounters:   02/17/20 85     Resp Readings from Last 1 Encounters:   06/11/18 16     Estimated body mass index is 29.96 kg/m  as calculated from the following:    Height as of this encounter: 1.676 m (5' 6\").    Weight as of this encounter: 84.2 kg (185 lb 9.6 oz).    GEN: NAD  HEENT: MMM. No oral lesions.  Anicteric, noninjected sclera.  Eyes appear to have good moisture by gross examination.  CV: S1, S2. RRR. No m/r/g.  PULM: CTA bilaterally. No w/c.  MSK: MCPs, PIPs, DIPs, wrists, elbows, shoulders, knees, ankles, and MTPs without swelling or tenderness palpation.  Both wrists with subluxation.  Hips nontender to palpation.  SKIN: No rash.    EXT: No LE edema  PSYCH: Alert. Appropriate.    Labs / Imaging (select studies)     DULCE  Recent Labs   Lab Test 02/26/18  0846   SHALINI Negative     RNP/Sm/SSA/SSB  Recent Labs   Lab Test 02/26/18  0846   RNPIGG 0.2   SMIGG <0.2   SSAIGG <0.2   SSBIGG <0.2   SCLIGG <0.2     dsDNA  Recent Labs   Lab Test 07/22/19  0805 02/26/18  0846   DNA 1 35*     C3/C4  Recent Labs   Lab Test 07/22/19  0805 "   G9JBIPF 109   S9EBMYL 19     CBC  Recent Labs   Lab Test 02/13/20  0853 10/25/19  1018 07/22/19  0805   WBC 5.8 8.6 9.6   RBC 4.54 4.52 4.41   HGB 15.1 14.8 14.3   HCT 45.5 44.6 44.0    99 100   RDW 13.4 14.3 14.2    210 191   MCH 33.3* 32.7 32.4   MCHC 33.2 33.2 32.5   NEUTROPHIL 68.0 82.1 78.9   LYMPH 16.3 10.5 10.8   MONOCYTE 12.7 5.0 8.2   EOSINOPHIL 2.3 1.9 1.5   BASOPHIL 0.7 0.5 0.6   ANEU 3.9 7.1 7.5   ALYM 0.9 0.9 1.0   KAREN 0.7 0.4 0.8   AEOS 0.1 0.2 0.1   ABAS 0.0 0.0 0.1     CMP  Recent Labs   Lab Test 02/13/20  0853 12/05/19  0851 10/25/19  1018 07/22/19  0805 06/04/19  1018  09/12/18  0923  02/26/18  0846 10/01/16  0625 09/27/16  0605 09/26/16  0615   NA  --   --   --  141  --   --  144  --  140  --  140 140   POTASSIUM  --   --   --  4.1  --   --  3.8  --  3.7  --  3.9 3.8   CHLORIDE  --   --   --  111*  --   --  109  --  107  --  106 107   CO2  --   --   --  23  --   --  28  --  27  --  29 26   ANIONGAP  --   --   --  7  --   --  7  --  6  --  5 7   GLC  --   --   --  89  --   --  102*  --  93 112* 100* 90   BUN  --   --   --  15  --   --  15  --  19  --  11 14   CR 0.93  --  0.75 0.72 0.84   < > 0.91   < > 0.78  --  0.65 0.66   GFRESTIMATED 65  --  86 90 74   < > 63   < > 75  --  >90  Non  GFR Calc   >90  Non  GFR Calc     GFRESTBLACK 76  --  >90 >90 86   < > 76   < > >90  --  >90   GFR Calc   >90   GFR Calc     HAZEL  --   --   --  9.1  --   --  9.4  --  9.1  --  8.7 8.5   BILITOTAL 0.7 0.7 0.7 0.3 0.4   < > 0.5   < > 0.4  --   --  0.4   ALBUMIN 4.1 4.1 4.1 3.8 3.9   < > 3.6   < > 4.0  --   --  3.0*   PROTTOTAL 6.8 6.8 6.9 6.8 6.7*   < > 6.9   < > 7.1  --   --  5.9*   ALKPHOS 68 67 72 86 87   < > 94   < > 79  --   --  63   AST 19 35 50* 22 21   < > 27   < > 20  --   --  18   ALT 30 51* 70* 30 26   < > 35   < > 24  --   --  21    < > = values in this interval not displayed.       Lipid Panel  Recent Labs   Lab Test  12/05/19  0851 07/22/19  0805   CHOL 310* 236*   TRIG 171* 266*   HDL 61 48*   * 135*   NHDL 249* 188*     Calcium/VitaminD  Recent Labs   Lab Test 07/22/19  0805 09/12/18  0923 02/26/18  0846 10/05/16  0831   HAZEL 9.1 9.4 9.1  --    VITDT  --   --   --  29     ESR/CRP  Recent Labs   Lab Test 02/13/20  0853 10/25/19  1018 06/04/19  1018   SED 5 6 8   CRP <2.9 <2.9 <2.9     TSH/T4  Recent Labs   Lab Test 10/05/16  0831   TSH 4.58*     Hepatitis B  Recent Labs   Lab Test 02/26/18  0846   HBCAB Nonreactive   HEPBANG Nonreactive     Hepatitis C  Recent Labs   Lab Test 02/26/18  0846   HCVAB Nonreactive     Tuberculosis Screening  Recent Labs   Lab Test 07/22/19  0805   TBRES Negative     UA  Recent Labs   Lab Test 07/22/19  0804 09/12/18  0936 09/25/16  2120 10/21/12  1807   COLOR Yellow Yellow Yellow Yellow   APPEARANCE Clear Clear Clear Clear   URINEGLC Negative Negative Negative Negative   URINEBILI Negative Negative Negative Negative   SG 1.020 >1.030 1.020 1.026   URINEPH 5.5 5.5 6.0 6.0   PROTEIN Negative Negative 30* 30*   UROBILINOGEN 0.2 0.2  --   --    NITRITE Negative Negative Negative Negative   UBLD Negative Negative Negative Negative   LEUKEST Trace* Negative Negative Trace*   WBCU 5-10*  --  <1 2   RBCU O - 2  --  1 0   SQUAMOUSEPI Few  --   --   --    BACTERIA Moderate*  --   --   --    MUCOUS Present*  --  Present* Present*     Urine Microscopic  Recent Labs   Lab Test 07/22/19  0804 09/25/16 2120 10/21/12  1807   WBCU 5-10* <1 2   RBCU O - 2 1 0   SQUAMOUSEPI Few  --   --    BACTERIA Moderate*  --   --    MUCOUS Present* Present* Present*     Urine Protein  Recent Labs   Lab Test 07/22/19  0804 09/12/18  0936   UTP 0.11 0.15   UTPG 0.08 0.05   UCRR 139 268     Immunization History     Immunization History   Administered Date(s) Administered     Influenza Quad, Recombinant, p-free (RIV4) 10/18/2018     Influenza Vaccine IM > 6 months Valent IIV4 10/01/2016     Pneumococcal 23 valent 03/11/2019           Chart documentation done in part with Dragon Voice recognition Software. Although reviewed after completion, some word and grammatical error may remain.    Maurilio Hayes MD

## 2020-02-17 NOTE — NURSING NOTE
Jackie to follow up with Primary Care provider regarding elevated blood pressure.  Blood pressure rechecked after visit    140/88  Elizabeth Haley CMA Rheumatology  2/17/2020 11:42 AM                                  RAPID3 (0-30) Cumulative Score  7.3          RAPID3 Weighted Score (divide #4 by 3 and that is the weighted score)  2.4     Elizabeth Haley CMA Rheumatology  2/17/2020 10:42 AM

## 2020-02-25 ENCOUNTER — OFFICE VISIT - HEALTHEAST (OUTPATIENT)
Dept: FAMILY MEDICINE | Facility: CLINIC | Age: 65
End: 2020-02-25

## 2020-02-25 DIAGNOSIS — M06.9 RHEUMATOID ARTHRITIS, INVOLVING UNSPECIFIED SITE, UNSPECIFIED RHEUMATOID FACTOR PRESENCE: ICD-10-CM

## 2020-02-25 DIAGNOSIS — E78.00 HYPERCHOLESTEREMIA: ICD-10-CM

## 2020-02-25 ASSESSMENT — MIFFLIN-ST. JEOR: SCORE: 1410.44

## 2020-03-09 ENCOUNTER — COMMUNICATION - HEALTHEAST (OUTPATIENT)
Dept: FAMILY MEDICINE | Facility: CLINIC | Age: 65
End: 2020-03-09

## 2020-03-09 DIAGNOSIS — E03.9 HYPOTHYROIDISM: ICD-10-CM

## 2020-05-28 DIAGNOSIS — Z79.899 HIGH RISK MEDICATION USE: ICD-10-CM

## 2020-05-28 DIAGNOSIS — M06.09 RHEUMATOID ARTHRITIS OF MULTIPLE SITES WITHOUT RHEUMATOID FACTOR (H): ICD-10-CM

## 2020-05-28 LAB
ALBUMIN SERPL-MCNC: 4 G/DL (ref 3.4–5)
ALP SERPL-CCNC: 91 U/L (ref 40–150)
ALT SERPL W P-5'-P-CCNC: 61 U/L (ref 0–50)
AST SERPL W P-5'-P-CCNC: 32 U/L (ref 0–45)
BASOPHILS # BLD AUTO: 0 10E9/L (ref 0–0.2)
BASOPHILS NFR BLD AUTO: 0.6 %
BILIRUB DIRECT SERPL-MCNC: 0.2 MG/DL (ref 0–0.2)
BILIRUB SERPL-MCNC: 0.8 MG/DL (ref 0.2–1.3)
CREAT SERPL-MCNC: 0.74 MG/DL (ref 0.52–1.04)
CRP SERPL-MCNC: <2.9 MG/L (ref 0–8)
DIFFERENTIAL METHOD BLD: ABNORMAL
EOSINOPHIL # BLD AUTO: 0.2 10E9/L (ref 0–0.7)
EOSINOPHIL NFR BLD AUTO: 2.3 %
ERYTHROCYTE [DISTWIDTH] IN BLOOD BY AUTOMATED COUNT: 13.6 % (ref 10–15)
ERYTHROCYTE [SEDIMENTATION RATE] IN BLOOD BY WESTERGREN METHOD: 5 MM/H (ref 0–30)
GFR SERPL CREATININE-BSD FRML MDRD: 87 ML/MIN/{1.73_M2}
GLUCOSE SERPL-MCNC: 96 MG/DL (ref 70–99)
HCT VFR BLD AUTO: 42.5 % (ref 35–47)
HGB BLD-MCNC: 14 G/DL (ref 11.7–15.7)
LYMPHOCYTES # BLD AUTO: 0.9 10E9/L (ref 0.8–5.3)
LYMPHOCYTES NFR BLD AUTO: 13.8 %
MCH RBC QN AUTO: 33.1 PG (ref 26.5–33)
MCHC RBC AUTO-ENTMCNC: 32.9 G/DL (ref 31.5–36.5)
MCV RBC AUTO: 101 FL (ref 78–100)
MONOCYTES # BLD AUTO: 0.8 10E9/L (ref 0–1.3)
MONOCYTES NFR BLD AUTO: 11.6 %
NEUTROPHILS # BLD AUTO: 4.6 10E9/L (ref 1.6–8.3)
NEUTROPHILS NFR BLD AUTO: 71.7 %
PLATELET # BLD AUTO: 212 10E9/L (ref 150–450)
PROT SERPL-MCNC: 6.8 G/DL (ref 6.8–8.8)
RBC # BLD AUTO: 4.23 10E12/L (ref 3.8–5.2)
WBC # BLD AUTO: 6.4 10E9/L (ref 4–11)

## 2020-05-28 PROCEDURE — 86140 C-REACTIVE PROTEIN: CPT | Performed by: INTERNAL MEDICINE

## 2020-05-28 PROCEDURE — 82565 ASSAY OF CREATININE: CPT | Performed by: INTERNAL MEDICINE

## 2020-05-28 PROCEDURE — 80076 HEPATIC FUNCTION PANEL: CPT | Performed by: INTERNAL MEDICINE

## 2020-05-28 PROCEDURE — 82947 ASSAY GLUCOSE BLOOD QUANT: CPT | Performed by: INTERNAL MEDICINE

## 2020-05-28 PROCEDURE — 85652 RBC SED RATE AUTOMATED: CPT | Performed by: INTERNAL MEDICINE

## 2020-05-28 PROCEDURE — 85025 COMPLETE CBC W/AUTO DIFF WBC: CPT | Performed by: INTERNAL MEDICINE

## 2020-05-28 PROCEDURE — 36415 COLL VENOUS BLD VENIPUNCTURE: CPT | Performed by: INTERNAL MEDICINE

## 2020-06-01 ENCOUNTER — VIRTUAL VISIT (OUTPATIENT)
Dept: RHEUMATOLOGY | Facility: CLINIC | Age: 65
End: 2020-06-01
Payer: MEDICARE

## 2020-06-01 ENCOUNTER — TELEPHONE (OUTPATIENT)
Dept: RHEUMATOLOGY | Facility: CLINIC | Age: 65
End: 2020-06-01

## 2020-06-01 ENCOUNTER — RECORDS - HEALTHEAST (OUTPATIENT)
Dept: ADMINISTRATIVE | Facility: OTHER | Age: 65
End: 2020-06-01

## 2020-06-01 DIAGNOSIS — E78.5 HYPERLIPIDEMIA, UNSPECIFIED HYPERLIPIDEMIA TYPE: ICD-10-CM

## 2020-06-01 DIAGNOSIS — Z79.899 HIGH RISK MEDICATION USE: ICD-10-CM

## 2020-06-01 DIAGNOSIS — M06.09 RHEUMATOID ARTHRITIS OF MULTIPLE SITES WITHOUT RHEUMATOID FACTOR (H): Primary | ICD-10-CM

## 2020-06-01 DIAGNOSIS — M72.2 PLANTAR FASCIITIS: ICD-10-CM

## 2020-06-01 PROCEDURE — 99442 ZZC PHYSICIAN TELEPHONE EVALUATION 11-20 MIN: CPT | Performed by: INTERNAL MEDICINE

## 2020-06-01 RX ORDER — SYRING-NEEDL,DISP,INSUL,0.3 ML 28GX1/2"
SYRINGE, EMPTY DISPOSABLE MISCELLANEOUS
Qty: 10 EACH | Refills: 6 | Status: SHIPPED | OUTPATIENT
Start: 2020-06-01 | End: 2020-12-07

## 2020-06-01 RX ORDER — METHOTREXATE 25 MG/ML
20 INJECTION, SOLUTION INTRA-ARTERIAL; INTRAMUSCULAR; INTRAVENOUS
Qty: 4 VIAL | Refills: 3 | Status: SHIPPED | OUTPATIENT
Start: 2020-06-01 | End: 2020-08-31

## 2020-06-01 RX ORDER — FOLIC ACID 1 MG/1
1 TABLET ORAL DAILY
Qty: 100 TABLET | Refills: 3 | Status: SHIPPED | OUTPATIENT
Start: 2020-06-01 | End: 2021-05-27

## 2020-06-01 RX ORDER — TOFACITINIB 11 MG/1
11 TABLET, FILM COATED, EXTENDED RELEASE ORAL DAILY
Qty: 30 TABLET | Refills: 12 | Status: SHIPPED | OUTPATIENT
Start: 2020-06-01 | End: 2021-05-27

## 2020-06-01 NOTE — TELEPHONE ENCOUNTER
PA Initiation    Medication: Methotrexate  Insurance Company: [employer] - Phone [phone] Fax [phone]  Pharmacy Filling the Rx: [facility] [address] [place] [address] - [address]  Filling Pharmacy Phone: [phone]  Filling Pharmacy Fax: [phone]  Start Date: 6/1/2020

## 2020-06-01 NOTE — Clinical Note
Please fax my clinic note dated 6/1/2020 to Ms. Andrez's PCP:    Dr. Talia Mejía at [facility]    Thank you,  Maurilio Hayes MD  6/1/2020 11:31 AM

## 2020-06-01 NOTE — PROGRESS NOTES
"Jackie Maguire is a 65 year old female who is being evaluated via a billable telephone visit.      The patient has been notified of following:     \"This telephone visit will be conducted via a call between you and your physician/provider. We have found that certain health care needs can be provided without the need for a physical exam.  This service lets us provide the care you need with a short phone conversation.  If a prescription is necessary we can send it directly to your pharmacy.  If lab work is needed we can place an order for that and you can then stop by our lab to have the test done at a later time.    Telephone visits are billed at different rates depending on your insurance coverage. During this emergency period, for some insurers they may be billed the same as an in-person visit.  Please reach out to your insurance provider with any questions.    If during the course of the call the physician/provider feels a telephone visit is not appropriate, you will not be charged for this service.\"    Patient has given verbal consent for Telephone visit?  Yes    What phone number would you like to be contacted at? [phone]    How would you like to obtain your AVS? Mail a copy      Rheumatology Telephone/Telehealth  Visit      Jackie Maguire MRN# [id number]   YOB: 1955 Age: 65 year old      Date of visit: 6/01/20   PCP: Dr. Talia Mejía at [facility]    Chief Complaint   Patient presents with:  Arthritis: RA. Has good days and some bad days having a hard time walking. Having a hard time with the pharmacies for methotrexate so she has missed a couple weeks.    Assessment and Plan     1. Rheumatoid Arthritis (CCP low positive, then negative): From record review: dx'd 2012; has followed with [facility] Cano, and [facility]; hx of +DULCE, +dsDNA, CCP low positive then negative; hx of psoriasis; hx of C-spine fusion; initial presentation of pain/stiff/swollen joints involving the knees, feet, " ankles, neck, back.  Previously on leflunomide (mouth sores), HCQ (ineffective), AZA (itching), Humira (ineffective at q7day and q14 days dosings), Remicade (associated with syncopal episodes).  Sulfa allergy.  Avoiding Orencia because of melanoma history.  Currently on MTX 25mg SQ wkly (improvement when changed from oral to SQ) and Xeljanz XR 11mg daily.  Significant improvement since starting Xeljanz.  Hyperlipidemia is being managed by Dr. Mejía with atorvastatin.  Doing well with regard to RA.   - Continue methotrexate 20 mg SQ every 7 days; sent request for insurance PA for MTX  - Continue folic acid 1mg daily  - Continue Xeljanz XR 11 mg daily   - Labs in 3 months: CBC, Creatinine, Hepatic Panel, ESR, CRP              Rapid 3, cumulative scores                      02/17/2020:  7.3 (MTX 20mg SQ wkly, prednisone 5mg daily, Xeljanz XR 11mg daily)                      11/25/2019:  8    (MTX 20mg SQ wkly, prednisone 5mg daily, Xeljanz XR 11mg daily)    2. History of Positive DULCE and dsDNA (repeats negative): with inflammatory arthritis.  Symptoms fit better for seropositive rheumatoid arthritis; see #1.  DULCE and dsDNA have since been rechecked and were negative.     3. Neck pain hx: Neck spasms several years ago that was dx'd as dystonia at the [facility]; had several imaging studies especially given the RA dx where degenerative changes were seen.  Also saw a spine surgeon at [place].  PT ineffective.  Neck pain has been stable.    4.  Lower back pain: She reports getting lower back pain when she is picking up her grandchild, and this improves with rest.  No radiation of pain.  Suspect degenerative changes and have advised physical therapy; she preferred doing exercises on her own and she says it is not much of an issue today.    5. Plantar fasciitis, bilaterally: based on history provided today.  Reviewed the diagnosis and treatment options.  Advised shoes with good arch support.  Advised stretching exercises.   Advised that she notify me if this worsens or does not improve.     6.  Hyperlipidemia: On atorvastatin from her PCP.    # Relevant labs and imaging were reviewed with the patient    # High risk medication toxicity monitoring: discussion and labs reviewed; appropriate labs ordered. See above.  Instructed that if confirmed to have COVID-19 or exposure to someone with confirmed COVID-19 to call this clinic for directions on DMARD management.    # Note that this is a virtual visit to reduce the risk of COVID-19 exposure during this current pandemic.      # Considered to be at high risk of complications from the COVID-19 virus.  It is recommended to limit contact with other people and if possible to work remotely or provide a leave of absence to reduce the risk for COVID-19.      Ms. Maguire verbalized agreement with and understanding of the rational for the diagnosis and treatment plan.  All questions were answered to best of my ability and the patient's satisfaction. Ms. Maguire was advised to contact the clinic with any questions that may arise after the clinic visit.      Thank you for involving me in the care of the patient    Return to clinic: 3-4 months      HPI   Jackie Maguire is a 65 year old female with a past medical history significant for hyperlipidemia, hypothyroidism, history of melanoma, history of humerus fracture, osteoporosis, psoriasis, and rheumatoid arthritis who presents for follow-up of rheumatoid arthritis.     Previously reviewed hx:    10/12/2017 rheumatology clinic note by Dr. Cabrera Chinchilla documents that the patient has rheumatoid arthritis with multiple treatment failures.  He discusses Orencia and prednisone 5 mg daily.    A consult note from Dr. Wendy James documents that the patient is a 61-year-old female with positive DULCE, positive dsDNA, positive CCP (low positive in 2012, then negative), psoriasis, fusion of some of the cervical facet levels.  Initially evaluated 9/2012 for  history of acute onset swollen joints, stiffness; joints affected: Knees, feet, hands, ankles, neck, back.  No preceding illness or trauma.  Failed methotrexate (ineffective), leflunomide (mouth sores), azathioprine (itching), Humira (ineffective at q7day and q14 days dosings).  Hydroxychloroquine and prednisone 10 mg daily with some improvement.    Additional chart review shows that MTX was effective and only reduced when a biologic DMARD was being added.     7/22/2018: She reported feeling stronger since changing methotrexate from oral to SQ.  Still with swelling in her MCPs and wrists.  Morning stiffness for most of the day that reaches a baseline after 1 hour; severity is significantly improved.  Tolerating medications.  Happy with how she is doing but feels like she could improve some.     11/25/2019: Feels much better on Xeljanz.  She says that her right wrist has been swollen for many years and the swelling is gone.  She says that the arthritis is improved so much that her ring is starting to fall off her fingers.  Morning stiffness for no more than 5-10 minutes.  Tolerating medications well and very happy with how much she has improved.    2/17/2020: reports that her BP is up and she is very stressed because her son is on suicide watch at this time; her son's child was born about 1 yrs ago and then he had a work injury that went to his mind and then he went downhill. She says that her son is getting mental health care at [facility] on a daily basis and things have been stable but it is still very stressful for her; she says that he is on medication for schizophrenia.  She says that otherwise she is doing well.  Joints are doing much better and the swelling in her right wrist and MCPs is nearly resolved.  She believes the medication is working well and does not want to change it.  She knows that her lipids are elevated and says that she will talk with her primary care provider regarding  cholesterol-lowering medication.  Morning stiffness for no more than 20 minutes.  Positive gelling phenomenon that resolves quickly.    Today, 6/1/2020: she reports that once in a while she has pain in her feet at the heels. Pain improves with activity.  After priors of inactivity she has worsening pain. No hand symptoms. Having some difficulty getting MTX because of insurance approval she says; has missed 2 weeks.  Morning stiffness for about 10 min; no gelling phenomenon.  No joint swelling. Atorvastatin Rx'd by Dr. Mejía for hyperlipidemia; she'd like labs sent to Dr. Mejía.     Denies fevers, chills, nausea, vomiting, constipation, diarrhea. No abdominal pain. No chest pain/pressure, palpitations, or shortness of breath. No LE swelling. No oral or nasal sores.  No rash. No sicca symptoms. No photosensitivity or photophobia. No eye pain or redness. No history of inflammatory eye disease.  No history of DVT or pulmonary embolism; one still birth, 3 grown children.  No history of serositis.  No history of Raynaud's Phenomenon.  No seizure history.  No known renal disorder.      Tobacco: none  EtOH: none  Drugs: none    ROS   GEN: No fevers, chills, night sweats, or weight change  SKIN: No itching, rashes, sores  HEENT: No oral or nasal ulcers.  CV: No chest pain, pressure, palpitations, or dyspnea on exertion.  PULM: No SOB, wheeze, cough.  GI: No nausea, vomiting, constipation, diarrhea. No blood in stool. No abdominal pain.  : No blood in urine.  MSK: See HPI.  NEURO: No numbness, tingling, or weakness.  ENDO: No heat/cold intolerance.  EXT: No LE swelling  PSYCH: Negative    Active Problem List     Patient Active Problem List   Diagnosis     Hypothyroidism     Hyperlipidemia LDL goal <160     Inflammatory arthritis     Fracture, pelvis closed (H)     Malignant melanoma of skin (H)     Distal radius fracture     Closed fracture of part of humerus     Proximal humerus fracture     Aftercare for healing  traumatic fracture of upper arm     Osteopetrosis     Cervical dystonia     Dystonia, torsion, fragments of     Psoriasis     Rheumatoid arthritis of multiple sites without rheumatoid factor (H)     Past Medical History     Past Medical History:   Diagnosis Date     Hyperlipidemia LDL goal <160 1/5/2011     Hypothyroidism 10/1/2010     Inflammatory arthritis 1/20/2011     Past Surgical History     Past Surgical History:   Procedure Laterality Date     APPENDECTOMY       ARTHRODESIS TOE(S) Left 8/17/2017    Procedure: ARTHRODESIS TOE(S);  Left foot 1st metatarsophalangeal realignment fusion, 2nd & 3rd digital corrections;  Surgeon: Jason Lamar DPM;  Location: [facility]     OPEN REDUCTION INTERNAL FIXATION HUMERUS PROXIMAL Left 9/30/2016    Procedure: OPEN REDUCTION INTERNAL FIXATION HUMERUS PROXIMAL;  Surgeon: Charly Bernal MD;  Location: [facility] OR     Allergy     Allergies   Allergen Reactions     Azathioprine Itching     Leflunomide Other (See Comments)     Mouth sores     Compazine Anxiety     Restless, anxious     Prochlorperazine Anxiety     Restless, anxious     Sulfa Drugs Rash     Current Medication List     Current Outpatient Medications   Medication Sig     folic acid (FOLVITE) 1 MG tablet Take 1 tablet (1 mg) by mouth daily     LEVOTHYROXINE SODIUM PO Take 137 mcg by mouth daily      methotrexate 50 MG/2ML injection Inject 0.8 mLs (20 mg) Subcutaneous every 7 days     tofacitinib (XELJANZ XR) 11 MG 24 hr tablet Take 1 tablet (11 mg) by mouth daily Hold for signs of infection, then seek medical attention.     VITAMIN D, CHOLECALCIFEROL, PO Take by mouth daily     acetaminophen (TYLENOL) 325 MG tablet 650 mg po QID and BId PRN     aspirin 81 MG tablet Take 1 tablet (81 mg) by mouth daily (Patient not taking: Reported on 11/25/2019)     hydrOXYzine (ATARAX) 25 MG tablet Take 1 tablet (25 mg) by mouth every 4 hours as needed for itching (and nausea) (Patient not taking: Reported on 11/25/2019)      "Insulin Syringe-Needle U-100 (BD INSULIN SYRINGE) 27G X 1/2\" 1 ML MISC For weekly methotrexate administration.     oxyCODONE-acetaminophen (PERCOCET) 5-325 MG per tablet Take 1-2 tablets by mouth every 4 hours as needed for pain (moderate to severe) (Patient not taking: Reported on 2/17/2020)     predniSONE (DELTASONE) 2.5 MG tablet Take 1 tablet (2.5 mg) by mouth daily x30days, then stop.     No current facility-administered medications for this visit.          Social History   See HPI    Family History     Family History   Problem Relation Age of Onset     Cancer Father        Physical Exam     Temp Readings from Last 3 Encounters:   06/11/18 98.4  F (36.9  C) (Oral)   08/17/17 98  F (36.7  C) (Oral)   07/13/17 98.1  F (36.7  C)     BP Readings from Last 5 Encounters:   02/17/20 (!) 140/88   11/25/19 124/82   07/22/19 123/79   03/11/19 138/84   11/05/18 141/90     Pulse Readings from Last 1 Encounters:   02/17/20 85     Resp Readings from Last 1 Encounters:   06/11/18 16     Estimated body mass index is 29.96 kg/m  as calculated from the following:    Height as of 2/17/20: 1.676 m (5' 6\").    Weight as of 2/17/20: 84.2 kg (185 lb 9.6 oz).    Telephone Visit     Labs / Imaging (select studies)     CBC  Recent Labs   Lab Test 05/28/20  0928 02/13/20  0853 10/25/19  1018   WBC 6.4 5.8 8.6   RBC 4.23 4.54 4.52   HGB 14.0 15.1 14.8   HCT 42.5 45.5 44.6   * 100 99   RDW 13.6 13.4 14.3    211 210   MCH 33.1* 33.3* 32.7   MCHC 32.9 33.2 33.2   NEUTROPHIL 71.7 68.0 82.1   LYMPH 13.8 16.3 10.5   MONOCYTE 11.6 12.7 5.0   EOSINOPHIL 2.3 2.3 1.9   BASOPHIL 0.6 0.7 0.5   ANEU 4.6 3.9 7.1   ALYM 0.9 0.9 0.9   KAREN 0.8 0.7 0.4   AEOS 0.2 0.1 0.2   ABAS 0.0 0.0 0.0     CMP  Recent Labs   Lab Test 05/28/20  0928 02/13/20  0853 12/05/19  0851 10/25/19  1018 07/22/19  0805  09/12/18  0923  02/26/18  0846   NA  --   --   --   --  141  --  144  --  140   POTASSIUM  --   --   --   --  4.1  --  3.8  --  3.7   CHLORIDE  --   " --   --   --  111*  --  109  --  107   CO2  --   --   --   --  23  --  28  --  27   ANIONGAP  --   --   --   --  7  --  7  --  6   GLC 96  --   --   --  89  --  102*  --  93   BUN  --   --   --   --  15  --  15  --  19   CR 0.74 0.93  --  0.75 0.72   < > 0.91   < > 0.78   GFRESTIMATED 87 65  --  86 90   < > 63   < > 75   GFRESTBLACK >90 76  --  >90 >90   < > 76   < > >90   HAZEL  --   --   --   --  9.1  --  9.4  --  9.1   BILITOTAL 0.8 0.7 0.7 0.7 0.3   < > 0.5   < > 0.4   ALBUMIN 4.0 4.1 4.1 4.1 3.8   < > 3.6   < > 4.0   PROTTOTAL 6.8 6.8 6.8 6.9 6.8   < > 6.9   < > 7.1   ALKPHOS 91 68 67 72 86   < > 94   < > 79   AST 32 19 35 50* 22   < > 27   < > 20   ALT 61* 30 51* 70* 30   < > 35   < > 24    < > = values in this interval not displayed.     Calcium/VitaminD  Recent Labs   Lab Test 07/22/19  0805 09/12/18  0923 02/26/18  0846 10/05/16  0831   HAZEL 9.1 9.4 9.1  --    VITDT  --   --   --  29     ESR/CRP  Recent Labs   Lab Test 05/28/20  0928 02/13/20  0853 10/25/19  1018   SED 5 5 6   CRP <2.9 <2.9 <2.9     Lipid Panel  Recent Labs   Lab Test 12/05/19  0851 07/22/19  0805   CHOL 310* 236*   TRIG 171* 266*   HDL 61 48*   * 135*   NHDL 249* 188*     Hepatitis B  Recent Labs   Lab Test 02/26/18  0846   HBCAB Nonreactive   HEPBANG Nonreactive     Hepatitis C  Recent Labs   Lab Test 02/26/18  0846   HCVAB Nonreactive     Tuberculosis Screening  Recent Labs   Lab Test 07/22/19  0805   TBRES Negative     Immunization History     Immunization History   Administered Date(s) Administered     Influenza Quad, Recombinant, p-free (RIV4) 10/18/2018     Influenza Vaccine IM > 6 months Valent IIV4 10/01/2016     Pneumococcal 23 valent 03/11/2019          Chart documentation done in part with Dragon Voice recognition Software. Although reviewed after completion, some word and grammatical error may remain.    Phone call start time: 11:04 AM  Phone call end time: 11:20 AM    This visit is equivalent to a 88307 visit    Location  of patient: home  Location of provider: home    Follow up:  follow up appointment scheduled to be in late August    Maurilio Hayes MD  6/1/2020

## 2020-06-01 NOTE — NURSING NOTE
Clinic notes faxed to Dr. Talia Mejía at [facility] Rheumatology  6/1/2020 11:42 AM

## 2020-06-01 NOTE — TELEPHONE ENCOUNTER
Could you please do a prior auth for patient's methotrexate, patient was told by pharmacy she needed one.    Elizabeth Haley CMA Rheumatology  6/1/2020 11:08 AM

## 2020-06-02 NOTE — TELEPHONE ENCOUNTER
Prior Authorization Approval    Authorization Effective Date: 3/3/2020  Authorization Expiration Date:    Medication: Methotrexate--APPROVED  Approved Dose/Quantity:   Reference #:     Insurance Company: [employer] - Phone [phone] Fax [phone]  Expected CoPay:       CoPay Card Available:      Foundation Assistance Needed:    Which Pharmacy is filling the prescription (Not needed for infusion/clinic administered): [facility] [address] [place] [address] - [address]  Pharmacy Notified: Yes  Patient Notified: Yes **Instructed pharmacy to notify patient when script is ready to /ship.**

## 2020-06-03 ENCOUNTER — COMMUNICATION - HEALTHEAST (OUTPATIENT)
Dept: FAMILY MEDICINE | Facility: CLINIC | Age: 65
End: 2020-06-03

## 2020-06-03 DIAGNOSIS — E03.9 HYPOTHYROIDISM: ICD-10-CM

## 2020-08-27 DIAGNOSIS — Z79.899 HIGH RISK MEDICATION USE: ICD-10-CM

## 2020-08-27 DIAGNOSIS — M06.09 RHEUMATOID ARTHRITIS OF MULTIPLE SITES WITHOUT RHEUMATOID FACTOR (H): ICD-10-CM

## 2020-08-27 LAB
ALBUMIN SERPL-MCNC: 4 G/DL (ref 3.4–5)
ALP SERPL-CCNC: 103 U/L (ref 40–150)
ALT SERPL W P-5'-P-CCNC: 65 U/L (ref 0–50)
AST SERPL W P-5'-P-CCNC: 36 U/L (ref 0–45)
BASOPHILS # BLD AUTO: 0 10E9/L (ref 0–0.2)
BASOPHILS NFR BLD AUTO: 0.4 %
BILIRUB DIRECT SERPL-MCNC: 0.1 MG/DL (ref 0–0.2)
BILIRUB SERPL-MCNC: 0.8 MG/DL (ref 0.2–1.3)
CREAT SERPL-MCNC: 0.83 MG/DL (ref 0.52–1.04)
CRP SERPL-MCNC: <2.9 MG/L (ref 0–8)
DIFFERENTIAL METHOD BLD: ABNORMAL
EOSINOPHIL # BLD AUTO: 0.2 10E9/L (ref 0–0.7)
EOSINOPHIL NFR BLD AUTO: 2.1 %
ERYTHROCYTE [DISTWIDTH] IN BLOOD BY AUTOMATED COUNT: 13.3 % (ref 10–15)
ERYTHROCYTE [SEDIMENTATION RATE] IN BLOOD BY WESTERGREN METHOD: 6 MM/H (ref 0–30)
GFR SERPL CREATININE-BSD FRML MDRD: 75 ML/MIN/{1.73_M2}
HCT VFR BLD AUTO: 42.4 % (ref 35–47)
HGB BLD-MCNC: 14.1 G/DL (ref 11.7–15.7)
LYMPHOCYTES # BLD AUTO: 0.6 10E9/L (ref 0.8–5.3)
LYMPHOCYTES NFR BLD AUTO: 7.2 %
MCH RBC QN AUTO: 33.2 PG (ref 26.5–33)
MCHC RBC AUTO-ENTMCNC: 33.3 G/DL (ref 31.5–36.5)
MCV RBC AUTO: 100 FL (ref 78–100)
MONOCYTES # BLD AUTO: 0.6 10E9/L (ref 0–1.3)
MONOCYTES NFR BLD AUTO: 6.9 %
NEUTROPHILS # BLD AUTO: 6.7 10E9/L (ref 1.6–8.3)
NEUTROPHILS NFR BLD AUTO: 83.4 %
PLATELET # BLD AUTO: 173 10E9/L (ref 150–450)
PROT SERPL-MCNC: 6.9 G/DL (ref 6.8–8.8)
RBC # BLD AUTO: 4.25 10E12/L (ref 3.8–5.2)
WBC # BLD AUTO: 8.1 10E9/L (ref 4–11)

## 2020-08-27 PROCEDURE — 82565 ASSAY OF CREATININE: CPT | Performed by: INTERNAL MEDICINE

## 2020-08-27 PROCEDURE — 36415 COLL VENOUS BLD VENIPUNCTURE: CPT | Performed by: INTERNAL MEDICINE

## 2020-08-27 PROCEDURE — 85025 COMPLETE CBC W/AUTO DIFF WBC: CPT | Performed by: INTERNAL MEDICINE

## 2020-08-27 PROCEDURE — 80076 HEPATIC FUNCTION PANEL: CPT | Performed by: INTERNAL MEDICINE

## 2020-08-27 PROCEDURE — 86140 C-REACTIVE PROTEIN: CPT | Performed by: INTERNAL MEDICINE

## 2020-08-27 PROCEDURE — 85652 RBC SED RATE AUTOMATED: CPT | Performed by: INTERNAL MEDICINE

## 2020-08-31 ENCOUNTER — VIRTUAL VISIT (OUTPATIENT)
Dept: RHEUMATOLOGY | Facility: CLINIC | Age: 65
End: 2020-08-31
Payer: MEDICARE

## 2020-08-31 DIAGNOSIS — M06.09 RHEUMATOID ARTHRITIS OF MULTIPLE SITES WITHOUT RHEUMATOID FACTOR (H): Primary | ICD-10-CM

## 2020-08-31 DIAGNOSIS — Z79.899 HIGH RISK MEDICATION USE: ICD-10-CM

## 2020-08-31 DIAGNOSIS — M72.2 PLANTAR FASCIITIS: ICD-10-CM

## 2020-08-31 PROCEDURE — 99441 ZZC PHYSICIAN TELEPHONE EVALUATION 5-10 MIN: CPT | Performed by: INTERNAL MEDICINE

## 2020-08-31 RX ORDER — METHOTREXATE 25 MG/ML
17.5 INJECTION, SOLUTION INTRA-ARTERIAL; INTRAMUSCULAR; INTRAVENOUS
Qty: 4 VIAL | Refills: 3 | Status: SHIPPED | OUTPATIENT
Start: 2020-08-31 | End: 2020-12-07

## 2020-09-05 ENCOUNTER — COMMUNICATION - HEALTHEAST (OUTPATIENT)
Dept: FAMILY MEDICINE | Facility: CLINIC | Age: 65
End: 2020-09-05

## 2020-09-05 DIAGNOSIS — E03.9 HYPOTHYROIDISM: ICD-10-CM

## 2020-09-24 ENCOUNTER — TELEPHONE (OUTPATIENT)
Dept: RHEUMATOLOGY | Facility: CLINIC | Age: 65
End: 2020-09-24

## 2020-09-24 DIAGNOSIS — M06.09 RHEUMATOID ARTHRITIS OF MULTIPLE SITES WITHOUT RHEUMATOID FACTOR (H): Primary | ICD-10-CM

## 2020-09-24 NOTE — TELEPHONE ENCOUNTER
Patient calling reporting an arthritic flare of both feet, ankles, and knees which started a few weeks ago. Having constant swelling, mild to moderate pain, and stiffness. States a casserole dish fell on the right foot a few weeks ago and she is unsure if it initiated the swelling. Ankles have golf-ball sized swelling. Symptoms are worst in the morning. Foot pain in the morning when walking is the most bothersome symptom. Patient has been elevating feet regularly and taking 1 tab of Ibuprofen in the AM with mild relief. Has history of plantar fascitis however does not feel pain is related to this. Pain is in whole foot not just heel area. She reports she has been doing very well arthritis-wise up until a few weeks ago and is wondering what to do.    Lenny Shahid RN....9/24/2020 10:47 AM

## 2020-09-24 NOTE — TELEPHONE ENCOUNTER
Reason for Call:  Other question    Detailed comments: arthritis is flared up and pt is struggling with walking despite taking medication- why is this?    Phone Number Patient can be reached at: Home number on file [phone] (home)    Best Time: any    Can we leave a detailed message on this number? YES    Call taken on 9/24/2020 at 8:36 AM by Esther Frederick

## 2020-09-25 RX ORDER — PREDNISONE 5 MG/1
TABLET ORAL
Qty: 42 TABLET | Refills: 0 | Status: SHIPPED | OUTPATIENT
Start: 2020-09-25 | End: 2020-10-23

## 2020-09-25 NOTE — TELEPHONE ENCOUNTER
RN: Please call to notify Иринаedgard Maguire that prednisone has been sent.  She should make a follow-up visit with us if no improvement with prednisone.    1. Rheumatoid arthritis of multiple sites without rheumatoid factor (H)  - predniSONE (DELTASONE) 5 MG tablet; Take 2 tablets (10 mg) by mouth daily for 14 days, THEN 1 tablet (5 mg) daily for 14 days.  Dispense: 42 tablet; Refill: 0    Maurilio Hayes MD  9/25/2020 4:34 PM

## 2020-09-28 NOTE — TELEPHONE ENCOUNTER
Called patient and discussed that prednisone script was sent and she should schedule a sooner follow up visit if no improvement of symptoms. Patient verbalized understanding and agreed with this plan.    Lenny Shahid RN....9/28/2020 8:26 AM

## 2020-10-14 DIAGNOSIS — M06.09 RHEUMATOID ARTHRITIS OF MULTIPLE SITES WITHOUT RHEUMATOID FACTOR (H): ICD-10-CM

## 2020-10-16 NOTE — TELEPHONE ENCOUNTER
Routing refill request to provider for review/approval because:  Drug not on the [facility] refill protocol       aNthen Conklin RN

## 2020-10-17 RX ORDER — METHOTREXATE 25 MG/ML
17.5 INJECTION, SOLUTION INTRA-ARTERIAL; INTRAMUSCULAR; INTRAVENOUS
Qty: 4 VIAL | Refills: 3 | OUTPATIENT
Start: 2020-10-17

## 2020-10-17 NOTE — TELEPHONE ENCOUNTER
Rheumatology team: Please call Ms. Maguire and her pharmacy to check on the refill request for methotrexate.  Based on last refill date and quantity a refill should not yet be needed.  I have refused this refill request.    Maurilio Hayes MD  10/17/2020 3:35 AM

## 2020-10-29 ENCOUNTER — TELEPHONE (OUTPATIENT)
Dept: RHEUMATOLOGY | Facility: CLINIC | Age: 65
End: 2020-10-29

## 2020-10-29 NOTE — TELEPHONE ENCOUNTER
Free Drug Application Initiated     Medication-Xeljanz     Sponsor-[employer]     Additional Information-as of 01/21/21 All documents on file for re-enrollment. Patient last dispense 90DS on 11/03/20. Re-Enrollment should be completed by the end of the month for this patient based on last dispense date.

## 2020-12-03 DIAGNOSIS — M06.09 RHEUMATOID ARTHRITIS OF MULTIPLE SITES WITHOUT RHEUMATOID FACTOR (H): ICD-10-CM

## 2020-12-03 DIAGNOSIS — Z79.899 HIGH RISK MEDICATION USE: ICD-10-CM

## 2020-12-03 LAB
ALBUMIN SERPL-MCNC: 4.2 G/DL (ref 3.4–5)
ALP SERPL-CCNC: 106 U/L (ref 40–150)
ALT SERPL W P-5'-P-CCNC: 67 U/L (ref 0–50)
AST SERPL W P-5'-P-CCNC: 45 U/L (ref 0–45)
BASOPHILS # BLD AUTO: 0 10E9/L (ref 0–0.2)
BASOPHILS NFR BLD AUTO: 0.7 %
BILIRUB DIRECT SERPL-MCNC: 0.2 MG/DL (ref 0–0.2)
BILIRUB SERPL-MCNC: 0.9 MG/DL (ref 0.2–1.3)
CHOLEST SERPL-MCNC: 196 MG/DL
CREAT SERPL-MCNC: 0.81 MG/DL (ref 0.52–1.04)
CRP SERPL-MCNC: <2.9 MG/L (ref 0–8)
DIFFERENTIAL METHOD BLD: ABNORMAL
EOSINOPHIL # BLD AUTO: 0.1 10E9/L (ref 0–0.7)
EOSINOPHIL NFR BLD AUTO: 1.6 %
ERYTHROCYTE [DISTWIDTH] IN BLOOD BY AUTOMATED COUNT: 13.6 % (ref 10–15)
ERYTHROCYTE [SEDIMENTATION RATE] IN BLOOD BY WESTERGREN METHOD: 7 MM/H (ref 0–30)
GFR SERPL CREATININE-BSD FRML MDRD: 77 ML/MIN/{1.73_M2}
HCT VFR BLD AUTO: 44.5 % (ref 35–47)
HDLC SERPL-MCNC: 60 MG/DL
HGB BLD-MCNC: 14.6 G/DL (ref 11.7–15.7)
LDLC SERPL CALC-MCNC: 101 MG/DL
LYMPHOCYTES # BLD AUTO: 0.7 10E9/L (ref 0.8–5.3)
LYMPHOCYTES NFR BLD AUTO: 11.8 %
MCH RBC QN AUTO: 33.3 PG (ref 26.5–33)
MCHC RBC AUTO-ENTMCNC: 32.8 G/DL (ref 31.5–36.5)
MCV RBC AUTO: 101 FL (ref 78–100)
MONOCYTES # BLD AUTO: 0.6 10E9/L (ref 0–1.3)
MONOCYTES NFR BLD AUTO: 10.7 %
NEUTROPHILS # BLD AUTO: 4.2 10E9/L (ref 1.6–8.3)
NEUTROPHILS NFR BLD AUTO: 75.2 %
NONHDLC SERPL-MCNC: 136 MG/DL
PLATELET # BLD AUTO: 213 10E9/L (ref 150–450)
PROT SERPL-MCNC: 7.1 G/DL (ref 6.8–8.8)
RBC # BLD AUTO: 4.39 10E12/L (ref 3.8–5.2)
TRIGL SERPL-MCNC: 176 MG/DL
WBC # BLD AUTO: 5.6 10E9/L (ref 4–11)

## 2020-12-03 PROCEDURE — 36415 COLL VENOUS BLD VENIPUNCTURE: CPT | Performed by: INTERNAL MEDICINE

## 2020-12-03 PROCEDURE — 85652 RBC SED RATE AUTOMATED: CPT | Performed by: INTERNAL MEDICINE

## 2020-12-03 PROCEDURE — 82565 ASSAY OF CREATININE: CPT | Performed by: INTERNAL MEDICINE

## 2020-12-03 PROCEDURE — 80061 LIPID PANEL: CPT | Performed by: INTERNAL MEDICINE

## 2020-12-03 PROCEDURE — 80076 HEPATIC FUNCTION PANEL: CPT | Performed by: INTERNAL MEDICINE

## 2020-12-03 PROCEDURE — 86140 C-REACTIVE PROTEIN: CPT | Performed by: INTERNAL MEDICINE

## 2020-12-03 PROCEDURE — 85025 COMPLETE CBC W/AUTO DIFF WBC: CPT | Performed by: INTERNAL MEDICINE

## 2020-12-07 ENCOUNTER — VIRTUAL VISIT (OUTPATIENT)
Dept: RHEUMATOLOGY | Facility: CLINIC | Age: 65
End: 2020-12-07
Payer: MEDICARE

## 2020-12-07 DIAGNOSIS — M06.09 RHEUMATOID ARTHRITIS OF MULTIPLE SITES WITHOUT RHEUMATOID FACTOR (H): Primary | ICD-10-CM

## 2020-12-07 DIAGNOSIS — Z79.899 HIGH RISK MEDICATION USE: ICD-10-CM

## 2020-12-07 PROCEDURE — 99441 PR PHYSICIAN TELEPHONE EVALUATION 5-10 MIN: CPT | Performed by: INTERNAL MEDICINE

## 2020-12-07 RX ORDER — SYRING-NEEDL,DISP,INSUL,0.3 ML 28GX1/2"
SYRINGE, EMPTY DISPOSABLE MISCELLANEOUS
Qty: 12 EACH | Refills: 6 | Status: SHIPPED | OUTPATIENT
Start: 2020-12-07 | End: 2023-10-30

## 2020-12-07 RX ORDER — METHOTREXATE 25 MG/ML
15 INJECTION, SOLUTION INTRA-ARTERIAL; INTRAMUSCULAR; INTRAVENOUS
Qty: 4 VIAL | Refills: 3 | Status: SHIPPED | OUTPATIENT
Start: 2020-12-07 | End: 2021-05-27

## 2020-12-07 NOTE — PROGRESS NOTES
"Jackie Maguire is a 65 year old female who is being evaluated via a billable telephone visit.      The patient has been notified of following:     \"This telephone visit will be conducted via a call between you and your physician/provider. We have found that certain health care needs can be provided without the need for a physical exam.  This service lets us provide the care you need with a short phone conversation.  If a prescription is necessary we can send it directly to your pharmacy.  If lab work is needed we can place an order for that and you can then stop by our lab to have the test done at a later time.    Telephone visits are billed at different rates depending on your insurance coverage. During this emergency period, for some insurers they may be billed the same as an in-person visit.  Please reach out to your insurance provider with any questions.    If during the course of the call the physician/provider feels a telephone visit is not appropriate, you will not be charged for this service.\"    Patient has given verbal consent for Telephone visit?  Yes    What phone number would you like to be contacted at? [phone]    How would you like to obtain your AVS? Mail a copy      Rheumatology Telephone/Telehealth  Visit      Jackie Maguire MRN# [id number]   YOB: 1955 Age: 65 year old      Date of visit: 12/07/20   PCP: Dr. Talia Mejía at [facility]    Chief Complaint   Patient presents with:  RECHECK: Patient is doing well overrall and states she had labs done last thursday    Assessment and Plan     1. Rheumatoid Arthritis (CCP low positive, then negative): From record review: dx'd 2012; has followed with Jacob Cano, and [facility]; hx of +DULCE, +dsDNA, CCP low positive then negative; hx of psoriasis; hx of C-spine fusion; initial presentation of pain/stiff/swollen joints involving the knees, feet, ankles, neck, back.  Previously on leflunomide (mouth sores), HCQ (ineffective), AZA " (itching), Humira (ineffective at q7day and q14 days dosings), Remicade (associated with syncopal episodes).  Sulfa allergy.  Avoiding Orencia because of melanoma history.  Currently on MTX 25mg SQ wkly (improvement when changed from oral to SQ) and Xeljanz XR 11mg daily.  Significant improvement since started on xeljanz.  Reduce MTX again due to LFT elevaiton.  Hyperlipidemia is being managed by Dr. Mejía.  Doing well with regard to RA.   - Reduce methotrexate from 17.5 mg SQ every 7 days, to 15mg once every 7 days  - Continue folic acid 1mg daily  - Continue Xeljanz XR 11 mg daily   - Labs in 3 months: CBC, Creatinine, Hepatic Panel, ESR, CRP              Rapid 3, cumulative scores                      8/31/2020: RA doing well ((MTX 20mg SQ wkly, Xeljanz XR 11mg daily)                      02/17/2020:  7.3 (MTX 20mg SQ wkly, prednisone 5mg daily, Xeljanz XR 11mg daily)                      11/25/2019:  8    (MTX 20mg SQ wkly, prednisone 5mg daily, Xeljanz XR 11mg daily)    2. History of Positive DULCE and dsDNA (repeats negative): with inflammatory arthritis.  Symptoms fit better for seropositive rheumatoid arthritis; see #1.  DULCE and dsDNA have since been rechecked and were negative.     3. Neck pain hx: Neck spasms several years ago that was dx'd as dystonia at the [facility]; had several imaging studies especially given the RA dx where degenerative changes were seen.  Also saw a spine surgeon at [place].  PT ineffective.  Neck pain has been stable.    4.  Lower back pain: She reports getting lower back pain when she is picking up her grandchild, and this improves with rest.  No radiation of pain.  Suspect degenerative changes and have advised physical therapy; she preferred doing exercises on her own and she says it is not much of an issue today.    5. Hyperlipidemia: managed by her PCP per patient.     # Relevant labs and imaging were reviewed with the patient    # High risk medication toxicity monitoring:  discussion and labs reviewed; appropriate labs ordered. See above.  Instructed that if confirmed to have COVID-19 or exposure to someone with confirmed COVID-19 to call this clinic for directions on DMARD management.    # Note that this is a virtual visit to reduce the risk of COVID-19 exposure during this current pandemic.      # Considered to be at high risk of complications from the COVID-19 virus.  It is recommended to limit contact with other people and if possible to work remotely or provide a leave of absence to reduce the risk for COVID-19.      Ms. Maguire verbalized agreement with and understanding of the rational for the diagnosis and treatment plan.  All questions were answered to best of my ability and the patient's satisfaction. Ms. Maguire was advised to contact the clinic with any questions that may arise after the clinic visit.      Thank you for involving me in the care of the patient    Return to clinic: 3-4 months      HPI   Jackie Maguire is a 65 year old female with a past medical history significant for hyperlipidemia, hypothyroidism, history of melanoma, history of humerus fracture, osteoporosis, psoriasis, and rheumatoid arthritis who presents for follow-up of rheumatoid arthritis.     Previously reviewed hx:    10/12/2017 rheumatology clinic note by Dr. Cabrera Chinchilla documents that the patient has rheumatoid arthritis with multiple treatment failures.  He discusses Orencia and prednisone 5 mg daily.    A consult note from Dr. Wendy James documents that the patient is a 61-year-old female with positive DULCE, positive dsDNA, positive CCP (low positive in 2012, then negative), psoriasis, fusion of some of the cervical facet levels.  Initially evaluated 9/2012 for history of acute onset swollen joints, stiffness; joints affected: Knees, feet, hands, ankles, neck, back.  No preceding illness or trauma.  Failed methotrexate (ineffective), leflunomide (mouth sores), azathioprine (itching),  Humira (ineffective at q7day and q14 days dosings).  Hydroxychloroquine and prednisone 10 mg daily with some improvement.    Additional chart review shows that MTX was effective and only reduced when a biologic DMARD was being added.     7/22/2018: She reported feeling stronger since changing methotrexate from oral to SQ.  Still with swelling in her MCPs and wrists.  Morning stiffness for most of the day that reaches a baseline after 1 hour; severity is significantly improved.  Tolerating medications.  Happy with how she is doing but feels like she could improve some.     11/25/2019: Feels much better on Xeljanz.  She says that her right wrist has been swollen for many years and the swelling is gone.  She says that the arthritis is improved so much that her ring is starting to fall off her fingers.  Morning stiffness for no more than 5-10 minutes.  Tolerating medications well and very happy with how much she has improved.    2/17/2020: reports that her BP is up and she is very stressed because her son is on suicide watch at this time; her son's child was born about 1 yrs ago and then he had a work injury that went to his mind and then he went downhill. She says that her son is getting mental health care at [facility] on a daily basis and things have been stable but it is still very stressful for her; she says that he is on medication for schizophrenia.  She says that otherwise she is doing well.  Joints are doing much better and the swelling in her right wrist and MCPs is nearly resolved.  She believes the medication is working well and does not want to change it.  She knows that her lipids are elevated and says that she will talk with her primary care provider regarding cholesterol-lowering medication.  Morning stiffness for no more than 20 minutes.  Positive gelling phenomenon that resolves quickly.    6/1/2020: she reports that once in a while she has pain in her feet at the heels. Pain improves with  activity.  After priors of inactivity she has worsening pain. No hand symptoms. Having some difficulty getting MTX because of insurance approval she says; has missed 2 weeks.  Morning stiffness for about 10 min; no gelling phenomenon.  No joint swelling. Atorvastatin Rx'd by Dr. Mejía for hyperlipidemia; she'd like labs sent to Dr. Mejía.     8/31/2020: doing well.  Some pain of plantar aspect of feet when she stands up after a period of rest.  She says that she has been doing the stretching exercises for the plantar fasciitis and this has improved the pain significantly.  Tolerating methotrexate and the rest of her joints are doing well.  Morning stiffness for less than 10 minutes.  No gelling phenomenon.  No joint swelling.  She notes that she is following with Dr. Mejía for hyperlipidemia management.     Today, 12/7/2020: doing well. Morning stiffness for about 10 min. No joint pain/swelling.  No worsening symptoms with MTX dose reduction.  Tolerating MTX well. Happy with her arthritis control.    Denies fevers, chills, nausea, vomiting, constipation, diarrhea. No abdominal pain. No chest pain/pressure, palpitations, or shortness of breath. No LE swelling. No oral or nasal sores.  No rash. No sicca symptoms.    Tobacco: none  EtOH: none  Drugs: none    ROS   GEN: No fevers, chills, night sweats, or weight change  SKIN: No itching, rashes, sores  HEENT: No oral or nasal ulcers.  CV: No chest pain, pressure, palpitations, or dyspnea on exertion.  PULM: No SOB, wheeze, cough.  GI: No nausea, vomiting, constipation, diarrhea. No blood in stool. No abdominal pain.  MSK: See HPI.  NEURO: No numbness, tingling, or weakness  EXT: No LE swelling  PSYCH: Negative    Active Problem List     Patient Active Problem List   Diagnosis     Hypothyroidism     Hyperlipidemia LDL goal <160     Inflammatory arthritis     Fracture, pelvis closed (H)     Malignant melanoma of skin (H)     Distal radius fracture     Closed fracture  "of part of humerus     Proximal humerus fracture     Aftercare for healing traumatic fracture of upper arm     Osteopetrosis     Cervical dystonia     Dystonia, torsion, fragments of     Psoriasis     Rheumatoid arthritis of multiple sites without rheumatoid factor (H)     Past Medical History     Past Medical History:   Diagnosis Date     Hyperlipidemia LDL goal <160 1/5/2011     Hypothyroidism 10/1/2010     Inflammatory arthritis 1/20/2011     Past Surgical History     Past Surgical History:   Procedure Laterality Date     APPENDECTOMY       ARTHRODESIS TOE(S) Left 8/17/2017    Procedure: ARTHRODESIS TOE(S);  Left foot 1st metatarsophalangeal realignment fusion, 2nd & 3rd digital corrections;  Surgeon: Jason Lamar DPM;  Location: [facility]     OPEN REDUCTION INTERNAL FIXATION HUMERUS PROXIMAL Left 9/30/2016    Procedure: OPEN REDUCTION INTERNAL FIXATION HUMERUS PROXIMAL;  Surgeon: Charly Bernal MD;  Location: [facility]     Allergy     Allergies   Allergen Reactions     Azathioprine Itching     Leflunomide Other (See Comments)     Mouth sores     Compazine Anxiety     Restless, anxious     Prochlorperazine Anxiety     Restless, anxious     Sulfa Drugs Rash     Current Medication List     Current Outpatient Medications   Medication Sig     acetaminophen (TYLENOL) 325 MG tablet 650 mg po QID and BId PRN     folic acid (FOLVITE) 1 MG tablet Take 1 tablet (1 mg) by mouth daily     hydrOXYzine (ATARAX) 25 MG tablet Take 1 tablet (25 mg) by mouth every 4 hours as needed for itching (and nausea)     Insulin Syringe-Needle U-100 (BD INSULIN SYRINGE) 27G X 1/2\" 1 ML MISC For weekly methotrexate administration.     LEVOTHYROXINE SODIUM PO Take 137 mcg by mouth daily      methotrexate 50 MG/2ML injection Inject 0.7 mLs (17.5 mg) Subcutaneous every 7 days     tofacitinib (XELJANZ XR) 11 MG 24 hr tablet Take 1 tablet (11 mg) by mouth daily Hold for signs of infection, then seek medical attention.     VITAMIN D, " "CHOLECALCIFEROL, PO Take by mouth daily     aspirin 81 MG tablet Take 1 tablet (81 mg) by mouth daily (Patient not taking: Reported on 11/25/2019)     oxyCODONE-acetaminophen (PERCOCET) 5-325 MG per tablet Take 1-2 tablets by mouth every 4 hours as needed for pain (moderate to severe) (Patient not taking: Reported on 12/7/2020)     No current facility-administered medications for this visit.          Social History   See HPI    Family History     Family History   Problem Relation Age of Onset     Cancer Father        Physical Exam     Temp Readings from Last 3 Encounters:   06/11/18 98.4  F (36.9  C) (Oral)   08/17/17 98  F (36.7  C) (Oral)   07/13/17 98.1  F (36.7  C)     BP Readings from Last 5 Encounters:   02/17/20 (!) 140/88   11/25/19 124/82   07/22/19 123/79   03/11/19 138/84   11/05/18 141/90     Pulse Readings from Last 1 Encounters:   02/17/20 85     Resp Readings from Last 1 Encounters:   06/11/18 16     Estimated body mass index is 29.96 kg/m  as calculated from the following:    Height as of 2/17/20: 1.676 m (5' 6\").    Weight as of 2/17/20: 84.2 kg (185 lb 9.6 oz).    Telephone Visit     Labs / Imaging (select studies)     CBC  Recent Labs   Lab Test 12/03/20  1019 08/27/20  1152 05/28/20  0928   WBC 5.6 8.1 6.4   RBC 4.39 4.25 4.23   HGB 14.6 14.1 14.0   HCT 44.5 42.4 42.5   * 100 101*   RDW 13.6 13.3 13.6    173 212   MCH 33.3* 33.2* 33.1*   MCHC 32.8 33.3 32.9   NEUTROPHIL 75.2 83.4 71.7   LYMPH 11.8 7.2 13.8   MONOCYTE 10.7 6.9 11.6   EOSINOPHIL 1.6 2.1 2.3   BASOPHIL 0.7 0.4 0.6   ANEU 4.2 6.7 4.6   ALYM 0.7* 0.6* 0.9   KAREN 0.6 0.6 0.8   AEOS 0.1 0.2 0.2   ABAS 0.0 0.0 0.0     CMP  Recent Labs   Lab Test 12/03/20  1019 08/27/20  1152 05/28/20  0928 07/22/19  0805 07/22/19  0805 09/12/18  0923 09/12/18  0923 02/26/18  0846 02/26/18  0846   NA  --   --   --   --  141  --  144  --  140   POTASSIUM  --   --   --   --  4.1  --  3.8  --  3.7   CHLORIDE  --   --   --   --  111*  --  109  " --  107   CO2  --   --   --   --  23  --  28  --  27   ANIONGAP  --   --   --   --  7  --  7  --  6   GLC  --   --  96  --  89  --  102*  --  93   BUN  --   --   --   --  15  --  15  --  19   CR 0.81 0.83 0.74   < > 0.72   < > 0.91   < > 0.78   GFRESTIMATED 77 75 87   < > 90   < > 63   < > 75   GFRESTBLACK 89 87 >90   < > >90   < > 76   < > >90   HAZEL  --   --   --   --  9.1  --  9.4  --  9.1   BILITOTAL 0.9 0.8 0.8   < > 0.3   < > 0.5   < > 0.4   ALBUMIN 4.2 4.0 4.0   < > 3.8   < > 3.6   < > 4.0   PROTTOTAL 7.1 6.9 6.8   < > 6.8   < > 6.9   < > 7.1   ALKPHOS 106 103 91   < > 86   < > 94   < > 79   AST 45 36 32   < > 22   < > 27   < > 20   ALT 67* 65* 61*   < > 30   < > 35   < > 24    < > = values in this interval not displayed.     Calcium/VitaminD  Recent Labs   Lab Test 07/22/19  0805 09/12/18  0923 02/26/18  0846 10/05/16  0831   HAZEL 9.1 9.4 9.1  --    VITDT  --   --   --  29     ESR/CRP  Recent Labs   Lab Test 12/03/20  1019 08/27/20  1152 05/28/20  0928   SED 7 6 5   CRP <2.9 <2.9 <2.9     Hepatitis B  Recent Labs   Lab Test 02/26/18  0846   HBCAB Nonreactive   HEPBANG Nonreactive     Hepatitis C  Recent Labs   Lab Test 02/26/18  0846   HCVAB Nonreactive     Tuberculosis Screening  Recent Labs   Lab Test 07/22/19  0805   TBRES Negative     Lipid Panel  Recent Labs   Lab Test 12/03/20  1019 12/05/19  0851 07/22/19  0805   CHOL 196 310* 236*   TRIG 176* 171* 266*   HDL 60 61 48*   * 215* 135*   NHDL 136* 249* 188*     Immunization History     Immunization History   Administered Date(s) Administered     Influenza Quad, Recombinant, p-free (RIV4) 10/18/2018     Influenza Vaccine IM > 6 months Valent IIV4 10/01/2016     Pneumococcal 23 valent 03/11/2019          Chart documentation done in part with Dragon Voice recognition Software. Although reviewed after completion, some word and grammatical error may remain.    Phone call start time: 1:48 PM  Phone call end time: 1:53 PM    This visit is equivalent to a  [id number] visit    Location of patient: [place], [place]   Location of provider: home    Follow up:  follow up appointment scheduled to be in March    Maurilio Hayes MD

## 2020-12-08 ENCOUNTER — COMMUNICATION - HEALTHEAST (OUTPATIENT)
Dept: FAMILY MEDICINE | Facility: CLINIC | Age: 65
End: 2020-12-08

## 2020-12-08 DIAGNOSIS — E03.9 HYPOTHYROIDISM: ICD-10-CM

## 2021-03-09 ENCOUNTER — COMMUNICATION - HEALTHEAST (OUTPATIENT)
Dept: FAMILY MEDICINE | Facility: CLINIC | Age: 66
End: 2021-03-09

## 2021-03-09 DIAGNOSIS — E03.9 HYPOTHYROIDISM: ICD-10-CM

## 2021-03-11 ENCOUNTER — OFFICE VISIT - HEALTHEAST (OUTPATIENT)
Dept: FAMILY MEDICINE | Facility: CLINIC | Age: 66
End: 2021-03-11

## 2021-03-11 DIAGNOSIS — M06.9 RHEUMATOID ARTHRITIS, INVOLVING UNSPECIFIED SITE, UNSPECIFIED WHETHER RHEUMATOID FACTOR PRESENT (H): ICD-10-CM

## 2021-03-11 DIAGNOSIS — E03.9 HYPOTHYROIDISM: ICD-10-CM

## 2021-03-11 DIAGNOSIS — C43.9 CUTANEOUS MALIGNANT MELANOMA (H): ICD-10-CM

## 2021-03-11 DIAGNOSIS — Z00.00 HEALTHCARE MAINTENANCE: ICD-10-CM

## 2021-03-11 LAB — TSH SERPL DL<=0.005 MIU/L-ACNC: 0.44 UIU/ML (ref 0.3–5)

## 2021-03-15 ENCOUNTER — COMMUNICATION - HEALTHEAST (OUTPATIENT)
Dept: FAMILY MEDICINE | Facility: CLINIC | Age: 66
End: 2021-03-15

## 2021-04-09 ENCOUNTER — COMMUNICATION - HEALTHEAST (OUTPATIENT)
Dept: FAMILY MEDICINE | Facility: CLINIC | Age: 66
End: 2021-04-09

## 2021-04-09 DIAGNOSIS — E78.00 HYPERCHOLESTEREMIA: ICD-10-CM

## 2021-04-12 ENCOUNTER — TELEPHONE (OUTPATIENT)
Dept: RHEUMATOLOGY | Facility: CLINIC | Age: 66
End: 2021-04-12

## 2021-04-12 NOTE — TELEPHONE ENCOUNTER
Rheumatology team: Please call to notify Ms. Maguire that toxicity monitoring labs are due.  These labs may be done at any [facility] lab.    Maurilio Hayes MD  4/12/2021 4:24 PM

## 2021-05-18 NOTE — TELEPHONE ENCOUNTER
Labs not done yet; orders extended.  Not sure patient was reminded. Will forward to RN to call patient.   Maurilio Hayes MD  5/18/2021 7:16 AM

## 2021-05-18 NOTE — TELEPHONE ENCOUNTER
Called pt and spoke to her. Labs scheduled prior to appt.    Jess RANGEL RN Specialty Triage 5/18/2021 10:35 AM

## 2021-05-21 DIAGNOSIS — M06.09 RHEUMATOID ARTHRITIS OF MULTIPLE SITES WITHOUT RHEUMATOID FACTOR (H): ICD-10-CM

## 2021-05-21 DIAGNOSIS — Z79.899 HIGH RISK MEDICATION USE: ICD-10-CM

## 2021-05-21 LAB
ALBUMIN SERPL-MCNC: 4 G/DL (ref 3.4–5)
ALP SERPL-CCNC: 91 U/L (ref 40–150)
ALT SERPL W P-5'-P-CCNC: 70 U/L (ref 0–50)
AST SERPL W P-5'-P-CCNC: 51 U/L (ref 0–45)
BASOPHILS # BLD AUTO: 0 10E9/L (ref 0–0.2)
BASOPHILS NFR BLD AUTO: 0.7 %
BILIRUB DIRECT SERPL-MCNC: 0.1 MG/DL (ref 0–0.2)
BILIRUB SERPL-MCNC: 0.7 MG/DL (ref 0.2–1.3)
CREAT SERPL-MCNC: 0.88 MG/DL (ref 0.52–1.04)
CRP SERPL-MCNC: <2.9 MG/L (ref 0–8)
DIFFERENTIAL METHOD BLD: ABNORMAL
EOSINOPHIL # BLD AUTO: 0.1 10E9/L (ref 0–0.7)
EOSINOPHIL NFR BLD AUTO: 2.2 %
ERYTHROCYTE [DISTWIDTH] IN BLOOD BY AUTOMATED COUNT: 13.2 % (ref 10–15)
ERYTHROCYTE [SEDIMENTATION RATE] IN BLOOD BY WESTERGREN METHOD: 7 MM/H (ref 0–30)
GFR SERPL CREATININE-BSD FRML MDRD: 70 ML/MIN/{1.73_M2}
HCT VFR BLD AUTO: 42.3 % (ref 35–47)
HGB BLD-MCNC: 14.1 G/DL (ref 11.7–15.7)
LYMPHOCYTES # BLD AUTO: 0.3 10E9/L (ref 0.8–5.3)
LYMPHOCYTES NFR BLD AUTO: 5.6 %
MCH RBC QN AUTO: 33.2 PG (ref 26.5–33)
MCHC RBC AUTO-ENTMCNC: 33.3 G/DL (ref 31.5–36.5)
MCV RBC AUTO: 100 FL (ref 78–100)
MONOCYTES # BLD AUTO: 0.7 10E9/L (ref 0–1.3)
MONOCYTES NFR BLD AUTO: 11.6 %
NEUTROPHILS # BLD AUTO: 4.7 10E9/L (ref 1.6–8.3)
NEUTROPHILS NFR BLD AUTO: 79.9 %
PLATELET # BLD AUTO: 208 10E9/L (ref 150–450)
PROT SERPL-MCNC: 6.8 G/DL (ref 6.8–8.8)
RBC # BLD AUTO: 4.25 10E12/L (ref 3.8–5.2)
WBC # BLD AUTO: 5.9 10E9/L (ref 4–11)

## 2021-05-21 PROCEDURE — 82565 ASSAY OF CREATININE: CPT | Performed by: INTERNAL MEDICINE

## 2021-05-21 PROCEDURE — 36415 COLL VENOUS BLD VENIPUNCTURE: CPT | Performed by: INTERNAL MEDICINE

## 2021-05-21 PROCEDURE — 86140 C-REACTIVE PROTEIN: CPT | Performed by: INTERNAL MEDICINE

## 2021-05-21 PROCEDURE — 85652 RBC SED RATE AUTOMATED: CPT | Performed by: INTERNAL MEDICINE

## 2021-05-21 PROCEDURE — 80076 HEPATIC FUNCTION PANEL: CPT | Performed by: INTERNAL MEDICINE

## 2021-05-21 PROCEDURE — 85025 COMPLETE CBC W/AUTO DIFF WBC: CPT | Performed by: INTERNAL MEDICINE

## 2021-05-26 NOTE — PROGRESS NOTES
Jackie Maguire is a 66 year old year old female who is being evaluated via a billable telephone visit.      What state will you be in during your phone visit? [place]  What phone number would you like to be contacted at? [phone]  How would you like to obtain your AVS? Mail a copy    Rheumatology Telephone/Telehealth  Visit      Jackie Maguire MRN# [id number]   YOB: 1955 Age: 66 year old      Date of visit: 5/27/21   PCP: Dr. Talia Mejía at [facility]    Chief Complaint   Patient presents with:  Arthritis: RA    Assessment and Plan     1. Rheumatoid Arthritis (CCP low positive, then negative): From record review: dx'd 2012; has followed with Kayley Jimenez, Jacob, and [facility]; hx of +DULCE, +dsDNA, CCP low positive then negative; hx of psoriasis; hx of C-spine fusion; initial presentation of pain/stiff/swollen joints involving the knees, feet, ankles, neck, back.  Previously on leflunomide (mouth sores), HCQ (ineffective), AZA (itching), Humira (ineffective at q7day and q14 days dosings), Remicade (associated with syncopal episodes).  Sulfa allergy.  Avoiding Orencia because of melanoma history.  Currently on MTX 15 mg SQ wkly (improvement when changed from oral to SQ, slower dose reduction due to LFT elevation and she is doing well since adding Xeljanz) and Xeljanz XR 11mg daily.  Hyperlipidemia is being managed by her primary care provider.  I stressed the importance of toxicity monitoring labs that are needed every 8 weeks at this time.  Chronic illness, side effect from treatment.    - Reduce methotrexate from 15 mg SQ every 7 days, to 10 mg SQ every 7 days  - Continue folic acid 1mg daily  - Continue Xeljanz XR 11 mg daily   - Labs every 8 weeks: CBC, Creatinine, Hepatic Panel, ESR, CRP    High risk medication requiring intensive toxicity monitoring at least quarterly: labs ordered include CBC, Creatinine, Hepatic panel to monitor for cytopenia and hepatotoxicity; checking creatinine as it  affects clearance of medication.                Rapid 3, cumulative scores                      8/31/2020: RA doing well ((MTX 20mg SQ wkly, Xeljanz XR 11mg daily)                      02/17/2020:  7.3 (MTX 20mg SQ wkly, prednisone 5mg daily, Xeljanz XR 11mg daily)                      11/25/2019:  8    (MTX 20mg SQ wkly, prednisone 5mg daily, Xeljanz XR 11mg daily)    2. History of Positive DULCE and dsDNA (repeats negative): with inflammatory arthritis.  Symptoms fit better for seropositive rheumatoid arthritis; see #1.  DULCE and dsDNA have since been rechecked and were negative.     3. Neck pain hx: Neck spasms several years ago that was dx'd as dystonia at the [facility]; had several imaging studies especially given the RA dx where degenerative changes were seen.  Also saw a spine surgeon at [facility].  PT ineffective.  Neck pain has been stable.     4.  Lower back pain: She reports getting lower back pain when she is picking up her grandchild, and this improves with rest.  No radiation of pain.  Suspect degenerative changes and have advised physical therapy; she preferred doing exercises on her own and she says it is not much of an issue today.    5. Hyperlipidemia: managed by her PCP per patient.     6.  Unable to fully extend the right knee: Reportedly has had difficulty extending the right knee for several years ever since she had a fall.  At the time of the fall she required surgery for other joints but the knee was reportedly not addressed.  She would like to have the knee addressed in clinic in a few months, she does not want to come in sooner for this issue.  No knee pain or swelling.    7.  High risk medications, immunocompromise status, and history of melanoma: Advised that she needs yearly skin checks with dermatology.  - Dermatology referral    # Status-post 2 doses of the Moderna COVID-19 vaccine, received on 4/19/2021 and 5/17/2021    Total minutes spent in evaluation with patient, documentation, order  entry, and review of pertinent studies and chart notes: 15     Ms. Maguire verbalized agreement with and understanding of the rational for the diagnosis and treatment plan.  All questions were answered to best of my ability and the patient's satisfaction. Ms. Maguire was advised to contact the clinic with any questions that may arise after the clinic visit.      Thank you for involving me in the care of the patient    Return to clinic: 3-4 months      HPI   Jackie Maguire is a 66 year old female with a past medical history significant for hyperlipidemia, hypothyroidism, history of melanoma, history of humerus fracture, osteoporosis, psoriasis, and rheumatoid arthritis who presents for follow-up of rheumatoid arthritis.     Today, 5/27/2021: Doing very well with regard to her arthritis.  No joint pain or swelling.  Morning stiffness for no more than 2-3 minutes.  No gelling phenomenon.  No stomach upset.  She then notes that ever since she had a fall several years ago she has been unable to extend her right knee fully.  No right knee pain or swelling.    Denies fevers, chills, nausea, vomiting, constipation, diarrhea. No abdominal pain. No chest pain/pressure, palpitations, or shortness of breath. No LE swelling. No oral or nasal sores.  No rash. No sicca symptoms.    Tobacco: none  EtOH: none  Drugs: none    ROS   GEN: No fevers, chills, night sweats, or weight change  SKIN: No itching, rashes, sores  HEENT: No oral or nasal ulcers.  CV: No chest pain, pressure, palpitations, or dyspnea on exertion.  PULM: No SOB, wheeze, cough.  GI: No nausea, vomiting, constipation, diarrhea. No blood in stool. No abdominal pain.  MSK: See HPI.  NEURO: No numbness, tingling, or weakness  EXT: No LE swelling  PSYCH: Negative    Active Problem List     Patient Active Problem List   Diagnosis     Hypothyroidism     Hyperlipidemia LDL goal <160     Inflammatory arthritis     Fracture, pelvis closed (H)     Malignant melanoma of skin  "(H)     Distal radius fracture     Closed fracture of part of humerus     Proximal humerus fracture     Aftercare for healing traumatic fracture of upper arm     Osteopetrosis     Cervical dystonia     Dystonia, torsion, fragments of     Psoriasis     Rheumatoid arthritis of multiple sites without rheumatoid factor (H)     Past Medical History     Past Medical History:   Diagnosis Date     Hyperlipidemia LDL goal <160 1/5/2011     Hypothyroidism 10/1/2010     Inflammatory arthritis 1/20/2011     Past Surgical History     Past Surgical History:   Procedure Laterality Date     APPENDECTOMY       ARTHRODESIS TOE(S) Left 8/17/2017    Procedure: ARTHRODESIS TOE(S);  Left foot 1st metatarsophalangeal realignment fusion, 2nd & 3rd digital corrections;  Surgeon: Jason Lamar DPM;  Location: [facility] OR     OPEN REDUCTION INTERNAL FIXATION HUMERUS PROXIMAL Left 9/30/2016    Procedure: OPEN REDUCTION INTERNAL FIXATION HUMERUS PROXIMAL;  Surgeon: Charly Bernal MD;  Location: [facility] OR     Allergy     Allergies   Allergen Reactions     Azathioprine Itching     Leflunomide Other (See Comments)     Mouth sores     Compazine Anxiety     Restless, anxious     Prochlorperazine Anxiety     Restless, anxious     Sulfa Drugs Rash     Current Medication List     Current Outpatient Medications   Medication Sig     acetaminophen (TYLENOL) 325 MG tablet 650 mg po QID and BId PRN     folic acid (FOLVITE) 1 MG tablet Take 1 tablet (1 mg) by mouth daily     hydrOXYzine (ATARAX) 25 MG tablet Take 1 tablet (25 mg) by mouth every 4 hours as needed for itching (and nausea)     Insulin Syringe-Needle U-100 (BD INSULIN SYRINGE) 27G X 1/2\" 1 ML MISC For weekly methotrexate administration.     LEVOTHYROXINE SODIUM PO Take 137 mcg by mouth daily      methotrexate 50 MG/2ML injection Inject 0.6 mLs (15 mg) Subcutaneous every 7 days     tofacitinib (XELJANZ XR) 11 MG 24 hr tablet Take 1 tablet (11 mg) by mouth daily Hold for signs of infection, then " "seek medical attention.     VITAMIN D, CHOLECALCIFEROL, PO Take by mouth daily     aspirin 81 MG tablet Take 1 tablet (81 mg) by mouth daily (Patient not taking: Reported on 11/25/2019)     oxyCODONE-acetaminophen (PERCOCET) 5-325 MG per tablet Take 1-2 tablets by mouth every 4 hours as needed for pain (moderate to severe) (Patient not taking: Reported on 12/7/2020)     No current facility-administered medications for this visit.          Social History   See HPI    Family History     Family History   Problem Relation Age of Onset     Cancer Father        Physical Exam     Temp Readings from Last 3 Encounters:   06/11/18 98.4  F (36.9  C) (Oral)   08/17/17 98  F (36.7  C) (Oral)   07/13/17 98.1  F (36.7  C)     BP Readings from Last 5 Encounters:   02/25/20 132/80   02/17/20 (!) 140/88   11/25/19 124/82   07/22/19 123/79   03/11/19 138/84     Pulse Readings from Last 1 Encounters:   02/25/20 72     Resp Readings from Last 1 Encounters:   02/25/20 16     Estimated body mass index is 29.96 kg/m  as calculated from the following:    Height as of 2/17/20: 1.676 m (5' 6\").    Weight as of 2/17/20: 84.2 kg (185 lb 9.6 oz).    GEN: alert and no distress  PSYCH: Alert; coherent speech, normal rate and volume, able to articulate logical thoughts, able   to abstract reason, no tangential thoughts. Normal affect.   RESP: No cough, no audible wheezing, able to talk in full sentences  Remainder of exam unable to be completed due to telephone visits      Labs / Imaging (select studies)       CBC  Recent Labs   Lab Test 05/21/21  1333 12/03/20  1019 08/27/20  1152   WBC 5.9 5.6 8.1   RBC 4.25 4.39 4.25   HGB 14.1 14.6 14.1   HCT 42.3 44.5 42.4    101* 100   RDW 13.2 13.6 13.3    213 173   MCH 33.2* 33.3* 33.2*   MCHC 33.3 32.8 33.3   NEUTROPHIL 79.9 75.2 83.4   LYMPH 5.6 11.8 7.2   MONOCYTE 11.6 10.7 6.9   EOSINOPHIL 2.2 1.6 2.1   BASOPHIL 0.7 0.7 0.4   ANEU 4.7 4.2 6.7   ALYM 0.3* 0.7* 0.6*   KAREN 0.7 0.6 0.6   AEOS " 0.1 0.1 0.2   ABAS 0.0 0.0 0.0     CMP  Recent Labs   Lab Test 05/21/21  1333 12/03/20  1019 08/27/20  1152 05/28/20  0928 07/22/19  0805 07/22/19  0805 09/12/18  0923 09/12/18  0923 02/26/18  0846 02/26/18  0846   NA  --   --   --   --   --  141  --  144  --  140   POTASSIUM  --   --   --   --   --  4.1  --  3.8  --  3.7   CHLORIDE  --   --   --   --   --  111*  --  109  --  107   CO2  --   --   --   --   --  23  --  28  --  27   ANIONGAP  --   --   --   --   --  7  --  7  --  6   GLC  --   --   --  96  --  89  --  102*  --  93   BUN  --   --   --   --   --  15  --  15  --  19   CR 0.88 0.81 0.83 0.74   < > 0.72   < > 0.91   < > 0.78   GFRESTIMATED 70 77 75 87   < > 90   < > 63   < > 75   GFRESTBLACK 81 89 87 >90   < > >90   < > 76   < > >90   HAZEL  --   --   --   --   --  9.1  --  9.4  --  9.1   BILITOTAL 0.7 0.9 0.8 0.8   < > 0.3   < > 0.5   < > 0.4   ALBUMIN 4.0 4.2 4.0 4.0   < > 3.8   < > 3.6   < > 4.0   PROTTOTAL 6.8 7.1 6.9 6.8   < > 6.8   < > 6.9   < > 7.1   ALKPHOS 91 106 103 91   < > 86   < > 94   < > 79   AST 51* 45 36 32   < > 22   < > 27   < > 20   ALT 70* 67* 65* 61*   < > 30   < > 35   < > 24    < > = values in this interval not displayed.     Calcium/VitaminD  Recent Labs   Lab Test 07/22/19  0805 09/12/18  0923 02/26/18  0846 10/05/16  0831   HAZEL 9.1 9.4 9.1  --    VITDT  --   --   --  29     ESR/CRP  Recent Labs   Lab Test 05/21/21  1333 12/03/20  1019 08/27/20  1152   SED 7 7 6   CRP <2.9 <2.9 <2.9     Lipid Panel  Recent Labs   Lab Test 12/03/20  1019 12/05/19  0851 07/22/19  0805   CHOL 196 310* 236*   TRIG 176* 171* 266*   HDL 60 61 48*   * 215* 135*   NHDL 136* 249* 188*     Hepatitis B  Recent Labs   Lab Test 02/26/18  0846   HBCAB Nonreactive   HEPBANG Nonreactive     Hepatitis C  Recent Labs   Lab Test 02/26/18  0846   HCVAB Nonreactive     Tuberculosis Screening  Recent Labs   Lab Test 07/22/19  0805   TBRES Negative       Immunization History     Immunization History   Administered  Date(s) Administered     Influenza Quad, Recombinant, p-free (RIV4) 10/18/2018     Influenza Vaccine IM > 6 months Valent IIV4 10/01/2016     Pneumococcal 23 valent 03/11/2019          Chart documentation done in part with Dragon Voice recognition Software. Although reviewed after completion, some word and grammatical error may remain.    Phone call duration with patient (in minutes): 13    Location of patient: [place], [place]   Location of provider: usha Hayes [place]

## 2021-05-26 NOTE — PATIENT INSTRUCTIONS
RHEUMATOLOGY    Dr. Maurilio Hayes    [facility]  [address] [place] [address]    Our new phone number for Rheumatology is [phone], this number will be able to help you schedule appointments for Dr. Hayes or if you have any message you would like sent to us.    Thank you for choosing [facility]!    Elizabeth Haley [facility]

## 2021-05-27 ENCOUNTER — VIRTUAL VISIT (OUTPATIENT)
Dept: RHEUMATOLOGY | Facility: CLINIC | Age: 66
End: 2021-05-27
Payer: MEDICARE

## 2021-05-27 ENCOUNTER — TELEPHONE (OUTPATIENT)
Dept: RHEUMATOLOGY | Facility: CLINIC | Age: 66
End: 2021-05-27

## 2021-05-27 DIAGNOSIS — M06.09 RHEUMATOID ARTHRITIS OF MULTIPLE SITES WITHOUT RHEUMATOID FACTOR (H): Primary | ICD-10-CM

## 2021-05-27 DIAGNOSIS — Z79.899 HIGH RISK MEDICATION USE: ICD-10-CM

## 2021-05-27 DIAGNOSIS — Z85.820 HISTORY OF MELANOMA: ICD-10-CM

## 2021-05-27 DIAGNOSIS — D84.9 IMMUNOCOMPROMISED (H): ICD-10-CM

## 2021-05-27 PROCEDURE — 99442 PR PHYSICIAN TELEPHONE EVALUATION 11-20 MIN: CPT | Mod: 95 | Performed by: INTERNAL MEDICINE

## 2021-05-27 RX ORDER — METHOTREXATE 25 MG/ML
10 INJECTION, SOLUTION INTRA-ARTERIAL; INTRAMUSCULAR; INTRAVENOUS
Qty: 8 ML | Refills: 1 | Status: SHIPPED | OUTPATIENT
Start: 2021-05-27 | End: 2021-07-15

## 2021-05-27 RX ORDER — FOLIC ACID 1 MG/1
1 TABLET ORAL DAILY
Qty: 90 TABLET | Refills: 3 | Status: SHIPPED | OUTPATIENT
Start: 2021-05-27 | End: 2021-09-17

## 2021-05-27 RX ORDER — TOFACITINIB 11 MG/1
11 TABLET, FILM COATED, EXTENDED RELEASE ORAL DAILY
Qty: 30 TABLET | Refills: 12 | Status: SHIPPED | OUTPATIENT
Start: 2021-05-27 | End: 2022-03-07

## 2021-05-27 NOTE — TELEPHONE ENCOUNTER
Prior Authorization Approval    Authorization Effective Date: 1/1/2021  Authorization Expiration Date: 12/31/2021  Medication: xeljanz  Approved Dose/Quantity: 30/30ds  Reference #: [id number]   Insurance Company: [employer] - Phone [phone] Fax [phone]  Expected CoPay: $2602.90     CoPay Card Available: No    Foundation Assistance Needed: yes  Which Pharmacy is filling the prescription (Not needed for infusion/clinic administered): [facility] - [place], [place] - [id number]

## 2021-05-30 VITALS — WEIGHT: 188 LBS | HEIGHT: 67 IN | BODY MASS INDEX: 29.51 KG/M2

## 2021-05-31 VITALS — BODY MASS INDEX: 28.88 KG/M2 | WEIGHT: 184 LBS | HEIGHT: 67 IN

## 2021-05-31 NOTE — TELEPHONE ENCOUNTER
RN cannot approve Refill Request    RN can NOT refill this medication Protocol failed and NO refill given.         Katheryn Corbin, [facility] Triage/Med Refill 9/3/2019    Requested Prescriptions   Pending Prescriptions Disp Refills     EUTHYROX 112 mcg tablet [Pharmacy Med Name: EUTHYROX 112MCG TAB] 90 tablet 3     Sig: TAKE 1 TABLET BY MOUTH ONCE DAILY       Thyroid Hormones Protocol Failed - 9/3/2019  9:27 AM        Failed - TSH on file in past 12 months for patient age 12 & older     TSH   Date Value Ref Range Status   07/24/2018 0.37 0.30 - 5.00 uIU/mL Final                   Passed - Provider visit in past 12 months or next 3 months     Last office visit with prescriber/PCP: 7/24/2018 Talia Mejía MD OR same dept: 9/26/2018 Luis Felipe Macedo MD OR same specialty: 9/26/2018 Luis Felipe Macedo MD  Last physical: 8/10/2017 Last MTM visit: Visit date not found   Next visit within 3 mo: Visit date not found  Next physical within 3 mo: Visit date not found  Prescriber OR PCP: Talia Mejía MD  Last diagnosis associated with med order: 1. Hypothyroidism  - EUTHYROX 112 mcg tablet [Pharmacy Med Name: EUTHYROX 112MCG TAB]; TAKE 1 TABLET BY MOUTH ONCE DAILY  Dispense: 90 tablet; Refill: 3    If protocol passes may refill for 12 months if within 3 months of last provider visit (or a total of 15 months).

## 2021-06-01 VITALS — WEIGHT: 174.06 LBS | HEIGHT: 67 IN | BODY MASS INDEX: 27.32 KG/M2

## 2021-06-01 VITALS — BODY MASS INDEX: 27.52 KG/M2 | WEIGHT: 170.5 LBS

## 2021-06-01 VITALS — HEIGHT: 66 IN | BODY MASS INDEX: 27.97 KG/M2 | WEIGHT: 174 LBS

## 2021-06-01 VITALS — BODY MASS INDEX: 27.64 KG/M2 | HEIGHT: 66 IN | WEIGHT: 172 LBS

## 2021-06-01 VITALS — BODY MASS INDEX: 28.45 KG/M2 | HEIGHT: 66 IN | WEIGHT: 177 LBS

## 2021-06-01 VITALS — HEIGHT: 67 IN | BODY MASS INDEX: 27.55 KG/M2 | WEIGHT: 175.5 LBS

## 2021-06-04 ENCOUNTER — RECORDS - HEALTHEAST (OUTPATIENT)
Dept: ADMINISTRATIVE | Facility: CLINIC | Age: 66
End: 2021-06-04

## 2021-06-04 VITALS
SYSTOLIC BLOOD PRESSURE: 132 MMHG | HEART RATE: 72 BPM | HEIGHT: 66 IN | WEIGHT: 186 LBS | RESPIRATION RATE: 16 BRPM | DIASTOLIC BLOOD PRESSURE: 80 MMHG | BODY MASS INDEX: 29.89 KG/M2

## 2021-06-04 NOTE — TELEPHONE ENCOUNTER
RN cannot approve Refill Request    RN can NOT refill this medication Protocol failed and NO refill given.     Katheryn Corbin, [facility] Triage/Med Refill 12/12/2019    Requested Prescriptions   Pending Prescriptions Disp Refills     levothyroxine (SYNTHROID, LEVOTHROID) 112 MCG tablet [Pharmacy Med Name: LEVOTHYROXIN 112MCG TAB] 90 tablet 0     Sig: TAKE 1 TABLET BY MOUTH ONCE DAILY.  NEEDS TO BE SEEN FOR FURTHER REFILLS       Thyroid Hormones Protocol Failed - 12/10/2019  8:56 PM        Failed - Provider visit in past 12 months or next 3 months     Last office visit with prescriber/PCP: 7/24/2018 Talia Mejía MD OR same dept: Visit date not found OR same specialty: 9/26/2018 Luis Felipe Macedo MD  Last physical: 8/10/2017 Last MTM visit: Visit date not found   Next visit within 3 mo: Visit date not found  Next physical within 3 mo: Visit date not found  Prescriber OR PCP: Talia Mejía MD  Last diagnosis associated with med order: 1. Hypothyroidism  - levothyroxine (SYNTHROID, LEVOTHROID) 112 MCG tablet [Pharmacy Med Name: LEVOTHYROXIN 112MCG TAB]; TAKE 1 TABLET BY MOUTH ONCE DAILY.  NEEDS TO BE SEEN FOR FURTHER REFILLS  Dispense: 90 tablet; Refill: 0    If protocol passes may refill for 12 months if within 3 months of last provider visit (or a total of 15 months).             Failed - TSH on file in past 12 months for patient age 12 & older     TSH   Date Value Ref Range Status   07/24/2018 0.37 0.30 - 5.00 uIU/mL Final

## 2021-06-05 ENCOUNTER — RECORDS - HEALTHEAST (OUTPATIENT)
Dept: PALLIATIVE MEDICINE | Facility: OTHER | Age: 66
End: 2021-06-05

## 2021-06-05 VITALS
RESPIRATION RATE: 16 BRPM | BODY MASS INDEX: 29.86 KG/M2 | TEMPERATURE: 98.5 F | SYSTOLIC BLOOD PRESSURE: 149 MMHG | DIASTOLIC BLOOD PRESSURE: 87 MMHG | WEIGHT: 185 LBS | HEART RATE: 72 BPM

## 2021-06-05 DIAGNOSIS — M54.2 CERVICALGIA: ICD-10-CM

## 2021-06-06 NOTE — TELEPHONE ENCOUNTER
RN cannot approve Refill Request    RN can NOT refill this medication Protocol failed and NO refill given.       Katheryn Corbin, [facility] Triage/Med Refill 3/12/2020    Requested Prescriptions   Pending Prescriptions Disp Refills     levothyroxine (SYNTHROID, LEVOTHROID) 112 MCG tablet [Pharmacy Med Name: Levothyroxine Sodium 112 MCG Oral Tablet] 90 tablet 0     Sig: TAKE 1 TABLET BY MOUTH ONCE DAILY . APPOINTMENT REQUIRED FOR FUTURE REFILLS       Thyroid Hormones Protocol Failed - 3/11/2020  4:56 PM        Failed - TSH on file in past 12 months for patient age 12 & older     TSH   Date Value Ref Range Status   07/24/2018 0.37 0.30 - 5.00 uIU/mL Final                   Passed - Provider visit in past 12 months or next 3 months     Last office visit with prescriber/PCP: 2/25/2020 Talia Mejía MD OR same dept: 2/25/2020 Talia Mejía MD OR same specialty: 2/25/2020 Talia Mejía MD  Last physical: 8/10/2017 Last MTM visit: Visit date not found   Next visit within 3 mo: Visit date not found  Next physical within 3 mo: Visit date not found  Prescriber OR PCP: Talia Mejía MD  Last diagnosis associated with med order: 1. Hypothyroidism  - levothyroxine (SYNTHROID, LEVOTHROID) 112 MCG tablet [Pharmacy Med Name: Levothyroxine Sodium 112 MCG Oral Tablet]; TAKE 1 TABLET BY MOUTH ONCE DAILY . APPOINTMENT REQUIRED FOR FUTURE REFILLS  Dispense: 90 tablet; Refill: 0    If protocol passes may refill for 12 months if within 3 months of last provider visit (or a total of 15 months).

## 2021-06-06 NOTE — TELEPHONE ENCOUNTER
Refill Request  Did you contact pharmacy: Yes.  Date: 3/9/20  Medication name:   Requested Prescriptions     Pending Prescriptions Disp Refills     levothyroxine (SYNTHROID, LEVOTHROID) 112 MCG tablet [Pharmacy Med Name: Levothyroxine Sodium 112 MCG Oral Tablet] 90 tablet 0     Sig: TAKE 1 TABLET BY MOUTH ONCE DAILY . APPOINTMENT REQUIRED FOR FUTURE REFILLS     Who prescribed the medication: Talia Mejía MD   Requested Pharmacy: [facility]  Is patient out of medication: No.  1 days left  Patient notified refills processed in 3 business days:  yes  Okay to leave a detailed message: yes

## 2021-06-08 NOTE — TELEPHONE ENCOUNTER
RN cannot approve Refill Request    RN can NOT refill this medication PCP messaged that patient is overdue for Labs. Last office visit: 2/25/2020 Talia Mejía MD Last Physical: 8/10/2017 Last MTM visit: Visit date not found Last visit same specialty: 2/25/2020 Talia Mejía MD.  Next visit within 3 mo: Visit date not found  Next physical within 3 mo: Visit date not found      Lenora Yeboah, [facility] Triage/Med Refill 6/7/2020    Requested Prescriptions   Pending Prescriptions Disp Refills     levothyroxine (SYNTHROID, LEVOTHROID) 112 MCG tablet [Pharmacy Med Name: Levothyroxine Sodium 112 MCG Oral Tablet] 90 tablet 0     Sig: TAKE 1 TABLET BY MOUTH ONCE DAILY . APPOINTMENT REQUIRED FOR FUTURE REFILLS       Thyroid Hormones Protocol Failed - 6/3/2020  7:39 PM        Failed - TSH on file in past 12 months for patient age 12 & older     TSH   Date Value Ref Range Status   07/24/2018 0.37 0.30 - 5.00 uIU/mL Final                   Passed - Provider visit in past 12 months or next 3 months     Last office visit with prescriber/PCP: 2/25/2020 Talia Mejía MD OR same dept: 2/25/2020 Talia Mejía MD OR same specialty: 2/25/2020 Talia Mejía MD  Last physical: 8/10/2017 Last MTM visit: Visit date not found   Next visit within 3 mo: Visit date not found  Next physical within 3 mo: Visit date not found  Prescriber OR PCP: Talia Mejía MD  Last diagnosis associated with med order: 1. Hypothyroidism  - levothyroxine (SYNTHROID, LEVOTHROID) 112 MCG tablet [Pharmacy Med Name: Levothyroxine Sodium 112 MCG Oral Tablet]; TAKE 1 TABLET BY MOUTH ONCE DAILY . APPOINTMENT REQUIRED FOR FUTURE REFILLS  Dispense: 90 tablet; Refill: 0    If protocol passes may refill for 12 months if within 3 months of last provider visit (or a total of 15 months).

## 2021-06-11 ENCOUNTER — OFFICE VISIT (OUTPATIENT)
Dept: DERMATOLOGY | Facility: CLINIC | Age: 66
End: 2021-06-11
Payer: MEDICARE

## 2021-06-11 ENCOUNTER — RECORDS - HEALTHEAST (OUTPATIENT)
Dept: ADMINISTRATIVE | Facility: OTHER | Age: 66
End: 2021-06-11

## 2021-06-11 VITALS — OXYGEN SATURATION: 97 % | HEART RATE: 75 BPM

## 2021-06-11 DIAGNOSIS — D18.01 CHERRY ANGIOMA: ICD-10-CM

## 2021-06-11 DIAGNOSIS — D84.9 IMMUNOCOMPROMISED (H): ICD-10-CM

## 2021-06-11 DIAGNOSIS — Z79.899 HIGH RISK MEDICATION USE: ICD-10-CM

## 2021-06-11 DIAGNOSIS — D48.5 NEOPLASM OF UNCERTAIN BEHAVIOR OF SKIN: Primary | ICD-10-CM

## 2021-06-11 DIAGNOSIS — L81.4 LENTIGO: ICD-10-CM

## 2021-06-11 DIAGNOSIS — Z85.820 HISTORY OF MELANOMA: ICD-10-CM

## 2021-06-11 DIAGNOSIS — L82.0 INFLAMED SEBORRHEIC KERATOSIS: ICD-10-CM

## 2021-06-11 DIAGNOSIS — D22.9 MULTIPLE BENIGN NEVI: ICD-10-CM

## 2021-06-11 PROCEDURE — 99203 OFFICE O/P NEW LOW 30 MIN: CPT | Mod: 25 | Performed by: PHYSICIAN ASSISTANT

## 2021-06-11 PROCEDURE — 17110 DESTRUCTION B9 LES UP TO 14: CPT | Performed by: PHYSICIAN ASSISTANT

## 2021-06-11 PROCEDURE — 11102 TANGNTL BX SKIN SINGLE LES: CPT | Mod: 59 | Performed by: PHYSICIAN ASSISTANT

## 2021-06-11 PROCEDURE — 88305 TISSUE EXAM BY PATHOLOGIST: CPT | Performed by: DERMATOLOGY

## 2021-06-11 PROCEDURE — 11103 TANGNTL BX SKIN EA SEP/ADDL: CPT | Mod: 59 | Performed by: PHYSICIAN ASSISTANT

## 2021-06-11 NOTE — PATIENT INSTRUCTIONS
Wound Care Instructions     FOR SUPERFICIAL WOUNDS     [facility] [phone]    [facility] [phone]                       AFTER 24 HOURS YOU SHOULD REMOVE THE BANDAGE AND BEGIN DAILY DRESSING CHANGES AS FOLLOWS:     1) Remove Dressing.     2) Clean and dry the area with tap water using a Q-tip or sterile gauze pad.     3) Apply Vaseline, Aquaphor, Polysporin ointment or Bacitracin ointment over entire wound.  Do NOT use Neosporin ointment.     4) Cover the wound with a band-aid, or a sterile non-stick gauze pad and micropore paper tape      REPEAT THESE INSTRUCTIONS AT LEAST ONCE A DAY UNTIL THE WOUND HAS COMPLETELY HEALED.    It is an old wives tale that a wound heals better when it is exposed to air and allowed to dry out. The wound will heal faster with a better cosmetic result if it is kept moist with ointment and covered with a bandage.    **Do not let the wound dry out.**      Supplies Needed:      *Cotton tipped applicators (Q-tips)    *Polysporin Ointment or Bacitracin Ointment (NOT NEOSPORIN)    *Band-aids or non-stick gauze pads and micropore paper tape.      PATIENT INFORMATION:    During the healing process you will notice a number of changes. All wounds develop a small halo of redness surrounding the wound.  This means healing is occurring. Severe itching with extensive redness usually indicates sensitivity to the ointment or bandage tape used to dress the wound.  You should call our office if this develops.      Swelling  and/or discoloration around your surgical site is common, particularly when performed around the eye.    All wounds normally drain.  The larger the wound the more drainage there will be.  After 7-10 days, you will notice the wound beginning to shrink and new skin will begin to grow.  The wound is healed when you can see skin has formed over the entire area.  A healed wound has a healthy, shiny look to the surface and is red to dark pink in color  to normalize.  Wounds may take approximately 4-6 weeks to heal.  Larger wounds may take 6-8 weeks.  After the wound is healed you may discontinue dressing changes.    You may experience a sensation of tightness as your wound heals. This is normal and will gradually subside.    Your healed wound may be sensitive to temperature changes. This sensitivity improves with time, but if you re having a lot of discomfort, try to avoid temperature extremes.    Patients frequently experience itching after their wound appears to have healed because of the continue healing under the skin.  Plain Vaseline will help relieve the itching.        POSSIBLE COMPLICATIONS    BLEEDIN. Leave the bandage in place.  2. Use tightly rolled up gauze or a cloth to apply direct pressure over the bandage for 30  minutes.  3. Reapply pressure for an additional 30 minutes if necessary  4. Use additional gauze and tape to maintain pressure once the bleeding has stopped.    WOUND CARE INSTRUCTIONS   FOR CRYOSURGERY   This area treated with liquid nitrogen should form a blister (areas treated may or may not blister-skin may just turn dark and slough off). You do not need to bandage the area unless a blister forms and breaks (which may be a few days). When the blister breaks, begin daily dressing changes as follows:  1) Clean and dry the area with tap water using clean Q-tip or sterile gauze pad.   2) Apply Polysporin ointment or Bacitracin ointment over entire wound. Do NOT use Neosporin ointment.   3) Cover the wound with a band-aid or sterile non-stick gauze pad and micropore paper tape.   REPEAT THESE INSTRUCTIONS AT LEAST ONCE A DAY UNTIL THE WOUND HAS COMPLETELY HEALED.   It is an old wives tale that a wound heals better when it is exposed to air and allowed to dry out. The wound will heal faster with a better cosmetic result if it is kept moist with ointment and covered with a bandage.   Do not let the wound dry out.   IMPORTANT  INFORMATION ON REVERSE SIDE   Supplies Needed:   *Cotton tipped applicators (Q-tips)   *Polysporin ointment or Bacitracin ointment (NOT NEOSPORIN)   *Band-aids, or non stick gauze pads and micropore paper tape   PATIENT INFORMATION   During the healing process you will notice a number of changes. All wounds develop a small halo of redness surrounding the wound. This means healing is occurring. Severe itching with extensive redness usually indicates sensitivity to the ointment or bandage tape used to dress the wound. You should call our office if this develops.   Swelling and/or discoloration around your surgical site is common, particularly when performed around the eye.   All wounds normally drain. The larger the wound the more drainage there will be. After 7-10 days, you will notice the wound beginning to shrink and new skin will begin to grow. The wound is healed when you can see skin has formed over the entire area. A healed wound has a healthy, shiny look to the surface and is red to dark pink in color to normalize. Wounds may take approximately 4-6 weeks to heal. Larger wounds may take 6-8 weeks. After the wound is healed you may discontinue dressing changes.   You may experience a sensation of tightness as your wound heals. This is normal and will gradually subside.   Your healed wound may be sensitive to temperature changes. This sensitivity improves with time, but if you re having a lot of discomfort, try to avoid temperature extremes.   Patients frequently experience itching after their wound appears to have healed because of the continue healing under the skin. Plain Vaseline will help relieve the itching.

## 2021-06-11 NOTE — LETTER
6/11/2021         RE: Jackie Maguire  [address] [facility] [address]        Dear Colleague,    Thank you for referring your patient, Jackie Maguire, to the [facility]. Please see a copy of my visit note below.    Jackie Maguire is an extremely pleasant 66 year old year old female patient here today for spot on chest. Present of year, developed after she had shingles about 5 years ago. She denies any bleeding.  Patient has no other skin complaints today.  Remainder of the HPI, Meds, PMH, Allergies, FH, and SH was reviewed in chart.    Pertinent Hx:  History of Melanoma on right upper arm, negative lymph node biopsy 2011  Past Medical History:   Diagnosis Date     Hyperlipidemia LDL goal <160 1/5/2011     Hypothyroidism 10/1/2010     Inflammatory arthritis 1/20/2011     Malignant melanoma (H)        Past Surgical History:   Procedure Laterality Date     APPENDECTOMY       ARTHRODESIS TOE(S) Left 8/17/2017    Procedure: ARTHRODESIS TOE(S);  Left foot 1st metatarsophalangeal realignment fusion, 2nd & 3rd digital corrections;  Surgeon: Jason Lamar DPM;  Location: [facility] OR     IR CERVICAL EPIDURAL STEROID INJECTION  5/9/2013     OPEN REDUCTION INTERNAL FIXATION HUMERUS PROXIMAL Left 9/30/2016    Procedure: OPEN REDUCTION INTERNAL FIXATION HUMERUS PROXIMAL;  Surgeon: Charly Bernal MD;  Location: [facility] OR        Family History   Problem Relation Age of Onset     Cancer Father        Social History     Socioeconomic History     Marital status:      Spouse name: Not on file     Number of children: Not on file     Years of education: Not on file     Highest education level: Not on file   Occupational History     Not on file   Social Needs     Financial resource strain: Not on file     Food insecurity     Worry: Not on file     Inability: Not on file     Transportation needs     Medical: Not on file     Non-medical: Not on file   Tobacco Use     Smoking status: Never  "Smoker     Smokeless tobacco: Never Used   Substance and Sexual Activity     Alcohol use: No     Drug use: No     Sexual activity: Yes     Partners: Male   Lifestyle     Physical activity     Days per week: Not on file     Minutes per session: Not on file     Stress: Not on file   Relationships     Social connections     Talks on phone: Not on file     Gets together: Not on file     Attends Sikhism service: Not on file     Active member of club or organization: Not on file     Attends meetings of clubs or organizations: Not on file     Relationship status: Not on file     Intimate partner violence     Fear of current or ex partner: Not on file     Emotionally abused: Not on file     Physically abused: Not on file     Forced sexual activity: Not on file   Other Topics Concern     Parent/sibling w/ CABG, MI or angioplasty before 65F 55M? Not Asked   Social History Narrative     Not on file       Outpatient Encounter Medications as of 6/11/2021   Medication Sig Dispense Refill     acetaminophen (TYLENOL) 325 MG tablet 650 mg po QID and BId  tablet 0     folic acid (FOLVITE) 1 MG tablet Take 1 tablet (1 mg) by mouth daily 90 tablet 3     hydrOXYzine (ATARAX) 25 MG tablet Take 1 tablet (25 mg) by mouth every 4 hours as needed for itching (and nausea) 36 tablet 0     Insulin Syringe-Needle U-100 (BD INSULIN SYRINGE) 27G X 1/2\" 1 ML MISC For weekly methotrexate administration. 12 each 6     LEVOTHYROXINE SODIUM PO Take 137 mcg by mouth daily        methotrexate 50 MG/2ML injection Inject 0.4 mLs (10 mg) Subcutaneous every 7 days 8 mL 1     tofacitinib (XELJANZ XR) 11 MG 24 hr tablet Take 1 tablet (11 mg) by mouth daily Hold for signs of infection, then seek medical attention. 30 tablet 12     VITAMIN D, CHOLECALCIFEROL, PO Take by mouth daily       aspirin 81 MG tablet Take 1 tablet (81 mg) by mouth daily (Patient not taking: Reported on 11/25/2019) 42 tablet 0     oxyCODONE-acetaminophen (PERCOCET) 5-325 MG per " tablet Take 1-2 tablets by mouth every 4 hours as needed for pain (moderate to severe) (Patient not taking: Reported on 12/7/2020) 38 tablet 0     No facility-administered encounter medications on file as of 6/11/2021.              O:   NAD, WDWN, Alert & Oriented, Mood & Affect wnl, Vitals stable   Here today alone   Pulse 75   SpO2 97%    General appearance normal   Vitals stable   Alert, oriented and in no acute distress     0.8 cm pink pearly papule on left temple   1.0 cm pink pearly papule on left clavicle   Stuck on papules and brown macules on trunk and ext   Red papules on trunk  Brown papules and macules with regular pigment network and borders on torso and extremities   No enlarged lymph nodes on cervical and axilla   Well healed scar on right upper arm     The remainder of skin exam is normal     Eyes: Conjunctivae/lids:Normal     ENT: Lips:: normal    MSK:Normal    Cardiovascular: peripheral edema none    Pulm: Breathing Normal    Neuro/Psych: Orientation:Alert and Orientedx3 ; Mood/Affect:normal     A/P:  1. R/O BCC on left temple and left clavicle  TANGENTIAL BIOPSY SENT OUT:  After consent, anesthesia with LEC and prep, tangential excision performed and specimen sent out for permanent section histology.  No complications and routine wound care. Patient told to call our office in 1-2 weeks for result.      2. Inflamed seborrheic keratosis on forehead and right cheek x 2  LN2:  Treated with LN2 for 5s for 1-2 cycles. Warned risks of blistering, pain, pigment change, scarring, and incomplete resolution.  Advised patient to return if lesions do not completely resolve.  Wound care sheet given.  3. hisotry of Melanoma on right upper arm 2011  MELANOMA DISCUSSED WITH PATIENT:  I discussed the specifics of tumor, prognosis, metachronous melanoma, self exam, and genetics with the patient. I explained the need for monthly skin exams including and taught the patient how to do this. Patient was asked about new  or changing moles . I discussed with patient signs and symptoms that could arise in the setting of recurrent locoregional or metastatic disease. In addition, the need to undergo every 12 month dermatologic full skin survey and evaluation given that patients with a diagnosis of melanoma are at risk of recurrence (local and distant) and of subsequent de gabo melanoma.    4. Seborrheic keratosis, lentigo, angioma, benign nevi   BENIGN LESIONS DISCUSSED WITH PATIENT:  I discussed the specifics of tumor, prognosis, and genetics of benign lesions.  I explained that treatment of these lesions would be purely cosmetic and not medically neccessary.  I discussed with patient different removal options including excision, cautery and /or laser.      Nature and genetics of benign skin lesions dicussed with patient.  Signs and Symptoms of skin cancer discussed with patient.  ABCDEs of melanoma reviewed with patient.  Patient encouraged to perform monthly skin exams.  UV precautions reviewed with patient.  Risks of non-melanoma skin cancer discussed with patient   Return to clinic pending biopsy results.   Noomie to follow up with Primary Care provider regarding elevated blood pressure.          Again, thank you for allowing me to participate in the care of your patient.        Sincerely,        Brooke Damon PA-C

## 2021-06-11 NOTE — PROGRESS NOTES
Jackie Maguire is an extremely pleasant 66 year old year old female patient here today for spot on chest. Present of year, developed after she had shingles about 5 years ago. She denies any bleeding.  Patient has no other skin complaints today.  Remainder of the HPI, Meds, PMH, Allergies, FH, and SH was reviewed in chart.    Pertinent Hx:  History of Melanoma on right upper arm, negative lymph node biopsy 2011  Past Medical History:   Diagnosis Date     Hyperlipidemia LDL goal <160 1/5/2011     Hypothyroidism 10/1/2010     Inflammatory arthritis 1/20/2011     Malignant melanoma (H)        Past Surgical History:   Procedure Laterality Date     APPENDECTOMY       ARTHRODESIS TOE(S) Left 8/17/2017    Procedure: ARTHRODESIS TOE(S);  Left foot 1st metatarsophalangeal realignment fusion, 2nd & 3rd digital corrections;  Surgeon: Jason Lamar DPM;  Location: [facility] OR     IR CERVICAL EPIDURAL STEROID INJECTION  5/9/2013     OPEN REDUCTION INTERNAL FIXATION HUMERUS PROXIMAL Left 9/30/2016    Procedure: OPEN REDUCTION INTERNAL FIXATION HUMERUS PROXIMAL;  Surgeon: Charly Bernal MD;  Location: [facility] OR        Family History   Problem Relation Age of Onset     Cancer Father        Social History     Socioeconomic History     Marital status:      Spouse name: Not on file     Number of children: Not on file     Years of education: Not on file     Highest education level: Not on file   Occupational History     Not on file   Social Needs     Financial resource strain: Not on file     Food insecurity     Worry: Not on file     Inability: Not on file     Transportation needs     Medical: Not on file     Non-medical: Not on file   Tobacco Use     Smoking status: Never Smoker     Smokeless tobacco: Never Used   Substance and Sexual Activity     Alcohol use: No     Drug use: No     Sexual activity: Yes     Partners: Male   Lifestyle     Physical activity     Days per week: Not on file     Minutes per session: Not on file  "    Stress: Not on file   Relationships     Social connections     Talks on phone: Not on file     Gets together: Not on file     Attends Anglican service: Not on file     Active member of club or organization: Not on file     Attends meetings of clubs or organizations: Not on file     Relationship status: Not on file     Intimate partner violence     Fear of current or ex partner: Not on file     Emotionally abused: Not on file     Physically abused: Not on file     Forced sexual activity: Not on file   Other Topics Concern     Parent/sibling w/ CABG, MI or angioplasty before 65F 55M? Not Asked   Social History Narrative     Not on file       Outpatient Encounter Medications as of 6/11/2021   Medication Sig Dispense Refill     acetaminophen (TYLENOL) 325 MG tablet 650 mg po QID and BId  tablet 0     folic acid (FOLVITE) 1 MG tablet Take 1 tablet (1 mg) by mouth daily 90 tablet 3     hydrOXYzine (ATARAX) 25 MG tablet Take 1 tablet (25 mg) by mouth every 4 hours as needed for itching (and nausea) 36 tablet 0     Insulin Syringe-Needle U-100 (BD INSULIN SYRINGE) 27G X 1/2\" 1 ML MISC For weekly methotrexate administration. 12 each 6     LEVOTHYROXINE SODIUM PO Take 137 mcg by mouth daily        methotrexate 50 MG/2ML injection Inject 0.4 mLs (10 mg) Subcutaneous every 7 days 8 mL 1     tofacitinib (XELJANZ XR) 11 MG 24 hr tablet Take 1 tablet (11 mg) by mouth daily Hold for signs of infection, then seek medical attention. 30 tablet 12     VITAMIN D, CHOLECALCIFEROL, PO Take by mouth daily       aspirin 81 MG tablet Take 1 tablet (81 mg) by mouth daily (Patient not taking: Reported on 11/25/2019) 42 tablet 0     oxyCODONE-acetaminophen (PERCOCET) 5-325 MG per tablet Take 1-2 tablets by mouth every 4 hours as needed for pain (moderate to severe) (Patient not taking: Reported on 12/7/2020) 38 tablet 0     No facility-administered encounter medications on file as of 6/11/2021.              O:   NAD, WDWN, Alert & " Oriented, Mood & Affect wnl, Vitals stable   Here today alone   Pulse 75   SpO2 97%    General appearance normal   Vitals stable   Alert, oriented and in no acute distress     0.8 cm pink pearly papule on left temple   1.0 cm pink pearly papule on left clavicle   Stuck on papules and brown macules on trunk and ext   Red papules on trunk  Brown papules and macules with regular pigment network and borders on torso and extremities   No enlarged lymph nodes on cervical and axilla   Well healed scar on right upper arm     The remainder of skin exam is normal     Eyes: Conjunctivae/lids:Normal     ENT: Lips:: normal    MSK:Normal    Cardiovascular: peripheral edema none    Pulm: Breathing Normal    Neuro/Psych: Orientation:Alert and Orientedx3 ; Mood/Affect:normal     A/P:  1. R/O BCC on left temple and left clavicle  TANGENTIAL BIOPSY SENT OUT:  After consent, anesthesia with LEC and prep, tangential excision performed and specimen sent out for permanent section histology.  No complications and routine wound care. Patient told to call our office in 1-2 weeks for result.      2. Inflamed seborrheic keratosis on forehead and right cheek x 2  LN2:  Treated with LN2 for 5s for 1-2 cycles. Warned risks of blistering, pain, pigment change, scarring, and incomplete resolution.  Advised patient to return if lesions do not completely resolve.  Wound care sheet given.  3. hisotry of Melanoma on right upper arm 2011  MELANOMA DISCUSSED WITH PATIENT:  I discussed the specifics of tumor, prognosis, metachronous melanoma, self exam, and genetics with the patient. I explained the need for monthly skin exams including and taught the patient how to do this. Patient was asked about new or changing moles . I discussed with patient signs and symptoms that could arise in the setting of recurrent locoregional or metastatic disease. In addition, the need to undergo every 12 month dermatologic full skin survey and evaluation given that  patients with a diagnosis of melanoma are at risk of recurrence (local and distant) and of subsequent de gabo melanoma.    4. Seborrheic keratosis, lentigo, angioma, benign nevi   BENIGN LESIONS DISCUSSED WITH PATIENT:  I discussed the specifics of tumor, prognosis, and genetics of benign lesions.  I explained that treatment of these lesions would be purely cosmetic and not medically neccessary.  I discussed with patient different removal options including excision, cautery and /or laser.      Nature and genetics of benign skin lesions dicussed with patient.  Signs and Symptoms of skin cancer discussed with patient.  ABCDEs of melanoma reviewed with patient.  Patient encouraged to perform monthly skin exams.  UV precautions reviewed with patient.  Risks of non-melanoma skin cancer discussed with patient   Return to clinic pending biopsy results.   Noomie to follow up with Primary Care provider regarding elevated blood pressure.

## 2021-06-11 NOTE — TELEPHONE ENCOUNTER
RN cannot approve Refill Request    RN can NOT refill this medication PCP messaged that patient is overdue for Labs. Last office visit: 2/25/2020 Talia Mejía MD Last Physical: 8/10/2017 Last MTM visit: Visit date not found Last visit same specialty: 2/25/2020 Talia Mejía MD.  Next visit within 3 mo: Visit date not found  Next physical within 3 mo: Visit date not found      Lenora Yeboah, [facility] Triage/Med Refill 9/7/2020    Requested Prescriptions   Pending Prescriptions Disp Refills     levothyroxine (SYNTHROID, LEVOTHROID) 112 MCG tablet [Pharmacy Med Name: Levothyroxine Sodium 112 MCG Oral Tablet] 90 tablet 0     Sig: TAKE 1 TABLET BY MOUTH ONCE DAILY . APPOINTMENT REQUIRED FOR FUTURE REFILLS       Thyroid Hormones Protocol Failed - 9/5/2020  5:30 AM        Failed - TSH on file in past 12 months for patient age 12 & older     TSH   Date Value Ref Range Status   07/24/2018 0.37 0.30 - 5.00 uIU/mL Final                   Passed - Provider visit in past 12 months or next 3 months     Last office visit with prescriber/PCP: 2/25/2020 Talia Mejía MD OR same dept: 2/25/2020 Talia Mejía MD OR same specialty: 2/25/2020 Talia Mejía MD  Last physical: 8/10/2017 Last MTM visit: Visit date not found   Next visit within 3 mo: Visit date not found  Next physical within 3 mo: Visit date not found  Prescriber OR PCP: Talia Mejía MD  Last diagnosis associated with med order: 1. Hypothyroidism  - levothyroxine (SYNTHROID, LEVOTHROID) 112 MCG tablet [Pharmacy Med Name: Levothyroxine Sodium 112 MCG Oral Tablet]; TAKE 1 TABLET BY MOUTH ONCE DAILY . APPOINTMENT REQUIRED FOR FUTURE REFILLS  Dispense: 90 tablet; Refill: 0    If protocol passes may refill for 12 months if within 3 months of last provider visit (or a total of 15 months).

## 2021-06-11 NOTE — PROGRESS NOTES
Assessment/ Plan     1. Foot deformity, left  Patient has deformity of her left foot with what looks like a severe bunion and overlapping of some toes.  This is quite painful for her and makes it difficult to walk.  Will refer her to [facility] orthopedist for an evaluation.  - Ambulatory referral to Orthopedic Surgery    2. Psoriatic arthropathy  Patient has psoriatic arthritis and takes prednisone daily.  Her rheumatologist retired and she cannot get in to see anyone until the end of July.  She is wondering if I will refill her prednisone to get her to that appointment.  This was refilled today.  - predniSONE (DELTASONE) 5 MG tablet; Take 1 tablet (5 mg total) by mouth daily.  Dispense: 60 tablet; Refill: 1      Subjective:       Jackie Maguire is a 62 y.o. female who presents for referral for foot deformity.  Patient has psoriatic arthritis and had an injury to her left foot about a year ago.  She dropped a big pain on on it and think she actually broke her toe.  It is painful from time to time but really it is hard for her to walk with such deformity.  She is wondering about a referral.  There is no acute injury or anything she thinks we need to x-ray today as the orthopedist will likely want her on x-rays.  She also needs refills on her prednisone.  Her rheumatologist retired and she could not get in to see anyone until the end of July.  I will give her refills until she can get seen.    The following portions of the patient's history were reviewed and updated as appropriate: allergies, current medications, past family history, past medical history, past social history, past surgical history and problem list.    Review of Systems   A 12 point comprehensive review of systems was negative except as noted.      Current Outpatient Prescriptions   Medication Sig Dispense Refill     levothyroxine (SYNTHROID, LEVOTHROID) 137 MCG tablet TAKE ONE TABLET BY MOUTH ONCE DAILY 90 tablet 2     predniSONE (DELTASONE) 5 MG  "tablet Take 1 tablet (5 mg total) by mouth daily. 60 tablet 1     No current facility-administered medications for this visit.        Objective:      /82  Pulse 80  Temp 98.1  F (36.7  C) (Oral)   Resp 16  Ht 5' 7\" (1.702 m)  Wt 188 lb (85.3 kg)  BMI 29.44 kg/m2      General appearance: alert, appears stated age and cooperative  Left foot: Show severe bunion deformity with overlapping of the remaining toes.    No results found for this or any previous visit (from the past 168 hour(s)).       This note has been dictated using voice recognition software. Any grammatical or context distortions are unintentional and inherent to the software  "

## 2021-06-12 NOTE — PROGRESS NOTES
Assessment/Plan:      Visit for Preoperative Exam.     Patient approved for surgery with general or local anesthesia. Labs will be done as indicated. Copy of the pre-op was given to the patient to bring along on the day of surgery.     Subjective:     History of Present Illness    Jackie is a 62-year-old woman who presents to the [facility] today for preop physical.  She has had deformity and pain of her left foot for at least the last several years.  The pain is getting increasingly worse to the point where it makes walking difficult.  She has had previous surgeries in the past with no problems with anesthesia.  She is up-to-date on immunizations.  She has had no recent illnesses or exposures.  She has no known coronary artery disease and denies chest pain or shortness of breath.  She does take chronic low-dose prednisone for her arthritis and we discussed that may inhibit healing.  Her surgeon is aware that she takes this.  She also has hypothyroidism which is currently stable.  Will check a CMP, CBC, and TSH.      Scheduled Procedure: Foot Lt  Surgery Date:  8/17/2017  Surgery Location:  [facility]  Surgeon:  Dr. Lopez    Current Outpatient Prescriptions   Medication Sig Dispense Refill     levothyroxine (SYNTHROID, LEVOTHROID) 137 MCG tablet TAKE ONE TABLET BY MOUTH ONCE DAILY 90 tablet 2     predniSONE (DELTASONE) 5 MG tablet Take 1 tablet (5 mg total) by mouth daily. 60 tablet 1     No current facility-administered medications for this visit.        Allergies   Allergen Reactions     Azathioprine Itching     Leflunomide Other (See Comments)     Mouth sores     Sulfa (Sulfonamide Antibiotics)      Compazine  [Prochlorperazine Edisylate] Anxiety     Restless, anxious       Immunization History   Administered Date(s) Administered     Influenza, seasonal,quad inj 6-35 mos 10/25/2012, 10/31/2013     Pneumo Polysac 23-V 10/31/2013     Tdap 10/25/2012       Patient Active Problem List   Diagnosis      "Cervicalgia     Focal Torsion Dystonia     Cervical Spondylosis     Occipital Neuralgia     Hypothyroidism     Vitamin D Deficiency     Fatigue     Psoriatic Arthropathy     Myofascial Pain Syndrome     Cutaneous malignant melanoma       No past medical history on file.    Social History     Social History     Marital status:      Spouse name: N/A     Number of children: N/A     Years of education: N/A     Occupational History     Not on file.     Social History Main Topics     Smoking status: Never Smoker     Smokeless tobacco: Not on file     Alcohol use Not on file     Drug use: Not on file     Sexual activity: Not on file     Other Topics Concern     Not on file     Social History Narrative       No past surgical history on file.    Review of Systems  A 12 point comprehensive review of systems was negative except as noted.    Fever: no  Chills: no  Fatigue: no  Chest Pain: no  Cough: no  Dyspnea: no  Urinary Frequency: no  Nausea: no  Vomiting: no  Diarrhea: no  Abdominal Pain: no  Easy Bruising: no  Lower Extremity Swelling: no  Poor Exercise Tolerance: no      Pertinent History  Prior Anesthesia: yes  Previous Anesthesia Reaction:  no  Diabetes: no  Cardiovascular Disease: no  Pulmonary Disease: no  Renal Disease: no  GI Disease: no  Sleep Apnea: no  Thromboembolic Problems: no  Clotting Disorder: no  Bleeding Disorder: no  Transfusion Reaction: no  Impaired Immunity: no  Steroid use in the last 6 months: yes  Frequent Aspirin use: no    No family history of anesthesia reaction    Social history of   Social History   Substance Use Topics     Smoking status: Never Smoker     Smokeless tobacco: None     Alcohol use None       After surgery, the patient plans to recover at home with family.        Objective:         Vitals:    08/10/17 0849   BP: 114/68   Pulse: 76   Resp: 16   Temp: 97.9  F (36.6  C)   TempSrc: Oral   Weight: 184 lb (83.5 kg)   Height: 5' 7\" (1.702 m)       Physical Exam:  Physical " Exam:  General Appearance: Alert, cooperative, no distress, appears stated age  Head: Normocephalic, without obvious abnormality, atraumatic  Eyes: PERRL, conjunctiva/corneas clear, EOM's intact  Ears: Normal TM's and external ear canals, both ears  Nose: Nares normal, septum midline,mucosa normal, no drainage  Throat: Lips, mucosa, and tongue normal; teeth and gums normal  Lungs: Clear to auscultation bilaterally, respirations unlabored  Heart: Regular rate and rhythm, S1 and S2 normal, no murmur, rub, or gallop,   Abdomen: Soft, non-tender, bowel sounds active all four quadrants,  no masses, no organomegaly  Extremities: Left foot deformity  Skin: Skin color, texture, turgor normal, no rashes or lesions  Lymph nodes: Cervical, supraclavicular, and axillary nodes normal  Neurologic: Normal        Results for orders placed or performed in visit on 08/10/17   HM2(CBC w/o Differential)   Result Value Ref Range    WBC 7.2 4.0 - 11.0 thou/uL    RBC 4.93 3.80 - 5.40 mill/uL    Hemoglobin 14.9 12.0 - 16.0 g/dL    Hematocrit 45.0 35.0 - 47.0 %    MCV 91 80 - 100 fL    MCH 30.3 27.0 - 34.0 pg    MCHC 33.2 32.0 - 36.0 g/dL    RDW 12.1 11.0 - 14.5 %    Platelets 225 140 - 440 thou/uL    MPV 7.3 7.0 - 10.0 fL       Talia Mejía MD

## 2021-06-13 NOTE — TELEPHONE ENCOUNTER
RN cannot approve Refill Request    RN can NOT refill this medication Protocol failed and NO refill given. Last office visit: 2/25/2020 Talia Mejía MD Last Physical: 8/10/2017 Last MTM visit: Visit date not found Last visit same specialty: 2/25/2020 Talia Mejía MD.  Next visit within 3 mo: Visit date not found  Next physical within 3 mo: Visit date not found      Katheryn Corbin, [facility] Triage/Med Refill 12/9/2020    Requested Prescriptions   Pending Prescriptions Disp Refills     levothyroxine (SYNTHROID, LEVOTHROID) 112 MCG tablet [Pharmacy Med Name: Levothyroxine Sodium 112 MCG Oral Tablet] 90 tablet 0     Sig: TAKE 1 TABLET BY MOUTH ONCE DAILY . APPOINTMENT REQUIRED FOR FUTURE REFILLS       Thyroid Hormones Protocol Failed - 12/8/2020  8:22 AM        Failed - TSH on file in past 12 months for patient age 12 & older     TSH   Date Value Ref Range Status   07/24/2018 0.37 0.30 - 5.00 uIU/mL Final                   Passed - Provider visit in past 12 months or next 3 months     Last office visit with prescriber/PCP: 2/25/2020 Talia Mejía MD OR same dept: 2/25/2020 Talia Mejía MD OR same specialty: 2/25/2020 Talia Mejía MD  Last physical: 8/10/2017 Last MTM visit: Visit date not found   Next visit within 3 mo: Visit date not found  Next physical within 3 mo: Visit date not found  Prescriber OR PCP: Talia Mejía MD  Last diagnosis associated with med order: 1. Hypothyroidism  - levothyroxine (SYNTHROID, LEVOTHROID) 112 MCG tablet [Pharmacy Med Name: Levothyroxine Sodium 112 MCG Oral Tablet]; TAKE 1 TABLET BY MOUTH ONCE DAILY . APPOINTMENT REQUIRED FOR FUTURE REFILLS  Dispense: 90 tablet; Refill: 0    If protocol passes may refill for 12 months if within 3 months of last provider visit (or a total of 15 months).

## 2021-06-14 LAB — COPATH REPORT: NORMAL

## 2021-06-15 NOTE — TELEPHONE ENCOUNTER
TSH hasn't been checked since 2018. Needs appointment before additional refills.    Please assist patient in scheduling appointment.

## 2021-06-15 NOTE — PROGRESS NOTES
Assessment & Plan     Jackie was seen today for medication management and medication refill.    Diagnoses and all orders for this visit:    Hypothyroidism  Stable. No symptoms. Lab today.   -     Thyroid Cascade  -     levothyroxine (SYNTHROID, LEVOTHROID) 112 MCG tablet; TAKE 1 TABLET BY MOUTH ONCE DAILY    Rheumatoid arthritis, involving unspecified site, unspecified whether rheumatoid factor present (H)  Seeing rheumatology. Stable.    Cutaneous malignant melanoma (H)  Hasn't seen derm for a couple years after removal of melanoma. Advised seeing derm and she wants to wait until after she gets COVID vaccine.     Healthcare maintenance  Doesn't want to get mammogram or colonoscopy for now until after she gets COVID vaccine      Return in about 6 months (around 9/11/2021) for Annual physical, chronic medical conditions.    Rosa Whipple MD  [facility]    Subjective   Jackie Maguire is 66 y.o. and presents today for the following health issues   HPI   Chief Complaint   Patient presents with     Medication Management     needs labs for thyroid     Medication Refill         Review of Systems  Negative except as above.      Objective    Resp 16   Wt 185 lb (83.9 kg)   BMI 29.86 kg/m    Body mass index is 29.86 kg/m .  Physical Exam  GENERAL: Healthy, alert and no distress  RESP: lungs clear to auscultation - no rales, rhonchi or wheezes  CV: regular rates and rhythm  NEURO: Cranial nerves grossly intact. Mentation and speech appropriate for age.  PSYCH: Mentation appears normal, affect normal/bright, judgement and insight intact, normal speech and appearance well-groomed

## 2021-06-15 NOTE — TELEPHONE ENCOUNTER
RN cannot approve Refill Request    RN can NOT refill this medication PCP messaged that patient is overdue for Labs and Office Visit. Last office visit: 2/25/2020 Talia Mejía MD Last Physical: 8/10/2017 Last MTM visit: Visit date not found Last visit same specialty: 2/25/2020 Talia Mejía MD.  Next visit within 3 mo: Visit date not found  Next physical within 3 mo: Visit date not found      Mya Sotelo, [facility] Triage/Med Refill 3/9/2021    Requested Prescriptions   Pending Prescriptions Disp Refills     levothyroxine (SYNTHROID, LEVOTHROID) 112 MCG tablet [Pharmacy Med Name: Levothyroxine Sodium 112 MCG Oral Tablet] 90 tablet 0     Sig: TAKE 1 TABLET BY MOUTH ONCE DAILY . APPOINTMENT REQUIRED FOR FUTURE REFILLS       Thyroid Hormones Protocol Failed - 3/9/2021  2:28 PM        Failed - Provider visit in past 12 months or next 3 months     Last office visit with prescriber/PCP: 2/25/2020 Talia Mejía MD OR same dept: Visit date not found OR same specialty: 2/25/2020 Talia Mejía MD  Last physical: 8/10/2017 Last MTM visit: Visit date not found   Next visit within 3 mo: Visit date not found  Next physical within 3 mo: Visit date not found  Prescriber OR PCP: Talia Mejía MD  Last diagnosis associated with med order: 1. Hypothyroidism  - levothyroxine (SYNTHROID, LEVOTHROID) 112 MCG tablet [Pharmacy Med Name: Levothyroxine Sodium 112 MCG Oral Tablet]; TAKE 1 TABLET BY MOUTH ONCE DAILY . APPOINTMENT REQUIRED FOR FUTURE REFILLS  Dispense: 90 tablet; Refill: 0    If protocol passes may refill for 12 months if within 3 months of last provider visit (or a total of 15 months).             Failed - TSH on file in past 12 months for patient age 12 & older     TSH   Date Value Ref Range Status   07/24/2018 0.37 0.30 - 5.00 uIU/mL Final

## 2021-06-15 NOTE — TELEPHONE ENCOUNTER
Informed patient of Dr. Childress's message below.  Pt scheduled with Dr. Whipple tomorrow 03/11/21 for thyroid follow up.

## 2021-06-15 NOTE — TELEPHONE ENCOUNTER
----- Message from Rosa Whipple MD sent at 3/11/2021  9:40 PM CST -----  Please call patient: thyroid lab looks fine. I have sent refill of thyroid medication to pharmacy for her.

## 2021-06-16 NOTE — TELEPHONE ENCOUNTER
Reason for Call:  Medication or medication refill:    Do you use a [facility]?  Name of the pharmacy and phone number for the current request: [facility] [address] [place] [address] - [address]    Name of the medication requested: atorvastatin (LIPITOR) 40 MG tablet    Other request: NA    Can we leave a detailed message on this number? Yes    Phone number patient can be reached at: Home number on file [phone] (home)    Best Time: Anytime    Call taken on 4/9/2021 at 1:03 PM by Lilia Jones

## 2021-06-16 NOTE — TELEPHONE ENCOUNTER
Requested Prescriptions     Pending Prescriptions Disp Refills     atorvastatin (LIPITOR) 40 MG tablet 90 tablet 3     Sig: Take 1 tablet (40 mg total) by mouth daily.

## 2021-06-16 NOTE — TELEPHONE ENCOUNTER
Informed patient that Dr. Mejía has sent Atorvastatin 40 mg refill to [facility] pharmacy. Pt voiced understanding.

## 2021-06-17 NOTE — PROGRESS NOTES
Assessment/ Plan     1. Facial swelling  Patient has left facial swelling along the jawline for the last couple of days.  I suspect this is likely from an abscessed tooth.  Will put her on Augmentin twice daily for 10 days.  She plans to establish with a new dentist within the next week.  She will let me know if things are not improving.  Reviewed potential side effects including GI upset and diarrhea.  Recommend either yogurt or probiotic while she is on this.  She will let me know if things are worsening.  - amoxicillin-clavulanate (AUGMENTIN) 500-125 mg per tablet; Take 1 tablet (500 mg total) by mouth 2 (two) times a day for 10 days.  Dispense: 20 tablet; Refill: 0      Subjective:       Jackie Maguire is a 63 y.o. female who presents for 2-3 day history of left facial swelling.  Patient states she knows she has some poor dentition and needs to going to see the dentist.  Is been about a year since she has been N.  She just noticed this over the weekend.  Says been really sore.  It is hard for her to open her mouth.  She has been able to eat, but has been somewhat tender.  She has had no fever or malaise.  No systemic symptoms.  Is never had this type of swelling before.    Relevant past medical, family, surgical, and social history reviewed with patient, unless noted in HPI, not pertinent for this visit.    Review of Systems   A 12 point comprehensive review of systems was negative except as noted.      Current Outpatient Prescriptions   Medication Sig Dispense Refill     levothyroxine (SYNTHROID, LEVOTHROID) 137 MCG tablet TAKE ONE TABLET BY MOUTH ONCE DAILY 90 tablet 2     methotrexate 2.5 MG tablet        predniSONE (DELTASONE) 5 MG tablet Take 1 tablet (5 mg total) by mouth daily. 60 tablet 1     amoxicillin-clavulanate (AUGMENTIN) 500-125 mg per tablet Take 1 tablet (500 mg total) by mouth 2 (two) times a day for 10 days. 20 tablet 0     No current facility-administered medications for this visit.   "      Objective:      /88  Pulse 96  Temp 98.3  F (36.8  C) (Oral)   Resp 16  Ht 5' 7\" (1.702 m)  Wt 175 lb 8 oz (79.6 kg)  BMI 27.49 kg/m2      General appearance: alert, appears stated age and cooperative  Head: Normocephalic, without obvious abnormality, atraumatic.  She has some swelling along the lower left jawline.  Eyes: conjunctivae/corneas clear.   Ears: normal TM's and external ear canals both ears  Nose: Nares normal. Septum midline. Mucosa normal. No drainage or sinus tenderness.  Throat: lips, mucosa, and tongue normal; 2 back lower molars on the left look decayed.  No palpable masses, just diffuse swelling.  No erythema or warmth.  Neck: no adenopathy  Lungs: clear to auscultation bilaterally  Heart: regular rate and rhythm, S1, S2 normal, no murmur, click, rub or gallop      No results found for this or any previous visit (from the past 168 hour(s)).       This note has been dictated using voice recognition software. Any grammatical or context distortions are unintentional and inherent to the software  "

## 2021-06-18 NOTE — ANESTHESIA CARE TRANSFER NOTE
Last vitals:   Vitals:    06/22/18 2240   BP: 167/88   Pulse: 81   Resp: 15   Temp:    SpO2: 98%     Patient's level of consciousness is drowsy  Spontaneous respirations: yes  Maintains airway independently: yes  Dentition unchanged: yes  Oropharynx: oropharynx clear of all foreign objects    QCDR Measures:  ASA# 20 - Surgical Safety Checklist: WHO surgical safety checklist completed prior to induction  PQRS# 430 - Adult PONV Prevention: 4558F - Pt received => 2 anti-emetic agents (different classes) preop & intraop  ASA# 8 - Peds PONV Prevention: NA - Not pediatric patient, not GA or 2 or more risk factors NOT present  PQRS# 424 - Edita-op Temp Management: 4559F - At least one body temp DOCUMENTED => 35.5C or 95.9F within required timeframe  PQRS# 426 - PACU Transfer Protocol: - Transfer of care checklist used  ASA# 14 - Acute Post-op Pain: ASA14B - Patient did NOT experience pain >= 7 out of 10

## 2021-06-18 NOTE — PROGRESS NOTES
"[facility] Note    Patient Name: Jackie Maguire  Patient Age: 63 y.o.  YOB: 1955  MRN: [id number]    Date of visit: 6/20/2018    Patient Active Problem List   Diagnosis     Cervicalgia     Focal Torsion Dystonia     Cervical Spondylosis     Occipital Neuralgia     Hypothyroidism     Vitamin D Deficiency     Fatigue     Psoriatic Arthropathy     Myofascial Pain Syndrome     Cutaneous malignant melanoma (H)     Social History     Social History Narrative     Outpatient Encounter Prescriptions as of 6/20/2018   Medication Sig Dispense Refill     amoxicillin-clavulanate (AUGMENTIN) 500-125 mg per tablet Take 1 tablet (500 mg total) by mouth 2 (two) times a day for 10 days. 20 tablet 0     levothyroxine (SYNTHROID, LEVOTHROID) 137 MCG tablet TAKE ONE TABLET BY MOUTH ONCE DAILY 90 tablet 2     methotrexate 2.5 MG tablet Take 8 pills every Wednesday       predniSONE (DELTASONE) 5 MG tablet Take 1 tablet (5 mg total) by mouth daily. 60 tablet 1     No facility-administered encounter medications on file as of 6/20/2018.        Chief Complaint:   Chief Complaint   Patient presents with     Follow-up     swollen cheek and has a new rash on swollen cheek - thinks it shingles       /82  Pulse 88  Temp 98.3  F (36.8  C) (Oral)   Resp 12  Ht 5' 7\" (1.702 m)  Wt 174 lb 1 oz (79 kg)  BMI 27.26 kg/m2  HPI:   Has had left jaw swelling started Friday.  Started taking augmentin.  Seems slight improved with this. No fevers.  Swallowing and breathing okay.  Difficult to chew.  She knows she has bad teeth and has not been to the dentist.  Started having a rash over her left jaw yesterday.      ROS: Pertinent ros findings in hpi, all other systems negative.    Objective/Physical Exam:     /82  Pulse 88  Temp 98.3  F (36.8  C) (Oral)   Resp 12  Ht 5' 7\" (1.702 m)  Wt 174 lb 1 oz (79 kg)  BMI 27.26 kg/m2    Heart: Regular rhythm, no rubs or gallops.  Normal rate: y  Murmur: n    Lungs:   Clear to " auscultation bilaterally: y  Wheeze: n  Rhonchi: n  Crackles: n  Area of decreased breath sounds: n  Labored breathing: n      Left eye:  Conjunctival erythema: n  Purulent drainage: n  Proptosis:n    Right eye:  Conjunctival erythema: n  Purulent drainage: n  Proptosis: n    Right tympanic membrane:   color: pale  ossicles visualized: y  umbo distorted: n  cone of light distorted: n  Air/fluid levels: n  Drainage:n  Canal: not edematous no discharge  Pain with external ear manipulation: n  Foreign body: n    Left tympanic membrane:   Occluded by cerumen  Drainage: n  Canal: not edematous no discharge  Pain with external ear manipulation: n  Foreign body: n    No auricular protrusion or erythema/swelling over mastoid area bilaterally.    No white patches that scrape off, no deviation of uvula. no ulcerations noted.    No torticollis, neck stiffness, drooling, muffled/hot potato voice, submandibular swelling, or stridor.    Pharynx:   Erythema: n  Pus pockets: n    Left jaw quite edematous/indurated erythema.  Seems fluctuant in middle. Posterior molar left is decayed.  Unable to open jaw fully.      Moist mucous membranes: y    MSK: no muscle or joint swelling  Neuro: no dysarthria or gross asymmetry  Psych: full affect, oriented x 3  Hematologic: no petechiae or purpura    Skin: No rash.       Assessment/Plan:  No results found for this or any previous visit (from the past 24 hour(s)).  Encounter Diagnoses   Name Primary?     Cellulitis, face Yes       No orders of the defined types were placed in this encounter.      Likely abscess left jaw.  Failed antibiotic treatment.  On immunosuppressants.  Needs ER evaluation, son will drive her to [facility] presently, I called report.      There are no Patient Instructions on file for this visit.    Counseled patient regarding treatments, treatment options, risks and benefits and diagnosis.  The patient was interactive, attentive, verbalized understanding, and we discussed  plan.     Filipe Forte MD

## 2021-06-18 NOTE — ANESTHESIA POSTPROCEDURE EVALUATION
Patient: Jackie Maguire  INCISION AND DRAINAGE JAW ABSCESS  Anesthesia type: general    Patient location: PACU  Last vitals:   Vitals:    06/22/18 2320   BP: 140/64   Pulse: (!) 44   Resp: 13   Temp:    SpO2: 99%     Post vital signs: stable  Level of consciousness: awake and responds to simple questions  Post-anesthesia pain: pain controlled  Post-anesthesia nausea and vomiting: no  Pulmonary: unassisted, spontaneous ventilation, nasal cannula  Cardiovascular: stable and blood pressure at baseline  Hydration: adequate  Anesthetic events: no    QCDR Measures:  ASA# 11 - Edita-op Cardiac Arrest: ASA11B - Patient did NOT experience unanticipated cardiac arrest  ASA# 12 - Edita-op Mortality Rate: ASA12B - Patient did NOT die  ASA# 13 - PACU Re-Intubation Rate: ASA13B - Patient did NOT require a new airway mgmt  ASA# 10 - Composite Anes Safety: ASA10A - No serious adverse event    Additional Notes:

## 2021-06-18 NOTE — ANESTHESIA PREPROCEDURE EVALUATION
Anesthesia Evaluation      Patient summary reviewed   No history of anesthetic complications     Airway   Mallampati: III  Neck ROM: limited   Pulmonary - negative ROS and normal exam    breath sounds clear to auscultation                         Cardiovascular - normal exam  Exercise tolerance: > or = 4 METS   Neuro/Psych    (+) neuromuscular disease (limited neck mobility from RA),  chronic pain    Endo/Other    (+) hypothyroidism, arthritis (severe RA on chronic prednisone, also on MTX),      GI/Hepatic/Renal - negative ROS      Other findings: Admitted 2 days ago, with submandibular abscess. Improvement with steroids, ABX.      Dental    (+) poor dentition                       Anesthesia Plan  Planned anesthetic: general endotracheal  Versed/fent/propofol/marina  Glidescope  Decadron 10/zofran 4  ASA 3 - emergent   Induction: intravenous   Anesthetic plan and risks discussed with: patient and spouse  Anesthesia plan special considerations: video-assisted, antiemetics,   Post-op plan: routine recovery          Chemistry        Component Value Date/Time     06/22/2018 0647    K 4.0 06/22/2018 0647     (H) 06/22/2018 0647    CO2 23 06/22/2018 0647    BUN 25 (H) 06/22/2018 0647    CREATININE 0.74 06/22/2018 0647     (H) 06/22/2018 0647        Component Value Date/Time    CALCIUM 9.6 06/22/2018 0647    ALKPHOS 80 08/10/2017 0904    AST 19 08/10/2017 0904    ALT 17 08/10/2017 0904    BILITOT 0.5 08/10/2017 0904        Lab Results   Component Value Date    WBC 18.5 (H) 06/22/2018    HGB 13.6 06/22/2018    HCT 39.7 06/22/2018    MCV 94 06/22/2018     06/22/2018      CT Soft Tissue Neck With Contrast View Image   Impression:   CONCLUSION:  1. Interval decrease in size of the fluid collection involving the left face and neck when compared to 6/20/2018. There is still a persistent moderate amount of fluid present

## 2021-06-19 NOTE — LETTER
Letter by Talia Mejía MD at      Author: Talia Mejía MD Service: -- Author Type: --    Filed:  Encounter Date: 5/7/2019 Status: (Other)         Jackie Maguire  [address] [facility] [address]             May 7, 2019        Dear Jackie Maguire :    Dr. Mejía  was reviewing your chart and noticed that you are due for a mammogram.      To prevent delays in your care, please call [phone] to schedule your screening mammogram.    If you had a mammogram performed within the last 2 years at a different facility please contact the [facility] at [phone] so we can get that report.      Sincerely,  Your care team at [facility]

## 2021-06-19 NOTE — PROGRESS NOTES
Assessment/ Plan     1. Hospital discharge follow-up  Patient was hospitalized in [place] from June 20-25 for an abscess along her left jawline from an infected tooth.  She was admitted for IV antibiotics and was taken to the OR for incision and drainage.  She was able to be transitioned to oral antibiotics.  Now we need to get her in with an oral surgeon.  They have been in contact with our specialty , but as this falls under dental insurance, they need to do this mostly on their own.    2. Abscess of left jaw  Patient's abscess is improving now that it has been draining and she is on antibiotics.  They do not think it has become any more swollen since discharge from the hospital.  We will continue to monitor and hopefully this will resolve once they get that abscessed tooth out.    3. RA (rheumatoid arthritis) (H)  Patient's methotrexate was stopped in the hospital and she will remain off of this until we get the infection completely resolved.    4. Hypothyroidism  Patient's thyroid medication was adjusted in the hospital.  She will return in 3-4 weeks for recheck of TSH.      Subjective:       Jackie Maguire is a 63 y.o. female who presents for hospital follow-up.  I first saw the patient back around May 7 for some swelling in the left lower jaw.  I felt that this was likely due to an abscessed tooth so I put her on Augmentin and advised her to follow-up with her dentist.  Because the RN between dental insurances, she never followed up with the dentist.  She then called me in mid June asking for a refill of medication.  I did refill the Augmentin on the condition that she get in with the dentist.  She ended up coming back here and seeing 1 of my partners on June 20.  At that time the swelling was very large.  She was therefore sent to the emergency room and admitted to the hospital.  There were consults from infectious disease and ENT.  She ended up going to the OR and having this drained.  She was  placed on IV antibiotics.  She was able to transition to oral antibiotics and discharged home on the 25th.  They did find that her TSH was low so they adjusted her medication.  She is due to have that rechecked in about a month or so.  They stopped her methotrexate and she will be following up with a rheumatologist sometime in the future.  He also thought possibly she had structured sleep apnea she had some bradycardia in the middle the night.  Patient thinks maybe she was just overmedicated at the time.  Will address this in the future when things are feeling better.  There was some confusion about the follow-up appointment with the oral surgeon.  Patient's  has been talking to her specialty  and she has been trying to help as much as possible, but since this is dental insurance, she really does not have anything to do with it.  She has been giving him some numbers and some suggestions on where to call.  Discussed the importance of getting this taken care of as soon as possible.    Relevant past medical, family, surgical, and social history reviewed with patient, unless noted in HPI, not pertinent for this visit.    Review of Systems   A 12 point comprehensive review of systems was negative except as noted.      Current Outpatient Prescriptions   Medication Sig Dispense Refill     amoxicillin-clavulanate (AUGMENTIN) 500-125 mg per tablet Take 1 tablet (500 mg total) by mouth 2 (two) times a day for 21 days. 42 tablet 0     cholecalciferol, vitamin D3, 1,000 unit tablet Take 1,000 Units by mouth daily.       famotidine (PEPCID) 20 MG tablet Take 1 tablet (20 mg total) by mouth 2 (two) times a day.  0     levothyroxine (SYNTHROID, LEVOTHROID) 112 MCG tablet Take 1 tablet (112 mcg total) by mouth daily. 30 tablet 0     predniSONE (DELTASONE) 2.5 MG tablet Take 2.5 mg by mouth daily.       methotrexate 2.5 MG tablet Take 20 mg by mouth once a week. 8 tablets every Wednesday.       No current  "facility-administered medications for this visit.        Objective:      /60  Pulse 72  Temp 98.4  F (36.9  C) (Oral)   Resp 16  Ht 5' 6\" (1.676 m)  Wt 172 lb (78 kg)  BMI 27.76 kg/m2      General appearance: alert, appears stated age and cooperative  Head: Normocephalic, without obvious abnormality, atraumatic  Eyes: conjunctivae/corneas clear. .  Neck: She still has some mild swelling along the left jawline and down into the neck.  There is no overlying erythema, there is some crustiness where a drain was placed and continues to drain somewhat.  Lungs: clear to auscultation bilaterally  Heart: regular rate and rhythm, S1, S2 normal, no murmur, click, rub or gallop      Recent Results (from the past 168 hour(s))   C-Reactive Protein   Result Value Ref Range    CRP 10.8 (H) 0.0 - 0.8 mg/dL   Basic Metabolic Panel   Result Value Ref Range    Sodium 140 136 - 145 mmol/L    Potassium 4.0 3.5 - 5.0 mmol/L    Chloride 108 (H) 98 - 107 mmol/L    CO2 23 22 - 31 mmol/L    Anion Gap, Calculation 9 5 - 18 mmol/L    Glucose 188 (H) 70 - 125 mg/dL    Calcium 9.6 8.5 - 10.5 mg/dL    BUN 25 (H) 8 - 22 mg/dL    Creatinine 0.74 0.60 - 1.10 mg/dL    GFR MDRD Af Amer >60 >60 mL/min/1.73m2    GFR MDRD Non Af Amer >60 >60 mL/min/1.73m2   Magnesium   Result Value Ref Range    Magnesium 2.1 1.8 - 2.6 mg/dL   POCT Glucose   Result Value Ref Range    Glucose,  mg/dL   HM2(CBC w/o Differential)   Result Value Ref Range    WBC 18.5 (H) 4.0 - 11.0 thou/uL    RBC 4.24 3.80 - 5.40 mill/uL    Hemoglobin 13.6 12.0 - 16.0 g/dL    Hematocrit 39.7 35.0 - 47.0 %    MCV 94 80 - 100 fL    MCH 32.1 27.0 - 34.0 pg    MCHC 34.3 32.0 - 36.0 g/dL    RDW 14.2 11.0 - 14.5 %    Platelets 380 140 - 440 thou/uL    MPV 9.7 8.5 - 12.5 fL   POCT Glucose   Result Value Ref Range    Glucose,  mg/dL   Culture and Gram Stain, Surgical Site   Result Value Ref Range    Culture 1+ Streptococcus viridans group (!)     Culture 1+ Lactobacillus " species (!)     Gram Stain Result 3+ Polymorphonuclear leukocytes     Gram Stain Result 3+ Gram positive cocci in pairs    Culture, Anaerobic   Result Value Ref Range    Culture No anaerobic organisms isolated    Basic Metabolic Panel   Result Value Ref Range    Sodium 140 136 - 145 mmol/L    Potassium 4.1 3.5 - 5.0 mmol/L    Chloride 110 (H) 98 - 107 mmol/L    CO2 25 22 - 31 mmol/L    Anion Gap, Calculation 5 5 - 18 mmol/L    Glucose 157 (H) 70 - 125 mg/dL    Calcium 8.7 8.5 - 10.5 mg/dL    BUN 27 (H) 8 - 22 mg/dL    Creatinine 0.68 0.60 - 1.10 mg/dL    GFR MDRD Af Amer >60 >60 mL/min/1.73m2    GFR MDRD Non Af Amer >60 >60 mL/min/1.73m2   Magnesium   Result Value Ref Range    Magnesium 2.1 1.8 - 2.6 mg/dL   HM2(CBC w/o Differential)   Result Value Ref Range    WBC 14.0 (H) 4.0 - 11.0 thou/uL    RBC 3.53 (L) 3.80 - 5.40 mill/uL    Hemoglobin 11.3 (L) 12.0 - 16.0 g/dL    Hematocrit 33.6 (L) 35.0 - 47.0 %    MCV 95 80 - 100 fL    MCH 32.0 27.0 - 34.0 pg    MCHC 33.6 32.0 - 36.0 g/dL    RDW 14.5 11.0 - 14.5 %    Platelets 301 140 - 440 thou/uL    MPV 9.8 8.5 - 12.5 fL   TSH   Result Value Ref Range    TSH 0.02 (L) 0.30 - 5.00 uIU/mL   Echo Complete   Result Value Ref Range    AV mean bruce 128 cm/s    AV mean gradient 8 mmHg    AV VTI 38.6 cm    AV peak bruce 196 cm/s    AO root 3 cm    IVSd 1.28 0.6 - 0.9 cm    LVIDd 2.99 3.8 - 5.2 cm    LVIDs 2.19 2.2 - 3.5 cm    LVOT mean gradient 6 mmHg    LVOT peak VTI 29.9 cm    LVOT mean bruce 116 cm/s    LVOT peak bruce 158 cm/s    LVOT peak gradient 10 mmHg    LV PWd 1.06 0.6 - 0.9 cm    MV E' lat bruce 6.96 cm/s    MV E' med bruce 6 cm/s    MV decel time 261 ms    MV peak A bruce 141 cm/s    MV peak E bruce 111 cm/s    TR peak bruce 151 cm/s    BSA 1.92 m2    Hieght 66 in    Weight 2780.8 lbs    /68 mmHg    HR 49 bpm    IVS/PW ratio 1.2     TR peak gradent 9.1 mmHg    LV FS 26.8 28 - 44 %    LA volume 39.0 mL    LV mass 104.3 g    AV DIM IND bruce 0.8     MV E/A Ratio 0.8     AV peak  gradient 15.4 mmHg    LA volume index 20.3 mL/m2    LV mass index 54.3 g/m2    TAPSE 2.1 cm    MV med E/e' ratio 18.5     MV lat E/e' ratio 15.9     LA area 2 16.4 cm2    LA area 1 14.0 cm2    Height 66.0 in    Weight 174 lbs    MV Avg E/e' Ratio 17.1 cm/s    LA length 5.0 cm    AV DIM IND VTI 0.8     Echo LVEF Estimated 60 %   T4, Free   Result Value Ref Range    Free T4 1.4 0.7 - 1.8 ng/dL   Basic Metabolic Panel   Result Value Ref Range    Sodium 142 136 - 145 mmol/L    Potassium 3.7 3.5 - 5.0 mmol/L    Chloride 110 (H) 98 - 107 mmol/L    CO2 25 22 - 31 mmol/L    Anion Gap, Calculation 7 5 - 18 mmol/L    Glucose 92 70 - 125 mg/dL    Calcium 8.5 8.5 - 10.5 mg/dL    BUN 30 (H) 8 - 22 mg/dL    Creatinine 0.76 0.60 - 1.10 mg/dL    GFR MDRD Af Amer >60 >60 mL/min/1.73m2    GFR MDRD Non Af Amer >60 >60 mL/min/1.73m2   HM2(CBC w/o Differential)   Result Value Ref Range    WBC 11.1 (H) 4.0 - 11.0 thou/uL    RBC 3.76 (L) 3.80 - 5.40 mill/uL    Hemoglobin 12.0 12.0 - 16.0 g/dL    Hematocrit 35.8 35.0 - 47.0 %    MCV 95 80 - 100 fL    MCH 31.9 27.0 - 34.0 pg    MCHC 33.5 32.0 - 36.0 g/dL    RDW 14.3 11.0 - 14.5 %    Platelets 251 140 - 440 thou/uL    MPV 9.4 8.5 - 12.5 fL   Magnesium   Result Value Ref Range    Magnesium 2.0 1.8 - 2.6 mg/dL   POCT Glucose   Result Value Ref Range    Glucose, POC 74 mg/dL   POCT Glucose   Result Value Ref Range    Glucose,  mg/dL   POCT Glucose   Result Value Ref Range    Glucose,  mg/dL   POCT Glucose   Result Value Ref Range    Glucose,  mg/dL   Basic Metabolic Panel   Result Value Ref Range    Sodium 140 136 - 145 mmol/L    Potassium 3.9 3.5 - 5.0 mmol/L    Chloride 109 (H) 98 - 107 mmol/L    CO2 24 22 - 31 mmol/L    Anion Gap, Calculation 7 5 - 18 mmol/L    Glucose 135 (H) 70 - 125 mg/dL    Calcium 9.1 8.5 - 10.5 mg/dL    BUN 20 8 - 22 mg/dL    Creatinine 0.69 0.60 - 1.10 mg/dL    GFR MDRD Af Amer >60 >60 mL/min/1.73m2    GFR MDRD Non Af Amer >60 >60 mL/min/1.73m2    Magnesium   Result Value Ref Range    Magnesium 2.4 1.8 - 2.6 mg/dL   HM2(CBC w/o Differential)   Result Value Ref Range    WBC 14.0 (H) 4.0 - 11.0 thou/uL    RBC 4.16 3.80 - 5.40 mill/uL    Hemoglobin 13.1 12.0 - 16.0 g/dL    Hematocrit 38.8 35.0 - 47.0 %    MCV 93 80 - 100 fL    MCH 31.5 27.0 - 34.0 pg    MCHC 33.8 32.0 - 36.0 g/dL    RDW 13.9 11.0 - 14.5 %    Platelets 294 140 - 440 thou/uL    MPV 9.6 8.5 - 12.5 fL   POCT Glucose   Result Value Ref Range    Glucose, POC 98 mg/dL          This note has been dictated using voice recognition software. Any grammatical or context distortions are unintentional and inherent to the software

## 2021-06-19 NOTE — PROGRESS NOTES
Assessment/ Plan     1. Abscess of left jaw  Patient had a previous hospitalization for large abscess on her job that required surgery.  Since discharge, she has been into see an oral surgeon and had the tooth removed that was causing the problem.  She was on 3 weeks of amoxicillin.  She has been off now for about a week.  She has had no recurrence of symptoms and the dentist told her the infection has all resolved.  Her exam looks good today.  She does have a little bit of scar tissue but I do not feel anything else concerning.    2. RA (rheumatoid arthritis) (H)  Per her rheumatologist, when she has been off antibiotics for a week and it appears that the infection has resolved, she can restart her methotrexate.  She was on 8 tablets weekly.  She will start with 4 and then go up to 8 the following week.    3. Hypothyroidism  Patient and her thyroid meds adjusted in the hospital.  She is due for recheck today.  As soon as I get those results back, will send in a refill of her thyroid medication as she only has 1 pill left.  - Thyroid Stimulating Hormone (TSH)      Subjective:       Jackie Maguire is a 63 y.o. female who presents for follow-up abscessed tooth and abscess of her jaw.  Patient had an abscess in 1 of her left upper molars.  I had seen her for a couple of occasions and put her on antibiotics.  She was unable to follow-up at that time with the dentist due to insurance issues.  This ended up spreading and creating a large abscess in her jaw area.  She ended up being admitted to [facility] for quite some time.  She got IV antibiotics and had a surgical drainage.  She was placed on outpatient antibiotics and told she had to get the tooth taken care of.  She did finally get in and had several teeth pulled.  She states is now feeling a lot better.  She has been off antibiotics for about a week or so.  She talked her rheumatologist as her methotrexate have been held when she had this infection.  They were  "okay with her restarting once she been off antibiotics for a week or so.  They also did some adjusting to her thyroid medication when she was in the hospital.  She is due to check a TSH today and refills of her medication.    Relevant past medical, family, surgical, and social history reviewed with patient, unless noted in HPI, not pertinent for this visit.    Review of Systems   A 12 point comprehensive review of systems was negative except as noted.      Current Outpatient Prescriptions   Medication Sig Dispense Refill     cholecalciferol, vitamin D3, 1,000 unit tablet Take 1,000 Units by mouth daily.       famotidine (PEPCID) 20 MG tablet Take 1 tablet (20 mg total) by mouth 2 (two) times a day.  0     levothyroxine (SYNTHROID, LEVOTHROID) 112 MCG tablet Take 1 tablet (112 mcg total) by mouth daily. 30 tablet 0     methotrexate 2.5 MG tablet Take 20 mg by mouth once a week. 8 tablets every Wednesday.       No current facility-administered medications for this visit.        Objective:      /76  Pulse 68  Temp 98  F (36.7  C) (Oral)   Resp 16  Ht 5' 6\" (1.676 m)  Wt 174 lb (78.9 kg)  BMI 28.08 kg/m2      General appearance: alert, appears stated age and cooperative  Head: Normocephalic, without obvious abnormality, atraumatic  Throat: Upper left molar has been removed, no surrounding erythema or drainage.  No tenderness to palpation along the left jaw.  There is no swelling but there is some palpable scar tissue.  Neck: no adenopathy  Lungs: clear to auscultation bilaterally  Heart: regular rate and rhythm, S1, S2 normal, no murmur, click, rub or gallop      No results found for this or any previous visit (from the past 168 hour(s)).       This note has been dictated using voice recognition software. Any grammatical or context distortions are unintentional and inherent to the software  "

## 2021-06-20 NOTE — PROGRESS NOTES
Chief Complaint   Patient presents with     Cold Symptoms     Started Saturday morning.  Pt complains of swollen/red eyes, and a cough.         HPI:   Jackie Maguire is a 63 y.o. female c/o cold symptoms for the last four days.  Eyes red, watery with discharge.  Has felt chilled.  Mild stuffy nose.  Cough, nonproductive.  No shortness of breath.   No chest pain.  No stomach upset.  No myalgias.  No headache.  Poor appetite.  No dysuria    Used eye drops in eyes.  No other medication taken.  No known exposures.    ROS:  Constitutional: chills, no documented fever  Eyes: as per HPI  ENT: as per HPI  Respiratory: as per HPI   CV: no chest pain  GI: as per HPI  : as per HPi  SKIN: as per HPI  MS: as per HPI  NEURO: no headache     Medications:  Current Outpatient Prescriptions on File Prior to Visit   Medication Sig Dispense Refill     cholecalciferol, vitamin D3, 1,000 unit tablet Take 1,000 Units by mouth daily.       famotidine (PEPCID) 20 MG tablet Take 1 tablet (20 mg total) by mouth 2 (two) times a day. (Patient taking differently: Take 20 mg by mouth daily. )  0     levothyroxine (SYNTHROID, LEVOTHROID) 112 MCG tablet Take 1 tablet (112 mcg total) by mouth daily. 90 tablet 3     methotrexate 2.5 MG tablet Take 20 mg by mouth once a week. 8 tablets every Wednesday.       No current facility-administered medications on file prior to visit.          Social History:  Social History   Substance Use Topics     Smoking status: Never Smoker     Smokeless tobacco: Never Used     Alcohol use No         Physical Exam:   Vitals:    09/26/18 0928   BP: 124/82   Pulse: (!) 110   Temp: 98.1  F (36.7  C)   TempSrc: Oral   SpO2: 98%   Weight: 170 lb 8 oz (77.3 kg)       GENERAL:   Alert. Oriented.  EYES: moderate erythema eyelids.  Slight scleral injection.  No proptosis.  Normal EOM  HENT:  Ears: R TM pearly gray. Normal landmarks. L TM pearly gray.  Normal landmarks  Nose: Clear.  Sinuses: Nontender.  Oropharynx:  No  erythema. No exudate.  NECK: Supple. No adenopathy.  LUNGS: Clear to ascultation.  No crackles.  No wheezing  HEART: RRR  SKIN:  No rash.         Assessment/Plan:    1. URI, acute     2. Cough  benzonatate (TESSALON) 100 MG capsule      Has mild tachycardia but no signs of respiratory distress or bacterial infection.  Viral URI with viral conjunctivitis.  May use antihistamine eye drop to help with itchy eyes.  Lubricating eye drops several times a day.  Cool compresses to eyes.  Given benzonatate to help with cough  May use over the counter cough medication as needed also.  Good fluid intake.  Recheck promptly if worsening.          Luis Felipe Macedo MD      9/26/2018    The following portions of the patient's history were reviewed and updated as appropriate: allergies, current medications, past family history, past medical history, past social history, past surgical history and problem list.

## 2021-06-23 ENCOUNTER — RECORDS - HEALTHEAST (OUTPATIENT)
Dept: ADMINISTRATIVE | Facility: OTHER | Age: 66
End: 2021-06-23

## 2021-06-23 ENCOUNTER — OFFICE VISIT (OUTPATIENT)
Dept: DERMATOLOGY | Facility: CLINIC | Age: 66
End: 2021-06-23
Payer: MEDICARE

## 2021-06-23 VITALS — DIASTOLIC BLOOD PRESSURE: 75 MMHG | SYSTOLIC BLOOD PRESSURE: 135 MMHG | OXYGEN SATURATION: 97 % | HEART RATE: 77 BPM

## 2021-06-23 DIAGNOSIS — C44.319 BASAL CELL CARCINOMA (BCC) OF LEFT TEMPLE REGION: Primary | ICD-10-CM

## 2021-06-23 PROCEDURE — 99207 PR NO CHARGE LOS: CPT | Performed by: DERMATOLOGY

## 2021-06-23 PROCEDURE — 13132 CMPLX RPR F/C/C/M/N/AX/G/H/F: CPT | Performed by: DERMATOLOGY

## 2021-06-23 PROCEDURE — 17311 MOHS 1 STAGE H/N/HF/G: CPT | Performed by: DERMATOLOGY

## 2021-06-23 NOTE — PATIENT INSTRUCTIONS
Sutured Wound Care     [facility]: [phone]    [facility]: [phone]          ? No strenuous activity for 48 hours. Resume moderate activity in 48 hours. No heavy exercising until you are seen for follow up in one week.     ? Take Tylenol as needed for discomfort.                         ? Do not drink alcoholic beverages for 48 hours.     ? Keep the pressure bandage in place for 24 hours. If the bandage becomes blood tinged or loose, reinforce it with gauze and tape.        (Refer to the reverse side of this page for management of bleeding).    ? Remove pressure bandage in 24 hours     ? Leave the flat bandage in place until your follow up appointment.    ? Keep the bandage dry. Wash around it carefully.    ? If the tape becomes soiled or starts to come off, reinforce it with additional paper tape.    ? Do not smoke for 3 weeks; smoking is detrimental to wound healing.    ? It is normal to have swelling and bruising around the surgical site. The bruising will fade in approximately 10-14 days. Elevate the area to reduce swelling.    ? Numbness, itchiness and sensitivity to temperature changes can occur after surgery and may take up to 18 months to normalize.      POSSIBLE COMPLICATIONS    BLEEDIN. Leave the bandage in place.  2. Use tightly rolled up gauze or a cloth to apply direct pressure over the bandage for 20   minutes.  3. Reapply pressure for an additional 20 minutes if necessary  4. Call the office or go to the nearest emergency room if pressure fails to stop the bleeding.  5. Use additional gauze and tape to maintain pressure once the bleeding has stopped.        PAIN:    1. Post operative pain should slowly get better, never worse.  2. A severe increase in pain may indicate a problem. Call the office if this occurs.    In case of emergency phone:Dr Olivas [phone]

## 2021-06-23 NOTE — LETTER
6/23/2021         RE: Jackie Maguire  [address] [facility] [id number]        Dear Colleague,    Thank you for referring your patient, Jackie Maguire, to the [facility]. Please see a copy of my visit note below.    Surgical Office Location :   [facility]  [address] [place] [address]      Jackie Maguire is an extremely pleasant 66 year old year old female patient here today for evaluation and managment of basal cell carcinoma on left temple. Patient has no other skin complaints today.  Remainder of the HPI, Meds, PMH, Allergies, FH, and SH was reviewed in chart.      Past Medical History:   Diagnosis Date     Basal cell carcinoma      Hyperlipidemia LDL goal <160 1/5/2011     Hypothyroidism 10/1/2010     Inflammatory arthritis 1/20/2011     Malignant melanoma (H)        Past Surgical History:   Procedure Laterality Date     APPENDECTOMY       ARTHRODESIS TOE(S) Left 8/17/2017    Procedure: ARTHRODESIS TOE(S);  Left foot 1st metatarsophalangeal realignment fusion, 2nd & 3rd digital corrections;  Surgeon: Jason Lamar DPM;  Location: [facility] OR     IR CERVICAL EPIDURAL STEROID INJECTION  5/9/2013     OPEN REDUCTION INTERNAL FIXATION HUMERUS PROXIMAL Left 9/30/2016    Procedure: OPEN REDUCTION INTERNAL FIXATION HUMERUS PROXIMAL;  Surgeon: Charly Bernal MD;  Location: [facility] OR        Family History   Problem Relation Age of Onset     Cancer Father        Social History     Socioeconomic History     Marital status:      Spouse name: Not on file     Number of children: Not on file     Years of education: Not on file     Highest education level: Not on file   Occupational History     Not on file   Social Needs     Financial resource strain: Not on file     Food insecurity     Worry: Not on file     Inability: Not on file     Transportation needs     Medical: Not on file     Non-medical: Not on file   Tobacco Use     Smoking status: Never Smoker  "    Smokeless tobacco: Never Used   Substance and Sexual Activity     Alcohol use: No     Drug use: No     Sexual activity: Yes     Partners: Male   Lifestyle     Physical activity     Days per week: Not on file     Minutes per session: Not on file     Stress: Not on file   Relationships     Social connections     Talks on phone: Not on file     Gets together: Not on file     Attends [employer] service: Not on file     Active member of club or organization: Not on file     Attends meetings of clubs or organizations: Not on file     Relationship status: Not on file     Intimate partner violence     Fear of current or ex partner: Not on file     Emotionally abused: Not on file     Physically abused: Not on file     Forced sexual activity: Not on file   Other Topics Concern     Parent/sibling w/ CABG, MI or angioplasty before 65F 55M? Not Asked   Social History Narrative     Not on file       Outpatient Encounter Medications as of 6/23/2021   Medication Sig Dispense Refill     acetaminophen (TYLENOL) 325 MG tablet 650 mg po QID and BId  tablet 0     aspirin 81 MG tablet Take 1 tablet (81 mg) by mouth daily (Patient not taking: Reported on 11/25/2019) 42 tablet 0     folic acid (FOLVITE) 1 MG tablet Take 1 tablet (1 mg) by mouth daily 90 tablet 3     hydrOXYzine (ATARAX) 25 MG tablet Take 1 tablet (25 mg) by mouth every 4 hours as needed for itching (and nausea) 36 tablet 0     Insulin Syringe-Needle U-100 (BD INSULIN SYRINGE) 27G X 1/2\" 1 ML MISC For weekly methotrexate administration. 12 each 6     LEVOTHYROXINE SODIUM PO Take 137 mcg by mouth daily        methotrexate 50 MG/2ML injection Inject 0.4 mLs (10 mg) Subcutaneous every 7 days 8 mL 1     oxyCODONE-acetaminophen (PERCOCET) 5-325 MG per tablet Take 1-2 tablets by mouth every 4 hours as needed for pain (moderate to severe) (Patient not taking: Reported on 12/7/2020) 38 tablet 0     tofacitinib (XELJANZ XR) 11 MG 24 hr tablet Take 1 tablet (11 mg) by mouth " daily Hold for signs of infection, then seek medical attention. 30 tablet 12     VITAMIN D, CHOLECALCIFEROL, PO Take by mouth daily       No facility-administered encounter medications on file as of 6/23/2021.              O:   NAD, WDWN, Alert & Oriented, Mood & Affect wnl, Vitals stable   Here today alone   /75   Pulse 77   SpO2 97%    General appearance normal   Vitals stable   Alert, oriented and in no acute distress     L temple 1cm pink pearly papule   Eyes: Conjunctivae/lids:Normal     ENT: Lips, buccal mucosa, tongue: normal    MSK:Normal    Cardiovascular: peripheral edema none    Pulm: Breathing Normal    Neuro/Psych: Orientation:Alert and Orientedx3 ; Mood/Affect:normal       A/P:  1. L temple basal cell carcinoma   MOHS:   Location    The rationale for Mohs surgery was discussed with the patient and consent was obtained.  The risks and benefits as well as alternatives to therapy were discussed, in detail.  Specifically, the risks of infection, scarring, bleeding, prolonged wound healing, incomplete removal, allergy to anesthesia, nerve injury and recurrence were addressed.  Indication for Mohs was Location. Prior to the procedure, the treatment site was clearly identified and, if available, confirmed with previous photos and confirmed by the patient   All components of the Universal Protocol/PAUSE rule were completed.  The Mohs surgeon operated in two distinct and integrated capacities as the surgeon and pathologist.      The area was prepped with Betasept.  A rim of normal appearing skin was marked circumferentially around the lesion.  The area was infiltrated with local anesthesia.  The tumor was first debulked to remove all clinically apparent tumor.  An incision following the standard Mohs approach was done and the specimen was oriented,mapped and placed in 1 block(s).  Each specimen was then chromacoded and processed in the Mohs laboratory using standard Mohs technique and submitted for  frozen section histology.  Frozen section analysis showed no residual tumor but CLEAR MARGINS.      The tumor was excised using standard Mohs technique in 1 stages(s).  CLEAR MARGINS OBTAINED and Final defect size was 1.4 x 1.5 cm.     We discussed the options for wound management in full with the patient including risks/benefits/ possible outcomes.        REPAIR COMPLEX: Because of the tightness of the surrounding skin and Because of the size and full thickness nature of the defect, Because of the tightness of the surrounding skin, To maintain form and function and In order to avoid distortion, a complex closure was planned. After LEC anesthesia and prep, Burow's triangles were excised in the relaxed skin tension lines. The wound edges were widely undermined greater than width of the defect on both sides (1.4 cm) by dissection in the subcutaneous plane until adequate tissue mobility was obtained. Hemostasis was obtained. The wound edges were closed in a layered fashion using Vicryl and Fast Absorbing Plain Gut sutures. Postoperative length was 4.2 cm.   EBL minimal; complications none; wound care routine.  The patient was discharged in good condition and will return in one week for wound evaluation.  It was a pleasure speaking to Jackie Maguire today.  Previous clinic notes and pertinent laboratory tests were reviewed prior to Jackie Maguire's visit.  Signs and Symptoms of skin cancer discussed with patient.  Patient encouraged to perform monthly skin exams.  UV precautions reviewed with patient.  Risks of non-melanoma skin cancer discussed with patient   Return to clinic 6 months        Again, thank you for allowing me to participate in the care of your patient.        Sincerely,        Laron Olivas MD

## 2021-06-23 NOTE — PROGRESS NOTES
Jackie Maguire is an extremely pleasant 66 year old year old female patient here today for evaluation and managment of basal cell carcinoma on left temple. Patient has no other skin complaints today.  Remainder of the HPI, Meds, PMH, Allergies, FH, and SH was reviewed in chart.      Past Medical History:   Diagnosis Date     Basal cell carcinoma      Hyperlipidemia LDL goal <160 1/5/2011     Hypothyroidism 10/1/2010     Inflammatory arthritis 1/20/2011     Malignant melanoma (H)        Past Surgical History:   Procedure Laterality Date     APPENDECTOMY       ARTHRODESIS TOE(S) Left 8/17/2017    Procedure: ARTHRODESIS TOE(S);  Left foot 1st metatarsophalangeal realignment fusion, 2nd & 3rd digital corrections;  Surgeon: Jason Lamar DPM;  Location: [facility]     IR CERVICAL EPIDURAL STEROID INJECTION  5/9/2013     OPEN REDUCTION INTERNAL FIXATION HUMERUS PROXIMAL Left 9/30/2016    Procedure: OPEN REDUCTION INTERNAL FIXATION HUMERUS PROXIMAL;  Surgeon: Charly Bernal MD;  Location: [facility]        Family History   Problem Relation Age of Onset     Cancer Father        Social History     Socioeconomic History     Marital status:      Spouse name: Not on file     Number of children: Not on file     Years of education: Not on file     Highest education level: Not on file   Occupational History     Not on file   Social Needs     Financial resource strain: Not on file     Food insecurity     Worry: Not on file     Inability: Not on file     Transportation needs     Medical: Not on file     Non-medical: Not on file   Tobacco Use     Smoking status: Never Smoker     Smokeless tobacco: Never Used   Substance and Sexual Activity     Alcohol use: No     Drug use: No     Sexual activity: Yes     Partners: Male   Lifestyle     Physical activity     Days per week: Not on file     Minutes per session: Not on file     Stress: Not on file   Relationships     Social connections     Talks on phone: Not on file     Gets  "together: Not on file     Attends Yazidi service: Not on file     Active member of club or organization: Not on file     Attends meetings of clubs or organizations: Not on file     Relationship status: Not on file     Intimate partner violence     Fear of current or ex partner: Not on file     Emotionally abused: Not on file     Physically abused: Not on file     Forced sexual activity: Not on file   Other Topics Concern     Parent/sibling w/ CABG, MI or angioplasty before 65F 55M? Not Asked   Social History Narrative     Not on file       Outpatient Encounter Medications as of 6/23/2021   Medication Sig Dispense Refill     acetaminophen (TYLENOL) 325 MG tablet 650 mg po QID and BId  tablet 0     aspirin 81 MG tablet Take 1 tablet (81 mg) by mouth daily (Patient not taking: Reported on 11/25/2019) 42 tablet 0     folic acid (FOLVITE) 1 MG tablet Take 1 tablet (1 mg) by mouth daily 90 tablet 3     hydrOXYzine (ATARAX) 25 MG tablet Take 1 tablet (25 mg) by mouth every 4 hours as needed for itching (and nausea) 36 tablet 0     Insulin Syringe-Needle U-100 (BD INSULIN SYRINGE) 27G X 1/2\" 1 ML MISC For weekly methotrexate administration. 12 each 6     LEVOTHYROXINE SODIUM PO Take 137 mcg by mouth daily        methotrexate 50 MG/2ML injection Inject 0.4 mLs (10 mg) Subcutaneous every 7 days 8 mL 1     oxyCODONE-acetaminophen (PERCOCET) 5-325 MG per tablet Take 1-2 tablets by mouth every 4 hours as needed for pain (moderate to severe) (Patient not taking: Reported on 12/7/2020) 38 tablet 0     tofacitinib (XELJANZ XR) 11 MG 24 hr tablet Take 1 tablet (11 mg) by mouth daily Hold for signs of infection, then seek medical attention. 30 tablet 12     VITAMIN D, CHOLECALCIFEROL, PO Take by mouth daily       No facility-administered encounter medications on file as of 6/23/2021.              O:   NAD, WDWN, Alert & Oriented, Mood & Affect wnl, Vitals stable   Here today alone   /75   Pulse 77   SpO2 97% "    General appearance normal   Vitals stable   Alert, oriented and in no acute distress     L temple 1cm pink pearly papule   Eyes: Conjunctivae/lids:Normal     ENT: Lips, buccal mucosa, tongue: normal    MSK:Normal    Cardiovascular: peripheral edema none    Pulm: Breathing Normal    Neuro/Psych: Orientation:Alert and Orientedx3 ; Mood/Affect:normal       A/P:  1. L temple basal cell carcinoma   MOHS:   Location    The rationale for Mohs surgery was discussed with the patient and consent was obtained.  The risks and benefits as well as alternatives to therapy were discussed, in detail.  Specifically, the risks of infection, scarring, bleeding, prolonged wound healing, incomplete removal, allergy to anesthesia, nerve injury and recurrence were addressed.  Indication for Mohs was Location. Prior to the procedure, the treatment site was clearly identified and, if available, confirmed with previous photos and confirmed by the patient   All components of the Universal Protocol/PAUSE rule were completed.  The Mohs surgeon operated in two distinct and integrated capacities as the surgeon and pathologist.      The area was prepped with Betasept.  A rim of normal appearing skin was marked circumferentially around the lesion.  The area was infiltrated with local anesthesia.  The tumor was first debulked to remove all clinically apparent tumor.  An incision following the standard Mohs approach was done and the specimen was oriented,mapped and placed in 1 block(s).  Each specimen was then chromacoded and processed in the Mohs laboratory using standard Mohs technique and submitted for frozen section histology.  Frozen section analysis showed no residual tumor but CLEAR MARGINS.      The tumor was excised using standard Mohs technique in 1 stages(s).  CLEAR MARGINS OBTAINED and Final defect size was 1.4 x 1.5 cm.     We discussed the options for wound management in full with the patient including risks/benefits/ possible  outcomes.        REPAIR COMPLEX: Because of the tightness of the surrounding skin and Because of the size and full thickness nature of the defect, Because of the tightness of the surrounding skin, To maintain form and function and In order to avoid distortion, a complex closure was planned. After LEC anesthesia and prep, Burow's triangles were excised in the relaxed skin tension lines. The wound edges were widely undermined greater than width of the defect on both sides (1.4 cm) by dissection in the subcutaneous plane until adequate tissue mobility was obtained. Hemostasis was obtained. The wound edges were closed in a layered fashion using Vicryl and Fast Absorbing Plain Gut sutures. Postoperative length was 4.2 cm.   EBL minimal; complications none; wound care routine.  The patient was discharged in good condition and will return in one week for wound evaluation.  It was a pleasure speaking to Jackie Maguire today.  Previous clinic notes and pertinent laboratory tests were reviewed prior to Jackie Maguire's visit.  Signs and Symptoms of skin cancer discussed with patient.  Patient encouraged to perform monthly skin exams.  UV precautions reviewed with patient.  Risks of non-melanoma skin cancer discussed with patient   Return to clinic 6 months

## 2021-06-28 NOTE — PROGRESS NOTES
Progress Notes by Talia Mejía MD at 2/25/2020  2:40 PM     Author: Talia Mejía MD Service: -- Author Type: Physician    Filed: 2/25/2020  3:27 PM Encounter Date: 2/25/2020 Status: Signed    : Talia Mejía MD (Physician)       Assessment/ Plan     1. Hypercholesteremia  Patient has quite elevated cholesterol.  This is likely a side effect of Xeljanz.  We discussed risks and benefits of staying on the Xeljanz.  She states that her quality of life is much better with this medication and she is tried many others in the past.  She prefers to stay on it knowing that that is likely the cause of her elevated cholesterol.  Would recommend then that we treat the cholesterol with a statin.  Reviewed potential adverse side effects.  She will take this at night.  She will plan to see her rheumatologist in 3 months and have repeat blood work done at that time.  I asked her to have them send those results so we can see how much progress she is made.  She does admit her diet has not been good the last couple of months due to some stressors at home.  She states that she will make an effort to try to eat healthier.  - atorvastatin (LIPITOR) 40 MG tablet; Take 1 tablet (40 mg total) by mouth daily.  Dispense: 90 tablet; Refill: 3    2. Rheumatoid arthritis, involving unspecified site, unspecified rheumatoid factor presence (H)  Patient was placed on Xeljanz by her rheumatologist this fall and this is likely the cause of her elevated cholesterol.    Patient denies all preventative cares.  Subjective:       Jackie Maguire is a 64 y.o. female who presents for discussion of cholesterol medication.  Patient has a longstanding history of rheumatoid arthritis.  She has been tried on many medications in the past and either had side effects or not a very good response.  She is been on Xeljanz now since last fall.  She states that it is worked really well for her and she has a lot less pain.  Unfortunately, her cholesterol has  jumped quite a bit since starting it.  This has been followed by her rheumatologist.  She has no family history of hyper cholesterolemia.  She is a non-smoker and does not have diabetes.  We discussed the risks of having elevated cholesterol including increased risk of heart disease and stroke.  She feels like her quality of life is much improved on the Xeljanz and would like to stay on it.  She is open to starting a statin to try to lower her cholesterol.  She declines all preventative cares again at this point.  She does admit that she has not been eating very well at home.  She states the last 2 months her son has been dealing with mental illness and has been living with them.  She admits that they have been eating a lot of convenience foods.  No family history of high cholesterol or heart disease.  Her last 2 lipid panels are as follows:  Contains abnormal data Lipid panel reflex to direct LDL Fasting   Order: [id number]   (suggestion)  Information displayed in this report will not trend or trigger automated decision support.    Ref Range & Units 12/5/19 0851   Cholesterol <200 mg/dL 310High     Comment: Desirable:       <200 mg/dl   Triglycerides <150 mg/dL 171High     Comment: Borderline high:  150-199 mg/dl   High:             200-499 mg/dl   Very high:       >499 mg/dl   Fasting specimen   HDL Cholesterol >49 mg/dL 61    LDL Cholesterol Calculated <100 mg/dL 215High     Comment: Above desirable:  100-129 mg/dl   Borderline High:  130-159 mg/dL   High:             160-189 mg/dL   Very high:       >189 mg/dl   Non HDL Cholesterol <130 mg/dL 249High     Comment: Above Desirable:  130-159 mg/dl   Borderline high:  160-189 mg/dl   High:             190-219 mg/dl   Very high:       >219 mg/dl   Resulting Agency  [facility]   Specimen Collected: 12/05/19 08:51 Last Resulted: 12/05/19 12:37   Received From: [place]        Results   Lipid panel reflex to direct LDL Non-fasting (Order [id number])    Contains abnormal data Lipid panel reflex to direct LDL Non-fasting   Order: [id number]   (suggestion)  Information displayed in this report will not trend or trigger automated decision support.    Ref Range & Units 7/22/19 0805   Cholesterol <200 mg/dL 236High     Comment: Desirable:       <200 mg/dl   Triglycerides <150 mg/dL 266High     Comment: Borderline high:  150-199 mg/dl   High:             200-499 mg/dl   Very high:       >499 mg/dl   Non Fasting   HDL Cholesterol >49 mg/dL 48Low     LDL Cholesterol Calculated <100 mg/dL 135High     Comment: Above desirable:  100-129 mg/dl   Borderline High:  130-159 mg/dL   High:             160-189 mg/dL   Very high:       >189 mg/dl   Non HDL Cholesterol <130 mg/dL 188High     Comment: Above Desirable:  130-159 mg/dl   Borderline high:  160-189 mg/dl   High:             190-219 mg/dl   Very high:       >219 mg/dl   [place]  [facility] [place] Collected: 07/22/19 08:05 Last Resulted: 07/22/19 14:01   Received From: [place]  Result Received: 10/01/19 09:48          Relevant past medical, family, surgical, and social history reviewed with patient, unless noted in HPI, not pertinent for this visit.  Medications were discussed and reconciled.   Review of Systems   A 12 point comprehensive review of systems was negative except as noted.      Current Outpatient Medications   Medication Sig Dispense Refill   ? cholecalciferol, vitamin D3, 1,000 unit tablet Take 1,000 Units by mouth daily.     ? famotidine (PEPCID) 20 MG tablet Take 1 tablet (20 mg total) by mouth 2 (two) times a day. (Patient taking differently: Take 20 mg by mouth daily. )  0   ? levothyroxine (SYNTHROID, LEVOTHROID) 112 MCG tablet TAKE 1 TABLET BY MOUTH ONCE DAILY.  NEEDS TO BE SEEN FOR FURTHER REFILLS 90 tablet 0   ? methotrexate 25 mg/mL injection INJECT 0.8 ML (20 MG) SUBCUTANEOUSLY ONCE A WEEK  1   ? tofacitinib (XELJANZ XR) 11 mg 24 hour tablet Take 11 mg by mouth daily.    "  ? atorvastatin (LIPITOR) 40 MG tablet Take 1 tablet (40 mg total) by mouth daily. 90 tablet 3     No current facility-administered medications for this visit.        Objective:      /80   Pulse 72   Resp 16   Ht 5' 6\" (1.676 m)   Wt 186 lb (84.4 kg)   BMI 30.02 kg/m        General appearance: alert, appears stated age and cooperative  Lungs: clear to auscultation bilaterally  Heart: regular rate and rhythm, S1, S2 normal, no murmur, click, rub or gallop      No results found for this or any previous visit (from the past 168 hour(s)).       This note has been dictated using voice recognition software. Any grammatical or context distortions are unintentional and inherent to the software         "

## 2021-06-30 ENCOUNTER — OFFICE VISIT (OUTPATIENT)
Dept: DERMATOLOGY | Facility: CLINIC | Age: 66
End: 2021-06-30
Payer: MEDICARE

## 2021-06-30 DIAGNOSIS — Z48.01 ENCOUNTER FOR CHANGE OR REMOVAL OF SURGICAL WOUND DRESSING: Primary | ICD-10-CM

## 2021-06-30 PROCEDURE — 99207 PR NO CHARGE NURSE ONLY: CPT

## 2021-06-30 NOTE — PATIENT INSTRUCTIONS
WOUND CARE INSTRUCTIONS  for  ONE WEEK AFTER SURGERY         Left temple    1) Leave flat bandage on your skin for one week after today s bandage change.  2) In one week when you remove the bandage, you may resume your regular skin care routine, including washing with mild soap and water, applying moisturizer, make-up and sunscreen.    3) If there are any open or bleeding areas at the incision/graft site you should begin to cover the area with a bandage daily as follows:    1) Clean and dry the area with plain tap water using a Q-tip or sterile gauze pad.  2) Apply Polysporin or Bacitracin ointment to the open area.  3) Cover the wound with a band-aid or a sterile non-stick gauze pad and micropore paper tape.         SIGNS OF INFECTION  - If you notice any of these signs of infection, call your doctor right away: expanding redness around the wound.  - Yellow or greenish-colored pus or cloudy wound drainage.    - Red streaking spreading from the wound.  - Increased swelling, tenderness, or pain around the wound.   - Fever.    Please remember that yellow and clear drainage from a wound can be normal and related to normal wound healing.  Isolated drainage from a wound without a combination of the above features does not indicate infection.       *Once the bandages are removed, the scar will be red and firm (especially in the lip/chin area). This is normal and will fade in time. It might take 6-12 months for this to happen.     *Massaging the area will help the scar soften and fade quicker. Begin to massage the area one month after the bandages have been removed. To massage apply pressure directly and firmly over the scar with the fingertips and move in a circular motion. Massage the area for a few minutes several times a day. Continue to massage the site for several months.    *Approximately 6-8 weeks after surgery it is not uncommon to see the formation of  tender pimple-like  bump along the scar. This is normal. As  the scar continues to mature and the stitches underneath the skin begin to dissolve, this might occur. Do not pick or squeeze, this will resolve on it s own. Should one break open producing a small amount of drainage, apply Polysporin or Bacitracin ointment a few times a day until the wound is completely healed.    *Numbness in the surgical area is expected. It might take 12-18 months for the feeling to return to normal. During this time sensations of itchiness, tingling and occasional sharp pains might be noted. These feelings are normal and will subside once the nerves have completely healed.         IN CASE OF EMERGENCY: Dr Olivas [phone]   If you were seen in [place] call: [phone]  If you were seen in [place] call: [phone]

## 2021-06-30 NOTE — PROGRESS NOTES
Pt returned to clinic for post surgery 1 week follow up bandage change. Pt has no complaints, denies pain. Bandage removed from left temple, area cleansed with normal saline. Site is healing and wound edges approximating well. Reapplied new steri strips and paper tape.    Advised to watch for signs/sx of infection; spreading redness, drainage, odor, fever. Call or report promptly to clinic. Pt given written instructions and informed to rtc as needed. Patient verbalized understanding.     Karla Reyes LPN   6/30/2021

## 2021-07-05 ENCOUNTER — COMMUNICATION - HEALTHEAST (OUTPATIENT)
Dept: FAMILY MEDICINE | Facility: CLINIC | Age: 66
End: 2021-07-05

## 2021-07-12 ENCOUNTER — TELEPHONE (OUTPATIENT)
Dept: RHEUMATOLOGY | Facility: CLINIC | Age: 66
End: 2021-07-12

## 2021-07-12 NOTE — TELEPHONE ENCOUNTER
Called and confirmed that she should get labs done on 7/28/21 and again before follow up visit. Labs are required every 8 weeks per Dr. Hayes. Patient verbalized understanding and has no questions.    Lenny SCHULZ RN....7/12/2021 3:23 PM

## 2021-07-12 NOTE — TELEPHONE ENCOUNTER
Reason for Call:  Other call back    Detailed comments: Patient was told to schedule labs 8 weeks before her appointment with Dr. Hayes. She wanted to confirm this was right and has scheduled a lab for 7/28/2021. Her appointment is 9/17/2021.    Phone Number Patient can be reached at: Home number on file [phone] (home)    Best Time: any time    Can we leave a detailed message on this number? YES    Call taken on 7/12/2021 at 3:06 PM by Grisel Montoya

## 2021-07-13 ENCOUNTER — OFFICE VISIT (OUTPATIENT)
Dept: DERMATOLOGY | Facility: CLINIC | Age: 66
End: 2021-07-13
Payer: MEDICARE

## 2021-07-13 VITALS — OXYGEN SATURATION: 97 % | HEART RATE: 86 BPM | BODY MASS INDEX: 29.05 KG/M2 | HEIGHT: 66 IN

## 2021-07-13 DIAGNOSIS — C44.41 BASAL CELL CARCINOMA (BCC) OF NECK: Primary | ICD-10-CM

## 2021-07-13 PROCEDURE — 99207 PR NO CHARGE LOS: CPT | Performed by: DERMATOLOGY

## 2021-07-13 PROCEDURE — 17311 MOHS 1 STAGE H/N/HF/G: CPT | Performed by: DERMATOLOGY

## 2021-07-13 PROCEDURE — 13132 CMPLX RPR F/C/C/M/N/AX/G/H/F: CPT | Performed by: DERMATOLOGY

## 2021-07-13 NOTE — PROGRESS NOTES
Surgical Office Location :   [facility]  [address] [place] [address]

## 2021-07-13 NOTE — LETTER
7/13/2021         RE: Jackie Maguire  [address] [facility] [address]        Dear Colleague,    Thank you for referring your patient, Jackie Maguire, to the [facility]. Please see a copy of my visit note below.    Surgical Office Location :   [facility]  [address] [place] [address]        Jackie Maguire is an extremely pleasant 66 year old year old female patient here today for evaluation and managment of basal cell carcinoma on left base of ant neck.  Temple healing well.  Patient has no other skin complaints today.  Remainder of the HPI, Meds, PMH, Allergies, FH, and SH was reviewed in chart.      Past Medical History:   Diagnosis Date     Abscess of left jaw 6/20/2018     Basal cell carcinoma      Hyperlipidemia LDL goal <160 1/5/2011     Hypothyroidism 10/1/2010     Inflammatory arthritis 1/20/2011     Malignant melanoma (H)        Past Surgical History:   Procedure Laterality Date     APPENDECTOMY       ARTHRODESIS TOE(S) Left 8/17/2017    Procedure: ARTHRODESIS TOE(S);  Left foot 1st metatarsophalangeal realignment fusion, 2nd & 3rd digital corrections;  Surgeon: Jason Lamar DPM;  Location: [facility]     INCISION AND DRAINAGE OF WOUND N/A 6/22/2018    Procedure: INCISION AND DRAINAGE JAW ABSCESS;  Surgeon: Lennox Sullivan MD;  Location: [facility] OR;  Service:      IR CERVICAL EPIDURAL STEROID INJECTION  5/9/2013     OPEN REDUCTION INTERNAL FIXATION HUMERUS PROXIMAL Left 9/30/2016    Procedure: OPEN REDUCTION INTERNAL FIXATION HUMERUS PROXIMAL;  Surgeon: Charly Bernal MD;  Location: [facility]        Family History   Problem Relation Age of Onset     Cancer Father        Social History     Socioeconomic History     Marital status:      Spouse name: Not on file     Number of children: Not on file     Years of education: Not on file     Highest education level: Not on file   Occupational History     Not on file   Tobacco Use  "    Smoking status: Never Smoker     Smokeless tobacco: Never Used   Substance and Sexual Activity     Alcohol use: No     Drug use: No     Sexual activity: Yes     Partners: Male   Other Topics Concern     Parent/sibling w/ CABG, MI or angioplasty before 65F 55M? Not Asked   Social History Narrative     Not on file     Social Determinants of Health     Financial Resource Strain:      Difficulty of Paying Living Expenses:    Food Insecurity:      Worried About Running Out of Food in the Last Year:      Ran Out of Food in the Last Year:    Transportation Needs:      Lack of Transportation (Medical):      Lack of Transportation (Non-Medical):    Physical Activity:      Days of Exercise per Week:      Minutes of Exercise per Session:    Stress:      Feeling of Stress :    Social Connections:      Frequency of Communication with Friends and Family:      Frequency of Social Gatherings with Friends and Family:      Attends Restorationist Services:      Active Member of Clubs or Organizations:      Attends Club or Organization Meetings:      Marital Status:    Intimate Partner Violence:      Fear of Current or Ex-Partner:      Emotionally Abused:      Physically Abused:      Sexually Abused:        Outpatient Encounter Medications as of 7/13/2021   Medication Sig Dispense Refill     acetaminophen (TYLENOL) 325 MG tablet 650 mg po QID and BId  tablet 0     folic acid (FOLVITE) 1 MG tablet Take 1 tablet (1 mg) by mouth daily 90 tablet 3     hydrOXYzine (ATARAX) 25 MG tablet Take 1 tablet (25 mg) by mouth every 4 hours as needed for itching (and nausea) 36 tablet 0     Insulin Syringe-Needle U-100 (BD INSULIN SYRINGE) 27G X 1/2\" 1 ML MISC For weekly methotrexate administration. 12 each 6     LEVOTHYROXINE SODIUM PO Take 137 mcg by mouth daily        methotrexate 50 MG/2ML injection Inject 0.4 mLs (10 mg) Subcutaneous every 7 days 8 mL 1     oxyCODONE-acetaminophen (PERCOCET) 5-325 MG per tablet Take 1-2 tablets by mouth " "every 4 hours as needed for pain (moderate to severe) 38 tablet 0     tofacitinib (XELJANZ XR) 11 MG 24 hr tablet Take 1 tablet (11 mg) by mouth daily Hold for signs of infection, then seek medical attention. 30 tablet 12     VITAMIN D, CHOLECALCIFEROL, PO Take by mouth daily       aspirin 81 MG tablet Take 1 tablet (81 mg) by mouth daily (Patient not taking: Reported on 11/25/2019) 42 tablet 0     No facility-administered encounter medications on file as of 7/13/2021.             O:   NAD, WDWN, Alert & Oriented, Mood & Affect wnl, Vitals stable   Here today alone   Pulse 86   Ht 1.676 m (5' 6\")   SpO2 97%   BMI 29.05 kg/m     General appearance normal   Vitals stable   Alert, oriented and in no acute distress     L base of ant neck 1.2cm red scaly patch   Eyes: Conjunctivae/lids:Normal     ENT: Lips, buccal mucosa, tongue: normal    MSK:Normal    Cardiovascular: peripheral edema none    Pulm: Breathing Normal    Neuro/Psych: Orientation:Alert and Orientedx3 ; Mood/Affect:normal       A/P:  1. L base of ant neck basal cell carcinoma   MOHS:   Location    The rationale for Mohs surgery was discussed with the patient and consent was obtained.  The risks and benefits as well as alternatives to therapy were discussed, in detail.  Specifically, the risks of infection, scarring, bleeding, prolonged wound healing, incomplete removal, allergy to anesthesia, nerve injury and recurrence were addressed.  Indication for Mohs was Location. Prior to the procedure, the treatment site was clearly identified and, if available, confirmed with previous photos and confirmed by the patient   All components of the Universal Protocol/PAUSE rule were completed.  The Mohs surgeon operated in two distinct and integrated capacities as the surgeon and pathologist.      The area was prepped with Betasept.  A rim of normal appearing skin was marked circumferentially around the lesion.  The area was infiltrated with local anesthesia.  The " tumor was first debulked to remove all clinically apparent tumor.  An incision following the standard Mohs approach was done and the specimen was oriented,mapped and placed in 1 block(s).  Each specimen was then chromacoded and processed in the Mohs laboratory using standard Mohs technique and submitted for frozen section histology.  Frozen section analysis showed no residual tumor but CLEAR MARGINS.      The tumor was excised using standard Mohs technique in 1 stages(s).  CLEAR MARGINS OBTAINED and Final defect size was 1.7 x 1.8 cm.     We discussed the options for wound management in full with the patient including risks/benefits/ possible outcomes.        REPAIR COMPLEX: Because of the tightness of the surrounding skin and Because of the size and full thickness nature of the defect and Because of the tightness of the surrounding skin, a complex closure was planned. After LEC anesthesia and prep, Burow's triangles were excised in the relaxed skin tension lines. The wound edges were widely undermined greater than width of the defect on both sides (1.7 cm) by dissection in the subcutaneous plane until adequate tissue mobility was obtained. Hemostasis was obtained. A tension reduction suture was placed.  The wound edges were closed in a layered fashion using Vicryl and Fast Absorbing Plain Gut sutures. Postoperative length was 4.3 cm.   EBL minimal; complications none; wound care routine.  The patient was discharged in good condition and will return in one week for wound evaluation.  It was a pleasure speaking to Jackie Maguire today.  Previous clinic notes and pertinent laboratory tests were reviewed prior to Jackie Maguire's visit.  Signs and Symptoms of skin cancer discussed with patient.  Patient encouraged to perform monthly skin exams.  UV precautions reviewed with patient.  Risks of non-melanoma skin cancer discussed with patient   Return to clinic 6 months        Again, thank you for allowing me to  participate in the care of your patient.        Sincerely,        Laron Olivas MD

## 2021-07-13 NOTE — NURSING NOTE
"Initial Pulse 86   Ht 1.676 m (5' 6\")   SpO2 97%   BMI 29.05 kg/m   Estimated body mass index is 29.05 kg/m  as calculated from the following:    Height as of this encounter: 1.676 m (5' 6\").    Weight as of 3/17/21: 81.6 kg (180 lb). .      "

## 2021-07-13 NOTE — PROGRESS NOTES
Jackie Maguire is an extremely pleasant 66 year old year old female patient here today for evaluation and managment of basal cell carcinoma on left base of ant neck.  Temple healing well.  Patient has no other skin complaints today.  Remainder of the HPI, Meds, PMH, Allergies, FH, and SH was reviewed in chart.      Past Medical History:   Diagnosis Date     Abscess of left jaw 6/20/2018     Basal cell carcinoma      Hyperlipidemia LDL goal <160 1/5/2011     Hypothyroidism 10/1/2010     Inflammatory arthritis 1/20/2011     Malignant melanoma (H)        Past Surgical History:   Procedure Laterality Date     APPENDECTOMY       ARTHRODESIS TOE(S) Left 8/17/2017    Procedure: ARTHRODESIS TOE(S);  Left foot 1st metatarsophalangeal realignment fusion, 2nd & 3rd digital corrections;  Surgeon: Jason Lamar DPM;  Location: [facility]     INCISION AND DRAINAGE OF WOUND N/A 6/22/2018    Procedure: INCISION AND DRAINAGE JAW ABSCESS;  Surgeon: Lennox Sullivan MD;  Location: [facility];  Service:      IR CERVICAL EPIDURAL STEROID INJECTION  5/9/2013     OPEN REDUCTION INTERNAL FIXATION HUMERUS PROXIMAL Left 9/30/2016    Procedure: OPEN REDUCTION INTERNAL FIXATION HUMERUS PROXIMAL;  Surgeon: Charly Bernal MD;  Location: [facility] OR        Family History   Problem Relation Age of Onset     Cancer Father        Social History     Socioeconomic History     Marital status:      Spouse name: Not on file     Number of children: Not on file     Years of education: Not on file     Highest education level: Not on file   Occupational History     Not on file   Tobacco Use     Smoking status: Never Smoker     Smokeless tobacco: Never Used   Substance and Sexual Activity     Alcohol use: No     Drug use: No     Sexual activity: Yes     Partners: Male   Other Topics Concern     Parent/sibling w/ CABG, MI or angioplasty before 65F 55M? Not Asked   Social History Narrative     Not on file     Social Determinants of Health  "    Financial Resource Strain:      Difficulty of Paying Living Expenses:    Food Insecurity:      Worried About Running Out of Food in the Last Year:      Ran Out of Food in the Last Year:    Transportation Needs:      Lack of Transportation (Medical):      Lack of Transportation (Non-Medical):    Physical Activity:      Days of Exercise per Week:      Minutes of Exercise per Session:    Stress:      Feeling of Stress :    Social Connections:      Frequency of Communication with Friends and Family:      Frequency of Social Gatherings with Friends and Family:      Attends Denominational Services:      Active Member of Clubs or Organizations:      Attends Club or Organization Meetings:      Marital Status:    Intimate Partner Violence:      Fear of Current or Ex-Partner:      Emotionally Abused:      Physically Abused:      Sexually Abused:        Outpatient Encounter Medications as of 7/13/2021   Medication Sig Dispense Refill     acetaminophen (TYLENOL) 325 MG tablet 650 mg po QID and BId  tablet 0     folic acid (FOLVITE) 1 MG tablet Take 1 tablet (1 mg) by mouth daily 90 tablet 3     hydrOXYzine (ATARAX) 25 MG tablet Take 1 tablet (25 mg) by mouth every 4 hours as needed for itching (and nausea) 36 tablet 0     Insulin Syringe-Needle U-100 (BD INSULIN SYRINGE) 27G X 1/2\" 1 ML MISC For weekly methotrexate administration. 12 each 6     LEVOTHYROXINE SODIUM PO Take 137 mcg by mouth daily        methotrexate 50 MG/2ML injection Inject 0.4 mLs (10 mg) Subcutaneous every 7 days 8 mL 1     oxyCODONE-acetaminophen (PERCOCET) 5-325 MG per tablet Take 1-2 tablets by mouth every 4 hours as needed for pain (moderate to severe) 38 tablet 0     tofacitinib (XELJANZ XR) 11 MG 24 hr tablet Take 1 tablet (11 mg) by mouth daily Hold for signs of infection, then seek medical attention. 30 tablet 12     VITAMIN D, CHOLECALCIFEROL, PO Take by mouth daily       aspirin 81 MG tablet Take 1 tablet (81 mg) by mouth daily (Patient not " "taking: Reported on 11/25/2019) 42 tablet 0     No facility-administered encounter medications on file as of 7/13/2021.             O:   NAD, WDWN, Alert & Oriented, Mood & Affect wnl, Vitals stable   Here today alone   Pulse 86   Ht 1.676 m (5' 6\")   SpO2 97%   BMI 29.05 kg/m     General appearance normal   Vitals stable   Alert, oriented and in no acute distress     L base of ant neck 1.2cm red scaly patch   Eyes: Conjunctivae/lids:Normal     ENT: Lips, buccal mucosa, tongue: normal    MSK:Normal    Cardiovascular: peripheral edema none    Pulm: Breathing Normal    Neuro/Psych: Orientation:Alert and Orientedx3 ; Mood/Affect:normal       A/P:  1. L base of ant neck basal cell carcinoma   MOHS:   Location    The rationale for Mohs surgery was discussed with the patient and consent was obtained.  The risks and benefits as well as alternatives to therapy were discussed, in detail.  Specifically, the risks of infection, scarring, bleeding, prolonged wound healing, incomplete removal, allergy to anesthesia, nerve injury and recurrence were addressed.  Indication for Mohs was Location. Prior to the procedure, the treatment site was clearly identified and, if available, confirmed with previous photos and confirmed by the patient   All components of the Universal Protocol/PAUSE rule were completed.  The Mohs surgeon operated in two distinct and integrated capacities as the surgeon and pathologist.      The area was prepped with Betasept.  A rim of normal appearing skin was marked circumferentially around the lesion.  The area was infiltrated with local anesthesia.  The tumor was first debulked to remove all clinically apparent tumor.  An incision following the standard Mohs approach was done and the specimen was oriented,mapped and placed in 1 block(s).  Each specimen was then chromacoded and processed in the Mohs laboratory using standard Mohs technique and submitted for frozen section histology.  Frozen section " analysis showed residual tumor but CLEAR MARGINS.    First stage:Orthokeratosis of epidermis with a proliferation of nests of basaloid cells, with peripheral palisading and a haphazard arrangement in the center extending into the dermis.  The tumor cells have hyperchromatic nuclei. Poor cytoplasm and intercellular bridging.      The tumor was excised using standard Mohs technique in 1 stages(s).  CLEAR MARGINS OBTAINED and Final defect size was 1.7 x 1.8 cm.     We discussed the options for wound management in full with the patient including risks/benefits/ possible outcomes.        REPAIR COMPLEX: Because of the tightness of the surrounding skin and Because of the size and full thickness nature of the defect and Because of the tightness of the surrounding skin, a complex closure was planned. After LEC anesthesia and prep, Burow's triangles were excised in the relaxed skin tension lines. The wound edges were widely undermined greater than width of the defect on both sides (1.7 cm) by dissection in the subcutaneous plane until adequate tissue mobility was obtained. Hemostasis was obtained. A tension reduction suture was placed.  The wound edges were closed in a layered fashion using Vicryl and Fast Absorbing Plain Gut sutures. Postoperative length was 4.3 cm.   EBL minimal; complications none; wound care routine.  The patient was discharged in good condition and will return in one week for wound evaluation.  It was a pleasure speaking to Jacike Maguire today.  Previous clinic notes and pertinent laboratory tests were reviewed prior to Jackie Maguire's visit.  Signs and Symptoms of skin cancer discussed with patient.  Patient encouraged to perform monthly skin exams.  UV precautions reviewed with patient.  Risks of non-melanoma skin cancer discussed with patient   Return to clinic 6 months

## 2021-07-13 NOTE — PATIENT INSTRUCTIONS
Sutured Wound Care     [facility]: [phone]    [facility]: [phone]          ? No strenuous activity for 48 hours. Resume moderate activity in 48 hours. No heavy exercising until you are seen for follow up in one week.     ? Take Tylenol as needed for discomfort.                         ? Do not drink alcoholic beverages for 48 hours.     ? Keep the pressure bandage in place for 24 hours. If the bandage becomes blood tinged or loose, reinforce it with gauze and tape.        (Refer to the reverse side of this page for management of bleeding).    ? Remove pressure bandage in 24 hours     ? Leave the flat bandage in place until your follow up appointment.    ? Keep the bandage dry. Wash around it carefully.    ? If the tape becomes soiled or starts to come off, reinforce it with additional paper tape.    ? Do not smoke for 3 weeks; smoking is detrimental to wound healing.    ? It is normal to have swelling and bruising around the surgical site. The bruising will fade in approximately 10-14 days. Elevate the area to reduce swelling.    ? Numbness, itchiness and sensitivity to temperature changes can occur after surgery and may take up to 18 months to normalize.      POSSIBLE COMPLICATIONS    BLEEDIN. Leave the bandage in place.  2. Use tightly rolled up gauze or a cloth to apply direct pressure over the bandage for 20   minutes.  3. Reapply pressure for an additional 20 minutes if necessary  4. Call the office or go to the nearest emergency room if pressure fails to stop the bleeding.  5. Use additional gauze and tape to maintain pressure once the bleeding has stopped.        PAIN:    1. Post operative pain should slowly get better, never worse.  2. A severe increase in pain may indicate a problem. Call the office if this occurs.    In case of emergency phone:Dr Olivas [phone]

## 2021-07-20 ENCOUNTER — TELEPHONE (OUTPATIENT)
Dept: DERMATOLOGY | Facility: CLINIC | Age: 66
End: 2021-07-20

## 2021-07-20 NOTE — TELEPHONE ENCOUNTER
[facility] Call Center    Phone Message    May a detailed message be left on voicemail: yes     Reason for Call: Other: Pt called and will have to r/s her appt today. Pt is not feeling well. Please call her back to r/s. Thanks     Action Taken: Message routed to:  Other: [facility]    Travel Screening: Not Applicable

## 2021-07-21 ENCOUNTER — TELEPHONE (OUTPATIENT)
Dept: FAMILY MEDICINE | Facility: CLINIC | Age: 66
End: 2021-07-21

## 2021-07-21 NOTE — TELEPHONE ENCOUNTER
Can you talk her through doing an evisit? Otherwise a virtual visit or inperson, but I know we don't have openings.

## 2021-07-21 NOTE — TELEPHONE ENCOUNTER
Pt calling because she has developed a cold. Started Friday with a sore throat and now the last couple days it has been non stop coughing, coughing is really bad when laying down at night trying to sleep. She has tried OTC cough syrup with not much relief. Pt stated in the past she has had walking pnemonia and doesn't want this to happen. Pt is wondering if Dr. Mejía can send her something? Pt did sound very congested over the phone.

## 2021-07-22 NOTE — LETTER
"Letter by Talia Mejía MD at      Author: Talia Mejía MD Service: -- Author Type: --    Filed:  Encounter Date: 7/5/2021 Status: (Other)           Jackie Maguire  [address] [place] [address]             July 5, 2021    Dear Jackie Maguire,     This is a reminder that it is time to make your appointment for your annual breast imaging. You may make an appointment for your breast imaging by calling our scheduling line [phone] or by visiting [email or web address], clicking on the \"Visits\" tab and selecting \"Schedule An Appointment.    If you are experiencing breast symptoms or concerns such as a new lump or breast pain you should first contact your health care team before scheduling your breast imaging.  Your healthcare professional may want to see you prior to your visit.    As you know, early detection of cancer is very important. Currently the American College of Radiology and the Society of Breast Imaging recommend annual breast imaging beginning at the age of 40.    If you are a patient currently enrolled in the [place] [facility] or the [facility], you must be seen by your health care team for a clinical breast examination prior to your breast imaging.    If you have already made an appointment, please disregard this letter.    Thank you and we look forward to seeing you in the near future for your annual breast imaging.    Sincerely,    Your primary care team at [facility]         "

## 2021-07-23 ENCOUNTER — ALLIED HEALTH/NURSE VISIT (OUTPATIENT)
Dept: DERMATOLOGY | Facility: CLINIC | Age: 66
End: 2021-07-23
Payer: MEDICARE

## 2021-07-23 DIAGNOSIS — Z48.01 ENCOUNTER FOR CHANGE OR REMOVAL OF SURGICAL WOUND DRESSING: Primary | ICD-10-CM

## 2021-07-23 PROCEDURE — 99207 PR NO CHARGE NURSE ONLY: CPT

## 2021-07-23 NOTE — PATIENT INSTRUCTIONS

## 2021-07-23 NOTE — PROGRESS NOTES
Pt returned to clinic for post surgery follow up bandage change. Pt has no complaints, denies pain. Bandage removed from chest, area cleansed with normal saline. Site is healing and wound edges approximating well. Reapplied new steri strips, paper tape and Tegaderm.    Advised to watch for signs/sx of infection; spreading redness, drainage, odor, fever. Call or report promptly to clinic. Pt given written instructions and informed to return to clinic as needed. Patient verbalized understanding.     Karla Reyes LPN   7/23/2021

## 2021-07-28 ENCOUNTER — LAB (OUTPATIENT)
Dept: LAB | Facility: CLINIC | Age: 66
End: 2021-07-28
Payer: MEDICARE

## 2021-07-28 DIAGNOSIS — M06.09 RHEUMATOID ARTHRITIS OF MULTIPLE SITES WITHOUT RHEUMATOID FACTOR (H): ICD-10-CM

## 2021-07-28 DIAGNOSIS — Z79.899 HIGH RISK MEDICATION USE: ICD-10-CM

## 2021-07-28 LAB
ALBUMIN SERPL-MCNC: 3.8 G/DL (ref 3.4–5)
ALP SERPL-CCNC: 99 U/L (ref 40–150)
ALT SERPL W P-5'-P-CCNC: 38 U/L (ref 0–50)
AST SERPL W P-5'-P-CCNC: 24 U/L (ref 0–45)
BASOPHILS # BLD AUTO: 0.1 10E3/UL (ref 0–0.2)
BASOPHILS NFR BLD AUTO: 1 %
BILIRUB DIRECT SERPL-MCNC: 0.2 MG/DL (ref 0–0.2)
BILIRUB SERPL-MCNC: 0.7 MG/DL (ref 0.2–1.3)
CREAT SERPL-MCNC: 0.88 MG/DL (ref 0.52–1.04)
CRP SERPL-MCNC: <2.9 MG/L (ref 0–8)
EOSINOPHIL # BLD AUTO: 0.3 10E3/UL (ref 0–0.7)
EOSINOPHIL NFR BLD AUTO: 5 %
ERYTHROCYTE [DISTWIDTH] IN BLOOD BY AUTOMATED COUNT: 13.2 % (ref 10–15)
ERYTHROCYTE [SEDIMENTATION RATE] IN BLOOD BY WESTERGREN METHOD: 8 MM/HR (ref 0–30)
GFR SERPL CREATININE-BSD FRML MDRD: 70 ML/MIN/1.73M2
HCT VFR BLD AUTO: 42.6 % (ref 35–47)
HGB BLD-MCNC: 14.3 G/DL (ref 11.7–15.7)
IMM GRANULOCYTES # BLD: 0.1 10E3/UL
IMM GRANULOCYTES NFR BLD: 2 %
LYMPHOCYTES # BLD AUTO: 0.8 10E3/UL (ref 0.8–5.3)
LYMPHOCYTES NFR BLD AUTO: 11 %
MCH RBC QN AUTO: 33.2 PG (ref 26.5–33)
MCHC RBC AUTO-ENTMCNC: 33.6 G/DL (ref 31.5–36.5)
MCV RBC AUTO: 99 FL (ref 78–100)
MONOCYTES # BLD AUTO: 0.9 10E3/UL (ref 0–1.3)
MONOCYTES NFR BLD AUTO: 12 %
NEUTROPHILS # BLD AUTO: 4.9 10E3/UL (ref 1.6–8.3)
NEUTROPHILS NFR BLD AUTO: 69 %
NRBC # BLD AUTO: 0 10E3/UL
NRBC BLD AUTO-RTO: 0 /100
PLATELET # BLD AUTO: 273 10E3/UL (ref 150–450)
PROT SERPL-MCNC: 6.9 G/DL (ref 6.8–8.8)
RBC # BLD AUTO: 4.31 10E6/UL (ref 3.8–5.2)
WBC # BLD AUTO: 7 10E3/UL (ref 4–11)

## 2021-07-28 PROCEDURE — 36415 COLL VENOUS BLD VENIPUNCTURE: CPT

## 2021-07-28 PROCEDURE — 80076 HEPATIC FUNCTION PANEL: CPT

## 2021-07-28 PROCEDURE — 85652 RBC SED RATE AUTOMATED: CPT

## 2021-07-28 PROCEDURE — 86140 C-REACTIVE PROTEIN: CPT

## 2021-07-28 PROCEDURE — 85025 COMPLETE CBC W/AUTO DIFF WBC: CPT

## 2021-07-28 PROCEDURE — 82565 ASSAY OF CREATININE: CPT

## 2021-08-02 NOTE — RESULT ENCOUNTER NOTE
RN: Please call to notify Jackie Maguire that rheumatology labs are okay.  Maurilio Hayes MD  8/1/2021 10:17 PM

## 2021-09-10 DIAGNOSIS — E03.9 HYPOTHYROIDISM: Primary | ICD-10-CM

## 2021-09-10 RX ORDER — LEVOTHYROXINE SODIUM 112 UG/1
TABLET ORAL
Qty: 90 TABLET | Refills: 1 | Status: SHIPPED | OUTPATIENT
Start: 2021-09-10 | End: 2022-03-02

## 2021-09-10 NOTE — TELEPHONE ENCOUNTER
" Disp Refills Start End BINU   levothyroxine (SYNTHROID, LEVOTHROID) 112 MCG tablet 90 tablet 1 3/11/2021  Yes   Sig: TAKE 1 TABLET BY MOUTH ONCE DAILY   Sent to pharmacy as: levothyroxine 112 mcg tablet   E-Prescribing Status: Receipt confirmed by pharmacy (3/11/2021  9:41 PM CST)   levothyroxine (SYNTHROID, LEVOTHROID) 112 MCG tablet [[id number]]    Electronically signed by: Rosa Whipple MD on 03/11/21 2141 Status: Active   Ordering user: Rosa Whipple MD 03/11/21 2141 Authorized by: Rosa Whipple MD   Frequency:  03/11/21 - Until Discontinued   Diagnoses  Hypothyroidism [E03.9]       Last Written Prescription Date:  3/11/21  Last Fill Quantity: 90,  # refills: 1   Last office visit provider:  3/11/21     Requested Prescriptions   Pending Prescriptions Disp Refills     levothyroxine (SYNTHROID/LEVOTHROID) 112 MCG tablet [Pharmacy Med Name: Levothyroxine Sodium 112 MCG Oral Tablet] 90 tablet 0     Sig: Take 1 tablet by mouth once daily       Thyroid Protocol Passed - 9/10/2021  9:10 AM        Passed - Patient is 12 years or older        Passed - Recent (12 mo) or future (30 days) visit within the authorizing provider's specialty     Patient has had an office visit with the authorizing provider or a provider within the authorizing providers department within the previous 12 mos or has a future within next 30 days. See \"Patient Info\" tab in inbasket, or \"Choose Columns\" in Meds & Orders section of the refill encounter.              Passed - Medication is active on med list        Passed - Normal TSH on file in past 12 months     Recent Labs   Lab Test 03/11/21  1741   TSH 0.44              Passed - No active pregnancy on record     If patient is pregnant or has had a positive pregnancy test, please check TSH.          Passed - No positive pregnancy test in past 12 months     If patient is pregnant or has had a positive pregnancy test, please check TSH.               Brien Hager RN 09/10/21 4:29 PM  "

## 2021-09-15 ENCOUNTER — LAB (OUTPATIENT)
Dept: LAB | Facility: CLINIC | Age: 66
End: 2021-09-15
Payer: MEDICARE

## 2021-09-15 DIAGNOSIS — M06.09 RHEUMATOID ARTHRITIS OF MULTIPLE SITES WITHOUT RHEUMATOID FACTOR (H): ICD-10-CM

## 2021-09-15 DIAGNOSIS — Z79.899 HIGH RISK MEDICATION USE: ICD-10-CM

## 2021-09-15 LAB
ALBUMIN SERPL-MCNC: 3.7 G/DL (ref 3.4–5)
ALP SERPL-CCNC: 91 U/L (ref 40–150)
ALT SERPL W P-5'-P-CCNC: 33 U/L (ref 0–50)
AST SERPL W P-5'-P-CCNC: 22 U/L (ref 0–45)
BASOPHILS # BLD AUTO: 0 10E3/UL (ref 0–0.2)
BASOPHILS NFR BLD AUTO: 0 %
BILIRUB DIRECT SERPL-MCNC: 0.2 MG/DL (ref 0–0.2)
BILIRUB SERPL-MCNC: 0.6 MG/DL (ref 0.2–1.3)
CREAT SERPL-MCNC: 0.82 MG/DL (ref 0.52–1.04)
CRP SERPL-MCNC: <2.9 MG/L (ref 0–8)
EOSINOPHIL # BLD AUTO: 0.2 10E3/UL (ref 0–0.7)
EOSINOPHIL NFR BLD AUTO: 3 %
ERYTHROCYTE [DISTWIDTH] IN BLOOD BY AUTOMATED COUNT: 13.6 % (ref 10–15)
ERYTHROCYTE [SEDIMENTATION RATE] IN BLOOD BY WESTERGREN METHOD: 9 MM/HR (ref 0–30)
GFR SERPL CREATININE-BSD FRML MDRD: 76 ML/MIN/1.73M2
HCT VFR BLD AUTO: 39.7 % (ref 35–47)
HGB BLD-MCNC: 13.5 G/DL (ref 11.7–15.7)
LYMPHOCYTES # BLD AUTO: 1.1 10E3/UL (ref 0.8–5.3)
LYMPHOCYTES NFR BLD AUTO: 18 %
MCH RBC QN AUTO: 33.1 PG (ref 26.5–33)
MCHC RBC AUTO-ENTMCNC: 34 G/DL (ref 31.5–36.5)
MCV RBC AUTO: 97 FL (ref 78–100)
MONOCYTES # BLD AUTO: 0.8 10E3/UL (ref 0–1.3)
MONOCYTES NFR BLD AUTO: 13 %
NEUTROPHILS # BLD AUTO: 4 10E3/UL (ref 1.6–8.3)
NEUTROPHILS NFR BLD AUTO: 65 %
PLATELET # BLD AUTO: 206 10E3/UL (ref 150–450)
PROT SERPL-MCNC: 6.8 G/DL (ref 6.8–8.8)
RBC # BLD AUTO: 4.08 10E6/UL (ref 3.8–5.2)
WBC # BLD AUTO: 6.1 10E3/UL (ref 4–11)

## 2021-09-15 PROCEDURE — 85025 COMPLETE CBC W/AUTO DIFF WBC: CPT

## 2021-09-15 PROCEDURE — 36415 COLL VENOUS BLD VENIPUNCTURE: CPT

## 2021-09-15 PROCEDURE — 86140 C-REACTIVE PROTEIN: CPT

## 2021-09-15 PROCEDURE — 85652 RBC SED RATE AUTOMATED: CPT

## 2021-09-15 PROCEDURE — 80076 HEPATIC FUNCTION PANEL: CPT

## 2021-09-15 PROCEDURE — 82565 ASSAY OF CREATININE: CPT

## 2021-09-16 NOTE — PATIENT INSTRUCTIONS
RHEUMATOLOGY    Dr. Maurilio Hayes    [facility]  [address] [place] [address]  Phone number: [phone]  Fax number: [phone]    Thank you for choosing [facility]!    Elizabeth Haley CMA Rheumatology    -------------------      A single additional dose of the Pfizer or Moderna COVID-19 vaccine is recommended at least 28 days after the completion of the 2-dose mRNA vaccine series for patients receiving any immunosuppressive or immunomodulatory therapy. Attempts should be made to match the additional mRNA dose type to the type given in the mRNA primary series; however, if that is not feasible, a booster dose with the alternative mRNA vaccine is permitted.    Based on our current understanding (and this may change over time as we learn more), medications should be adjusted as noted below, if disease activity allows:  - NSAIDs and Acetaminophen: hold for 24 hours prior to vaccination if able to do so  - Xeljanz (tofacitinib) should be held for 1 week after the mRNA COVID-19 booster vaccine

## 2021-09-17 ENCOUNTER — ANCILLARY PROCEDURE (OUTPATIENT)
Dept: GENERAL RADIOLOGY | Facility: CLINIC | Age: 66
End: 2021-09-17
Attending: INTERNAL MEDICINE
Payer: MEDICARE

## 2021-09-17 ENCOUNTER — OFFICE VISIT (OUTPATIENT)
Dept: RHEUMATOLOGY | Facility: CLINIC | Age: 66
End: 2021-09-17
Payer: MEDICARE

## 2021-09-17 VITALS
SYSTOLIC BLOOD PRESSURE: 126 MMHG | OXYGEN SATURATION: 94 % | HEIGHT: 66 IN | DIASTOLIC BLOOD PRESSURE: 85 MMHG | BODY MASS INDEX: 29.67 KG/M2 | HEART RATE: 71 BPM | WEIGHT: 184.6 LBS

## 2021-09-17 DIAGNOSIS — M25.662 DECREASED RANGE OF MOTION OF LEFT KNEE: ICD-10-CM

## 2021-09-17 DIAGNOSIS — M06.09 RHEUMATOID ARTHRITIS OF MULTIPLE SITES WITHOUT RHEUMATOID FACTOR (H): Primary | ICD-10-CM

## 2021-09-17 DIAGNOSIS — Z79.899 HIGH RISK MEDICATION USE: ICD-10-CM

## 2021-09-17 PROCEDURE — 99214 OFFICE O/P EST MOD 30 MIN: CPT | Performed by: INTERNAL MEDICINE

## 2021-09-17 PROCEDURE — 73562 X-RAY EXAM OF KNEE 3: CPT | Mod: LT | Performed by: RADIOLOGY

## 2021-09-17 RX ORDER — METHOTREXATE 25 MG/ML
10 INJECTION, SOLUTION INTRA-ARTERIAL; INTRAMUSCULAR; INTRAVENOUS
Qty: 8 ML | Refills: 1 | Status: CANCELLED | OUTPATIENT
Start: 2021-09-17

## 2021-09-17 ASSESSMENT — MIFFLIN-ST. JEOR: SCORE: 1404.09

## 2021-09-17 NOTE — NURSING NOTE
RAPID3 (0-30) Cumulative Score  7.3          RAPID3 Weighted Score (divide #4 by 3 and that is the weighted score)  2.4

## 2021-09-17 NOTE — PROGRESS NOTES
Rheumatology Clinic Visit      Jackie Maguire MRN# [id number]   YOB: 1955 Age: 66 year old      Date of visit: 9/17/21   PCP: Dr. Talia Mejía at [facility]    Chief Complaint   Patient presents with:  Rheumatoid Arthritis    Assessment and Plan     1. Rheumatoid Arthritis (CCP low positive, then negative): From record review: dx'd 2012; has followed with Kayley Jimenez, Jacob, and [facility]; hx of +DULCE, +dsDNA, CCP low positive then negative; hx of psoriasis; hx of C-spine fusion; initial presentation of pain/stiff/swollen joints involving the knees, feet, ankles, neck, back.  Previously on leflunomide (mouth sores), HCQ (ineffective), AZA (itching), Humira (ineffective at q7day and q14 days dosings), Remicade (associated with syncopal episodes).  Sulfa allergy.  Avoiding Orencia because of melanoma history.  Currently on MTX 10 mg SQ wkly (improvement when changed from oral to SQ, then later reduced due to LFT elevation and again reducing since she has been doing well on Xeljanz) and Xeljanz XR 11mg daily.  Hyperlipidemia is being managed by her primary care provider.  Doing well at this time with regard to arthritis.  Discontinue methotrexate to see if she will do well without it; if needed may restart methotrexate 10 mg SQ once weekly  Chronic illness, stable.    - Discontinue methotrexate 10 mg SQ every 7 days  - Discontinue folic acid 1mg daily  - Continue Xeljanz XR 11 mg daily   - Labs in 3 months: CBC, Creatinine, Hepatic Panel, ESR, CRP, lipid panel, quantiferon TB gold plus    High risk medication requiring intensive toxicity monitoring at least quarterly: labs ordered include CBC, Creatinine, Hepatic panel to monitor for cytopenia and hepatotoxicity; checking creatinine as it affects clearance of medication.                Rapid 3, cumulative scores                      9/17/2021: 7.3   (MTX 10mg SQ wkly, Xeljanz XR 11mg daily)                      8/31/2020: RA doing well ((MTX 20mg SQ  wkly, Xeljanz XR 11mg daily)                      02/17/2020:  7.3 (MTX 20mg SQ wkly, prednisone 5mg daily, Xeljanz XR 11mg daily)                      11/25/2019:  8    (MTX 20mg SQ wkly, prednisone 5mg daily, Xeljanz XR 11mg daily)    2. History of Positive DULCE and dsDNA (repeats negative): with inflammatory arthritis.  Symptoms fit better for seropositive rheumatoid arthritis; see #1.  DULCE and dsDNA have since been rechecked and were negative.     3. Neck pain hx: Neck spasms several years ago that was dx'd as dystonia at the [facility]; had several imaging studies especially given the RA dx where degenerative changes were seen.  Also saw a spine surgeon at [place].  PT ineffective.  Nonradiating neck pain has been stable.     4.  Lower back pain history: Previously was bothering her after she had been picking up her grandchild; degenerative in nature.  Home physical therapy exercises were effective.  Not an issue today.    5. Hyperlipidemia: managed by PCP per patient.     6.  Unable to fully extend the left knee: Reportedly has had difficulty extending the left knee for several years ever since she had a fall.  At the time of the fall she required surgery for other joints but the knee was reportedly not addressed.  No knee pain or swelling.  Slight loss of full extension that seems to affect her gait and possibly contributes to her low back pain.    - PT referral  - X-ray: Left knee   - If no improvement with PT then may consider orthopedic evaluation     7.  High risk medications, immunocompromise status, and history of melanoma: Advised that she needs at least once yearly skin checks with dermatology.    # Status-post 2 doses of the Moderna COVID-19 vaccine, received on 4/19/2021 and 5/17/2021    A single additional dose of the Pfizer or Moderna COVID-19 vaccine is recommended at least 28 days after the completion of the 2-dose mRNA vaccine series for patients receiving any immunosuppressive or immunomodulatory  therapy. Attempts should be made to match the additional mRNA dose type to the type given in the mRNA primary series; however, if that is not feasible, a booster dose with the alternative mRNA vaccine is permitted.    Based on our current understanding (and this may change over time as we learn more), medications should be adjusted as noted below, if disease activity allows:  - NSAIDs and Acetaminophen: hold for 24 hours prior to vaccination if able to do so  - Xeljanz (tofacitinib) should be held for 1 week after the mRNA COVID-19 booster vaccine     Total minutes spent in evaluation with patient, documentation, , and review of pertinent studies and chart notes: 18      Ms. Maguire verbalized agreement with and understanding of the rational for the diagnosis and treatment plan.  All questions were answered to best of my ability and the patient's satisfaction. Ms. Maguire was advised to contact the clinic with any questions that may arise after the clinic visit.      Thank you for involving me in the care of the patient    Return to clinic: 3-4 months      HPI   Jackie Maguire is a 66 year old female with a past medical history significant for hyperlipidemia, hypothyroidism, history of melanoma, history of humerus fracture, osteoporosis, psoriasis, and rheumatoid arthritis who presents for follow-up of rheumatoid arthritis.     Today, 9/17/2021: RA is controlled.  Tolerating Xeljanz and methotrexate well.  Morning stiffness for no more than 10-15 minutes.  No gelling phenomenon.  Very happy with how well she is doing.  However, her left knee does not fully extend and she believes that this is altering her gait and therefore affecting her back.  She says that it has been this way for many years.  No left knee pain or swelling.    Denies fevers, chills, nausea, vomiting, constipation, diarrhea. No abdominal pain. No chest pain/pressure, palpitations, or shortness of breath. No LE swelling. No oral or  nasal sores.  No rash. No sicca symptoms.    Tobacco: none  EtOH: none  Drugs: none    ROS   12 point review of system was completed and negative except as noted in the HPI     Active Problem List     Patient Active Problem List   Diagnosis     Hypothyroidism     Hyperlipidemia LDL goal <160     Inflammatory arthritis     Fracture, pelvis closed (H)     Malignant melanoma of skin (H)     Distal radius fracture     Closed fracture of part of humerus     Proximal humerus fracture     Aftercare for healing traumatic fracture of upper arm     Osteopetrosis     Cervical dystonia     Dystonia, torsion, fragments of     Psoriasis     Rheumatoid arthritis of multiple sites without rheumatoid factor (H)     Past Medical History     Past Medical History:   Diagnosis Date     Abscess of left jaw 6/20/2018     Basal cell carcinoma      Hyperlipidemia LDL goal <160 1/5/2011     Hypothyroidism 10/1/2010     Inflammatory arthritis 1/20/2011     Malignant melanoma (H)      Past Surgical History     Past Surgical History:   Procedure Laterality Date     APPENDECTOMY       ARTHRODESIS TOE(S) Left 8/17/2017    Procedure: ARTHRODESIS TOE(S);  Left foot 1st metatarsophalangeal realignment fusion, 2nd & 3rd digital corrections;  Surgeon: Jason Lamar DPM;  Location: [facility] OR     INCISION AND DRAINAGE OF WOUND N/A 6/22/2018    Procedure: INCISION AND DRAINAGE JAW ABSCESS;  Surgeon: Lennox Sullivan MD;  Location: [facility] OR;  Service:      IR CERVICAL EPIDURAL STEROID INJECTION  5/9/2013     OPEN REDUCTION INTERNAL FIXATION HUMERUS PROXIMAL Left 9/30/2016    Procedure: OPEN REDUCTION INTERNAL FIXATION HUMERUS PROXIMAL;  Surgeon: Charly Bernal MD;  Location: [facility] OR     Allergy     Allergies   Allergen Reactions     Azathioprine Itching     Leflunomide Other (See Comments)     Mouth sores     Compazine Anxiety     Restless, anxious     Prochlorperazine Anxiety     Restless, anxious     Sulfa Drugs Rash     Current Medication  "List     Current Outpatient Medications   Medication Sig     acetaminophen (TYLENOL) 325 MG tablet 650 mg po QID and BId PRN     hydrOXYzine (ATARAX) 25 MG tablet Take 1 tablet (25 mg) by mouth every 4 hours as needed for itching (and nausea)     Insulin Syringe-Needle U-100 (BD INSULIN SYRINGE) 27G X 1/2\" 1 ML MISC For weekly methotrexate administration.     levothyroxine (SYNTHROID/LEVOTHROID) 112 MCG tablet Take 1 tablet by mouth once daily     tofacitinib (XELJANZ XR) 11 MG 24 hr tablet Take 1 tablet (11 mg) by mouth daily Hold for signs of infection, then seek medical attention.     VITAMIN D, CHOLECALCIFEROL, PO Take by mouth daily     aspirin 81 MG tablet Take 1 tablet (81 mg) by mouth daily (Patient not taking: Reported on 11/25/2019)     LEVOTHYROXINE SODIUM PO Take 137 mcg by mouth daily  (Patient not taking: Reported on 9/17/2021)     oxyCODONE-acetaminophen (PERCOCET) 5-325 MG per tablet Take 1-2 tablets by mouth every 4 hours as needed for pain (moderate to severe) (Patient not taking: Reported on 9/17/2021)     No current facility-administered medications for this visit.         Social History   See HPI    Family History     Family History   Problem Relation Age of Onset     Cancer Father        Physical Exam     Temp Readings from Last 3 Encounters:   03/11/21 98.5  F (36.9  C) (Oral)   06/11/18 98.4  F (36.9  C) (Oral)   08/17/17 98  F (36.7  C) (Oral)     BP Readings from Last 5 Encounters:   09/17/21 126/85   06/23/21 135/75   03/11/21 (!) 149/87   02/25/20 132/80   02/17/20 (!) 140/88     Pulse Readings from Last 1 Encounters:   09/17/21 71     Resp Readings from Last 1 Encounters:   03/11/21 16     Estimated body mass index is 29.8 kg/m  as calculated from the following:    Height as of this encounter: 1.676 m (5' 6\").    Weight as of this encounter: 83.7 kg (184 lb 9.6 oz).      GEN: NAD. Healthy appearing adult.   HEENT:  Anicteric, noninjected sclera. No obvious external lesions of the ear and " nose. Hearing intact.  CV: S1, S2. RRR. No m/r/g  PULM: No increased work of breathing. CTA bilaterally   MSK: MCPs, PIPs, DIPs without swelling or tenderness to palpation.  Wrists without swelling or tenderness to palpation.  Elbows and shoulders without swelling or tenderness to palpation.  Shoulders with normal range of motion.  Knees, ankles, and MTPs without swelling or tenderness to palpation.  Cannot fully extend the left knee  SKIN: No rash or jaundice seen  PSYCH: Alert. Appropriate.        Labs / Imaging (select studies)     CBC  Recent Labs   Lab Test 09/15/21  1437 07/28/21  0928 05/21/21  1333 12/03/20  1019 12/03/20  1019 08/27/20  1152 08/27/20  1152   WBC 6.1 7.0 5.9   < > 5.6   < > 8.1   RBC 4.08 4.31 4.25   < > 4.39   < > 4.25   HGB 13.5 14.3 14.1   < > 14.6   < > 14.1   HCT 39.7 42.6 42.3   < > 44.5   < > 42.4   MCV 97 99 100   < > 101*   < > 100   RDW 13.6 13.2 13.2   < > 13.6   < > 13.3    273 208   < > 213   < > 173   MCH 33.1* 33.2* 33.2*   < > 33.3*   < > 33.2*   MCHC 34.0 33.6 33.3   < > 32.8   < > 33.3   NEUTROPHIL 65 69 79.9   < > 75.2   < > 83.4   LYMPH 18 11 5.6   < > 11.8   < > 7.2   MONOCYTE 13 12 11.6   < > 10.7   < > 6.9   EOSINOPHIL 3 5 2.2   < > 1.6   < > 2.1   BASOPHIL 0 1 0.7   < > 0.7   < > 0.4   ANEU  --   --  4.7  --  4.2  --  6.7   ALYM  --   --  0.3*  --  0.7*  --  0.6*   KAREN  --   --  0.7  --  0.6  --  0.6   AEOS  --   --  0.1  --  0.1  --  0.2   ABAS  --   --  0.0  --  0.0  --  0.0   ANEUTAUTO 4.0 4.9  --   --   --   --   --    ALYMPAUTO 1.1 0.8  --   --   --   --   --    AMONOAUTO 0.8 0.9  --   --   --   --   --    AEOSAUTO 0.2 0.3  --   --   --   --   --    ABSBASO 0.0 0.1  --   --   --   --   --     < > = values in this interval not displayed.     CMP  Recent Labs   Lab Test 09/15/21  1437 07/28/21  0928 05/21/21  1333 12/03/20  1019 12/03/20  1019 08/27/20  1152 08/27/20  1152 05/28/20  0928 05/28/20  0928 10/25/19  1018 07/22/19  0805 12/19/18  0852  09/12/18 0923 06/25/18  0813 06/25/18  0626   NA  --   --   --   --   --   --   --   --   --   --  141  --  144  --  140   POTASSIUM  --   --   --   --   --   --   --   --   --   --  4.1  --  3.8  --  3.9   CHLORIDE  --   --   --   --   --   --   --   --   --   --  111*  --  109  --  109*   CO2  --   --   --   --   --   --   --   --   --   --  23  --  28  --  24   ANIONGAP  --   --   --   --   --   --   --   --   --   --  7  --  7  --  7   GLC  --   --   --   --   --   --   --   --  96  --  89  --  102*   < > 135*   BUN  --   --   --   --   --   --   --   --   --   --  15  --  15  --  20   CR 0.82 0.88 0.88   < > 0.81   < > 0.83   < > 0.74   < > 0.72   < > 0.91  --  0.69   GFRESTIMATED 76 70 70   < > 77   < > 75   < > 87   < > 90   < > 63  --  >60   GFRESTBLACK  --   --  81  --  89  --  87   < > >90   < > >90   < > 76  --  >60   HAZEL  --   --   --   --   --   --   --   --   --   --  9.1  --  9.4  --  9.1   BILITOTAL 0.6 0.7 0.7   < > 0.9   < > 0.8   < > 0.8   < > 0.3   < > 0.5  --   --    ALBUMIN 3.7 3.8 4.0   < > 4.2   < > 4.0   < > 4.0   < > 3.8   < > 3.6  --   --    PROTTOTAL 6.8 6.9 6.8   < > 7.1   < > 6.9   < > 6.8   < > 6.8   < > 6.9  --   --    ALKPHOS 91 99 91   < > 106   < > 103   < > 91   < > 86   < > 94  --   --    AST 22 24 51*   < > 45   < > 36   < > 32   < > 22   < > 27  --   --    ALT 33 38 70*   < > 67*   < > 65*   < > 61*   < > 30   < > 35  --   --     < > = values in this interval not displayed.     Calcium/VitaminD  Recent Labs   Lab Test 07/22/19  0805 09/12/18  0923 06/25/18  0626 02/26/18  0846 10/05/16  0831   HAZEL 9.1 9.4 9.1   < >  --    VITDT  --   --   --   --  29    < > = values in this interval not displayed.     ESR/CRP  Recent Labs   Lab Test 09/15/21  1437 07/28/21  0928 05/21/21  1333   SED 9 8 7   CRP <2.9 <2.9 <2.9       Hepatitis B  Recent Labs   Lab Test 02/26/18  0846   HBCAB Nonreactive   HEPBANG Nonreactive     Hepatitis C  Recent Labs   Lab Test 02/26/18  0846   HCVAB  Nonreactive       Tuberculosis Screening  Recent Labs   Lab Test 07/22/19  0805   TBRES Negative       Immunization History     Immunization History   Administered Date(s) Administered     COVID-19,PF,Moderna 04/19/2021, 05/17/2021     Influenza Quad, Recombinant, pf(RIV4) (Flublok) 10/18/2018, 10/01/2019, 10/16/2020     Influenza Vaccine IM > 6 months Valent IIV4 (Alfuria,Fluzone) 10/01/2016     Pneumo Conj 13-V (2010&after) 10/05/2016     Pneumococcal 23 valent 10/31/2013, 03/11/2019     Tdap (Adacel,Boostrix) 10/25/2012, 12/10/2012     Zoster vaccine recombinant adjuvanted (SHINGRIX) 11/25/2019          Chart documentation done in part with Dragon Voice recognition Software. Although reviewed after completion, some word and grammatical error may remain.    Maurilio Hayes MD

## 2021-09-24 ENCOUNTER — HOSPITAL ENCOUNTER (OUTPATIENT)
Dept: PHYSICAL THERAPY | Facility: CLINIC | Age: 66
Setting detail: THERAPIES SERIES
End: 2021-09-24
Attending: INTERNAL MEDICINE
Payer: MEDICARE

## 2021-09-24 DIAGNOSIS — M25.662 DECREASED RANGE OF MOTION OF LEFT KNEE: ICD-10-CM

## 2021-09-24 PROCEDURE — 97161 PT EVAL LOW COMPLEX 20 MIN: CPT | Mod: GP | Performed by: PHYSICAL THERAPIST

## 2021-09-24 PROCEDURE — 97110 THERAPEUTIC EXERCISES: CPT | Mod: GP | Performed by: PHYSICAL THERAPIST

## 2021-09-24 NOTE — PROGRESS NOTES
[facility]          OUTPATIENT PHYSICAL THERAPY ORTHOPEDIC EVALUATION  PLAN OF TREATMENT FOR OUTPATIENT REHABILITATION  (COMPLETE FOR INITIAL CLAIMS ONLY)  Patient's Last Name, First Name, M.I.  YOB: 1955  Caroline Maguirejusedgard  KO[place]    Provider s Name:  [facility]   Medical Record No.  [id number]   Start of Care Date:  09/24/21   Onset Date:  09/17/21 (MD appt date)   Type:     _X__PT   ___OT   ___SLP Medical Diagnosis:  decreased knee ROM     PT Diagnosis:  decreased ROM and strength L knee/ weak L hip, biomechanical malalignment   Visits from SOC:  1      _________________________________________________________________________________  Plan of Treatment/Functional Goals:  stretching, strengthening, gait training           Goals  Goal Identifier: 1  Goal Description: increase L hip strength to 4/5 for more stable pelvis in gait in 6wk  Target Date: 11/05/21    Goal Identifier: 2  Goal Description: knee exten to 0* for longer step length in gait in 6wk  Target Date: 11/05/21       Therapy Frequency:  1 time/week  Predicted Duration of Therapy Intervention:  6wks, recommended pt get arch supports to prevent further pronation, would try to find OTC inserts before seeking referral for orthotics    Kris Hoenk, PT                 I CERTIFY THE NEED FOR THESE SERVICES FURNISHED UNDER        THIS PLAN OF TREATMENT AND WHILE UNDER MY CARE     (Physician co-signature of this document indicates review and certification of the therapy plan).                       Certification Date From:  09/24/21   Certification Date To:  11/05/21    Referring Provider:  Dr Hayes    Initial Assessment        See Epic Evaluation Start of Care Date: 09/24/21

## 2021-09-24 NOTE — PROGRESS NOTES
"  Jackie Maguire   PHYSICAL THERAPY EVALUATION    09/24/21 1100   General Information   Type of Visit Initial OP Ortho PT Evaluation   Start of Care Date 09/24/21   Referring Physician Dr Hayes   Patient/Family Goals Statement would like to straighten her knee, (sticks out to side)   Orders Evaluate and Treat   Date of Order 09/17/21   Certification Required? Yes   Medical Diagnosis L knee pain , decreased ROM   Body Part(s)   Body Part(s) Knee   Presentation and Etiology   Pertinent history of current problem (include personal factors and/or comorbidities that impact the POC) L knee is at an angle, states it sticks out to the side, doesn't quite fully straighten. Not painful.  States she is not very active, has some LBP. 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 PMH L foot fusion, thinks her first toe, maybe middle of foot, not sure.    Onset date of current episode/exacerbation 09/17/21  (MD appt date)   Knee Objective Findings   Side (if bilateral, select both right and left) Left   Posture severe L knee valgus, L foot toed outward, pronation, navicular almost to floor, mild valgus R knee   Gait/Locomotion waddling/glut med lurch, L foot turned outward, short step length   Knee ROM Comment L ankle IV 0*   Knee/Hip Strength Comments hip ext 3-3+ B, hip flexion 4+ B   Step Test Height Control Comment 6\" fwd step up using 2 rails, no pain   Palpation no specific tenderness   Left Knee Extension AROM supine -8*   Left Knee Extension PROM -4*   Left Knee Flexion AROM WFL   Left Knee Flexion Strength 4+   Left Knee Extension Strength 4+   Left Hip Abduction Strength 3+, R 4+   Left Quad Set Strength poor   Left Gastrocnemius Flexibility 20* WB, 5* supine   Left Hip Flexor Flexibility tight B   Left Quadricep Flexibility 8\" to butt, R 6\"   Planned Therapy Interventions   Planned Therapy Interventions stretching;strengthening;gait training   Clinical Impression   Criteria for Skilled Therapeutic Interventions Met yes, " treatment indicated   PT Diagnosis decreased ROM and strength L knee/ weak L hip, biomechanical malalignment   Functional limitations due to impairments walking too far, caring for grandkirachel   Clinical Presentation Stable/Uncomplicated   Clinical Decision Making (Complexity) Low complexity   Therapy Frequency 1 time/week   Predicted Duration of Therapy Intervention (days/wks) 6wks, recommended pt get arch supports to prevent further pronation, would try to find OTC inserts before seeking referral for orthotics   Risk & Benefits of therapy have been explained Yes   Patient, Family & other staff in agreement with plan of care Yes   Education Assessment   Preferred Learning Style Demonstration;Pictures/video   Barriers to Learning No barriers   ORTHO GOALS   PT Ortho Eval Goals 1;2   Ortho Goal 1   Goal Identifier 1   Goal Description increase L hip strength to 4/5 for more stable pelvis in gait in 6wk   Target Date 11/05/21   Ortho Goal 2   Goal Identifier 2   Goal Description knee exten to 0* for longer step length in gait in 6wk   Target Date 11/05/21   Total Evaluation Time   PT Eval, Low Complexity Minutes (36600) 15   Therapy Certification   Certification date from 09/24/21   Certification date to 11/05/21   Medical Diagnosis decreased knee ROM   [facility]  Kris Hoenk  PT  [facility]  [address] [place] [address]  [email or web address]   Office: [phone]  Voicemail: [phone]

## 2021-11-10 NOTE — PROGRESS NOTES
Discharge Note -Physical Therapy    NAME:  Jackie Maguire  MRN:   [id number]    S:    Pt did not follow up for therapy as recommended.    O:  Objective information is not available as pt has not returned for therapy.  Last objective information or progress note will serve as final entry.    A:   Pt did not return for further treatment.    Status of goals is unknown due to lack of followup by patient.    P:  Discharge from PT this date.      [facility]  Kris Hoenk  PT  [facility]  [address] [address]  [email or web address]   Office: [phone]  Voicemail: [phone]

## 2022-01-14 ENCOUNTER — TELEPHONE (OUTPATIENT)
Dept: RHEUMATOLOGY | Facility: CLINIC | Age: 67
End: 2022-01-14
Payer: MEDICARE

## 2022-01-14 DIAGNOSIS — M06.09 RHEUMATOID ARTHRITIS OF MULTIPLE SITES WITHOUT RHEUMATOID FACTOR (H): ICD-10-CM

## 2022-01-17 ENCOUNTER — LAB (OUTPATIENT)
Dept: LAB | Facility: CLINIC | Age: 67
End: 2022-01-17
Payer: MEDICARE

## 2022-01-17 DIAGNOSIS — M06.09 RHEUMATOID ARTHRITIS OF MULTIPLE SITES WITHOUT RHEUMATOID FACTOR (H): ICD-10-CM

## 2022-01-17 DIAGNOSIS — Z79.899 HIGH RISK MEDICATION USE: ICD-10-CM

## 2022-01-17 LAB
ALBUMIN SERPL-MCNC: 3.3 G/DL (ref 3.4–5)
ALP SERPL-CCNC: 92 U/L (ref 40–150)
ALT SERPL W P-5'-P-CCNC: 27 U/L (ref 0–50)
AST SERPL W P-5'-P-CCNC: 15 U/L (ref 0–45)
BASOPHILS # BLD AUTO: 0 10E3/UL (ref 0–0.2)
BASOPHILS NFR BLD AUTO: 1 %
BILIRUB DIRECT SERPL-MCNC: 0.1 MG/DL (ref 0–0.2)
BILIRUB SERPL-MCNC: 0.5 MG/DL (ref 0.2–1.3)
CHOLEST SERPL-MCNC: 130 MG/DL
CREAT SERPL-MCNC: 0.76 MG/DL (ref 0.52–1.04)
CRP SERPL-MCNC: <2.9 MG/L (ref 0–8)
EOSINOPHIL # BLD AUTO: 0.2 10E3/UL (ref 0–0.7)
EOSINOPHIL NFR BLD AUTO: 4 %
ERYTHROCYTE [DISTWIDTH] IN BLOOD BY AUTOMATED COUNT: 12.6 % (ref 10–15)
ERYTHROCYTE [SEDIMENTATION RATE] IN BLOOD BY WESTERGREN METHOD: 6 MM/HR (ref 0–30)
FASTING STATUS PATIENT QL REPORTED: NORMAL
GFR SERPL CREATININE-BSD FRML MDRD: 87 ML/MIN/1.73M2
HCT VFR BLD AUTO: 41 % (ref 35–47)
HDLC SERPL-MCNC: 60 MG/DL
HGB BLD-MCNC: 13.7 G/DL (ref 11.7–15.7)
HOLD SPECIMEN: NORMAL
HOLD SPECIMEN: NORMAL
LDLC SERPL CALC-MCNC: 53 MG/DL
LYMPHOCYTES # BLD AUTO: 0.8 10E3/UL (ref 0.8–5.3)
LYMPHOCYTES NFR BLD AUTO: 14 %
MCH RBC QN AUTO: 31.7 PG (ref 26.5–33)
MCHC RBC AUTO-ENTMCNC: 33.4 G/DL (ref 31.5–36.5)
MCV RBC AUTO: 95 FL (ref 78–100)
MONOCYTES # BLD AUTO: 0.7 10E3/UL (ref 0–1.3)
MONOCYTES NFR BLD AUTO: 12 %
NEUTROPHILS # BLD AUTO: 4.2 10E3/UL (ref 1.6–8.3)
NEUTROPHILS NFR BLD AUTO: 70 %
NONHDLC SERPL-MCNC: 70 MG/DL
PLATELET # BLD AUTO: 211 10E3/UL (ref 150–450)
PROT SERPL-MCNC: 6.5 G/DL (ref 6.8–8.8)
RBC # BLD AUTO: 4.32 10E6/UL (ref 3.8–5.2)
TRIGL SERPL-MCNC: 83 MG/DL
WBC # BLD AUTO: 5.9 10E3/UL (ref 4–11)

## 2022-01-17 PROCEDURE — 82565 ASSAY OF CREATININE: CPT

## 2022-01-17 PROCEDURE — 85652 RBC SED RATE AUTOMATED: CPT

## 2022-01-17 PROCEDURE — 85025 COMPLETE CBC W/AUTO DIFF WBC: CPT

## 2022-01-17 PROCEDURE — 86140 C-REACTIVE PROTEIN: CPT

## 2022-01-17 PROCEDURE — 80061 LIPID PANEL: CPT

## 2022-01-17 PROCEDURE — 80076 HEPATIC FUNCTION PANEL: CPT

## 2022-01-17 PROCEDURE — 86481 TB AG RESPONSE T-CELL SUSP: CPT

## 2022-01-17 PROCEDURE — 36415 COLL VENOUS BLD VENIPUNCTURE: CPT

## 2022-01-19 LAB
GAMMA INTERFERON BACKGROUND BLD IA-ACNC: 0.04 IU/ML
M TB IFN-G BLD-IMP: NEGATIVE
M TB IFN-G CD4+ BCKGRND COR BLD-ACNC: 2.42 IU/ML
MITOGEN IGNF BCKGRD COR BLD-ACNC: 0 IU/ML
MITOGEN IGNF BCKGRD COR BLD-ACNC: 0.01 IU/ML
QUANTIFERON MITOGEN: 2.46 IU/ML
QUANTIFERON NIL TUBE: 0.04 IU/ML
QUANTIFERON TB1 TUBE: 0.05 IU/ML
QUANTIFERON TB2 TUBE: 0.04

## 2022-01-19 NOTE — TELEPHONE ENCOUNTER
Medication Appeal Initiation    We have initiated an appeal for the requested medication:  Medication: xeljanz    [employer] Appeals Department  Patient: Jackie Maguire  : 1955  ID#: [id number]  From the office of Maurilio aHyes MD    To whom it may concern,    I am writing this letter of medical necessity in regards to the recent denial of Xeljanz. Jackie Maguire is a  pleasant 66-year-old female who unfortunately, suffers from Rheumatoid arthritis of multiple sites without rheumatoid factor (H) (M06.09).     In your denial you state that the member must have prior therapy or cannot use the medication Rinvoq.    Miss Maguire has been on Xeljanz since 2019 and has been receiving a positive therapeutic outcome. This med  was approved by [employer] from 2021 to 2021 and by the patient s previous health plans prior to coverage with [employer].     We feel that a change in prescription drug required by the step therapy protocol is expected to  be ineffective or cause harm to the enrollee based on the known characteristics of the specific enrollee and the  known characteristics of the required prescription drug. Due to this, we are asking you to approve coverage of  this medication pursuant to [place] Statue 62Q.184 Step Therapy Override Subd. 3 Article 4 that states: Step  Therapy Requirement must be overridden if:    (4) the enrollee is currently receiving a positive therapeutic outcome on a prescription drug for the medical  condition under consideration if, while on their current health plan or the immediately preceding health plan, the  enrollee received coverage for the prescription drug and the enrollee's prescribing health care provider gives  documentation to the health plan company that the change in prescription drug required by the step therapy  protocol is expected to be ineffective or cause harm to the enrollee based on the known characteristics of the  specific enrollee and the  known characteristics of the required prescription drug.    Source: www.revisor.[place].gov/statutes/cite/62Q.184    We feel that Xeljanz is the best choice of therapy for Noomie. We would like to pursue continuation of this  course of therapy and are requesting that you approve our decision to provide Xeljanz to our patient, allowing us  to provide the best treatment option available. We thank you for your immediate attention to this issue and we  would appreciate haste in your determination as to not delay the patient s current treatment regimen.      On behalf of MD Tanisha Mattson, [facility]  Specialty Clinics Pharmacy Liaison

## 2022-01-20 NOTE — TELEPHONE ENCOUNTER
Prior Authorization Approval    Authorization Effective Date: 1/1/2022  Authorization Expiration Date: 12/31/2022  Medication: xeljanz  Approved Dose/Quantity: 30/30ds  Reference #: [id number]   Insurance Company: [employer] [phone] Fax [phone]  [employer] Card Available: No    Foundation Assistance Needed: yes  Which Pharmacy is filling the prescription (Not needed for infusion/clinic administered): [facility] - [place], [place] [facility] [facility] [id number]

## 2022-01-21 ENCOUNTER — OFFICE VISIT (OUTPATIENT)
Dept: RHEUMATOLOGY | Facility: CLINIC | Age: 67
End: 2022-01-21
Payer: MEDICARE

## 2022-01-21 VITALS
SYSTOLIC BLOOD PRESSURE: 129 MMHG | HEIGHT: 66 IN | HEART RATE: 81 BPM | OXYGEN SATURATION: 98 % | BODY MASS INDEX: 29.92 KG/M2 | DIASTOLIC BLOOD PRESSURE: 84 MMHG | WEIGHT: 186.2 LBS

## 2022-01-21 DIAGNOSIS — Z79.899 HIGH RISK MEDICATION USE: ICD-10-CM

## 2022-01-21 DIAGNOSIS — M06.09 RHEUMATOID ARTHRITIS OF MULTIPLE SITES WITHOUT RHEUMATOID FACTOR (H): Primary | ICD-10-CM

## 2022-01-21 DIAGNOSIS — M17.12 PRIMARY OSTEOARTHRITIS OF LEFT KNEE: ICD-10-CM

## 2022-01-21 PROCEDURE — 99214 OFFICE O/P EST MOD 30 MIN: CPT | Mod: 25 | Performed by: INTERNAL MEDICINE

## 2022-01-21 PROCEDURE — 20610 DRAIN/INJ JOINT/BURSA W/O US: CPT | Mod: LT | Performed by: INTERNAL MEDICINE

## 2022-01-21 RX ORDER — TRIAMCINOLONE ACETONIDE 40 MG/ML
40 INJECTION, SUSPENSION INTRA-ARTICULAR; INTRAMUSCULAR ONCE
Status: COMPLETED | OUTPATIENT
Start: 2022-01-21 | End: 2022-01-21

## 2022-01-21 RX ADMIN — TRIAMCINOLONE ACETONIDE 40 MG: 40 INJECTION, SUSPENSION INTRA-ARTICULAR; INTRAMUSCULAR at 09:41

## 2022-01-21 ASSESSMENT — MIFFLIN-ST. JEOR: SCORE: 1411.35

## 2022-01-21 NOTE — PATIENT INSTRUCTIONS
RHEUMATOLOGY    Dr. Maurilio Hayes    [address] [place] [address]  Phone number: [phone]  Fax number: [phone]      Thank you for choosing [facility]!    Elizabeth Haley CMA Rheumatology

## 2022-01-21 NOTE — PROGRESS NOTES
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  0626 02/26/18  0846 10/05/16  0831   HAZEL 9.1 9.4 9.1   < >  --    VITDT  --   --   --   --  29    < > = values in this interval not displayed.     ESR/CRP  Recent Labs   Lab Test 01/17/22  0857 09/15/21  1437 07/28/21  0928   SED 6 9 8   CRP <2.9 <2.9 <2.9     Lipid Panel  Recent Labs   Lab Test 01/17/22  0857 12/03/20  1019 12/05/19  0851   CHOL 130 196 310*   TRIG 83 176* 171*   HDL 60 60 61   LDL 53 101* 215*   NHDL 70 136* 249*     Hepatitis B  Recent Labs   Lab Test 02/26/18  0846   HBCAB Nonreactive   HEPBANG Nonreactive     Hepatitis C  Recent Labs   Lab Test 02/26/18  0846   HCVAB Nonreactive     Tuberculosis Screening  Recent Labs   Lab Test 01/17/22  0857 07/22/19  0805   TBRES Negative Negative     Immunization History     Immunization History   Administered Date(s) Administered     COVID-19,PF,Moderna 04/19/2021, 05/17/2021, 11/29/2021     Influenza Quad, Recombinant, pf(RIV4) (Flublok) 10/18/2018, 10/01/2019, 10/16/2020     Influenza Vaccine IM > 6 months Valent IIV4 (Alfuria,Fluzone) 10/01/2016     Pneumo Conj 13-V (2010&after) 10/05/2016     Pneumococcal 23 valent 10/31/2013, 03/11/2019     Tdap (Adacel,Boostrix) 10/25/2012,  TEST NOT PERFORMED   04/08/2017     CALLED TO, READ BACK AND CONFIRMED [facility] ROSS ON 04/08/2017 AT 1054 BY  [id number]   04/07/2017 FEW BACTEROIDES THETAIOTAOMICRON (P)   04/07/2017     Note: B. fragilis group typically responds to empiric therapy with metronidazole, amp/sulbact, pip/tazo, or meropenem. May be clindamycin resistant.   04/07/2017 RARE EGGERTHELLA LENTA (P)   04/07/2017 NO AEROBES ISOLATED   04/07/2017 NO GROWTH 5 DAYS.        Diagnostic Assessments Reviewed:  No new update    Nutritional Assessment:  Prealbumin and/or Albumin reviewed    Wound treatment goals are palliative:  No    DIAGNOSES:  Traumatic wound right wrist   Diabetes Mellitus Type 2 controlled HgbA1C of 7.5 on 2/2/2017  COPD  Essential hypertension      Medical Decision Making:     Patient with right wrist wound sustained from IV infiltrate debrided today. Wound is almost healed with minimal drainage.     Will discontinue Medihoney since wound is almost healed and granular.  Will cover wound with Foam Border to change twice/week    Recommend continued adequate glycemic control and increase protein intake to promote wound healing.     Avoid repetitive trauma to wound.    Return in 2 weeks for recheck.  Sooner if any issues with wound.     Plan of Care:  Advanced Wound Care Recommendations:  See above  Percent Wound Closure from consult:  N/A  Care plan to augment wound closure:  Not applicable.       Patient stable.     Rupal Malik MD             12/10/2012     Zoster vaccine recombinant adjuvanted (SHINGRIX) 11/25/2019       Procedure     Procedure: Steroid injection of the left knee  Indication: Pain, left knee osteoarthritis    The procedure was explained in detail. Risks including infection, pain, structural damage such as cartilage damage and tendon rupture, fat atrophy, skin hyper-/hypo-pigmentation, and medication reaction was explained. The need for rest of the affected joint for one week after the procedure was explained.  The option of not doing the procedure was also provided. All questions were answered and the patient consented to the procedure.     A time-out was performed and the correct patient, procedure, and laterality were verified.    The left knee was examined and location for injection was identified - anterior medial. The area was cleaned with chlorhexidine, twice.  Ethyl chloride was then used for topical anaesthetic.  Then a mixture of lidocaine 1% 2 mL and Kenalog 40mg was injected into the intra-articular space.     The patient tolerated the procedure well. No complications.    1% Lidocaine  : [employer]  Lot #: [id number]  EXPIRATION DATE: 04/2023  NDC: 9697-4327-54    MEDICATION: Triamcinolone 40 mg  LOT #: [id number]  EXPIRATION DATE:  4/2023  NDC#: [address]-2         Chart documentation done in part with Dragon Voice recognition Software. Although reviewed after completion, some word and grammatical error may remain.    Maurilio Hayes MD

## 2022-01-21 NOTE — NURSING NOTE
RAPID3 (0-30) Cumulative Score  6.2          RAPID3 Weighted Score (divide #4 by 3 and that is the weighted score)  2.0

## 2022-03-07 RX ORDER — TOFACITINIB 11 MG/1
11 TABLET, FILM COATED, EXTENDED RELEASE ORAL DAILY
Qty: 30 TABLET | Refills: 1 | Status: SHIPPED | OUTPATIENT
Start: 2022-03-07 | End: 2022-05-06

## 2022-03-07 NOTE — TELEPHONE ENCOUNTER
"Pt states she spoke with someone earlier and was given the Phone number [phone] for [employer] and they state they still have not gotten an \"approval\" from Dr. Hayes's office and has been waiting to send the Xeljanz out to the Pt.    Pt is calling back to see what is going on?  If someone can call her back to let her know.  "

## 2022-03-07 NOTE — TELEPHONE ENCOUNTER
[email or web address]    Lenny SCHULZ RN....3/7/2022 10:59 AM

## 2022-03-08 NOTE — TELEPHONE ENCOUNTER
I have faxed the PA approval from the [employer] Part [employer] to [employer] and they will have a decision on PAP (free drug) within the next two days.

## 2022-03-08 NOTE — TELEPHONE ENCOUNTER
Pt's insurance is requiring a prior auth before they will cover this medication.    Dianna Bryan Patient Representative

## 2022-03-09 NOTE — TELEPHONE ENCOUNTER
Pt called asking for status of her PA for Hina. Writer informed pt the PA was sent but we are waiting for a reply.    Pt would like an update when the PA team responds whether it is approved or not.    Please call pt at [phone].    Karla FLORENCE RN, BSN  [facility]

## 2022-03-09 NOTE — TELEPHONE ENCOUNTER
Patient has been notified. Form stating approval has been sent to abstracting.  Elizabeth Haley CMA Rheumatology  3/9/2022

## 2022-03-10 NOTE — TELEPHONE ENCOUNTER
Called and spoke with Ren and Jackie has been approved for 2022 from 03/09/22 thru 12/31/22.  They have already set up an order that will be shipping out today and should be received by Friday 03/11/22.

## 2022-04-22 ENCOUNTER — LAB (OUTPATIENT)
Dept: LAB | Facility: CLINIC | Age: 67
End: 2022-04-22
Payer: MEDICARE

## 2022-04-22 DIAGNOSIS — Z79.899 HIGH RISK MEDICATION USE: ICD-10-CM

## 2022-04-22 DIAGNOSIS — M06.09 RHEUMATOID ARTHRITIS OF MULTIPLE SITES WITHOUT RHEUMATOID FACTOR (H): ICD-10-CM

## 2022-04-22 LAB
ALBUMIN SERPL-MCNC: 3.8 G/DL (ref 3.4–5)
ALP SERPL-CCNC: 140 U/L (ref 40–150)
ALT SERPL W P-5'-P-CCNC: 39 U/L (ref 0–50)
AST SERPL W P-5'-P-CCNC: 31 U/L (ref 0–45)
BASOPHILS # BLD AUTO: 0 10E3/UL (ref 0–0.2)
BASOPHILS NFR BLD AUTO: 1 %
BILIRUB DIRECT SERPL-MCNC: 0.1 MG/DL (ref 0–0.2)
BILIRUB SERPL-MCNC: 0.7 MG/DL (ref 0.2–1.3)
CREAT SERPL-MCNC: 0.84 MG/DL (ref 0.52–1.04)
CRP SERPL-MCNC: <2.9 MG/L (ref 0–8)
EOSINOPHIL # BLD AUTO: 0.2 10E3/UL (ref 0–0.7)
EOSINOPHIL NFR BLD AUTO: 3 %
ERYTHROCYTE [DISTWIDTH] IN BLOOD BY AUTOMATED COUNT: 13 % (ref 10–15)
ERYTHROCYTE [SEDIMENTATION RATE] IN BLOOD BY WESTERGREN METHOD: 8 MM/HR (ref 0–30)
GFR SERPL CREATININE-BSD FRML MDRD: 77 ML/MIN/1.73M2
HCT VFR BLD AUTO: 46.5 % (ref 35–47)
HGB BLD-MCNC: 14.8 G/DL (ref 11.7–15.7)
LYMPHOCYTES # BLD AUTO: 0.8 10E3/UL (ref 0.8–5.3)
LYMPHOCYTES NFR BLD AUTO: 11 %
MCH RBC QN AUTO: 32.2 PG (ref 26.5–33)
MCHC RBC AUTO-ENTMCNC: 31.8 G/DL (ref 31.5–36.5)
MCV RBC AUTO: 101 FL (ref 78–100)
MONOCYTES # BLD AUTO: 0.8 10E3/UL (ref 0–1.3)
MONOCYTES NFR BLD AUTO: 12 %
NEUTROPHILS # BLD AUTO: 5.1 10E3/UL (ref 1.6–8.3)
NEUTROPHILS NFR BLD AUTO: 74 %
PLATELET # BLD AUTO: 210 10E3/UL (ref 150–450)
PROT SERPL-MCNC: 7.2 G/DL (ref 6.8–8.8)
RBC # BLD AUTO: 4.6 10E6/UL (ref 3.8–5.2)
WBC # BLD AUTO: 7 10E3/UL (ref 4–11)

## 2022-04-22 PROCEDURE — 85025 COMPLETE CBC W/AUTO DIFF WBC: CPT

## 2022-04-22 PROCEDURE — 80076 HEPATIC FUNCTION PANEL: CPT

## 2022-04-22 PROCEDURE — 85652 RBC SED RATE AUTOMATED: CPT

## 2022-04-22 PROCEDURE — 82565 ASSAY OF CREATININE: CPT

## 2022-04-22 PROCEDURE — 86140 C-REACTIVE PROTEIN: CPT

## 2022-04-22 PROCEDURE — 36415 COLL VENOUS BLD VENIPUNCTURE: CPT

## 2022-05-05 ENCOUNTER — TELEPHONE (OUTPATIENT)
Dept: RHEUMATOLOGY | Facility: CLINIC | Age: 67
End: 2022-05-05
Payer: MEDICARE

## 2022-05-05 NOTE — PATIENT INSTRUCTIONS
RHEUMATOLOGY    Dr. Maurilio Hayes    [facility]  [address] [place] [address] [place] [address]  Phone number: [phone]  Fax number: [phone]      Thank you for choosing [facility]!    Elizabeth Haley CMA Rheumatology    ---------------------------------------------    COVID-19 vaccination:     A 4th dose (1st booster) of the mRNA COVID-19 vaccination is recommended to be received 3 months after the third mRNA COVID-19 vaccination was received.  A 5th mRNA vaccine (2nd booster) may be received at least 4 months after the 4th dose.     Based on our current understanding (and this may change over time as we learn more), medications should be adjusted as noted below, if disease activity allows:  - NSAIDs and Acetaminophen: hold for 24 hours prior to vaccination if able to do so  - Xeljanz (tofacitinib) should be held for 1-2 weeks after each COVID-19 vaccine (as disease activity allows)

## 2022-05-05 NOTE — TELEPHONE ENCOUNTER
Patient is calling because she is not feeling well and is wondering if she should reschedule her appointment tomorrow with Dr. Hayes or if it can be done virtually.

## 2022-05-05 NOTE — PROGRESS NOTES
Jackie Maguire is a 67 year old year old who is being evaluated via a billable telephone visit.      What phone number would you like to be contacted at? [phone]   How would you like to obtain your AVS? Mail a copy     Rheumatology Telephone/Telehealth Visit      Jackie Maguire MRN# [id number]   YOB: 1955 Age: 67 year old      Date of visit: 5/06/22   PCP: Dr. Talia Mejía at [facility]    Chief Complaint   Patient presents with:  Rheumatoid Arthritis: RA is good.    Assessment and Plan     1. Rheumatoid Arthritis (CCP low positive, then negative): From record review: dx'd 2012; has followed with Kayley Jimenez, Jacob, and [facility]; hx of +DULCE, +dsDNA, CCP low positive then negative; hx of psoriasis; hx of C-spine fusion; initial presentation of pain/stiff/swollen joints involving the knees, feet, ankles, neck, back.  Previously on leflunomide (mouth sores), HCQ (ineffective), AZA (itching), Humira (ineffective at q7day and q14 days dosings), Remicade (associated with syncopal episodes), MTX (partially effective, stopped when doing well on Xeljanz, higher doses associated with LFT elevations). Sulfa allergy. Currently on Xeljanz XR 11mg daily. Hyperlipidemia is being managed by her primary care provider. Discussed the data for higher risk of cardiovascular events and malignancy with Xeljanz; she says that she will see dermatology at least 1-2x/year for skin cancer monitoring and that she would like to continue xeljanz.  Chronic illness, stable.    - Continue Xeljanz XR 11 mg daily   - Labs in 3 months: CBC, Creatinine, Hepatic Panel, ESR, CRP    High risk medication requiring intensive toxicity monitoring at least quarterly: labs ordered include CBC, Creatinine, Hepatic panel to monitor for cytopenia and hepatotoxicity; checking creatinine as it affects clearance of medication.                Rapid 3, cumulative scores                      1/21/2022: 6.2   (Xeljanz XR 11mg daily; RA doing well;  left knee OA is main symptom)                      9/17/2021: 7.3   (MTX 10mg SQ wkly, Xeljanz XR 11mg daily)                      8/31/2020: RA doing well ((MTX 20mg SQ wkly, Xeljanz XR 11mg daily)                      02/17/2020:  7.3 (MTX 20mg SQ wkly, prednisone 5mg daily, Xeljanz XR 11mg daily)                      11/25/2019:  8    (MTX 20mg SQ wkly, prednisone 5mg daily, Xeljanz XR 11mg daily)    2. History of Positive DULCE and dsDNA (repeats negative): with inflammatory arthritis.  Symptoms fit better for seropositive rheumatoid arthritis; see #1.  DULCE and dsDNA negative previously when rechecked.     3. Neck pain hx: Neck spasms several years ago that was dx'd as dystonia at the [facility]; had several imaging studies especially given the RA dx where degenerative changes were seen.  Also saw a spine surgeon at [place].  PT ineffective.  Nonradiating neck pain has been stable per patient. Unchanged since last visit.     4.  Lower back pain history: Previously was bothering her after she had been picking up her grandchild; degenerative in nature.  Home physical therapy exercises were effective.  Not an issue today.    5. Hyperlipidemia: managed by PCP per patient.     6.  Unable to fully extend the left knee, tricompartmental degenerative changes seen on 9/17/2021 x-rays: Reportedly has had difficulty extending the left knee for several years ever since she had a fall.  At the time of the fall she required surgery for other joints but the knee was reportedly not addressed.  Sometimes with mild knee pain with increased activity but otherwise doing well; still difficult to fully extend her knee and this bothers her.  PT somewhat helpful for pain but not ROM.  A steroid injection in Jan 2022 was minimally helpful.  Advised seeing orthopedics and she is in agreement.  Chronic illness, progressive.    - orthopedic surgery referral; phone number provided so that she may call to schedule    7.  High risk medications,  immunocompromise status, and history of melanoma: Advised that she needs at least once yearly skin checks with dermatology.    8. Current illness: advised that she be tested for COVID19 and she says that she will do so and if positive then she will follow-up with her PCP's office. If worsening symptoms regardless of COVID19 test result she will follow-up with her PCP.     9.  Vaccinations: Vaccinations reviewed with Ms. Maguire.  Risks and benefits of vaccinations were discussed.    - Influenza: up to date  - [id number]: up to date  - [id number]: up to date   - Shingrix: up to date  - COVID-19: Moderna vaccine received 4/19/21, 5/17/2021, 11/29/2021; A 4th dose (1st booster) of the mRNA COVID-19 vaccination is recommended to be received 3 months after the third mRNA COVID-19 vaccination was received.  A 5th mRNA vaccine (2nd booster) may be received at least 4 months after the 4th dose.   Based on our current understanding (and this may change over time as we learn more), medications should be adjusted as noted below, if disease activity allows:  - NSAIDs and Acetaminophen: hold for 24 hours prior to vaccination if able to do so  - Xeljanz (tofacitinib) should be held for 1-2 weeks after each COVID-19 vaccine (as disease activity allows)     Total minutes spent in evaluation with patient, documentation, , and review of pertinent studies and chart notes: 18     Ms. Maguire verbalized agreement with and understanding of the rational for the diagnosis and treatment plan.  All questions were answered to best of my ability and the patient's satisfaction. Ms. Maguire was advised to contact the clinic with any questions that may arise after the clinic visit.      Thank you for involving me in the care of the patient    Return to clinic: 3-4 months      HPI   Jackie Maguire is a 67 year old female with a past medical history significant for hyperlipidemia, hypothyroidism, history of melanoma, history of humerus  fracture, osteoporosis, psoriasis, and rheumatoid arthritis who presents for follow-up of rheumatoid arthritis.     Today, 5/6/2022: Jackie changed today's appointment to virtual due to feeling ill; achy, sore throat; says that her son had a similar illness and now she and her  are ill since Wednesday.  Left knee injection was only minimally helpful; still cannot fully extend the left knee. Other joints are doing well. Happy with how well she is doing on xeljanz.     Denies fevers, chills, nausea, vomiting, constipation, diarrhea. No abdominal pain. No chest pain/pressure, palpitations, or shortness of breath. No LE swelling. No oral or nasal sores.  No rash. No sicca symptoms.    Tobacco: none  EtOH: none  Drugs: none    ROS   12 point review of system was completed and negative except as noted in the HPI     Active Problem List     Patient Active Problem List   Diagnosis     Hypothyroidism     Hyperlipidemia LDL goal <160     Inflammatory arthritis     Fracture, pelvis closed (H)     Malignant melanoma of skin (H)     Distal radius fracture     Closed fracture of part of humerus     Proximal humerus fracture     Aftercare for healing traumatic fracture of upper arm     Osteopetrosis     Cervical dystonia     Dystonia, torsion, fragments of     Psoriasis     Rheumatoid arthritis of multiple sites without rheumatoid factor (H)     Past Medical History     Past Medical History:   Diagnosis Date     Abscess of left jaw 6/20/2018     Basal cell carcinoma      Hyperlipidemia LDL goal <160 1/5/2011     Hypothyroidism 10/1/2010     Inflammatory arthritis 1/20/2011     Malignant melanoma (H)      Past Surgical History     Past Surgical History:   Procedure Laterality Date     APPENDECTOMY       ARTHRODESIS TOE(S) Left 8/17/2017    Procedure: ARTHRODESIS TOE(S);  Left foot 1st metatarsophalangeal realignment fusion, 2nd & 3rd digital corrections;  Surgeon: Jason Lamar DPM;  Location: [facility]     INCISION AND  "DRAINAGE OF WOUND N/A 6/22/2018    Procedure: INCISION AND DRAINAGE JAW ABSCESS;  Surgeon: Lennox Sullivan MD;  [place] [facility] [place]  Service:      IR CERVICAL EPIDURAL STEROID INJECTION  5/9/2013     OPEN REDUCTION INTERNAL FIXATION HUMERUS PROXIMAL Left 9/30/2016    Procedure: OPEN REDUCTION INTERNAL FIXATION HUMERUS PROXIMAL;  Surgeon: Charly Bernal MD;  Location: [facility] OR     Allergy     Allergies   Allergen Reactions     Azathioprine Itching     Leflunomide Other (See Comments)     Mouth sores     Compazine Anxiety     Restless, anxious     Prochlorperazine Anxiety     Restless, anxious     Sulfa Drugs Rash     Current Medication List     Current Outpatient Medications   Medication Sig     acetaminophen (TYLENOL) 325 MG tablet 650 mg po QID and BId PRN     hydrOXYzine (ATARAX) 25 MG tablet Take 1 tablet (25 mg) by mouth every 4 hours as needed for itching (and nausea)     levothyroxine (SYNTHROID/LEVOTHROID) 112 MCG tablet Take 1 tablet by mouth once daily     tofacitinib (XELJANZ XR) 11 MG 24 hr tablet Take 1 tablet (11 mg) by mouth daily Hold for signs of infection, then seek medical attention.     VITAMIN D, CHOLECALCIFEROL, PO Take by mouth daily     aspirin 81 MG tablet Take 1 tablet (81 mg) by mouth daily (Patient not taking: Reported on 11/25/2019)     Insulin Syringe-Needle U-100 (BD INSULIN SYRINGE) 27G X 1/2\" 1 ML MISC For weekly methotrexate administration.     LEVOTHYROXINE SODIUM PO Take 137 mcg by mouth daily  (Patient not taking: Reported on 9/17/2021)     oxyCODONE-acetaminophen (PERCOCET) 5-325 MG per tablet Take 1-2 tablets by mouth every 4 hours as needed for pain (moderate to severe) (Patient not taking: Reported on 9/17/2021)     No current facility-administered medications for this visit.         Social History   See HPI    Family History     Family History   Problem Relation Age of Onset     Cancer Father        Physical Exam     Temp Readings from Last 3 Encounters:   03/11/21 " "98.5  F (36.9  C) (Oral)   06/11/18 98.4  F (36.9  C) (Oral)   08/17/17 98  F (36.7  C) (Oral)     BP Readings from Last 5 Encounters:   01/21/22 129/84   09/17/21 126/85   06/23/21 135/75   03/11/21 (!) 149/87   02/25/20 132/80     Pulse Readings from Last 1 Encounters:   01/21/22 81     Resp Readings from Last 1 Encounters:   03/11/21 16     Estimated body mass index is 30.05 kg/m  as calculated from the following:    Height as of 1/21/22: 1.676 m (5' 6\").    Weight as of 1/21/22: 84.5 kg (186 lb 3.2 oz).    GEN: alert and no distress  PSYCH: Alert; coherent speech, normal rate and volume, able to articulate logical thoughts, able   to abstract reason, no tangential thoughts. Normal affect.   RESP: No cough, no audible wheezing, able to talk in full sentences  Remainder of exam unable to be completed due to telephone visits       Labs / Imaging (select studies)     CBC  Recent Labs   Lab Test 04/22/22  0748 01/17/22  0857 09/15/21  1437 07/28/21  0928 05/21/21  1333 12/03/20  1019 08/27/20  1152   WBC 7.0 5.9 6.1   < > 5.9 5.6 8.1   RBC 4.60 4.32 4.08   < > 4.25 4.39 4.25   HGB 14.8 13.7 13.5   < > 14.1 14.6 14.1   HCT 46.5 41.0 39.7   < > 42.3 44.5 42.4   * 95 97   < > 100 101* 100   RDW 13.0 12.6 13.6   < > 13.2 13.6 13.3    211 206   < > 208 213 173   MCH 32.2 31.7 33.1*   < > 33.2* 33.3* 33.2*   MCHC 31.8 33.4 34.0   < > 33.3 32.8 33.3   NEUTROPHIL 74 70 65   < > 79.9 75.2 83.4   LYMPH 11 14 18   < > 5.6 11.8 7.2   MONOCYTE 12 12 13   < > 11.6 10.7 6.9   EOSINOPHIL 3 4 3   < > 2.2 1.6 2.1   BASOPHIL 1 1 0   < > 0.7 0.7 0.4   ANEU  --   --   --   --  4.7 4.2 6.7   ALYM  --   --   --   --  0.3* 0.7* 0.6*   KAREN  --   --   --   --  0.7 0.6 0.6   AEOS  --   --   --   --  0.1 0.1 0.2   ABAS  --   --   --   --  0.0 0.0 0.0   ANEUTAUTO 5.1 4.2 4.0   < >  --   --   --    ALYMPAUTO 0.8 0.8 1.1   < >  --   --   --    AMONOAUTO 0.8 0.7 0.8   < >  --   --   --    AEOSAUTO 0.2 0.2 0.2   < >  --   --   --  "   ABSBASO 0.0 0.0 0.0   < >  --   --   --     < > = values in this interval not displayed.     CMP  Recent Labs   Lab Test 04/22/22  0748 01/17/22  0857 09/15/21  1437 07/28/21  0928 05/21/21  1333 12/03/20  1019 08/27/20  1152 05/28/20  0928 10/25/19  1018 07/22/19  0805 12/19/18  0852 09/12/18  0923 06/25/18  0813 06/25/18  0626   NA  --   --   --   --   --   --   --   --   --  141  --  144  --  140   POTASSIUM  --   --   --   --   --   --   --   --   --  4.1  --  3.8  --  3.9   CHLORIDE  --   --   --   --   --   --   --   --   --  111*  --  109  --  109*   CO2  --   --   --   --   --   --   --   --   --  23  --  28  --  24   ANIONGAP  --   --   --   --   --   --   --   --   --  7  --  7  --  7   GLC  --   --   --   --   --   --   --  96  --  89  --  102*   < > 135*   BUN  --   --   --   --   --   --   --   --   --  15  --  15  --  20   CR 0.84 0.76 0.82   < > 0.88 0.81 0.83 0.74   < > 0.72   < > 0.91  --  0.69   GFRESTIMATED 77 87 76   < > 70 77 75 87   < > 90   < > 63  --  >60   GFRESTBLACK  --   --   --   --  81 89 87 >90   < > >90   < > 76  --  >60   HAZEL  --   --   --   --   --   --   --   --   --  9.1  --  9.4  --  9.1   BILITOTAL 0.7 0.5 0.6   < > 0.7 0.9 0.8 0.8   < > 0.3   < > 0.5  --   --    ALBUMIN 3.8 3.3* 3.7   < > 4.0 4.2 4.0 4.0   < > 3.8   < > 3.6  --   --    PROTTOTAL 7.2 6.5* 6.8   < > 6.8 7.1 6.9 6.8   < > 6.8   < > 6.9  --   --    ALKPHOS 140 92 91   < > 91 106 103 91   < > 86   < > 94  --   --    AST 31 15 22   < > 51* 45 36 32   < > 22   < > 27  --   --    ALT 39 27 33   < > 70* 67* 65* 61*   < > 30   < > 35  --   --     < > = values in this interval not displayed.     Calcium/VitaminD  Recent Labs   Lab Test 07/22/19  0805 09/12/18  0923 06/25/18  0626 02/26/18  0846 10/05/16  0831   HAZEL 9.1 9.4 9.1   < >  --    VITDT  --   --   --   --  29    < > = values in this interval not displayed.     ESR/CRP  Recent Labs   Lab Test 04/22/22  0748 01/17/22  0857 09/15/21  1437   SED 8 6 9   CRP <2.9  <2.9 <2.9     Lipid Panel  Recent Labs   Lab Test 01/17/22  0857 12/03/20  1019 12/05/19  0851   CHOL 130 196 310*   TRIG 83 176* 171*   HDL 60 60 61   LDL 53 101* 215*   NHDL 70 136* 249*     Hepatitis B  Recent Labs   Lab Test 02/26/18  0846   HBCAB Nonreactive   HEPBANG Nonreactive     Hepatitis C  Recent Labs   Lab Test 02/26/18  0846   HCVAB Nonreactive     Tuberculosis Screening  Recent Labs   Lab Test 01/17/22  0857 07/22/19  0805   TBRES Negative Negative     Immunization History     Immunization History   Administered Date(s) Administered     COVID-19,PF,Moderna 04/19/2021, 05/17/2021, 11/29/2021     Influenza Quad, Recombinant, pf(RIV4) (Flublok) 10/18/2018, 10/01/2019, 10/16/2020     Influenza Vaccine IM > 6 months Valent IIV4 (Alfuria,Fluzone) 10/01/2016     Pneumo Conj 13-V (2010&after) 10/05/2016     Pneumococcal 23 valent 10/31/2013, 03/11/2019     Tdap (Adacel,Boostrix) 10/25/2012, 12/10/2012     Zoster vaccine recombinant adjuvanted (SHINGRIX) 11/25/2019            Chart documentation done in part with Dragon Voice recognition Software. Although reviewed after completion, some word and grammatical error may remain.    Phone call duration with patient (in minutes): 13    Location of patient: [place], [place]   Location of provider: [facility] [place]    Maurilio Hayes MD

## 2022-05-05 NOTE — TELEPHONE ENCOUNTER
Called patient and switched appointment to be virtual.  Elizabeth Haley CMA Rheumatology  5/5/2022

## 2022-05-06 ENCOUNTER — VIRTUAL VISIT (OUTPATIENT)
Dept: RHEUMATOLOGY | Facility: CLINIC | Age: 67
End: 2022-05-06
Payer: MEDICARE

## 2022-05-06 DIAGNOSIS — M06.09 RHEUMATOID ARTHRITIS OF MULTIPLE SITES WITHOUT RHEUMATOID FACTOR (H): Primary | ICD-10-CM

## 2022-05-06 DIAGNOSIS — Z79.899 HIGH RISK MEDICATION USE: ICD-10-CM

## 2022-05-06 DIAGNOSIS — M17.12 PRIMARY OSTEOARTHRITIS OF LEFT KNEE: ICD-10-CM

## 2022-05-06 PROCEDURE — 99442 PR PHYSICIAN TELEPHONE EVALUATION 11-20 MIN: CPT | Mod: 95 | Performed by: INTERNAL MEDICINE

## 2022-05-06 RX ORDER — TOFACITINIB 11 MG/1
11 TABLET, FILM COATED, EXTENDED RELEASE ORAL DAILY
Qty: 30 TABLET | Refills: 3 | Status: SHIPPED | OUTPATIENT
Start: 2022-05-06 | End: 2022-08-08

## 2022-05-31 ENCOUNTER — TRANSFERRED RECORDS (OUTPATIENT)
Dept: HEALTH INFORMATION MANAGEMENT | Facility: CLINIC | Age: 67
End: 2022-05-31
Payer: MEDICARE

## 2022-07-05 ENCOUNTER — TRANSFERRED RECORDS (OUTPATIENT)
Dept: HEALTH INFORMATION MANAGEMENT | Facility: CLINIC | Age: 67
End: 2022-07-05

## 2022-08-01 ENCOUNTER — LAB (OUTPATIENT)
Dept: LAB | Facility: CLINIC | Age: 67
End: 2022-08-01
Payer: MEDICARE

## 2022-08-01 DIAGNOSIS — Z79.899 HIGH RISK MEDICATION USE: ICD-10-CM

## 2022-08-01 DIAGNOSIS — M06.09 RHEUMATOID ARTHRITIS OF MULTIPLE SITES WITHOUT RHEUMATOID FACTOR (H): ICD-10-CM

## 2022-08-01 LAB
ALBUMIN SERPL-MCNC: 4 G/DL (ref 3.4–5)
ALP SERPL-CCNC: 101 U/L (ref 40–150)
ALT SERPL W P-5'-P-CCNC: 35 U/L (ref 0–50)
AST SERPL W P-5'-P-CCNC: 24 U/L (ref 0–45)
BASOPHILS # BLD AUTO: 0 10E3/UL (ref 0–0.2)
BASOPHILS NFR BLD AUTO: 1 %
BILIRUB DIRECT SERPL-MCNC: 0.2 MG/DL (ref 0–0.2)
BILIRUB SERPL-MCNC: 0.9 MG/DL (ref 0.2–1.3)
CREAT SERPL-MCNC: 0.76 MG/DL (ref 0.52–1.04)
CRP SERPL-MCNC: <2.9 MG/L (ref 0–8)
EOSINOPHIL # BLD AUTO: 0.1 10E3/UL (ref 0–0.7)
EOSINOPHIL NFR BLD AUTO: 2 %
ERYTHROCYTE [DISTWIDTH] IN BLOOD BY AUTOMATED COUNT: 12.6 % (ref 10–15)
ERYTHROCYTE [SEDIMENTATION RATE] IN BLOOD BY WESTERGREN METHOD: 9 MM/HR (ref 0–30)
GFR SERPL CREATININE-BSD FRML MDRD: 86 ML/MIN/1.73M2
HCT VFR BLD AUTO: 43.9 % (ref 35–47)
HGB BLD-MCNC: 14.7 G/DL (ref 11.7–15.7)
IMM GRANULOCYTES # BLD: 0 10E3/UL
IMM GRANULOCYTES NFR BLD: 0 %
LYMPHOCYTES # BLD AUTO: 0.8 10E3/UL (ref 0.8–5.3)
LYMPHOCYTES NFR BLD AUTO: 12 %
MCH RBC QN AUTO: 31.5 PG (ref 26.5–33)
MCHC RBC AUTO-ENTMCNC: 33.5 G/DL (ref 31.5–36.5)
MCV RBC AUTO: 94 FL (ref 78–100)
MONOCYTES # BLD AUTO: 0.7 10E3/UL (ref 0–1.3)
MONOCYTES NFR BLD AUTO: 11 %
NEUTROPHILS # BLD AUTO: 5.2 10E3/UL (ref 1.6–8.3)
NEUTROPHILS NFR BLD AUTO: 75 %
PLATELET # BLD AUTO: 271 10E3/UL (ref 150–450)
PROT SERPL-MCNC: 7.3 G/DL (ref 6.8–8.8)
RBC # BLD AUTO: 4.66 10E6/UL (ref 3.8–5.2)
WBC # BLD AUTO: 6.9 10E3/UL (ref 4–11)

## 2022-08-01 PROCEDURE — 85025 COMPLETE CBC W/AUTO DIFF WBC: CPT

## 2022-08-01 PROCEDURE — 82565 ASSAY OF CREATININE: CPT

## 2022-08-01 PROCEDURE — 36415 COLL VENOUS BLD VENIPUNCTURE: CPT

## 2022-08-01 PROCEDURE — 86140 C-REACTIVE PROTEIN: CPT

## 2022-08-01 PROCEDURE — 80076 HEPATIC FUNCTION PANEL: CPT

## 2022-08-01 PROCEDURE — 85652 RBC SED RATE AUTOMATED: CPT

## 2022-08-08 ENCOUNTER — OFFICE VISIT (OUTPATIENT)
Dept: RHEUMATOLOGY | Facility: CLINIC | Age: 67
End: 2022-08-08
Payer: MEDICARE

## 2022-08-08 VITALS
OXYGEN SATURATION: 97 % | BODY MASS INDEX: 29.7 KG/M2 | HEART RATE: 66 BPM | SYSTOLIC BLOOD PRESSURE: 132 MMHG | DIASTOLIC BLOOD PRESSURE: 80 MMHG | WEIGHT: 184 LBS

## 2022-08-08 DIAGNOSIS — M25.561 CHRONIC PAIN OF BOTH KNEES: ICD-10-CM

## 2022-08-08 DIAGNOSIS — M25.562 CHRONIC PAIN OF BOTH KNEES: ICD-10-CM

## 2022-08-08 DIAGNOSIS — G89.29 CHRONIC PAIN OF BOTH KNEES: ICD-10-CM

## 2022-08-08 DIAGNOSIS — G89.29 CHRONIC MIDLINE LOW BACK PAIN, UNSPECIFIED WHETHER SCIATICA PRESENT: ICD-10-CM

## 2022-08-08 DIAGNOSIS — M54.50 CHRONIC MIDLINE LOW BACK PAIN, UNSPECIFIED WHETHER SCIATICA PRESENT: ICD-10-CM

## 2022-08-08 DIAGNOSIS — M06.09 RHEUMATOID ARTHRITIS OF MULTIPLE SITES WITHOUT RHEUMATOID FACTOR (H): Primary | ICD-10-CM

## 2022-08-08 DIAGNOSIS — Z79.899 HIGH RISK MEDICATION USE: ICD-10-CM

## 2022-08-08 PROCEDURE — 99214 OFFICE O/P EST MOD 30 MIN: CPT | Performed by: INTERNAL MEDICINE

## 2022-08-08 RX ORDER — TOFACITINIB 11 MG/1
11 TABLET, FILM COATED, EXTENDED RELEASE ORAL DAILY
Qty: 30 TABLET | Refills: 3 | Status: SHIPPED | OUTPATIENT
Start: 2022-08-08 | End: 2022-11-30

## 2022-08-08 NOTE — PROGRESS NOTES
Rheumatology Clinic Visit      Jackie Maguire MRN# [id number]   YOB: 1955 Age: 67 year old      Date of visit: 8/08/22   PCP: Dr. Talia Mejía at [facility]    Chief Complaint   Patient presents with:  Rheumatoid Arthritis: Still having pain and stiffness but is getting so use to it.    Assessment and Plan     1. Rheumatoid Arthritis (CCP low positive, then negative): From record review: dx'd 2012; has followed with Jacob Cano, and [facility]; hx of +DULCE, +dsDNA, CCP low positive then negative; hx of psoriasis; hx of C-spine fusion; initial presentation of pain/stiff/swollen joints involving the knees, feet, ankles, neck, back.  Previously on leflunomide (mouth sores), HCQ (ineffective), AZA (itching), Humira (ineffective at q7day and q14 days dosings), Remicade (associated with syncopal episodes), MTX (partially effective, stopped when doing well on Xeljanz, higher doses associated with LFT elevations). Sulfa allergy. Currently on Xeljanz XR 11mg daily. Hyperlipidemia is being managed by her primary care provider. RA controlled.  Chronic illness, stable.    - Continue Xeljanz XR 11 mg daily   - Labs in 3 months: CBC, Creatinine, Hepatic Panel, ESR, CRP    High risk medication requiring intensive toxicity monitoring at least quarterly: labs ordered include CBC, Creatinine, Hepatic panel to monitor for cytopenia and hepatotoxicity; checking creatinine as it affects clearance of medication.                Rapid 3, cumulative scores                      1/21/2022: 6.2   (Xeljanz XR 11mg daily; RA doing well; left knee OA is main symptom)                      9/17/2021: 7.3   (MTX 10mg SQ wkly, Xeljanz XR 11mg daily)                      8/31/2020: RA doing well ((MTX 20mg SQ wkly, Xeljanz XR 11mg daily)                      02/17/2020:  7.3 (MTX 20mg SQ wkly, prednisone 5mg daily, Xeljanz XR 11mg daily)                      11/25/2019:  8    (MTX 20mg SQ wkly, prednisone 5mg daily, Xeljanz XR  "11mg daily)    2. History of Positive DULCE and dsDNA (repeats negative): with inflammatory arthritis.  Symptoms fit better for seropositive rheumatoid arthritis; see #1.  DULCE and dsDNA negative previously when rechecked.     3. Neck pain hx: Neck spasms several years ago that was dx'd as dystonia at the [facility]; had several imaging studies especially given the RA dx where degenerative changes were seen.  Also saw a spine surgeon at [place].  PT ineffective.  Nonradiating neck pain has been stable per patient. Unchanged since last visit.     4.  Lower back pain history: Previously was bothering her after she had been picking up her grandchild; degenerative in nature.  Home physical therapy exercises were effective.  Today she reports again having back pain after picking up her grandchild and considering that the PT exercises helped in the past will repeat PT.  Chronic illness, progressive.    - PT referral    5. Hyperlipidemia: managed by PCP per patient.     6.  L>R knee osteoarthritis unable to fully extend the left knee, tricompartmental degenerative changes seen on 9/17/2021 x-rays: Reportedly has had difficulty extending the left knee for several years ever since she had a fall.  At the time of the fall she required surgery for other joints but the knee was reportedly not addressed.  Sometimes with mild knee pain with increased activity but otherwise doing well; still difficult to fully extend her knee and this bothers her.  PT somewhat helpful for pain but not ROM.  A steroid injection in Jan 2022 was minimally helpful.  She subsequently went to see orthopedic surgery where \"gel injections\" were administered with possibly some benefit.  She is considering having this repeated or total knee arthroplasty.  Chronic illness    7.  High risk medications, immunocompromise status, and history of melanoma: Advised that she needs at least once yearly skin checks with dermatology.     8.  Vaccinations: Vaccinations " "reviewed with Ms. Maguire.  Risks and benefits of vaccinations were discussed.    - Influenza: up to date  - [id number]: up to date  - [id number]: up to date   - Shingrix: up to date  - COVID-19: Moderna vaccine received 4/19/21, 5/17/2021, 11/29/2021, 6/27/2022.  A 5th mRNA vaccine (2nd booster) may be received at least 4 months after the 4th dose.   Based on our current understanding (and this may change over time as we learn more), medications should be adjusted as noted below, if disease activity allows:  - NSAIDs and Acetaminophen: hold for 24 hours prior to vaccination if able to do so  - Xeljanz (tofacitinib) should be held for 1-2 weeks after each COVID-19 vaccine (as disease activity allows)     Total minutes spent in evaluation with patient, documentation, , and review of pertinent studies and chart notes: 18     Ms. Maguire verbalized agreement with and understanding of the rational for the diagnosis and treatment plan.  All questions were answered to best of my ability and the patient's satisfaction. Ms. Maguire was advised to contact the clinic with any questions that may arise after the clinic visit.      Thank you for involving me in the care of the patient    Return to clinic: 3-4 months      HPI   Jackie Maguire is a 67 year old female with a past medical history significant for hyperlipidemia, hypothyroidism, history of melanoma, history of humerus fracture, osteoporosis, psoriasis, and rheumatoid arthritis who presents for follow-up of rheumatoid arthritis.     Today, 8/8/2022: Left foot fracture in March 2022 and she is recovering from that; she was treated nonoperatively with a walking boot.  She also had a \"gel injection\" at orthopedics of the left knee with possibly some improvement; she is considering having this repeated or total knee arthroplasty.  Back pain is worse since lifting her grandchild, something that she has had in the past and has improved with physical therapy in " the past but she is not doing the PT exercises now.  She would like to return to physical therapy.  RA is doing well and she is tolerating Xeljanz well.  Morning stiffness for no more than 30 minutes.    Denies fevers, chills, nausea, vomiting, constipation, diarrhea. No abdominal pain. No chest pain/pressure, palpitations, or shortness of breath. No LE swelling. No oral or nasal sores.  No rash. No sicca symptoms.    Patient's  is present with her today.    Tobacco: none  EtOH: none  Drugs: none    ROS   12 point review of system was completed and negative except as noted in the HPI     Active Problem List     Patient Active Problem List   Diagnosis     Hypothyroidism     Hyperlipidemia LDL goal <160     Inflammatory arthritis     Fracture, pelvis closed (H)     Malignant melanoma of skin (H)     Distal radius fracture     Closed fracture of part of humerus     Proximal humerus fracture     Aftercare for healing traumatic fracture of upper arm     Osteopetrosis     Cervical dystonia     Dystonia, torsion, fragments of     Psoriasis     Rheumatoid arthritis of multiple sites without rheumatoid factor (H)     Past Medical History     Past Medical History:   Diagnosis Date     Abscess of left jaw 6/20/2018     Basal cell carcinoma      Hyperlipidemia LDL goal <160 1/5/2011     Hypothyroidism 10/1/2010     Inflammatory arthritis 1/20/2011     Malignant melanoma (H)      Past Surgical History     Past Surgical History:   Procedure Laterality Date     APPENDECTOMY       ARTHRODESIS TOE(S) Left 8/17/2017    Procedure: ARTHRODESIS TOE(S);  Left foot 1st metatarsophalangeal realignment fusion, 2nd & 3rd digital corrections;  Surgeon: Jason Lamar DPM;  [facility]     INCISION AND DRAINAGE OF WOUND N/A 6/22/2018    Procedure: INCISION AND DRAINAGE JAW ABSCESS;  Surgeon: Lennox Sullivan MD;  Location: [facility] OR;  Service:      IR CERVICAL EPIDURAL STEROID INJECTION  5/9/2013     OPEN REDUCTION  "INTERNAL FIXATION HUMERUS PROXIMAL Left 9/30/2016    Procedure: OPEN REDUCTION INTERNAL FIXATION HUMERUS PROXIMAL;  Surgeon: Charly Bernal MD;  Location: [facility] OR     Allergy     Allergies   Allergen Reactions     Azathioprine Itching     Leflunomide Other (See Comments)     Mouth sores     Compazine Anxiety     Restless, anxious     Prochlorperazine Anxiety     Restless, anxious     Sulfa Drugs Rash     Current Medication List     Current Outpatient Medications   Medication Sig     acetaminophen (TYLENOL) 325 MG tablet 650 mg po QID and BId PRN     atorvastatin (LIPITOR) 40 MG tablet Take 1 tablet by mouth once daily     hydrOXYzine (ATARAX) 25 MG tablet Take 1 tablet (25 mg) by mouth every 4 hours as needed for itching (and nausea)     levothyroxine (SYNTHROID/LEVOTHROID) 112 MCG tablet Take 1 tablet by mouth once daily     tofacitinib (XELJANZ XR) 11 MG 24 hr tablet Take 1 tablet (11 mg) by mouth daily Hold for signs of infection, then seek medical attention.     VITAMIN D, CHOLECALCIFEROL, PO Take by mouth daily     aspirin 81 MG tablet Take 1 tablet (81 mg) by mouth daily (Patient not taking: No sig reported)     Insulin Syringe-Needle U-100 (BD INSULIN SYRINGE) 27G X 1/2\" 1 ML MISC For weekly methotrexate administration.     LEVOTHYROXINE SODIUM PO Take 137 mcg by mouth daily  (Patient not taking: No sig reported)     oxyCODONE-acetaminophen (PERCOCET) 5-325 MG per tablet Take 1-2 tablets by mouth every 4 hours as needed for pain (moderate to severe) (Patient not taking: No sig reported)     No current facility-administered medications for this visit.         Social History   See HPI    Family History     Family History   Problem Relation Age of Onset     Cancer Father        Physical Exam     Temp Readings from Last 3 Encounters:   03/11/21 98.5  F (36.9  C) (Oral)   06/11/18 98.4  F (36.9  C) (Oral)   08/17/17 98  F (36.7  C) (Oral)     BP Readings from Last 5 Encounters:   08/08/22 (!) 153/96   01/21/22 " "129/84   09/17/21 126/85   06/23/21 135/75   03/11/21 (!) 149/87     Pulse Readings from Last 1 Encounters:   08/08/22 66     Resp Readings from Last 1 Encounters:   03/11/21 16     Estimated body mass index is 29.7 kg/m  as calculated from the following:    Height as of 1/21/22: 1.676 m (5' 6\").    Weight as of this encounter: 83.5 kg (184 lb).    GEN: NAD.   HEENT:  Anicteric, noninjected sclera. No obvious external lesions of the ear and nose. Hearing intact.  CV: S1, S2. RRR. No m/r/g  PULM: No increased work of breathing. CTA bilaterally   MSK: Chronic synovial hypertrophy of the bilateral second and third MCPs without tenderness to palpation, increased warmth, or overlying erythema.  Other MCPs, PIPs, DIPs without swelling or tenderness to palpation.  Wrists without swelling or tenderness to palpation.  Elbows and shoulders without swelling or tenderness to palpation.  Shoulders with normal range of motion.  Knees with medial joint line tenderness but no effusion or increased warmth.  Ankles and MTPs without swelling or tenderness to palpation.    SKIN: No rash or jaundice seen  PSYCH: Alert. Appropriate.      Labs / Imaging (select studies)       CBC  Recent Labs   Lab Test 08/01/22  1346 04/22/22  0748 01/17/22  0857 07/28/21  0928 05/21/21  1333 12/03/20  1019 08/27/20  1152   WBC 6.9 7.0 5.9   < > 5.9 5.6 8.1   RBC 4.66 4.60 4.32   < > 4.25 4.39 4.25   HGB 14.7 14.8 13.7   < > 14.1 14.6 14.1   HCT 43.9 46.5 41.0   < > 42.3 44.5 42.4   MCV 94 101* 95   < > 100 101* 100   RDW 12.6 13.0 12.6   < > 13.2 13.6 13.3    210 211   < > 208 213 173   MCH 31.5 32.2 31.7   < > 33.2* 33.3* 33.2*   MCHC 33.5 31.8 33.4   < > 33.3 32.8 33.3   NEUTROPHIL 75 74 70   < > 79.9 75.2 83.4   LYMPH 12 11 14   < > 5.6 11.8 7.2   MONOCYTE 11 12 12   < > 11.6 10.7 6.9   EOSINOPHIL 2 3 4   < > 2.2 1.6 2.1   BASOPHIL 1 1 1   < > 0.7 0.7 0.4   ANEU  --   --   --   --  4.7 4.2 6.7   ALYM  --   --   --   --  0.3* 0.7* 0.6*   KAREN  " --   --   --   --  0.7 0.6 0.6   AEOS  --   --   --   --  0.1 0.1 0.2   ABAS  --   --   --   --  0.0 0.0 0.0   ANEUTAUTO 5.2 5.1 4.2   < >  --   --   --    ALYMPAUTO 0.8 0.8 0.8   < >  --   --   --    AMONOAUTO 0.7 0.8 0.7   < >  --   --   --    AEOSAUTO 0.1 0.2 0.2   < >  --   --   --    ABSBASO 0.0 0.0 0.0   < >  --   --   --     < > = values in this interval not displayed.     CMP  Recent Labs   Lab Test 08/01/22  1346 04/22/22  0748 01/17/22  0857 07/28/21  0928 05/21/21  1333 12/03/20  1019 08/27/20  1152 05/28/20  0928 10/25/19  1018 07/22/19  0805 12/19/18  0852 09/12/18  0923 06/25/18  0813 06/25/18  0626   NA  --   --   --   --   --   --   --   --   --  141  --  144  --  140   POTASSIUM  --   --   --   --   --   --   --   --   --  4.1  --  3.8  --  3.9   CHLORIDE  --   --   --   --   --   --   --   --   --  111*  --  109  --  109*   CO2  --   --   --   --   --   --   --   --   --  23  --  28  --  24   ANIONGAP  --   --   --   --   --   --   --   --   --  7  --  7  --  7   GLC  --   --   --   --   --   --   --  96  --  89  --  102*   < > 135*   BUN  --   --   --   --   --   --   --   --   --  15  --  15  --  20   CR 0.76 0.84 0.76   < > 0.88 0.81 0.83 0.74   < > 0.72   < > 0.91  --  0.69   GFRESTIMATED 86 77 87   < > 70 77 75 87   < > 90   < > 63  --  >60   GFRESTBLACK  --   --   --   --  81 89 87 >90   < > >90   < > 76  --  >60   HAZEL  --   --   --   --   --   --   --   --   --  9.1  --  9.4  --  9.1   BILITOTAL 0.9 0.7 0.5   < > 0.7 0.9 0.8 0.8   < > 0.3   < > 0.5  --   --    ALBUMIN 4.0 3.8 3.3*   < > 4.0 4.2 4.0 4.0   < > 3.8   < > 3.6  --   --    PROTTOTAL 7.3 7.2 6.5*   < > 6.8 7.1 6.9 6.8   < > 6.8   < > 6.9  --   --    ALKPHOS 101 140 92   < > 91 106 103 91   < > 86   < > 94  --   --    AST 24 31 15   < > 51* 45 36 32   < > 22   < > 27  --   --    ALT 35 39 27   < > 70* 67* 65* 61*   < > 30   < > 35  --   --     < > = values in this interval not displayed.     Calcium/VitaminD  Recent Labs   Lab Test  07/22/19  0805 09/12/18  0923 06/25/18  0626 02/26/18  0846 10/05/16  0831   HAZEL 9.1 9.4 9.1   < >  --    VITDT  --   --   --   --  29    < > = values in this interval not displayed.     ESR/CRP  Recent Labs   Lab Test 08/01/22  1346 04/22/22  0748 01/17/22  0857   SED 9 8 6   CRP <2.9 <2.9 <2.9     Lipid Panel  Recent Labs   Lab Test 01/17/22  0857 12/03/20  1019 12/05/19  0851   CHOL 130 196 310*   TRIG 83 176* 171*   HDL 60 60 61   LDL 53 101* 215*   NHDL 70 136* 249*     Hepatitis B  Recent Labs   Lab Test 02/26/18  0846   HBCAB Nonreactive   HEPBANG Nonreactive     Hepatitis C  Recent Labs   Lab Test 02/26/18  0846   HCVAB Nonreactive       Tuberculosis Screening  Recent Labs   Lab Test 01/17/22  0857 07/22/19  0805   TBRES Negative Negative         Immunization History     Immunization History   Administered Date(s) Administered     COVID-19,PF,Moderna 04/19/2021, 05/17/2021, 11/29/2021, 06/27/2022     Influenza Quad, Recombinant, pf(RIV4) (Flublok) 10/18/2018, 10/01/2019, 10/16/2020     Influenza Vaccine IM > 6 months Valent IIV4 (Alfuria,Fluzone) 10/01/2016     Pneumo Conj 13-V (2010&after) 10/05/2016     Pneumococcal 23 valent 10/31/2013, 03/11/2019     Tdap (Adacel,Boostrix) 10/25/2012, 12/10/2012     Zoster vaccine recombinant adjuvanted (SHINGRIX) 11/25/2019          Chart documentation done in part with Dragon Voice recognition Software. Although reviewed after completion, some word and grammatical error may remain.    Maurilio Hayes MD

## 2022-08-08 NOTE — NURSING NOTE
Blood pressure rechecked after visit    132/80  Elizabeth Haley CMA Rheumatology  8/8/2022                                 RAPID3 (0-30) Cumulative Score  7.0          RAPID3 Weighted Score (divide #4 by 3 and that is the weighted score)  2.3

## 2022-11-22 ENCOUNTER — LAB (OUTPATIENT)
Dept: LAB | Facility: CLINIC | Age: 67
End: 2022-11-22
Payer: MEDICARE

## 2022-11-22 DIAGNOSIS — M06.09 RHEUMATOID ARTHRITIS OF MULTIPLE SITES WITHOUT RHEUMATOID FACTOR (H): ICD-10-CM

## 2022-11-22 DIAGNOSIS — Z79.899 HIGH RISK MEDICATION USE: ICD-10-CM

## 2022-11-22 LAB
ALBUMIN SERPL BCG-MCNC: 4.5 G/DL (ref 3.5–5.2)
ALP SERPL-CCNC: 105 U/L (ref 35–104)
ALT SERPL W P-5'-P-CCNC: 26 U/L (ref 10–35)
AST SERPL W P-5'-P-CCNC: 28 U/L (ref 10–35)
BASOPHILS # BLD AUTO: 0 10E3/UL (ref 0–0.2)
BASOPHILS NFR BLD AUTO: 1 %
BILIRUB DIRECT SERPL-MCNC: <0.2 MG/DL (ref 0–0.3)
BILIRUB SERPL-MCNC: 0.7 MG/DL
CREAT SERPL-MCNC: 0.82 MG/DL (ref 0.51–0.95)
CRP SERPL-MCNC: <3 MG/L
EOSINOPHIL # BLD AUTO: 0.1 10E3/UL (ref 0–0.7)
EOSINOPHIL NFR BLD AUTO: 2 %
ERYTHROCYTE [DISTWIDTH] IN BLOOD BY AUTOMATED COUNT: 12.8 % (ref 10–15)
ERYTHROCYTE [SEDIMENTATION RATE] IN BLOOD BY WESTERGREN METHOD: 8 MM/HR (ref 0–30)
GFR SERPL CREATININE-BSD FRML MDRD: 79 ML/MIN/1.73M2
HCT VFR BLD AUTO: 43.5 % (ref 35–47)
HGB BLD-MCNC: 14.6 G/DL (ref 11.7–15.7)
IMM GRANULOCYTES # BLD: 0 10E3/UL
IMM GRANULOCYTES NFR BLD: 0 %
LYMPHOCYTES # BLD AUTO: 0.7 10E3/UL (ref 0.8–5.3)
LYMPHOCYTES NFR BLD AUTO: 11 %
MCH RBC QN AUTO: 32.1 PG (ref 26.5–33)
MCHC RBC AUTO-ENTMCNC: 33.6 G/DL (ref 31.5–36.5)
MCV RBC AUTO: 96 FL (ref 78–100)
MONOCYTES # BLD AUTO: 0.6 10E3/UL (ref 0–1.3)
MONOCYTES NFR BLD AUTO: 9 %
NEUTROPHILS # BLD AUTO: 5 10E3/UL (ref 1.6–8.3)
NEUTROPHILS NFR BLD AUTO: 78 %
PLATELET # BLD AUTO: 208 10E3/UL (ref 150–450)
PROT SERPL-MCNC: 7.2 G/DL (ref 6.4–8.3)
RBC # BLD AUTO: 4.55 10E6/UL (ref 3.8–5.2)
WBC # BLD AUTO: 6.4 10E3/UL (ref 4–11)

## 2022-11-22 PROCEDURE — 80076 HEPATIC FUNCTION PANEL: CPT

## 2022-11-22 PROCEDURE — 85652 RBC SED RATE AUTOMATED: CPT

## 2022-11-22 PROCEDURE — 85025 COMPLETE CBC W/AUTO DIFF WBC: CPT

## 2022-11-22 PROCEDURE — 82565 ASSAY OF CREATININE: CPT

## 2022-11-22 PROCEDURE — 86140 C-REACTIVE PROTEIN: CPT

## 2022-11-22 PROCEDURE — 36415 COLL VENOUS BLD VENIPUNCTURE: CPT

## 2022-11-30 ENCOUNTER — OFFICE VISIT (OUTPATIENT)
Dept: RHEUMATOLOGY | Facility: CLINIC | Age: 67
End: 2022-11-30
Payer: MEDICARE

## 2022-11-30 VITALS
BODY MASS INDEX: 29.25 KG/M2 | SYSTOLIC BLOOD PRESSURE: 129 MMHG | OXYGEN SATURATION: 98 % | WEIGHT: 181.2 LBS | HEART RATE: 78 BPM | DIASTOLIC BLOOD PRESSURE: 82 MMHG

## 2022-11-30 DIAGNOSIS — Z79.899 HIGH RISK MEDICATION USE: ICD-10-CM

## 2022-11-30 DIAGNOSIS — M06.09 RHEUMATOID ARTHRITIS OF MULTIPLE SITES WITHOUT RHEUMATOID FACTOR (H): Primary | ICD-10-CM

## 2022-11-30 PROCEDURE — 99214 OFFICE O/P EST MOD 30 MIN: CPT | Performed by: INTERNAL MEDICINE

## 2022-11-30 RX ORDER — TOFACITINIB 11 MG/1
11 TABLET, FILM COATED, EXTENDED RELEASE ORAL DAILY
Qty: 90 TABLET | Refills: 2 | Status: SHIPPED | OUTPATIENT
Start: 2022-11-30 | End: 2023-06-29

## 2022-11-30 NOTE — NURSING NOTE
RAPID3 (0-30) Cumulative Score  7.0          RAPID3 Weighted Score (divide #4 by 3 and that is the weighted score)  2.3

## 2022-11-30 NOTE — PROGRESS NOTES
Rheumatology Clinic Visit      Jackie Maguire MRN# [id number]   YOB: 1955 Age: 67 year old      Date of visit: 11/30/22   PCP: Dr. Talia Mejía at [facility]    Chief Complaint   Patient presents with:  Rheumatoid Arthritis: More pain since it is colder out. Does get a back ache when walking, thinks it is a pinched nerve, aches up a couple times a day. Puts heat on it and it does get better.    Assessment and Plan     1. Rheumatoid Arthritis (CCP low positive, then negative): From record review: dx'd 2012; has followed with Jacob Cano, and [facility]; hx of +DULCE, +dsDNA, CCP low positive then negative; hx of psoriasis; hx of C-spine fusion; initial presentation of pain/stiff/swollen joints involving the knees, feet, ankles, neck, back.  Previously on leflunomide (mouth sores), HCQ (ineffective), AZA (itching), Humira (ineffective at q7day and q14 days dosings), Remicade (associated with syncopal episodes), MTX (partially effective, stopped when doing well on Xeljanz, higher doses associated with LFT elevations). Sulfa allergy. Currently on Xeljanz XR 11mg daily. Hyperlipidemia is being managed by her primary care provider. RA controlled.  Chronic illness, stable.    - Continue Xeljanz XR 11 mg daily   - Fasting labs in 3 months: CBC, Creatinine, Hepatic Panel, ESR, CRP, lipid panel    High risk medication requiring intensive toxicity monitoring at least quarterly: labs ordered include CBC, Creatinine, Hepatic panel to monitor for cytopenia and hepatotoxicity; checking creatinine as it affects clearance of medication.                Rapid 3, cumulative scores                      11/30/2022: 7    (Xeljanz XR 11mg daily; RA doing well; left knee OA and back pain contributing)                      1/21/2022: 6.2   (Xeljanz XR 11mg daily; RA doing well; left knee OA is main symptom)                      9/17/2021: 7.3   (MTX 10mg SQ wkly, Xeljanz XR 11mg daily)                      8/31/2020: RA  doing well ((MTX 20mg SQ wkly, Xeljanz XR 11mg daily)                      02/17/2020:  7.3 (MTX 20mg SQ wkly, prednisone 5mg daily, Xeljanz XR 11mg daily)                      11/25/2019:  8    (MTX 20mg SQ wkly, prednisone 5mg daily, Xeljanz XR 11mg daily)    2. History of Positive DULCE and dsDNA (repeats negative): with inflammatory arthritis.  Symptoms fit better for seropositive rheumatoid arthritis; see #1.  DULCE and dsDNA negative previously when rechecked.     3. Neck pain hx: Neck spasms several years ago that was dx'd as dystonia at the [facility]; had several imaging studies especially given the RA dx where degenerative changes were seen.  Also saw a spine surgeon at [place].  PT ineffective.  Nonradiating neck pain has been stable per patient. Unchanged since last visit.     4.  Lower back pain: Degenerative in nature.  Home physical therapy exercises were effective, but pain has been worsening over time.  Advised physical therapy and the referral that was given previously was reprinted for her and I advised that she call to schedule.  Question if the altered gait due to left knee osteoarthritis is contributing to the low back pain.  She will continue following with orthopedics for the left knee osteoarthritis.   Chronic illness, progressive.    - PT referral; advised patient to call to schedule    5. Hyperlipidemia: managed by PCP per patient.     6.  L>R knee osteoarthritis unable to fully extend the left knee, tricompartmental degenerative changes seen on 9/17/2021 x-rays: Reportedly has had difficulty extending the left knee for several years ever since she had a fall.  At the time of the fall she required surgery for other joints but the knee was reportedly not addressed.  Sometimes with mild knee pain with increased activity but otherwise doing well; still difficult to fully extend her knee and this bothers her.  PT somewhat helpful for pain but not ROM.  A steroid injection in Jan 2022 was minimally  "helpful.  She subsequently went to see orthopedic surgery where \"gel injections\" were administered with possibly some benefit.  She is considering having this repeated or total knee arthroplasty.  Chronic illness    7.  High risk medications, immunocompromise status, and history of melanoma: Advised that she needs at least once yearly skin checks with dermatology.     8.  Vaccinations: Vaccinations reviewed with Ms. Maguire.  Risks and benefits of vaccinations were discussed.    - Influenza: up to date  - [id number]: up to date  - [id number]: up to date   - Shingrix: up to date  - COVID-19: advised updating, and then to hold xeljanz for 1-2 weeks afterward  - Tetanus: advised receiving     Total minutes spent in evaluation with patient, documentation, , and review of pertinent studies and chart notes: 18     Ms. Maguire verbalized agreement with and understanding of the rational for the diagnosis and treatment plan.  All questions were answered to best of my ability and the patient's satisfaction. Ms. Maguire was advised to contact the clinic with any questions that may arise after the clinic visit.      Thank you for involving me in the care of the patient    Return to clinic: 3-4 months      HPI   Jackie Maguire is a 67 year old female with a past medical history significant for hyperlipidemia, hypothyroidism, history of melanoma, history of humerus fracture, osteoporosis, psoriasis, and rheumatoid arthritis who presents for follow-up of rheumatoid arthritis.     Today, 11/30/2022: Chronic low back pain has worsened.  Altered gait and feels \"wobbly\" due to the deformity and osteoarthritis of the left knee.  Minimal left knee pain.  She had a gel injection of the left knee with orthopedics with possibly some improvement.  She will continue following with orthopedics for the left knee osteoarthritis.  RA controlled.  She reports that Xeljanz is effective for RA management.  Morning stiffness for no more " than 10 minutes.    Denies fevers, chills, nausea, vomiting, constipation, diarrhea. No abdominal pain. No chest pain/pressure, palpitations, or shortness of breath. No LE swelling. No oral or nasal sores.  No rash. No sicca symptoms.    Patient's  is present with her today.    Tobacco: none  EtOH: none  Drugs: none    ROS   12 point review of system was completed and negative except as noted in the HPI     Active Problem List     Patient Active Problem List   Diagnosis     Hypothyroidism     Hyperlipidemia LDL goal <160     Inflammatory arthritis     Fracture, pelvis closed (H)     Malignant melanoma of skin (H)     Distal radius fracture     Closed fracture of part of humerus     Proximal humerus fracture     Aftercare for healing traumatic fracture of upper arm     Osteopetrosis     Cervical dystonia     Dystonia, torsion, fragments of     Psoriasis     Rheumatoid arthritis of multiple sites without rheumatoid factor (H)     Past Medical History     Past Medical History:   Diagnosis Date     Abscess of left jaw 6/20/2018     Basal cell carcinoma      Hyperlipidemia LDL goal <160 1/5/2011     Hypothyroidism 10/1/2010     Inflammatory arthritis 1/20/2011     Malignant melanoma (H)      Past Surgical History     Past Surgical History:   Procedure Laterality Date     APPENDECTOMY       ARTHRODESIS TOE(S) Left 8/17/2017    Procedure: ARTHRODESIS TOE(S);  Left foot 1st metatarsophalangeal realignment fusion, 2nd & 3rd digital corrections;  Surgeon: Jason Lamar DPM;  Location: [facility] OR     INCISION AND DRAINAGE OF WOUND N/A 6/22/2018    Procedure: INCISION AND DRAINAGE JAW ABSCESS;  Surgeon: Lennox Sullivan MD;  Location: [facility] OR;  Service:      IR CERVICAL EPIDURAL STEROID INJECTION  5/9/2013     OPEN REDUCTION INTERNAL FIXATION HUMERUS PROXIMAL Left 9/30/2016    Procedure: OPEN REDUCTION INTERNAL FIXATION HUMERUS PROXIMAL;  Surgeon: Charly Bernal MD;  Location: [facility] OR     Allergy  "    Allergies   Allergen Reactions     Azathioprine Itching     Leflunomide Other (See Comments)     Mouth sores     Compazine Anxiety     Restless, anxious     Prochlorperazine Anxiety     Restless, anxious     Sulfa Drugs Rash     Current Medication List     Current Outpatient Medications   Medication Sig     acetaminophen (TYLENOL) 325 MG tablet 650 mg po QID and BId PRN     atorvastatin (LIPITOR) 40 MG tablet Take 1 tablet by mouth once daily     levothyroxine (SYNTHROID/LEVOTHROID) 112 MCG tablet Take 1 tablet by mouth once daily     tofacitinib (XELJANZ XR) 11 MG 24 hr tablet Take 1 tablet (11 mg) by mouth daily Hold for signs of infection, then seek medical attention.     VITAMIN D, CHOLECALCIFEROL, PO Take by mouth daily     aspirin 81 MG tablet Take 1 tablet (81 mg) by mouth daily (Patient not taking: Reported on 11/25/2019)     hydrOXYzine (ATARAX) 25 MG tablet Take 1 tablet (25 mg) by mouth every 4 hours as needed for itching (and nausea) (Patient not taking: Reported on 11/30/2022)     Insulin Syringe-Needle U-100 (BD INSULIN SYRINGE) 27G X 1/2\" 1 ML MISC For weekly methotrexate administration.     LEVOTHYROXINE SODIUM PO Take 137 mcg by mouth daily  (Patient not taking: Reported on 9/17/2021)     oxyCODONE-acetaminophen (PERCOCET) 5-325 MG per tablet Take 1-2 tablets by mouth every 4 hours as needed for pain (moderate to severe) (Patient not taking: Reported on 9/17/2021)     No current facility-administered medications for this visit.         Social History   See HPI    Family History     Family History   Problem Relation Age of Onset     Cancer Father        Physical Exam     Temp Readings from Last 3 Encounters:   03/11/21 98.5  F (36.9  C) (Oral)   06/11/18 98.4  F (36.9  C) (Oral)   08/17/17 98  F (36.7  C) (Oral)     BP Readings from Last 5 Encounters:   11/30/22 129/82   08/08/22 132/80   01/21/22 129/84   09/17/21 126/85   06/23/21 135/75     Pulse Readings from Last 1 Encounters:   11/30/22 78 " "    Resp Readings from Last 1 Encounters:   03/11/21 16     Estimated body mass index is 29.25 kg/m  as calculated from the following:    Height as of 1/21/22: 1.676 m (5' 6\").    Weight as of this encounter: 82.2 kg (181 lb 3.2 oz).    GEN: NAD.   HEENT:  Anicteric, noninjected sclera. No obvious external lesions of the ear and nose. Hearing intact.  CV: S1, S2. RRR. No m/r/g  PULM: No increased work of breathing. CTA bilaterally   MSK: Chronic synovial hypertrophy of the bilateral second and third MCPs without tenderness to palpation, increased warmth, or overlying erythema; overall the synovial hypertrophy is reduced today compared to previous exam.  Other MCPs, PIPs, DIPs without swelling or tenderness to palpation.  Wrists without swelling or tenderness to palpation.  Elbows and shoulders without swelling or tenderness to palpation.  Shoulders with normal range of motion.  Knees with medial joint line tenderness but no effusion or increased warmth; significant valgus deformity of the left knee.  Ankles and MTPs without swelling or tenderness to palpation.    SKIN: No rash or jaundice seen  PSYCH: Alert. Appropriate.      Labs / Imaging (select studies)       CBC  Recent Labs   Lab Test 11/22/22  0909 08/01/22  1346 04/22/22  0748 07/28/21  0928 05/21/21  1333 12/03/20  1019 08/27/20  1152   WBC 6.4 6.9 7.0   < > 5.9 5.6 8.1   RBC 4.55 4.66 4.60   < > 4.25 4.39 4.25   HGB 14.6 14.7 14.8   < > 14.1 14.6 14.1   HCT 43.5 43.9 46.5   < > 42.3 44.5 42.4   MCV 96 94 101*   < > 100 101* 100   RDW 12.8 12.6 13.0   < > 13.2 13.6 13.3    271 210   < > 208 213 173   MCH 32.1 31.5 32.2   < > 33.2* 33.3* 33.2*   MCHC 33.6 33.5 31.8   < > 33.3 32.8 33.3   NEUTROPHIL 78 75 74   < > 79.9 75.2 83.4   LYMPH 11 12 11   < > 5.6 11.8 7.2   MONOCYTE 9 11 12   < > 11.6 10.7 6.9   EOSINOPHIL 2 2 3   < > 2.2 1.6 2.1   BASOPHIL 1 1 1   < > 0.7 0.7 0.4   ANEU  --   --   --   --  4.7 4.2 6.7   ALYM  --   --   --   --  0.3* 0.7* 0.6* "   KAREN  --   --   --   --  0.7 0.6 0.6   AEOS  --   --   --   --  0.1 0.1 0.2   ABAS  --   --   --   --  0.0 0.0 0.0   ANEUTAUTO 5.0 5.2 5.1   < >  --   --   --    ALYMPAUTO 0.7* 0.8 0.8   < >  --   --   --    AMONOAUTO 0.6 0.7 0.8   < >  --   --   --    AEOSAUTO 0.1 0.1 0.2   < >  --   --   --    ABSBASO 0.0 0.0 0.0   < >  --   --   --     < > = values in this interval not displayed.     CMP  Recent Labs   Lab Test 11/22/22  0909 08/01/22  1346 04/22/22  0748 07/28/21  0928 05/21/21  1333 12/03/20  1019 08/27/20  1152 05/28/20  0928 10/25/19  1018 07/22/19  0805 12/19/18  0852 09/12/18  0923 06/25/18  0813 06/25/18  0626   NA  --   --   --   --   --   --   --   --   --  141  --  144  --  140   POTASSIUM  --   --   --   --   --   --   --   --   --  4.1  --  3.8  --  3.9   CHLORIDE  --   --   --   --   --   --   --   --   --  111*  --  109  --  109*   CO2  --   --   --   --   --   --   --   --   --  23  --  28  --  24   ANIONGAP  --   --   --   --   --   --   --   --   --  7  --  7  --  7   GLC  --   --   --   --   --   --   --  96  --  89  --  102*   < > 135*   BUN  --   --   --   --   --   --   --   --   --  15  --  15  --  20   CR 0.82 0.76 0.84   < > 0.88 0.81 0.83 0.74   < > 0.72   < > 0.91  --  0.69   GFRESTIMATED 79 86 77   < > 70 77 75 87   < > 90   < > 63  --  >60   GFRESTBLACK  --   --   --   --  81 89 87 >90   < > >90   < > 76  --  >60   HAZEL  --   --   --   --   --   --   --   --   --  9.1  --  9.4  --  9.1   BILITOTAL 0.7 0.9 0.7   < > 0.7 0.9 0.8 0.8   < > 0.3   < > 0.5  --   --    ALBUMIN 4.5 4.0 3.8   < > 4.0 4.2 4.0 4.0   < > 3.8   < > 3.6  --   --    PROTTOTAL 7.2 7.3 7.2   < > 6.8 7.1 6.9 6.8   < > 6.8   < > 6.9  --   --    ALKPHOS 105* 101 140   < > 91 106 103 91   < > 86   < > 94  --   --    AST 28 24 31   < > 51* 45 36 32   < > 22   < > 27  --   --    ALT 26 35 39   < > 70* 67* 65* 61*   < > 30   < > 35  --   --     < > = values in this interval not displayed.     Calcium/VitaminD  Recent Labs    Lab Test 07/22/19  0805 09/12/18  0923 06/25/18  0626 02/26/18  0846 10/05/16  0831   HAZEL 9.1 9.4 9.1   < >  --    VITDT  --   --   --   --  29    < > = values in this interval not displayed.     ESR/CRP  Recent Labs   Lab Test 11/22/22  0909 08/01/22  1346 04/22/22  0748   SED 8 9 8   CRP <3.00 <2.9 <2.9     Lipid Panel  Recent Labs   Lab Test 01/17/22  0857 12/03/20  1019 12/05/19  0851   CHOL 130 196 310*   TRIG 83 176* 171*   HDL 60 60 61   LDL 53 101* 215*   NHDL 70 136* 249*     Hepatitis B  Recent Labs   Lab Test 02/26/18  0846   HBCAB Nonreactive   HEPBANG Nonreactive     Hepatitis C  Recent Labs   Lab Test 02/26/18  0846   HCVAB Nonreactive     Tuberculosis Screening  Recent Labs   Lab Test 01/17/22  0857 07/22/19  0805   TBRES Negative Negative     Immunization History     Immunization History   Administered Date(s) Administered     COVID-19 Vaccine 18+ (Moderna) 04/19/2021, 05/17/2021, 11/29/2021, 06/27/2022     Influenza Vaccine 50-64 or 18-64 w/egg allergy (Flublok) 10/18/2018, 10/01/2019, 10/16/2020     Influenza Vaccine >6 months (Alfuria,Fluzone) 10/01/2016     Pneumo Conj 13-V (2010&after) 10/05/2016     Pneumococcal 23 valent 10/31/2013, 03/11/2019     Tdap (Adacel,Boostrix) 10/25/2012, 12/10/2012     Zoster vaccine recombinant adjuvanted (SHINGRIX) 11/25/2019          Chart documentation done in part with Dragon Voice recognition Software. Although reviewed after completion, some word and grammatical error may remain.    Maurilio Hayes MD

## 2022-11-30 NOTE — PATIENT INSTRUCTIONS
RHEUMATOLOGY    Dr. Maurilio Hayes    [facility]  [address] [place] [address]  [place] [address]  Phone number: [phone]  Fax number: [phone]    You may schedule your FLU shot by calling [phone] or if you would like to get your shot at a [facility] pharmacy you may schedule online at [email or web address].    Thank you for choosing [facility]!    Elizabeth Haley CMA Rheumatology

## 2022-12-28 ENCOUNTER — TELEPHONE (OUTPATIENT)
Dept: RHEUMATOLOGY | Facility: CLINIC | Age: 67
End: 2022-12-28

## 2022-12-28 NOTE — TELEPHONE ENCOUNTER
[facility] Call Center    Phone Message    May a detailed message be left on voicemail: yes     Reason for Call: Other: Pt is calling because a prior authorization is needed on the Xeljanz.     Action Taken: Message routed to:  Other: [facility]    Travel Screening: Not Applicable

## 2023-01-03 ENCOUNTER — TRANSFERRED RECORDS (OUTPATIENT)
Dept: HEALTH INFORMATION MANAGEMENT | Facility: CLINIC | Age: 68
End: 2023-01-03

## 2023-01-04 NOTE — TELEPHONE ENCOUNTER
Sent provider his portion of free drug (xeljanz) renewal application via email.    Nelsy Grijalva [facility] Liaison  [email or web address]  Phone: [phone]  Fax: [phone]

## 2023-01-05 ENCOUNTER — HOSPITAL ENCOUNTER (OUTPATIENT)
Dept: PHYSICAL THERAPY | Facility: CLINIC | Age: 68
Setting detail: THERAPIES SERIES
Discharge: HOME | End: 2023-01-05
Attending: INTERNAL MEDICINE
Payer: COMMERCIAL

## 2023-01-05 DIAGNOSIS — G89.29 CHRONIC MIDLINE LOW BACK PAIN, UNSPECIFIED WHETHER SCIATICA PRESENT: ICD-10-CM

## 2023-01-05 DIAGNOSIS — M54.50 CHRONIC MIDLINE LOW BACK PAIN, UNSPECIFIED WHETHER SCIATICA PRESENT: ICD-10-CM

## 2023-01-05 PROCEDURE — 97162 PT EVAL MOD COMPLEX 30 MIN: CPT | Mod: GP | Performed by: PHYSICAL THERAPIST

## 2023-01-05 PROCEDURE — 97110 THERAPEUTIC EXERCISES: CPT | Mod: GP | Performed by: PHYSICAL THERAPIST

## 2023-01-05 NOTE — PROGRESS NOTES
[facility]    OUTPATIENT PHYSICAL THERAPY ORTHOPEDIC EVALUATION  PLAN OF TREATMENT FOR OUTPATIENT REHABILITATION  (COMPLETE FOR INITIAL CLAIMS ONLY)  Patient's Last Name, First Name, M.I.  YOB: 1955  Jackie Maguire  KOBE    Provider s Name:  [facility]   Medical Record No.  [id number]   Start of Care Date:  01/05/23   Onset Date:  08/08/22   Type:     _X__PT   ___OT   ___SLP Medical Diagnosis:  Chronic midline low back pain, unspecified whether sciatica present (M54.50, G89.29)     PT Diagnosis:  LBP   Visits from SOC:  1      _________________________________________________________________________________  Plan of Treatment/Functional Goals:  joint mobilization, manual therapy, neuromuscular re-education, ROM, strengthening, stretching, balance training           Goals  Goal Identifier: 1  Goal Description: Patient will score 13 seconds or less on the TUG to decrease rrisk for falls.  Target Date: 03/30/23    Goal Identifier: 2  Goal Description: Patient will be able to stand > 10 minutes without increased back pain.  Target Date: 03/30/23    Goal Identifier: 3  Goal Description: Patient will be able to complete light household tasks without increased back pain.  Target Date: 03/30/23    Goal Identifier: 4  Goal Description: Patient will be independent and consistent with HEP 5x a week to aid functional recovery.  Target Date: 03/30/23                      Therapy Frequency:  1 time/week  Predicted Duration of Therapy Intervention:  12 weeks    Lacy Wilson, PT                 I CERTIFY THE NEED FOR THESE SERVICES FURNISHED UNDER        THIS PLAN OF TREATMENT AND WHILE UNDER MY CARE     (Physician co-signature of this document indicates review and certification of the therapy plan).                       Certification Date From:  01/05/23   Certification Date To:   03/30/23    Referring Provider:  Maurilio Hayes MD    Initial Assessment        See Epic Evaluation Start of Care Date: 01/05/23

## 2023-01-05 NOTE — PROGRESS NOTES
PHYSICAL THERAPY INITIAL EVALUATION  01/05/23 0800   General Information   Type of Visit Initial OP Ortho PT Evaluation   Start of Care Date 01/05/23   Referring Physician Maurilio Hayes MD   Patient/Family Goals Statement be able to walk better   Orders Evaluate and Treat   Date of Order 08/08/22   Certification Required? Yes   Medical Diagnosis Chronic midline low back pain, unspecified whether sciatica present (M54.50, G89.29)   Surgical/Medical history reviewed Yes   General Information Comments PMH: osteopetrosis, RA, hypothyroidism,, hx of pelvic, radius and humeral fractures   Body Part(s)   Body Part(s) Lumbar Spine/SI   Presentation and Etiology   Pertinent history of current problem (include personal factors and/or comorbidities that impact the POC) Patient reports back pain off and on past few years. Located low in the left side of her back. Any time she does activity she needs to sit and place heat on it. Pain is sharp. No radiating pain down the leg, no numbness or tingling. Also reporting some knee pain, saw Dr. Baker and he did not think the knee was bad enough to require surgery.   Impairments A. Pain   Functional Limitations perform activities of daily living   Symptom Location L side low back   How/Where did it occur From Degenerative Joint Disease   Onset date of current episode/exacerbation 08/08/22   Chronicity Chronic   Pain rating (0-10 point scale) Best (/10);Worst (/10)   Best (/10) 0   Worst (/10) 8   Pain quality E. Shooting   Frequency of pain/symptoms C. With activity   Pain/symptoms exacerbated by B. Walking;C. Lifting;D. Carrying;K. Home tasks   Pain/symptoms eased by A. Sitting;C. Rest;G. Heat   Progression of symptoms since onset: Unchanged   Prior Level of Function   Prior Level of Function-Mobility independent   Prior Level of Function-ADLs independent   Current Level of Function   Patient role/employment history F. Retired;G. Disabled   Current equipment-Gait/Locomotion  None   Fall Risk Screen   Fall screen completed by PT   Have you fallen 2 or more times in the past year? No   Have you fallen and had an injury in the past year? Yes   Timed Up and Go score (seconds) 15.8   Is patient a fall risk? Yes;Department fall risk interventions implemented   System Outcome Measures   Outcome Measures Low Back Pain (see Osemma and Kunal)   Lumbar Spine/SI Objective Findings   Gait/Locomotion significant medial arch collapse with pronation on the L, L knee valgus, externally rotates left foot, shortened step and stride length B, little to no L knee flexion during swing   Hamstring Flexibility WNL   Hip Flexor Flexibility tight   Piriformis Flexibility tight   Flexion ROM fingertips to toes   Extension ROM 25%   Right Side Bending ROM fingertips to superior pole of patella   Left Side Bending ROM fingertips to superior pole of patella   Lumbar ROM Comment no increased pain with ROM testing   Pelvic Screen - thigh thrust   Hip Screen hip PROM WNL, - scour, - FADIR, - ALTON   Hip Flexion (L2) Strength 4   Hip Abduction Strength 4   Knee Flexion Strength 5   Knee Extension (L3) Strength 5   Ankle Dorsiflexion (L4) Strength 5   Great Toe Extension (L5) Strength 5   Ankle Plantar Flexion (S1) Strength 5   SLR -   Spring Test -   Segmental Mobility testing in SL, hypomobile   Palpation tender L piriformis and gluteals, mild tender L ITB   Posture increased thoracic kypohsis, significant forward head   Planned Therapy Interventions   Planned Therapy Interventions joint mobilization;manual therapy;neuromuscular re-education;ROM;strengthening;stretching;balance training   Clinical Impression   Criteria for Skilled Therapeutic Interventions Met yes, treatment indicated   PT Diagnosis LBP   Influenced by the following impairments pain, impaired gait, impaired balance   Functional limitations due to impairments walking, lifting, carrying, household tasks   Clinical Presentation Evolving/Changing    Clinical Presentation Rationale variable symptoms, unable to reproduce pain on exam today   Clinical Decision Making (Complexity) Moderate complexity   Therapy Frequency 1 time/week   Predicted Duration of Therapy Intervention (days/wks) 12 weeks   Risk & Benefits of therapy have been explained Yes   Patient, Family & other staff in agreement with plan of care Yes   Education Assessment   Preferred Learning Style Listening;Demonstration;Pictures/video   Barriers to Learning No barriers   ORTHO GOALS   PT Ortho Eval Goals 1;2;3;4   Ortho Goal 1   Goal Identifier 1   Goal Description Patient will score 13 seconds or less on the TUG to decrease rrisk for falls.   Target Date 03/30/23   Ortho Goal 2   Goal Identifier 2   Goal Description Patient will be able to stand > 10 minutes without increased back pain.   Target Date 03/30/23   Ortho Goal 3   Goal Identifier 3   Goal Description Patient will be able to complete light household tasks without increased back pain.   Target Date 03/30/23   Ortho Goal 4   Goal Identifier 4   Goal Description Patient will be independent and consistent with HEP 5x a week to aid functional recovery.   Target Date 03/30/23   Total Evaluation Time   PT Eval, Moderate Complexity Minutes (20604) 30   Therapy Certification   Certification date from 01/05/23   Certification date to 03/30/23   Medical Diagnosis Chronic midline low back pain, unspecified whether sciatica present (M54.50, G89.29)       Please contact me with any questions or concerns.  Thank you for your referral.    Lacy Wilson, PT, DPT, OCS  Physical Therapist, Orthopedic Certified Specialist    [facility]  [address] [place] [id number]  [email or web address]   Office: [phone]   Employed by [employer]

## 2023-01-11 ENCOUNTER — TELEPHONE (OUTPATIENT)
Dept: RHEUMATOLOGY | Facility: CLINIC | Age: 68
End: 2023-01-11
Payer: COMMERCIAL

## 2023-01-11 NOTE — TELEPHONE ENCOUNTER
Called Ren to check patient's Xeljanz free drug program application status. Patient's enrollment is active until 12/31/2023.    Nelsy Grijalva [facility] Lia[facility]  [email or web address]  Phone: [phone]  Fax: [phone]

## 2023-01-11 NOTE — TELEPHONE ENCOUNTER
Called Ren to check patient's Xeljanz free drug program application status. Patient application pending.    Nelsy Grijalva CphT  [facility] Pharm[facility] Liaison  [email or web address]  Phone: [phone]  Fax: [phone]

## 2023-01-19 ENCOUNTER — HOSPITAL ENCOUNTER (OUTPATIENT)
Dept: PHYSICAL THERAPY | Facility: CLINIC | Age: 68
Setting detail: THERAPIES SERIES
Discharge: HOME | End: 2023-01-19
Attending: INTERNAL MEDICINE
Payer: COMMERCIAL

## 2023-01-19 PROCEDURE — 97110 THERAPEUTIC EXERCISES: CPT | Mod: GP | Performed by: PHYSICAL THERAPIST

## 2023-01-24 ENCOUNTER — TRANSFERRED RECORDS (OUTPATIENT)
Dept: HEALTH INFORMATION MANAGEMENT | Facility: CLINIC | Age: 68
End: 2023-01-24

## 2023-01-24 ENCOUNTER — HOSPITAL ENCOUNTER (OUTPATIENT)
Dept: PHYSICAL THERAPY | Facility: CLINIC | Age: 68
Setting detail: THERAPIES SERIES
Discharge: HOME | End: 2023-01-24
Attending: INTERNAL MEDICINE
Payer: COMMERCIAL

## 2023-01-24 PROCEDURE — 97110 THERAPEUTIC EXERCISES: CPT | Mod: GP | Performed by: PHYSICAL THERAPIST

## 2023-02-07 NOTE — TELEPHONE ENCOUNTER
Called patient and discussed her application with her. She needed a new application; liaison sent one via email to her at the email she provided over the phone.    Nelsy Grijalva [facility] [email or web address]  Phone: [phone]  Fax: [phone]

## 2023-02-14 ENCOUNTER — HOSPITAL ENCOUNTER (OUTPATIENT)
Dept: PHYSICAL THERAPY | Facility: CLINIC | Age: 68
Setting detail: THERAPIES SERIES
Discharge: HOME | End: 2023-02-14
Attending: INTERNAL MEDICINE
Payer: COMMERCIAL

## 2023-02-14 PROCEDURE — 97110 THERAPEUTIC EXERCISES: CPT | Mod: GP | Performed by: PHYSICAL THERAPIST

## 2023-02-23 NOTE — TELEPHONE ENCOUNTER
Nelsy Grijalva [facility]  [facility] Liaison  [email or web address]  Phone: [phone]  Fax: [phone]

## 2023-02-28 ENCOUNTER — HOSPITAL ENCOUNTER (OUTPATIENT)
Dept: PHYSICAL THERAPY | Facility: CLINIC | Age: 68
Setting detail: THERAPIES SERIES
Discharge: HOME | End: 2023-02-28
Attending: INTERNAL MEDICINE
Payer: COMMERCIAL

## 2023-02-28 PROCEDURE — 97110 THERAPEUTIC EXERCISES: CPT | Mod: GP | Performed by: PHYSICAL THERAPIST

## 2023-03-07 ENCOUNTER — HOSPITAL ENCOUNTER (OUTPATIENT)
Dept: PHYSICAL THERAPY | Facility: CLINIC | Age: 68
Setting detail: THERAPIES SERIES
Discharge: HOME | End: 2023-03-07
Attending: INTERNAL MEDICINE
Payer: COMMERCIAL

## 2023-03-07 PROCEDURE — 97110 THERAPEUTIC EXERCISES: CPT | Mod: GP | Performed by: PHYSICAL THERAPIST

## 2023-03-15 ENCOUNTER — LAB (OUTPATIENT)
Dept: LAB | Facility: CLINIC | Age: 68
End: 2023-03-15
Payer: COMMERCIAL

## 2023-03-15 DIAGNOSIS — M06.09 RHEUMATOID ARTHRITIS OF MULTIPLE SITES WITHOUT RHEUMATOID FACTOR (H): ICD-10-CM

## 2023-03-15 DIAGNOSIS — Z79.899 HIGH RISK MEDICATION USE: ICD-10-CM

## 2023-03-15 LAB
ALBUMIN SERPL BCG-MCNC: 4.5 G/DL (ref 3.5–5.2)
ALP SERPL-CCNC: 98 U/L (ref 35–104)
ALT SERPL W P-5'-P-CCNC: 40 U/L (ref 10–35)
AST SERPL W P-5'-P-CCNC: 37 U/L (ref 10–35)
BASOPHILS # BLD AUTO: 0 10E3/UL (ref 0–0.2)
BASOPHILS NFR BLD AUTO: 0 %
BILIRUB DIRECT SERPL-MCNC: <0.2 MG/DL (ref 0–0.3)
BILIRUB SERPL-MCNC: 0.4 MG/DL
CHOLEST SERPL-MCNC: 169 MG/DL
CREAT SERPL-MCNC: 0.93 MG/DL (ref 0.51–0.95)
CRP SERPL-MCNC: <3 MG/L
EOSINOPHIL # BLD AUTO: 0.1 10E3/UL (ref 0–0.7)
EOSINOPHIL NFR BLD AUTO: 2 %
ERYTHROCYTE [DISTWIDTH] IN BLOOD BY AUTOMATED COUNT: 12.8 % (ref 10–15)
ERYTHROCYTE [SEDIMENTATION RATE] IN BLOOD BY WESTERGREN METHOD: 8 MM/HR (ref 0–30)
GFR SERPL CREATININE-BSD FRML MDRD: 67 ML/MIN/1.73M2
HCT VFR BLD AUTO: 44.4 % (ref 35–47)
HDLC SERPL-MCNC: 61 MG/DL
HGB BLD-MCNC: 14.6 G/DL (ref 11.7–15.7)
IMM GRANULOCYTES # BLD: 0 10E3/UL
IMM GRANULOCYTES NFR BLD: 0 %
LDLC SERPL CALC-MCNC: 85 MG/DL
LYMPHOCYTES # BLD AUTO: 0.8 10E3/UL (ref 0.8–5.3)
LYMPHOCYTES NFR BLD AUTO: 11 %
MCH RBC QN AUTO: 31.3 PG (ref 26.5–33)
MCHC RBC AUTO-ENTMCNC: 32.9 G/DL (ref 31.5–36.5)
MCV RBC AUTO: 95 FL (ref 78–100)
MONOCYTES # BLD AUTO: 0.7 10E3/UL (ref 0–1.3)
MONOCYTES NFR BLD AUTO: 9 %
NEUTROPHILS # BLD AUTO: 5.5 10E3/UL (ref 1.6–8.3)
NEUTROPHILS NFR BLD AUTO: 77 %
NONHDLC SERPL-MCNC: 108 MG/DL
PLATELET # BLD AUTO: 226 10E3/UL (ref 150–450)
PROT SERPL-MCNC: 7.1 G/DL (ref 6.4–8.3)
RBC # BLD AUTO: 4.66 10E6/UL (ref 3.8–5.2)
TRIGL SERPL-MCNC: 115 MG/DL
WBC # BLD AUTO: 7.1 10E3/UL (ref 4–11)

## 2023-03-15 PROCEDURE — 82565 ASSAY OF CREATININE: CPT

## 2023-03-15 PROCEDURE — 80061 LIPID PANEL: CPT

## 2023-03-15 PROCEDURE — 85652 RBC SED RATE AUTOMATED: CPT

## 2023-03-15 PROCEDURE — 80076 HEPATIC FUNCTION PANEL: CPT

## 2023-03-15 PROCEDURE — 85025 COMPLETE CBC W/AUTO DIFF WBC: CPT

## 2023-03-15 PROCEDURE — 36415 COLL VENOUS BLD VENIPUNCTURE: CPT

## 2023-03-15 PROCEDURE — 86140 C-REACTIVE PROTEIN: CPT

## 2023-03-21 ENCOUNTER — HOSPITAL ENCOUNTER (OUTPATIENT)
Dept: PHYSICAL THERAPY | Facility: CLINIC | Age: 68
Setting detail: THERAPIES SERIES
Discharge: HOME | End: 2023-03-21
Attending: INTERNAL MEDICINE
Payer: COMMERCIAL

## 2023-03-21 PROCEDURE — 97110 THERAPEUTIC EXERCISES: CPT | Mod: GP | Performed by: PHYSICAL THERAPIST

## 2023-03-21 NOTE — PROGRESS NOTES
[facility] Rehabilitation Service    Outpatient Physical Therapy Discharge Note  Patient: Jackie Maguire  : 1955    Beginning/End Dates of Reporting Period:  23 to 3/21/23  Total visits: 7    Referring Provider: Maurilio Hayes MD    Therapy Diagnosis: LBP     Client Self Report: Was standing in kitchen for a couple hours at a time yesterday, back sore after doing this. If walking around in the grocery store it doesn't really bother her. Has rheumatology appointment tomorrow.    Objective Measurements:  Objective Measure: SARIKA  Details: 20% (26% at initial)    Objective Measure: TUG  Details: 9.87 seconds (15.8 seconds at initial)    Objective Measure: LE Strength Testing  Details: Hip flexion 5/5 B, Hip abduction 5/5 B, Knee extension 5/5 B, knee flexion 5/5 B. (Hip flexion and abduction 4/5 B at initial)       Goals:  Goal Identifier 1   Goal Description Patient will score 13 seconds or less on the TUG to decrease rrisk for falls.   Target Date 23   Date Met  23   Progress (detail required for progress note): 9.87 seconds     Goal Identifier 2   Goal Description Patient will be able to stand > 10 minutes without increased back pain.   Target Date 23   Date Met  23   Progress (detail required for progress note):       Goal Identifier 3   Goal Description Patient will be able to complete light household tasks without increased back pain.   Target Date 23   Date Met  23   Progress (detail required for progress note): able to do dusting and light activities     Goal Identifier 4   Goal Description Patient will be independent and consistent with HEP 5x a week to aid functional recovery.   Target Date 23   Date Met  23   Progress (detail required for progress note):       Jackie made good progress in physical therapy. She is reporting less low back pain, only when she stands for a  prolonged period of time. Her manual muscle testing is improved and now all 5/5 on all major muscle groups. Her TUG score is also improved and no longer places her at increased risk for falls. She is independent and consistent with her home program and feels comfortable continuing on her own.        Plan:  Discharge from therapy.    Discharge:    Reason for Discharge: Patient has met all goals.    Equipment Issued: theraband    Discharge Plan: Patient to continue home program.      Please contact me with any questions or concerns.  Thank you for your referral.    Lacy Wilson, PT, DPT, OCS  Physical Therapist, Orthopedic Certified Specialist    [facility]  [address] [place] [id number]  [email or web address]   Office: [phone]   Employed by [facility] [employer]

## 2023-03-22 ENCOUNTER — OFFICE VISIT (OUTPATIENT)
Dept: RHEUMATOLOGY | Facility: CLINIC | Age: 68
End: 2023-03-22
Payer: COMMERCIAL

## 2023-03-22 VITALS
WEIGHT: 178.2 LBS | HEART RATE: 77 BPM | SYSTOLIC BLOOD PRESSURE: 128 MMHG | OXYGEN SATURATION: 98 % | DIASTOLIC BLOOD PRESSURE: 84 MMHG | BODY MASS INDEX: 28.76 KG/M2

## 2023-03-22 DIAGNOSIS — Z79.899 HIGH RISK MEDICATION USE: ICD-10-CM

## 2023-03-22 DIAGNOSIS — M06.09 RHEUMATOID ARTHRITIS OF MULTIPLE SITES WITHOUT RHEUMATOID FACTOR (H): Primary | ICD-10-CM

## 2023-03-22 PROCEDURE — 99214 OFFICE O/P EST MOD 30 MIN: CPT | Performed by: INTERNAL MEDICINE

## 2023-03-22 NOTE — PATIENT INSTRUCTIONS
RHEUMATOLOGY    Dr. Maurilio Hayes    [facility]  [address] [place] [address]  [place] [address]  Phone number: [phone]  Fax number: [phone]      Thank you for choosing [facility]!    ---------------------    Continue to follow with orthopedics.  May consider physiatry input if needed

## 2023-03-22 NOTE — PROGRESS NOTES
Rheumatology Clinic Visit      Jackie Maguire MRN# [id number]   YOB: 1955 Age: 68 year old      Date of visit: 3/22/23   PCP: Dr. Talia Mejía at [facility]    Chief Complaint   Patient presents with:  Rheumatoid Arthritis: Feeling about the same    Assessment and Plan     1. Rheumatoid Arthritis (CCP low positive, then negative): From record review: dx'd 2012; has followed with Kayley Jimenez, Jacob, and [facility]; hx of +DULCE, +dsDNA, CCP low positive then negative; hx of psoriasis; hx of C-spine fusion; initial presentation of pain/stiff/swollen joints involving the knees, feet, ankles, neck, back.  Previously on leflunomide (mouth sores), HCQ (ineffective), AZA (itching), Humira (ineffective at q7day and q14 days dosings), Remicade (associated with syncopal episodes), MTX (partially effective, stopped when doing well on Xeljanz, higher doses associated with LFT elevations). Sulfa allergy. Currently on Xeljanz XR 11mg daily. Hyperlipidemia is being managed by her primary care provider. RA controlled.  Chronic illness, stable.    - Continue Xeljanz XR 11 mg daily   - Labs in 3 months: CBC, Creatinine, Hepatic Panel, ESR, CRP    High risk medication requiring intensive toxicity monitoring at least quarterly: labs ordered include CBC, Creatinine, Hepatic panel to monitor for cytopenia and hepatotoxicity; checking creatinine as it affects clearance of medication.                Rapid 3, cumulative scores                      11/30/2022: 7    (Xeljanz XR 11mg daily; RA doing well; left knee OA and back pain contributing)                      1/21/2022: 6.2   (Xeljanz XR 11mg daily; RA doing well; left knee OA is main symptom)                      9/17/2021: 7.3   (MTX 10mg SQ wkly, Xeljanz XR 11mg daily)                      8/31/2020: RA doing well ((MTX 20mg SQ wkly, Xeljanz XR 11mg daily)                      02/17/2020:  7.3 (MTX 20mg SQ wkly, prednisone 5mg daily, Xeljanz XR 11mg daily)                       11/25/2019:  8    (MTX 20mg SQ wkly, prednisone 5mg daily, Xeljanz XR 11mg daily)    2. History of Positive DULCE and dsDNA (repeats negative): with inflammatory arthritis.  Symptoms fit better for seropositive rheumatoid arthritis; see #1.  DULCE and dsDNA negative previously when rechecked.     3. Neck pain hx: Neck spasms several years ago that was dx'd as dystonia at the [facility]; had several imaging studies especially given the RA dx where degenerative changes were seen.  Also saw a spine surgeon at [place].  PT ineffective.  Nonradiating neck pain has been stable per patient. Unchanged since last visit.     4.  Lower back pain: Degenerative in nature.  Home physical therapy exercises were effective, but pain has been worsening over time.  Now participating in physical therapy for her low back with improvement.  Question if altered gait due to left knee issues is contributing; see #6.  Advised that she continue following with her surgeon regarding her knee.      5. Hyperlipidemia: managed by PCP per patient.     6.  L>R knee osteoarthritis unable to fully extend the left knee, tricompartmental degenerative changes seen on 9/17/2021 x-rays: Reportedly has had difficulty extending the left knee for several years ever since she had a fall.  She is now following with orthopedic surgery regarding the left knee that is altering her gait and potentially affecting her chronic low back pain; she says that the surgeon does not believe surgery would help and she does not have pain in the left knee .  Continue following with orthopedics as needed, and can consider physiatry in the future as well; this was discussed with the patient in detail today.  Chronic illness    7.  High risk medications, immunocompromise status, and history of melanoma: Advised that she needs at least once yearly skin checks with dermatology.     8.  Vaccinations: Vaccinations reviewed with Ms. Maguire.      - Influenza: up to date  - [id number]:  up to date  - [id number]: up to date   - Shingrix: up to date  - COVID-19: up to date     Total minutes spent in evaluation with patient, documentation, , and review of pertinent studies and chart notes: 22     Ms. Maguire verbalized agreement with and understanding of the rational for the diagnosis and treatment plan.  All questions were answered to best of my ability and the patient's satisfaction. Ms. Maguire was advised to contact the clinic with any questions that may arise after the clinic visit.      Thank you for involving me in the care of the patient    Return to clinic: 3 months      HPI   Jackie Maguire is a 68 year old female with a past medical history significant for hyperlipidemia, hypothyroidism, history of melanoma, history of humerus fracture, osteoporosis, psoriasis, and rheumatoid arthritis who presents for follow-up of rheumatoid arthritis.     Today, 3/22/2023: Chronic low back pain has improved with physical therapy.  She says that her gait is still off because of her left knee deformity but she has no left knee pain; has seen orthopedics where they sent her to physical therapy but she says that she was told by orthopedics that surgery was not needed for the left knee at this time.  RA is controlled.  Morning stiffness for no more than 10 minutes.  No joint swelling.    Denies fevers, chills, nausea, vomiting, constipation, diarrhea. No abdominal pain. No chest pain/pressure, palpitations, or shortness of breath. No LE swelling. No oral or nasal sores.  No rash. No sicca symptoms.    Patient's  is present with her today.    Tobacco: none  EtOH: none  Drugs: none    ROS   12 point review of system was completed and negative except as noted in the HPI     Active Problem List     Patient Active Problem List   Diagnosis     Hypothyroidism     Hyperlipidemia LDL goal <160     Inflammatory arthritis     Fracture, pelvis closed (H)     Malignant melanoma of skin (H)     Distal  radius fracture     Closed fracture of part of humerus     Proximal humerus fracture     Aftercare for healing traumatic fracture of upper arm     Osteopetrosis     Cervical dystonia     Dystonia, torsion, fragments of     Psoriasis     Rheumatoid arthritis of multiple sites without rheumatoid factor (H)     Past Medical History     Past Medical History:   Diagnosis Date     Abscess of left jaw 6/20/2018     Basal cell carcinoma      Hyperlipidemia LDL goal <160 1/5/2011     Hypothyroidism 10/1/2010     Inflammatory arthritis 1/20/2011     Malignant melanoma (H)      Past Surgical History     Past Surgical History:   Procedure Laterality Date     APPENDECTOMY       ARTHRODESIS TOE(S) Left 8/17/2017    Procedure: ARTHRODESIS TOE(S);  Left foot 1st metatarsophalangeal realignment fusion, 2nd & 3rd digital corrections;  Surgeon: Jason Lamar DPM;  Location: [facility] OR     INCISION AND DRAINAGE OF WOUND N/A 6/22/2018    Procedure: INCISION AND DRAINAGE JAW ABSCESS;  Surgeon: Lennox Sullivan MD;  [facility];  Service:      IR CERVICAL EPIDURAL STEROID INJECTION  5/9/2013     OPEN REDUCTION INTERNAL FIXATION HUMERUS PROXIMAL Left 9/30/2016    Procedure: OPEN REDUCTION INTERNAL FIXATION HUMERUS PROXIMAL;  Surgeon: Charly Bernal MD;  Location: [facility] OR     Allergy     Allergies   Allergen Reactions     Azathioprine Itching     Leflunomide Other (See Comments)     Mouth sores     Compazine Anxiety     Restless, anxious     Prochlorperazine Anxiety     Restless, anxious     Sulfa Drugs Rash     Current Medication List     Current Outpatient Medications   Medication Sig     acetaminophen (TYLENOL) 325 MG tablet 650 mg po QID and BId PRN     atorvastatin (LIPITOR) 40 MG tablet Take 1 tablet by mouth once daily     levothyroxine (SYNTHROID/LEVOTHROID) 112 MCG tablet Take 1 tablet by mouth once daily     tofacitinib (XELJANZ XR) 11 MG 24 hr tablet Take 1 tablet (11 mg) by mouth daily . Hold for signs of  "infection, then seek medical attention.     VITAMIN D, CHOLECALCIFEROL, PO Take by mouth daily     aspirin 81 MG tablet Take 1 tablet (81 mg) by mouth daily (Patient not taking: Reported on 11/25/2019)     hydrOXYzine (ATARAX) 25 MG tablet Take 1 tablet (25 mg) by mouth every 4 hours as needed for itching (and nausea) (Patient not taking: Reported on 11/30/2022)     Insulin Syringe-Needle U-100 (BD INSULIN SYRINGE) 27G X 1/2\" 1 ML MISC For weekly methotrexate administration.     LEVOTHYROXINE SODIUM PO Take 137 mcg by mouth daily  (Patient not taking: Reported on 9/17/2021)     oxyCODONE-acetaminophen (PERCOCET) 5-325 MG per tablet Take 1-2 tablets by mouth every 4 hours as needed for pain (moderate to severe) (Patient not taking: Reported on 9/17/2021)     No current facility-administered medications for this visit.         Social History   See HPI    Family History     Family History   Problem Relation Age of Onset     Cancer Father        Physical Exam     Temp Readings from Last 3 Encounters:   03/11/21 98.5  F (36.9  C) (Oral)   06/11/18 98.4  F (36.9  C) (Oral)   08/17/17 98  F (36.7  C) (Oral)     BP Readings from Last 5 Encounters:   03/22/23 128/84   11/30/22 129/82   08/08/22 132/80   01/21/22 129/84   09/17/21 126/85     Pulse Readings from Last 1 Encounters:   03/22/23 77     Resp Readings from Last 1 Encounters:   03/11/21 16     Estimated body mass index is 28.76 kg/m  as calculated from the following:    Height as of 1/21/22: 1.676 m (5' 6\").    Weight as of this encounter: 80.8 kg (178 lb 3.2 oz).    GEN: NAD.   HEENT:  Anicteric, noninjected sclera. No obvious external lesions of the ear and nose. Hearing intact.  CV: S1, S2. RRR. No m/r/g  PULM: No increased work of breathing. CTA bilaterally   MSK: Chronic synovial hypertrophy of the bilateral second and third MCPs without tenderness to palpation, increased warmth, or overlying erythema.  Other MCPs, PIPs, DIPs without swelling or tenderness to " palpation.  Wrists without swelling or tenderness to palpation.  Elbows and shoulders without swelling or tenderness to palpation.  Shoulders with normal range of motion.  Knees with medial joint line tenderness but no effusion or increased warmth; valgus deformity of the left knee.  Ankles and MTPs without swelling or tenderness to palpation.    SKIN: No rash or jaundice seen  PSYCH: Alert. Appropriate.      Labs / Imaging (select studies)     CBC  Recent Labs   Lab Test 03/15/23  0930 11/22/22  0909 08/01/22  1346 07/28/21  0928 05/21/21  1333 12/03/20  1019 08/27/20  1152   WBC 7.1 6.4 6.9   < > 5.9 5.6 8.1   RBC 4.66 4.55 4.66   < > 4.25 4.39 4.25   HGB 14.6 14.6 14.7   < > 14.1 14.6 14.1   HCT 44.4 43.5 43.9   < > 42.3 44.5 42.4   MCV 95 96 94   < > 100 101* 100   RDW 12.8 12.8 12.6   < > 13.2 13.6 13.3    208 271   < > 208 213 173   MCH 31.3 32.1 31.5   < > 33.2* 33.3* 33.2*   MCHC 32.9 33.6 33.5   < > 33.3 32.8 33.3   NEUTROPHIL 77 78 75   < > 79.9 75.2 83.4   LYMPH 11 11 12   < > 5.6 11.8 7.2   MONOCYTE 9 9 11   < > 11.6 10.7 6.9   EOSINOPHIL 2 2 2   < > 2.2 1.6 2.1   BASOPHIL 0 1 1   < > 0.7 0.7 0.4   ANEU  --   --   --   --  4.7 4.2 6.7   ALYM  --   --   --   --  0.3* 0.7* 0.6*   KAREN  --   --   --   --  0.7 0.6 0.6   AEOS  --   --   --   --  0.1 0.1 0.2   ABAS  --   --   --   --  0.0 0.0 0.0   ANEUTAUTO 5.5 5.0 5.2   < >  --   --   --    ALYMPAUTO 0.8 0.7* 0.8   < >  --   --   --    AMONOAUTO 0.7 0.6 0.7   < >  --   --   --    AEOSAUTO 0.1 0.1 0.1   < >  --   --   --    ABSBASO 0.0 0.0 0.0   < >  --   --   --     < > = values in this interval not displayed.     CMP  Recent Labs   Lab Test 03/15/23  0930 11/22/22  0909 08/01/22  1346 07/28/21  0928 05/21/21  1333 12/03/20  1019 08/27/20  1152 05/28/20  0928 10/25/19  1018 07/22/19  0805 12/19/18  0852 09/12/18  0923 06/25/18  0813 06/25/18  0626   NA  --   --   --   --   --   --   --   --   --  141  --  144  --  140   POTASSIUM  --   --   --   --    --   --   --   --   --  4.1  --  3.8  --  3.9   CHLORIDE  --   --   --   --   --   --   --   --   --  111*  --  109  --  109*   CO2  --   --   --   --   --   --   --   --   --  23  --  28  --  24   ANIONGAP  --   --   --   --   --   --   --   --   --  7  --  7  --  7   GLC  --   --   --   --   --   --   --  96  --  89  --  102*   < > 135*   BUN  --   --   --   --   --   --   --   --   --  15  --  15  --  20   CR 0.93 0.82 0.76   < > 0.88 0.81 0.83 0.74   < > 0.72   < > 0.91  --  0.69   GFRESTIMATED 67 79 86   < > 70 77 75 87   < > 90   < > 63  --  >60   GFRESTBLACK  --   --   --   --  81 89 87 >90   < > >90   < > 76  --  >60   HAZEL  --   --   --   --   --   --   --   --   --  9.1  --  9.4  --  9.1   BILITOTAL 0.4 0.7 0.9   < > 0.7 0.9 0.8 0.8   < > 0.3   < > 0.5  --   --    ALBUMIN 4.5 4.5 4.0   < > 4.0 4.2 4.0 4.0   < > 3.8   < > 3.6  --   --    PROTTOTAL 7.1 7.2 7.3   < > 6.8 7.1 6.9 6.8   < > 6.8   < > 6.9  --   --    ALKPHOS 98 105* 101   < > 91 106 103 91   < > 86   < > 94  --   --    AST 37* 28 24   < > 51* 45 36 32   < > 22   < > 27  --   --    ALT 40* 26 35   < > 70* 67* 65* 61*   < > 30   < > 35  --   --     < > = values in this interval not displayed.     Calcium/VitaminD  Recent Labs   Lab Test 07/22/19  0805 09/12/18 0923 06/25/18  0626 02/26/18  0846 10/05/16  0831   HAZEL 9.1 9.4 9.1   < >  --    VITDT  --   --   --   --  29    < > = values in this interval not displayed.     ESR/CRP  Recent Labs   Lab Test 03/15/23  0930 11/22/22  0909 08/01/22  1346 04/22/22  0748 01/17/22  0857   SED 8 8 9 8 6   CRP  --   --  <2.9 <2.9 <2.9   CRPI <3.00 <3.00  --   --   --      Lipid Panel  Recent Labs   Lab Test 03/15/23  0930 01/17/22  0857 12/03/20  1019   CHOL 169 130 196   TRIG 115 83 176*   HDL 61 60 60   LDL 85 53 101*   NHDL 108 70 136*     Hepatitis B  Recent Labs   Lab Test 02/26/18  0846   HBCAB Nonreactive   HEPBANG Nonreactive     Hepatitis C  Recent Labs   Lab Test 02/26/18  0846   HCVAB Nonreactive      Tuberculosis Screening  Recent Labs   Lab Test 01/17/22  0857 07/22/19  0805   TBRES Negative Negative     Immunization History     Immunization History   Administered Date(s) Administered     COVID-19 Vaccine 18+ (Moderna) 04/19/2021, 05/17/2021, 11/29/2021, 06/27/2022     COVID-19 Vaccine Bivalent Booster 18+ (Moderna) 11/30/2022     Influenza Vaccine 50-64 or 18-64 w/egg allergy (Flublok) 10/18/2018, 10/01/2019, 10/16/2020     Influenza Vaccine >6 months (Alfuria,Fluzone) 10/01/2016     Pneumo Conj 13-V (2010&after) 10/05/2016     Pneumococcal 23 valent 10/31/2013, 03/11/2019     TDAP (Adacel,Boostrix) 10/25/2012, 12/10/2012     Zoster recombinant adjuvanted (SHINGRIX) 11/25/2019          Chart documentation done in part with Dragon Voice recognition Software. Although reviewed after completion, some word and grammatical error may remain.    Maurilio Hayes MD

## 2023-06-01 DIAGNOSIS — E78.00 HYPERCHOLESTEREMIA: ICD-10-CM

## 2023-06-01 DIAGNOSIS — E03.9 HYPOTHYROIDISM: ICD-10-CM

## 2023-06-02 NOTE — TELEPHONE ENCOUNTER
"Routing refill request to provider for review/approval because:  Labs not current:  tsh  Patient needs to be seen because it has been more than 1 year since last office visit.    Last Written Prescription Date:  2/28/23  Last Fill Quantity: 90,  # refills: 0   Last office visit provider:  3/11/21     Requested Prescriptions   Pending Prescriptions Disp Refills     levothyroxine (SYNTHROID/LEVOTHROID) 112 MCG tablet [Pharmacy Med Name: Levothyroxine Sodium 112 MCG Oral Tablet] 90 tablet 0     Sig: TAKE 1 TABLET BY MOUTH ONCE DAILY DUE  FOR  PHYSICAL  AND  LABS  BEFORE  FURTHER  REFILLS       Thyroid Protocol Failed - 6/1/2023  8:02 PM        Failed - Recent (12 mo) or future (30 days) visit within the authorizing provider's specialty     Patient has had an office visit with the authorizing provider or a provider within the authorizing providers department within the previous 12 mos or has a future within next 30 days. See \"Patient Info\" tab in inbasket, or \"Choose Columns\" in Meds & Orders section of the refill encounter.              Failed - Normal TSH on file in past 12 months     Recent Labs   Lab Test 03/11/21  1741   TSH 0.44              Passed - Patient is 12 years or older        Passed - Medication is active on med list        Passed - No active pregnancy on record     If patient is pregnant or has had a positive pregnancy test, please check TSH.          Passed - No positive pregnancy test in past 12 months     If patient is pregnant or has had a positive pregnancy test, please check TSH.               Katheryn Corbin RN 06/02/23 12:18 PM  "

## 2023-06-04 RX ORDER — LEVOTHYROXINE SODIUM 112 UG/1
TABLET ORAL
Qty: 90 TABLET | Refills: 0 | Status: SHIPPED | OUTPATIENT
Start: 2023-06-04 | End: 2023-09-06

## 2023-06-12 RX ORDER — ATORVASTATIN CALCIUM 40 MG/1
40 TABLET, FILM COATED ORAL DAILY
Qty: 90 TABLET | Refills: 0 | Status: SHIPPED | OUTPATIENT
Start: 2023-06-12 | End: 2023-09-13

## 2023-06-12 NOTE — TELEPHONE ENCOUNTER
"Routing refill request to provider for review/approval because:  Patient needs to be seen because it has been more than 2 years since last office visit.    Last Written Prescription Date:  5/19/22  Last Fill Quantity: 90,  # refills: 3   Last office visit provider:  3/11/21     Requested Prescriptions   Pending Prescriptions Disp Refills     atorvastatin (LIPITOR) 40 MG tablet 90 tablet 3     Sig: Take 1 tablet (40 mg) by mouth daily       Statins Protocol Failed - 6/12/2023  2:33 PM        Failed - Recent (12 mo) or future (30 days) visit within the authorizing provider's specialty     Patient has had an office visit with the authorizing provider or a provider within the authorizing providers department within the previous 12 mos or has a future within next 30 days. See \"Patient Info\" tab in inbasket, or \"Choose Columns\" in Meds & Orders section of the refill encounter.              Passed - LDL on file in past 12 months     Recent Labs   Lab Test 03/15/23  0930   LDL 85             Passed - No abnormal creatine kinase in past 12 months     No lab results found.             Passed - Medication is active on med list        Passed - Patient is age 18 or older        Passed - No active pregnancy on record        Passed - No positive pregnancy test in past 12 months         Signed Prescriptions Disp Refills    levothyroxine (SYNTHROID/LEVOTHROID) 112 MCG tablet 90 tablet 0     Sig: TAKE 1 TABLET BY MOUTH ONCE DAILY DUE  FOR  PHYSICAL  AND  LABS  BEFORE  FURTHER  REFILLS       Thyroid Protocol Failed - 6/2/2023 12:19 PM        Failed - Recent (12 mo) or future (30 days) visit within the authorizing provider's specialty     Patient has had an office visit with the authorizing provider or a provider within the authorizing providers department within the previous 12 mos or has a future within next 30 days. See \"Patient Info\" tab in inbasket, or \"Choose Columns\" in Meds & Orders section of the refill encounter.              " Failed - Normal TSH on file in past 12 months     Recent Labs   Lab Test 03/11/21  1741   TSH 0.44              Passed - Patient is 12 years or older        Passed - Medication is active on med list        Passed - No active pregnancy on record     If patient is pregnant or has had a positive pregnancy test, please check TSH.          Passed - No positive pregnancy test in past 12 months     If patient is pregnant or has had a positive pregnancy test, please check TSH.               Michael Cohen RN 06/12/23 2:34 PM

## 2023-06-12 NOTE — TELEPHONE ENCOUNTER
Pending Prescriptions:                       Disp   Refills    atorvastatin (LIPITOR) 40 MG tablet       90 tab*3            Sig: Take 1 tablet (40 mg) by mouth daily

## 2023-06-26 ENCOUNTER — LAB (OUTPATIENT)
Dept: LAB | Facility: CLINIC | Age: 68
End: 2023-06-26
Payer: COMMERCIAL

## 2023-06-26 DIAGNOSIS — M06.09 RHEUMATOID ARTHRITIS OF MULTIPLE SITES WITHOUT RHEUMATOID FACTOR (H): ICD-10-CM

## 2023-06-26 DIAGNOSIS — Z79.899 HIGH RISK MEDICATION USE: ICD-10-CM

## 2023-06-26 LAB
ALBUMIN SERPL BCG-MCNC: 4.3 G/DL (ref 3.5–5.2)
ALP SERPL-CCNC: 90 U/L (ref 35–104)
ALT SERPL W P-5'-P-CCNC: 26 U/L (ref 0–50)
AST SERPL W P-5'-P-CCNC: 26 U/L (ref 0–45)
BASOPHILS # BLD AUTO: 0 10E3/UL (ref 0–0.2)
BASOPHILS NFR BLD AUTO: 1 %
BILIRUB DIRECT SERPL-MCNC: <0.2 MG/DL (ref 0–0.3)
BILIRUB SERPL-MCNC: 0.6 MG/DL
CREAT SERPL-MCNC: 0.87 MG/DL (ref 0.51–0.95)
CRP SERPL-MCNC: <3 MG/L
EOSINOPHIL # BLD AUTO: 0.1 10E3/UL (ref 0–0.7)
EOSINOPHIL NFR BLD AUTO: 2 %
ERYTHROCYTE [DISTWIDTH] IN BLOOD BY AUTOMATED COUNT: 12.7 % (ref 10–15)
ERYTHROCYTE [SEDIMENTATION RATE] IN BLOOD BY WESTERGREN METHOD: 9 MM/HR (ref 0–30)
GFR SERPL CREATININE-BSD FRML MDRD: 73 ML/MIN/1.73M2
HCT VFR BLD AUTO: 41.6 % (ref 35–47)
HGB BLD-MCNC: 13.8 G/DL (ref 11.7–15.7)
IMM GRANULOCYTES # BLD: 0 10E3/UL
IMM GRANULOCYTES NFR BLD: 0 %
LYMPHOCYTES # BLD AUTO: 0.8 10E3/UL (ref 0.8–5.3)
LYMPHOCYTES NFR BLD AUTO: 15 %
MCH RBC QN AUTO: 31.4 PG (ref 26.5–33)
MCHC RBC AUTO-ENTMCNC: 33.2 G/DL (ref 31.5–36.5)
MCV RBC AUTO: 95 FL (ref 78–100)
MONOCYTES # BLD AUTO: 0.5 10E3/UL (ref 0–1.3)
MONOCYTES NFR BLD AUTO: 10 %
NEUTROPHILS # BLD AUTO: 3.9 10E3/UL (ref 1.6–8.3)
NEUTROPHILS NFR BLD AUTO: 72 %
PLATELET # BLD AUTO: 195 10E3/UL (ref 150–450)
PROT SERPL-MCNC: 6.8 G/DL (ref 6.4–8.3)
RBC # BLD AUTO: 4.39 10E6/UL (ref 3.8–5.2)
WBC # BLD AUTO: 5.4 10E3/UL (ref 4–11)

## 2023-06-26 PROCEDURE — 85652 RBC SED RATE AUTOMATED: CPT

## 2023-06-26 PROCEDURE — 36415 COLL VENOUS BLD VENIPUNCTURE: CPT

## 2023-06-26 PROCEDURE — 82565 ASSAY OF CREATININE: CPT

## 2023-06-26 PROCEDURE — 85025 COMPLETE CBC W/AUTO DIFF WBC: CPT

## 2023-06-26 PROCEDURE — 86140 C-REACTIVE PROTEIN: CPT

## 2023-06-26 PROCEDURE — 80076 HEPATIC FUNCTION PANEL: CPT

## 2023-06-29 ENCOUNTER — OFFICE VISIT (OUTPATIENT)
Dept: RHEUMATOLOGY | Facility: CLINIC | Age: 68
End: 2023-06-29
Payer: COMMERCIAL

## 2023-06-29 ENCOUNTER — TELEPHONE (OUTPATIENT)
Dept: RHEUMATOLOGY | Facility: CLINIC | Age: 68
End: 2023-06-29

## 2023-06-29 VITALS
OXYGEN SATURATION: 95 % | HEART RATE: 74 BPM | WEIGHT: 181.2 LBS | BODY MASS INDEX: 29.25 KG/M2 | DIASTOLIC BLOOD PRESSURE: 86 MMHG | SYSTOLIC BLOOD PRESSURE: 139 MMHG

## 2023-06-29 DIAGNOSIS — M06.09 RHEUMATOID ARTHRITIS OF MULTIPLE SITES WITHOUT RHEUMATOID FACTOR (H): Primary | ICD-10-CM

## 2023-06-29 DIAGNOSIS — Z79.899 HIGH RISK MEDICATION USE: ICD-10-CM

## 2023-06-29 PROCEDURE — 99214 OFFICE O/P EST MOD 30 MIN: CPT | Performed by: INTERNAL MEDICINE

## 2023-06-29 RX ORDER — TOFACITINIB 11 MG/1
11 TABLET, FILM COATED, EXTENDED RELEASE ORAL DAILY
Qty: 90 TABLET | Refills: 2 | Status: SHIPPED | OUTPATIENT
Start: 2023-06-29 | End: 2024-04-04

## 2023-06-29 NOTE — PROGRESS NOTES
Rheumatology Clinic Visit      Jackie Maguire MRN# [id number]   YOB: 1955 Age: 68 year old      Date of visit: 6/29/23   PCP: Dr. Talia Mejía at [facility]    Chief Complaint   Patient presents with:  Rheumatoid Arthritis: Has been more active so doing good.    Assessment and Plan     1. Rheumatoid Arthritis (CCP low positive, then negative): From record review: dx'd 2012; has followed with Kayley Jimenez, Jacob, and [facility]; hx of +DULCE, +dsDNA, CCP low positive then negative; hx of psoriasis; hx of C-spine fusion; initial presentation of pain/stiff/swollen joints involving the knees, feet, ankles, neck, back.  Previously on leflunomide (mouth sores), HCQ (ineffective), AZA (itching), Humira (ineffective at q7day and q14 days dosings), Remicade (associated with syncopal episodes), MTX (partially effective, stopped when doing well on Xeljanz, higher doses associated with LFT elevations). Sulfa allergy. Currently on Xeljanz XR 11mg daily. Hyperlipidemia is being managed by her primary care provider. RA controlled.  Chronic illness, stable.    - Continue Xeljanz XR 11 mg daily   - Labs in 3 months: CBC, Creatinine, Hepatic Panel, ESR, CRP    High risk medication requiring intensive toxicity monitoring at least quarterly.                Rapid 3, cumulative scores                      06/29/2023: 5    (Xeljanz XR 11mg daily)                      11/30/2022: 7    (Xeljanz XR 11mg daily; RA doing well; left knee OA and back pain contributing)                      1/21/2022: 6.2   (Xeljanz XR 11mg daily; RA doing well; left knee OA is main symptom)                      9/17/2021: 7.3   (MTX 10mg SQ wkly, Xeljanz XR 11mg daily)                      8/31/2020: RA doing well ((MTX 20mg SQ wkly, Xeljanz XR 11mg daily)                      02/17/2020:  7.3 (MTX 20mg SQ wkly, prednisone 5mg daily, Xeljanz XR 11mg daily)                      11/25/2019:  8    (MTX 20mg SQ wkly, prednisone 5mg daily, Xeljanz XR  11mg daily)    2. History of Positive DULCE and dsDNA (repeats negative): with inflammatory arthritis.  Symptoms fit better for seropositive rheumatoid arthritis; see #1.  DULCE and dsDNA negative previously when rechecked.     3. Neck pain hx: Neck spasms several years ago that was dx'd as dystonia at the [facility]; had several imaging studies especially given the RA dx where degenerative changes were seen.  Also saw a spine surgeon at [place].  PT ineffective.  Nonradiating neck pain has been stable per patient. Unchanged since last visit.     4.  Lower back pain: Degenerative in nature.  Home physical therapy exercises were effective, but pain has been worsening over time.  Then return to physical therapy and has been improving with those exercises that she does on a daily basis.    5. Hyperlipidemia: managed by PCP per patient.     6.  L>R knee osteoarthritis unable to fully extend the left knee, tricompartmental degenerative changes seen on 9/17/2021 x-rays: Reportedly has had difficulty extending the left knee for several years ever since she had a fall.  She is now following with orthopedic surgery regarding the left knee that is altering her gait and potentially affecting her chronic low back pain; she says that the surgeon does not believe surgery would help and she does not have pain in the left knee .  Continue following with orthopedics as needed.  Currently doing well since increasing physical activity and doing the exercises taught in physical therapy.    7.  High risk medications, immunocompromise status, and history of melanoma: Advised that she needs at least once yearly skin checks with dermatology.     8.  Vaccinations: Vaccinations reviewed with Ms. Maguire.      - Influenza: encouraged yearly vaccination  - [id number]: up to date  - [id number]: up to date   - Shingrix: up to date  - COVID-19: Advised keeping up-to-date     Total minutes spent in evaluation with patient, documentation, ,  and review of pertinent studies and chart notes: 18     Ms. Maguire verbalized agreement with and understanding of the rational for the diagnosis and treatment plan.  All questions were answered to best of my ability and the patient's satisfaction. Ms. Maguire was advised to contact the clinic with any questions that may arise after the clinic visit.      Thank you for involving me in the care of the patient    Return to clinic: 3 months      HPI   Jackie Maguire is a 68 year old female with a past medical history significant for hyperlipidemia, hypothyroidism, history of melanoma, history of humerus fracture, osteoporosis, psoriasis, and rheumatoid arthritis who presents for follow-up of rheumatoid arthritis.     Today, 6/29/2023: Chronic low back pain and knee pain has improved with physical therapy exercises and doing more walking during the summer months.  RA well controlled.  Morning stiffness for no more than 5 minutes.  No gelling phenomenon.  No joint swelling.  No missed doses of Xeljanz.    Denies fevers, chills, nausea, vomiting, constipation, diarrhea. No abdominal pain. No chest pain/pressure, palpitations, or shortness of breath. No LE swelling. No oral or nasal sores.  No rash. No sicca symptoms.    Patient's  is present with her today.    Tobacco: none  EtOH: none  Drugs: none    ROS   12 point review of system was completed and negative except as noted in the HPI     Active Problem List     Patient Active Problem List   Diagnosis     Hypothyroidism     Hyperlipidemia LDL goal <160     Inflammatory arthritis     Fracture, pelvis closed (H)     Malignant melanoma of skin (H)     Distal radius fracture     Closed fracture of part of humerus     Proximal humerus fracture     Aftercare for healing traumatic fracture of upper arm     Osteopetrosis     Cervical dystonia     Dystonia, torsion, fragments of     Psoriasis     Rheumatoid arthritis of multiple sites without rheumatoid factor (H)      Past Medical History     Past Medical History:   Diagnosis Date     Abscess of left jaw 6/20/2018     Basal cell carcinoma      Hyperlipidemia LDL goal <160 1/5/2011     Hypothyroidism 10/1/2010     Inflammatory arthritis 1/20/2011     Malignant melanoma (H)      Past Surgical History     Past Surgical History:   Procedure Laterality Date     APPENDECTOMY       ARTHRODESIS TOE(S) Left 8/17/2017    Procedure: ARTHRODESIS TOE(S);  Left foot 1st metatarsophalangeal realignment fusion, 2nd & 3rd digital corrections;  Surgeon: Jason Lamar DPM;  Location: [facility] OR     INCISION AND DRAINAGE OF WOUND N/A 6/22/2018    Procedure: INCISION AND DRAINAGE JAW ABSCESS;  Surgeon: Lennox Sullivan MD;  Location: [facility] OR;  Service:      IR CERVICAL EPIDURAL STEROID INJECTION  5/9/2013     OPEN REDUCTION INTERNAL FIXATION HUMERUS PROXIMAL Left 9/30/2016    Procedure: OPEN REDUCTION INTERNAL FIXATION HUMERUS PROXIMAL;  Surgeon: Charly Bernal MD;  Location: [facility] OR     Allergy     Allergies   Allergen Reactions     Azathioprine Itching     Leflunomide Other (See Comments)     Mouth sores     Compazine Anxiety     Restless, anxious     Prochlorperazine Anxiety     Restless, anxious     Sulfa Antibiotics Rash     Current Medication List     Current Outpatient Medications   Medication Sig     acetaminophen (TYLENOL) 325 MG tablet 650 mg po QID and BId PRN     atorvastatin (LIPITOR) 40 MG tablet Take 1 tablet (40 mg) by mouth daily     levothyroxine (SYNTHROID/LEVOTHROID) 112 MCG tablet TAKE 1 TABLET BY MOUTH ONCE DAILY DUE  FOR  PHYSICAL  AND  LABS  BEFORE  FURTHER  REFILLS     tofacitinib (XELJANZ XR) 11 MG 24 hr tablet Take 1 tablet (11 mg) by mouth daily . Hold for signs of infection, then seek medical attention.     VITAMIN D, CHOLECALCIFEROL, PO Take by mouth daily     aspirin 81 MG tablet Take 1 tablet (81 mg) by mouth daily (Patient not taking: Reported on 11/25/2019)     hydrOXYzine (ATARAX) 25 MG tablet Take  "1 tablet (25 mg) by mouth every 4 hours as needed for itching (and nausea) (Patient not taking: Reported on 11/30/2022)     Insulin Syringe-Needle U-100 (BD INSULIN SYRINGE) 27G X 1/2\" 1 ML MISC For weekly methotrexate administration.     LEVOTHYROXINE SODIUM PO Take 137 mcg by mouth daily  (Patient not taking: Reported on 9/17/2021)     oxyCODONE-acetaminophen (PERCOCET) 5-325 MG per tablet Take 1-2 tablets by mouth every 4 hours as needed for pain (moderate to severe) (Patient not taking: Reported on 9/17/2021)     No current facility-administered medications for this visit.         Social History   See HPI    Family History     Family History   Problem Relation Age of Onset     Cancer Father        Physical Exam     Temp Readings from Last 3 Encounters:   03/11/21 98.5  F (36.9  C) (Oral)   06/11/18 98.4  F (36.9  C) (Oral)   08/17/17 98  F (36.7  C) (Oral)     BP Readings from Last 5 Encounters:   06/29/23 139/86   03/22/23 128/84   11/30/22 129/82   08/08/22 132/80   01/21/22 129/84     Pulse Readings from Last 1 Encounters:   06/29/23 74     Resp Readings from Last 1 Encounters:   03/11/21 16     Estimated body mass index is 29.25 kg/m  as calculated from the following:    Height as of 1/21/22: 1.676 m (5' 6\").    Weight as of this encounter: 82.2 kg (181 lb 3.2 oz).    GEN: NAD.   HEENT:  Anicteric, noninjected sclera. No obvious external lesions of the ear and nose. Hearing intact.  CV: S1, S2. RRR. No m/r/g  PULM: No increased work of breathing. CTA bilaterally   MSK: Chronic synovial hypertrophy of the bilateral second and third MCPs without tenderness to palpation, increased warmth, or overlying erythema.  Other MCPs, PIPs, DIPs without swelling or tenderness to palpation.  Wrists without swelling or tenderness to palpation.  Elbows and shoulders without swelling or tenderness to palpation.  Knees with medial joint line tenderness but no effusion or increased warmth; valgus deformity of the left knee.  " Ankles and MTPs without swelling or tenderness to palpation.    SKIN: No rash or jaundice seen  PSYCH: Alert. Appropriate.      Labs / Imaging (select studies)     CBC  Recent Labs   Lab Test 06/26/23  0934 03/15/23  0930 11/22/22  0909 07/28/21  0928 05/21/21  1333 12/03/20  1019 08/27/20  1152   WBC 5.4 7.1 6.4   < > 5.9 5.6 8.1   RBC 4.39 4.66 4.55   < > 4.25 4.39 4.25   HGB 13.8 14.6 14.6   < > 14.1 14.6 14.1   HCT 41.6 44.4 43.5   < > 42.3 44.5 42.4   MCV 95 95 96   < > 100 101* 100   RDW 12.7 12.8 12.8   < > 13.2 13.6 13.3    226 208   < > 208 213 173   MCH 31.4 31.3 32.1   < > 33.2* 33.3* 33.2*   MCHC 33.2 32.9 33.6   < > 33.3 32.8 33.3   NEUTROPHIL 72 77 78   < > 79.9 75.2 83.4   LYMPH 15 11 11   < > 5.6 11.8 7.2   MONOCYTE 10 9 9   < > 11.6 10.7 6.9   EOSINOPHIL 2 2 2   < > 2.2 1.6 2.1   BASOPHIL 1 0 1   < > 0.7 0.7 0.4   ANEU  --   --   --   --  4.7 4.2 6.7   ALYM  --   --   --   --  0.3* 0.7* 0.6*   KAREN  --   --   --   --  0.7 0.6 0.6   AEOS  --   --   --   --  0.1 0.1 0.2   ABAS  --   --   --   --  0.0 0.0 0.0   ANEUTAUTO 3.9 5.5 5.0   < >  --   --   --    ALYMPAUTO 0.8 0.8 0.7*   < >  --   --   --    AMONOAUTO 0.5 0.7 0.6   < >  --   --   --    AEOSAUTO 0.1 0.1 0.1   < >  --   --   --    ABSBASO 0.0 0.0 0.0   < >  --   --   --     < > = values in this interval not displayed.     CMP  Recent Labs   Lab Test 06/26/23  0934 03/15/23  0930 11/22/22  0909 07/28/21  0928 05/21/21  1333 12/03/20  1019 08/27/20  1152 05/28/20  0928 10/25/19  1018 07/22/19  0805 12/19/18  0852 09/12/18  0923 06/25/18  0813 06/25/18  0626   NA  --   --   --   --   --   --   --   --   --  141  --  144  --  140   POTASSIUM  --   --   --   --   --   --   --   --   --  4.1  --  3.8  --  3.9   CHLORIDE  --   --   --   --   --   --   --   --   --  111*  --  109  --  109*   CO2  --   --   --   --   --   --   --   --   --  23  --  28  --  24   ANIONGAP  --   --   --   --   --   --   --   --   --  7  --  7  --  7   GLC  --   --    --   --   --   --   --  96  --  89  --  102*   < > 135*   BUN  --   --   --   --   --   --   --   --   --  15  --  15  --  20   CR 0.87 0.93 0.82   < > 0.88 0.81 0.83 0.74   < > 0.72   < > 0.91  --  0.69   GFRESTIMATED 73 67 79   < > 70 77 75 87   < > 90   < > 63  --  >60   GFRESTBLACK  --   --   --   --  81 89 87 >90   < > >90   < > 76  --  >60   HAZEL  --   --   --   --   --   --   --   --   --  9.1  --  9.4  --  9.1   BILITOTAL 0.6 0.4 0.7   < > 0.7 0.9 0.8 0.8   < > 0.3   < > 0.5  --   --    ALBUMIN 4.3 4.5 4.5   < > 4.0 4.2 4.0 4.0   < > 3.8   < > 3.6  --   --    PROTTOTAL 6.8 7.1 7.2   < > 6.8 7.1 6.9 6.8   < > 6.8   < > 6.9  --   --    ALKPHOS 90 98 105*   < > 91 106 103 91   < > 86   < > 94  --   --    AST 26 37* 28   < > 51* 45 36 32   < > 22   < > 27  --   --    ALT 26 40* 26   < > 70* 67* 65* 61*   < > 30   < > 35  --   --     < > = values in this interval not displayed.     Calcium/VitaminD  Recent Labs   Lab Test 07/22/19  0805 09/12/18  0923 06/25/18  0626 02/26/18  0846 10/05/16  0831   HAZEL 9.1 9.4 9.1   < >  --    VITDT  --   --   --   --  29    < > = values in this interval not displayed.     ESR/CRP  Recent Labs   Lab Test 06/26/23  0934 03/15/23  0930 11/22/22  0909 08/01/22  1346 04/22/22  0748 01/17/22  0857   SED 9 8 8 9 8 6   CRP  --   --   --  <2.9 <2.9 <2.9   CRPI <3.00 <3.00 <3.00  --   --   --      Lipid Panel  Recent Labs   Lab Test 03/15/23  0930 01/17/22  0857 12/03/20  1019   CHOL 169 130 196   TRIG 115 83 176*   HDL 61 60 60   LDL 85 53 101*   NHDL 108 70 136*     Hepatitis B  Recent Labs   Lab Test 02/26/18  0846   HBCAB Nonreactive   HEPBANG Nonreactive     Hepatitis C  Recent Labs   Lab Test 02/26/18  0846   HCVAB Nonreactive     Tuberculosis Screening  Recent Labs   Lab Test 01/17/22  0857 07/22/19  0805   TBRES Negative Negative     Immunization History     Immunization History   Administered Date(s) Administered     COVID-19 Bivalent 18+ (Moderna) 11/30/2022     COVID-19  Monovalent 18+ (Moderna) 04/19/2021, 05/17/2021, 11/29/2021, 06/27/2022     Influenza Vaccine 50-64 or 18-64 w/egg allergy (Flublok) 10/18/2018, 10/01/2019, 10/16/2020     Influenza Vaccine >6 months (Alfuria,Fluzone) 10/01/2016     Pneumo Conj 13-V (2010&after) 10/05/2016     Pneumococcal 23 valent 10/31/2013, 03/11/2019     TDAP (Adacel,Boostrix) 10/25/2012, 12/10/2012     Zoster recombinant adjuvanted (SHINGRIX) 11/25/2019          Chart documentation done in part with Dragon Voice recognition Software. Although reviewed after completion, some word and grammatical error may remain.    Maurilio Hayes MD

## 2023-06-29 NOTE — PATIENT INSTRUCTIONS
RHEUMATOLOGY    [facility]  [address]  [place] [address]    Phone number: [phone]  Fax number: [phone]      Thank you for choosing [facility]!    Elizabeth Haley CMA Rheumatology

## 2023-06-29 NOTE — NURSING NOTE
RAPID3 (0-30) Cumulative Score  5.0          RAPID3 Weighted Score (divide #4 by 3 and that is the weighted score)  1.6

## 2023-06-29 NOTE — TELEPHONE ENCOUNTER
PA Initiation    Medication: XELJANZ XR 11 MG PO TB24  Insurance Company: [employer] Part D - Phone [phone] Fax [phone]  Pharmacy Filling the Rx: [facility] [place] - [facility] [facility] [id number]  Filling Pharmacy Phone:    Filling Pharmacy Fax:    Start Date: 6/29/2023    Jackie Maguire (Key: [id number])      Patient has been receiving medication through [employer] free drug program 2023. Patient insurance has changed and prior authorization required to confirm continued eligibility with PAP.    Thank You,     Aiden Griffin [facility]  Specialty Pharmacy Clinic [facility] Specialty  [email or web address]  [email or web address]  Phone: [phone]  Fax: [phone]

## 2023-06-30 NOTE — TELEPHONE ENCOUNTER
Prior Authorization Approval    Medication: XELJANZ XR 11 MG PO TB24  Authorization Effective Date: 6/30/2023  Authorization Expiration Date: 12/31/2023  Approved Dose/Quantity: 30 / 30  Reference #: Key: [id number]   Insurance Company: [employer] Part D - Phone [phone] Fax [phone]  Expected CoPay: $1865.38     CoPay Card Available: No    Financial Assistance Needed: XELSMINO APPROVED - 12/31/2023  Which Pharmacy is filling the prescription: [facility] [place], [place] - [id number]  Pharmacy Notified: Yes  Patient Notified: Yes - spoke to pt on phone - knows I will be contacting in November for renewal of free drug          Thank You,     Sissy Griffin, [facility]  Specialty Pharmacy Clinic [facility] Specialty  [email or web address]  [email or web address]  Phone: [phone]  Fax: [phone]

## 2023-09-06 DIAGNOSIS — E03.9 HYPOTHYROIDISM: ICD-10-CM

## 2023-09-06 RX ORDER — LEVOTHYROXINE SODIUM 112 UG/1
TABLET ORAL
Qty: 90 TABLET | Refills: 0 | Status: SHIPPED | OUTPATIENT
Start: 2023-09-06 | End: 2023-12-11

## 2023-09-13 DIAGNOSIS — E78.00 HYPERCHOLESTEREMIA: ICD-10-CM

## 2023-09-13 RX ORDER — ATORVASTATIN CALCIUM 40 MG/1
40 TABLET, FILM COATED ORAL DAILY
Qty: 90 TABLET | Refills: 0 | Status: SHIPPED | OUTPATIENT
Start: 2023-09-13 | End: 2024-02-27

## 2023-09-13 NOTE — TELEPHONE ENCOUNTER
Is a patient know that I have not seen her now in 3 years.  Would recommend that she come in for physical, med check, and blood work.  I will send a 90-day refill of the Lipitor.

## 2023-09-28 ENCOUNTER — LAB (OUTPATIENT)
Dept: LAB | Facility: CLINIC | Age: 68
End: 2023-09-28
Payer: COMMERCIAL

## 2023-09-28 DIAGNOSIS — M06.09 RHEUMATOID ARTHRITIS OF MULTIPLE SITES WITHOUT RHEUMATOID FACTOR (H): ICD-10-CM

## 2023-09-28 DIAGNOSIS — Z79.899 HIGH RISK MEDICATION USE: ICD-10-CM

## 2023-09-28 LAB
ALBUMIN SERPL BCG-MCNC: 4 G/DL (ref 3.5–5.2)
ALP SERPL-CCNC: 83 U/L (ref 35–104)
ALT SERPL W P-5'-P-CCNC: 28 U/L (ref 0–50)
AST SERPL W P-5'-P-CCNC: 30 U/L (ref 0–45)
BASOPHILS # BLD AUTO: 0 10E3/UL (ref 0–0.2)
BASOPHILS NFR BLD AUTO: 1 %
BILIRUB DIRECT SERPL-MCNC: <0.2 MG/DL (ref 0–0.3)
BILIRUB SERPL-MCNC: 0.7 MG/DL
CREAT SERPL-MCNC: 0.84 MG/DL (ref 0.51–0.95)
CRP SERPL-MCNC: <3 MG/L
EGFRCR SERPLBLD CKD-EPI 2021: 76 ML/MIN/1.73M2
EOSINOPHIL # BLD AUTO: 0.1 10E3/UL (ref 0–0.7)
EOSINOPHIL NFR BLD AUTO: 2 %
ERYTHROCYTE [DISTWIDTH] IN BLOOD BY AUTOMATED COUNT: 12.6 % (ref 10–15)
ERYTHROCYTE [SEDIMENTATION RATE] IN BLOOD BY WESTERGREN METHOD: 7 MM/HR (ref 0–30)
HCT VFR BLD AUTO: 42.7 % (ref 35–47)
HGB BLD-MCNC: 14.1 G/DL (ref 11.7–15.7)
IMM GRANULOCYTES # BLD: 0 10E3/UL
IMM GRANULOCYTES NFR BLD: 0 %
LYMPHOCYTES # BLD AUTO: 0.9 10E3/UL (ref 0.8–5.3)
LYMPHOCYTES NFR BLD AUTO: 15 %
MCH RBC QN AUTO: 31.5 PG (ref 26.5–33)
MCHC RBC AUTO-ENTMCNC: 33 G/DL (ref 31.5–36.5)
MCV RBC AUTO: 96 FL (ref 78–100)
MONOCYTES # BLD AUTO: 0.6 10E3/UL (ref 0–1.3)
MONOCYTES NFR BLD AUTO: 10 %
NEUTROPHILS # BLD AUTO: 4.3 10E3/UL (ref 1.6–8.3)
NEUTROPHILS NFR BLD AUTO: 72 %
PLATELET # BLD AUTO: 202 10E3/UL (ref 150–450)
PROT SERPL-MCNC: 6.7 G/DL (ref 6.4–8.3)
RBC # BLD AUTO: 4.47 10E6/UL (ref 3.8–5.2)
WBC # BLD AUTO: 6 10E3/UL (ref 4–11)

## 2023-09-28 PROCEDURE — 85652 RBC SED RATE AUTOMATED: CPT

## 2023-09-28 PROCEDURE — 85025 COMPLETE CBC W/AUTO DIFF WBC: CPT

## 2023-09-28 PROCEDURE — 86140 C-REACTIVE PROTEIN: CPT

## 2023-09-28 PROCEDURE — 36415 COLL VENOUS BLD VENIPUNCTURE: CPT

## 2023-09-28 PROCEDURE — 80076 HEPATIC FUNCTION PANEL: CPT

## 2023-09-28 PROCEDURE — 82565 ASSAY OF CREATININE: CPT

## 2023-10-05 ENCOUNTER — TELEPHONE (OUTPATIENT)
Dept: RHEUMATOLOGY | Facility: CLINIC | Age: 68
End: 2023-10-05

## 2023-10-05 ENCOUNTER — OFFICE VISIT (OUTPATIENT)
Dept: RHEUMATOLOGY | Facility: CLINIC | Age: 68
End: 2023-10-05
Payer: COMMERCIAL

## 2023-10-05 VITALS
TEMPERATURE: 97.6 F | HEART RATE: 74 BPM | DIASTOLIC BLOOD PRESSURE: 78 MMHG | OXYGEN SATURATION: 95 % | WEIGHT: 185.8 LBS | SYSTOLIC BLOOD PRESSURE: 116 MMHG | BODY MASS INDEX: 29.99 KG/M2 | RESPIRATION RATE: 16 BRPM

## 2023-10-05 DIAGNOSIS — Z79.899 HIGH RISK MEDICATION USE: ICD-10-CM

## 2023-10-05 DIAGNOSIS — Z23 NEED FOR PROPHYLACTIC VACCINATION AND INOCULATION AGAINST INFLUENZA: ICD-10-CM

## 2023-10-05 DIAGNOSIS — M06.09 RHEUMATOID ARTHRITIS OF MULTIPLE SITES WITHOUT RHEUMATOID FACTOR (H): Primary | ICD-10-CM

## 2023-10-05 PROCEDURE — G0008 ADMIN INFLUENZA VIRUS VAC: HCPCS | Performed by: INTERNAL MEDICINE

## 2023-10-05 PROCEDURE — 90662 IIV NO PRSV INCREASED AG IM: CPT | Performed by: INTERNAL MEDICINE

## 2023-10-05 PROCEDURE — 99214 OFFICE O/P EST MOD 30 MIN: CPT | Mod: 25 | Performed by: INTERNAL MEDICINE

## 2023-10-05 NOTE — TELEPHONE ENCOUNTER
Patient due for renewal of patient assistance with [employer].  Sent application to provider to complete his portion.

## 2023-10-05 NOTE — PROGRESS NOTES
Rheumatology Clinic Visit      Jackie Maguire MRN# [id number]   YOB: 1955 Age: 68 year old      Date of visit: 10/05/23   PCP: Dr. Talia Mejía at [facility]    Chief Complaint   Patient presents with:  Rheumatoid Arthritis    Assessment and Plan     1. Rheumatoid Arthritis (CCP low positive, then negative): From record review: dx'd 2012; has followed with Kayley Jimenez, Jacob, and [facility]; hx of +DULCE, +dsDNA, CCP low positive then negative; hx of psoriasis; hx of C-spine fusion; initial presentation of pain/stiff/swollen joints involving the knees, feet, ankles, neck, back.  Previously on leflunomide (mouth sores), HCQ (ineffective), AZA (itching), Humira (ineffective at q7day and q14 days dosings), Remicade (associated with syncopal episodes), MTX (partially effective, stopped when doing well on Xeljanz, higher doses associated with LFT elevations). Sulfa allergy. Currently on Xeljanz XR 11mg daily. Hyperlipidemia is being managed by her primary care provider. RA controlled.  Chronic illness, stable.    - Continue Xeljanz XR 11 mg daily   - Labs in 3 months: CBC, Creatinine, Hepatic Panel  - Labs in 6 months: CBC, Creatinine, Hepatic Panel, ESR, CRP, fasting lipid panel    High risk medication requiring intensive toxicity monitoring at least quarterly.                Rapid 3, cumulative scores                      10/05/2023: 5    (Xeljanz XR 11mg daily)                       06/29/2023: 5    (Xeljanz XR 11mg daily)                      11/30/2022: 7    (Xeljanz XR 11mg daily; RA doing well; left knee OA and back pain contributing)                      1/21/2022: 6.2   (Xeljanz XR 11mg daily; RA doing well; left knee OA is main symptom)                      9/17/2021: 7.3   (MTX 10mg SQ wkly, Xeljanz XR 11mg daily)                      8/31/2020: RA doing well ((MTX 20mg SQ wkly, Xeljanz XR 11mg daily)                      02/17/2020:  7.3 (MTX 20mg SQ wkly, prednisone 5mg daily, Xeljanz XR 11mg  daily)                      11/25/2019:  8    (MTX 20mg SQ wkly, prednisone 5mg daily, Xeljanz XR 11mg daily)    2. History of Positive DULCE and dsDNA (repeats negative): with inflammatory arthritis.  Symptoms fit better for seropositive rheumatoid arthritis; see #1.  DULCE and dsDNA negative previously when rechecked.     3. Neck pain hx: Neck spasms several years ago that was dx'd as dystonia at the [facility]; had several imaging studies especially given the RA dx where degenerative changes were seen.  Also saw a spine surgeon at [place].  PT ineffective.  Nonradiating neck pain has been stable per patient. Unchanged since last visit.     4.  Lower back pain: Degenerative in nature.  Home physical therapy exercises were effective, but pain has been worsening over time.  Then return to physical therapy and has been improving with those exercises that she does on a daily basis.      5. Hyperlipidemia: managed by PCP per patient.     6.  L>R knee osteoarthritis unable to fully extend the left knee, tricompartmental degenerative changes seen on 9/17/2021 x-rays: Reportedly has had difficulty extending the left knee for several years ever since she had a fall.  She is now following with orthopedic surgery regarding the left knee that is altering her gait and potentially affecting her chronic low back pain; she says that the surgeon does not believe surgery would help and she does not have pain in the left knee .  Continue following with orthopedics as needed.  Currently doing better  since increasing physical activity and doing the exercises at home that were taught in physical therapy.    7.  High risk medications, immunocompromise status, and history of melanoma: Advised that she needs at least once yearly skin checks with dermatology.     8.  Vaccinations: Vaccinations reviewed with Ms. Maguire.      - Influenza: encouraged yearly vaccination  - [id number]: up to date  - [id number]: up to date   - Shingrix: up to  date  - COVID-19: Advised keeping up-to-date, and to hold Xeljanz for 1-2 weeks afterward     Total minutes spent in evaluation with patient, documentation, , and review of pertinent studies and chart notes: 20     Ms. Maguire verbalized agreement with and understanding of the rational for the diagnosis and treatment plan.  All questions were answered to best of my ability and the patient's satisfaction. Ms. Maguire was advised to contact the clinic with any questions that may arise after the clinic visit.      Thank you for involving me in the care of the patient    Return to clinic: 3 months      HPI   Jackie Maguire is a 68 year old female with a past medical history significant for hyperlipidemia, hypothyroidism, history of melanoma, history of humerus fracture, osteoporosis, psoriasis, and rheumatoid arthritis who presents for follow-up of rheumatoid arthritis.     Today, 10/5/2023: arthritis controlled at this time.  No joint pain/swelling.  Still with left knee extension issue but better since doing PT exercises regularly at home.  Morning stiffness <5 min.     Denies fevers, chills, nausea, vomiting, constipation, diarrhea. No abdominal pain. No chest pain/pressure, palpitations, or shortness of breath. No LE swelling. No oral or nasal sores.  No rash. No sicca symptoms.    Patient's  is present with her today.    Tobacco: none  EtOH: none  Drugs: none    ROS   12 point review of system was completed and negative except as noted in the HPI     Active Problem List     Patient Active Problem List   Diagnosis    Hypothyroidism    Hyperlipidemia LDL goal <160    Inflammatory arthritis    Fracture, pelvis closed (H)    Malignant melanoma of skin (H)    Distal radius fracture    Closed fracture of part of humerus    Proximal humerus fracture    Aftercare for healing traumatic fracture of upper arm    Osteopetrosis    Cervical dystonia    Dystonia, torsion, fragments of    Psoriasis     "Rheumatoid arthritis of multiple sites without rheumatoid factor (H)     Past Medical History     Past Medical History:   Diagnosis Date    Abscess of left jaw 6/20/2018    Basal cell carcinoma     Hyperlipidemia LDL goal <160 1/5/2011    Hypothyroidism 10/1/2010    Inflammatory arthritis 1/20/2011    Malignant melanoma (H)      Past Surgical History     Past Surgical History:   Procedure Laterality Date    APPENDECTOMY      ARTHRODESIS TOE(S) Left 8/17/2017    Procedure: ARTHRODESIS TOE(S);  Left foot 1st metatarsophalangeal realignment fusion, 2nd & 3rd digital corrections;  Surgeon: Jason Lamar DPM;  Location: [facility] OR    INCISION AND DRAINAGE OF WOUND N/A 6/22/2018    Procedure: INCISION AND DRAINAGE JAW ABSCESS;  Surgeon: Lennox Sullivan MD;  Location: [facility] [place];  Service:     IR CERVICAL EPIDURAL STEROID INJECTION  5/9/2013    OPEN REDUCTION INTERNAL FIXATION HUMERUS PROXIMAL Left 9/30/2016    Procedure: OPEN REDUCTION INTERNAL FIXATION HUMERUS PROXIMAL;  Surgeon: Charly Bernal MD;  Location: [facility] OR     Allergy     Allergies   Allergen Reactions    Azathioprine Itching    Leflunomide Other (See Comments)     Mouth sores    Compazine Anxiety     Restless, anxious    Prochlorperazine Anxiety     Restless, anxious    Sulfa Antibiotics Rash     Current Medication List     Current Outpatient Medications   Medication Sig    acetaminophen (TYLENOL) 325 MG tablet 650 mg po QID and BId PRN    aspirin 81 MG tablet Take 1 tablet (81 mg) by mouth daily (Patient not taking: Reported on 11/25/2019)    atorvastatin (LIPITOR) 40 MG tablet Take 1 tablet (40 mg) by mouth daily    hydrOXYzine (ATARAX) 25 MG tablet Take 1 tablet (25 mg) by mouth every 4 hours as needed for itching (and nausea) (Patient not taking: Reported on 11/30/2022)    Insulin Syringe-Needle U-100 (BD INSULIN SYRINGE) 27G X 1/2\" 1 ML MISC For weekly methotrexate administration.    levothyroxine (SYNTHROID/LEVOTHROID) 112 MCG tablet TAKE 1 " "TABLET BY MOUTH ONCE DAILY PHYSICAL AND LABS REQUIRED FOR FUTURE REFILLS    LEVOTHYROXINE SODIUM PO Take 137 mcg by mouth daily  (Patient not taking: Reported on 9/17/2021)    oxyCODONE-acetaminophen (PERCOCET) 5-325 MG per tablet Take 1-2 tablets by mouth every 4 hours as needed for pain (moderate to severe) (Patient not taking: Reported on 9/17/2021)    tofacitinib (XELJANZ XR) 11 MG 24 hr tablet Take 1 tablet (11 mg) by mouth daily . Hold for signs of infection, then seek medical attention.    VITAMIN D, CHOLECALCIFEROL, PO Take by mouth daily     No current facility-administered medications for this visit.         Social History   See HPI    Family History     Family History   Problem Relation Age of Onset    Cancer Father        Physical Exam     Temp Readings from Last 3 Encounters:   03/11/21 98.5  F (36.9  C) (Oral)   06/11/18 98.4  F (36.9  C) (Oral)   08/17/17 98  F (36.7  C) (Oral)     BP Readings from Last 5 Encounters:   06/29/23 139/86   03/22/23 128/84   11/30/22 129/82   08/08/22 132/80   01/21/22 129/84     Pulse Readings from Last 1 Encounters:   06/29/23 74     Resp Readings from Last 1 Encounters:   03/11/21 16     Estimated body mass index is 29.25 kg/m  as calculated from the following:    Height as of 1/21/22: 1.676 m (5' 6\").    Weight as of 6/29/23: 82.2 kg (181 lb 3.2 oz).    GEN: NAD.   HEENT:  Anicteric, noninjected sclera. No obvious external lesions of the ear and nose. Hearing intact.  CV: S1, S2. RRR. No m/r/g  PULM: No increased work of breathing. CTA bilaterally   MSK: Chronic synovial hypertrophy of the right second and third MCPs without tenderness to palpation, increased warmth, or overlying erythema.  Other MCPs, PIPs, DIPs without swelling or tenderness to palpation.  Wrists without swelling or tenderness to palpation.  Elbows and shoulders without swelling or tenderness to palpation.  Knees with medial joint line tenderness but no effusion or increased warmth; valgus deformity " of the left knee.  Ankles and MTPs without swelling or tenderness to palpation.    SKIN: No rash or jaundice seen  PSYCH: Alert. Appropriate.      Labs / Imaging (select studies)     CBC  Recent Labs   Lab Test 09/28/23  0800 06/26/23  0934 03/15/23  0930 07/28/21  0928 05/21/21  1333 12/03/20  1019 08/27/20  1152   WBC 6.0 5.4 7.1   < > 5.9 5.6 8.1   RBC 4.47 4.39 4.66   < > 4.25 4.39 4.25   HGB 14.1 13.8 14.6   < > 14.1 14.6 14.1   HCT 42.7 41.6 44.4   < > 42.3 44.5 42.4   MCV 96 95 95   < > 100 101* 100   RDW 12.6 12.7 12.8   < > 13.2 13.6 13.3    195 226   < > 208 213 173   MCH 31.5 31.4 31.3   < > 33.2* 33.3* 33.2*   MCHC 33.0 33.2 32.9   < > 33.3 32.8 33.3   NEUTROPHIL 72 72 77   < > 79.9 75.2 83.4   LYMPH 15 15 11   < > 5.6 11.8 7.2   MONOCYTE 10 10 9   < > 11.6 10.7 6.9   EOSINOPHIL 2 2 2   < > 2.2 1.6 2.1   BASOPHIL 1 1 0   < > 0.7 0.7 0.4   ANEU  --   --   --   --  4.7 4.2 6.7   ALYM  --   --   --   --  0.3* 0.7* 0.6*   KAREN  --   --   --   --  0.7 0.6 0.6   AEOS  --   --   --   --  0.1 0.1 0.2   ABAS  --   --   --   --  0.0 0.0 0.0   ANEUTAUTO 4.3 3.9 5.5   < >  --   --   --    ALYMPAUTO 0.9 0.8 0.8   < >  --   --   --    AMONOAUTO 0.6 0.5 0.7   < >  --   --   --    AEOSAUTO 0.1 0.1 0.1   < >  --   --   --    ABSBASO 0.0 0.0 0.0   < >  --   --   --     < > = values in this interval not displayed.     CMP  Recent Labs   Lab Test 09/28/23  0800 06/26/23  0934 03/15/23  0930 07/28/21  0928 05/21/21  1333 12/03/20  1019 08/27/20  1152 05/28/20  0928 10/25/19  1018 07/22/19  0805 12/19/18  0852 09/12/18  0923 06/25/18  0813 06/25/18  0626   NA  --   --   --   --   --   --   --   --   --  141  --  144  --  140   POTASSIUM  --   --   --   --   --   --   --   --   --  4.1  --  3.8  --  3.9   CHLORIDE  --   --   --   --   --   --   --   --   --  111*  --  109  --  109*   CO2  --   --   --   --   --   --   --   --   --  23  --  28  --  24   ANIONGAP  --   --   --   --   --   --   --   --   --  7  --  7  --   7   GLC  --   --   --   --   --   --   --  96  --  89  --  102*   < > 135*   BUN  --   --   --   --   --   --   --   --   --  15  --  15  --  20   CR 0.84 0.87 0.93   < > 0.88 0.81 0.83 0.74   < > 0.72   < > 0.91  --  0.69   GFRESTIMATED 76 73 67   < > 70 77 75 87   < > 90   < > 63  --  >60   GFRESTBLACK  --   --   --   --  81 89 87 >90   < > >90   < > 76  --  >60   HAZEL  --   --   --   --   --   --   --   --   --  9.1  --  9.4  --  9.1   BILITOTAL 0.7 0.6 0.4   < > 0.7 0.9 0.8 0.8   < > 0.3   < > 0.5  --   --    ALBUMIN 4.0 4.3 4.5   < > 4.0 4.2 4.0 4.0   < > 3.8   < > 3.6  --   --    PROTTOTAL 6.7 6.8 7.1   < > 6.8 7.1 6.9 6.8   < > 6.8   < > 6.9  --   --    ALKPHOS 83 90 98   < > 91 106 103 91   < > 86   < > 94  --   --    AST 30 26 37*   < > 51* 45 36 32   < > 22   < > 27  --   --    ALT 28 26 40*   < > 70* 67* 65* 61*   < > 30   < > 35  --   --     < > = values in this interval not displayed.     Calcium/VitaminD  Recent Labs   Lab Test 07/22/19  0805 09/12/18  0923 06/25/18  0626 02/26/18  0846 10/05/16  0831   HAZEL 9.1 9.4 9.1   < >  --    VITDT  --   --   --   --  29    < > = values in this interval not displayed.     ESR/CRP  Recent Labs   Lab Test 09/28/23  0800 06/26/23  0934 03/15/23  0930 11/22/22  0909 08/01/22  1346 04/22/22  0748 01/17/22  0857   SED 7 9 8   < > 9 8 6   CRP  --   --   --   --  <2.9 <2.9 <2.9   CRPI <3.00 <3.00 <3.00   < >  --   --   --     < > = values in this interval not displayed.     Lipid Panel  Recent Labs   Lab Test 03/15/23  0930 01/17/22  0857 12/03/20  1019   CHOL 169 130 196   TRIG 115 83 176*   HDL 61 60 60   LDL 85 53 101*   NHDL 108 70 136*     Hepatitis B  Recent Labs   Lab Test 02/26/18  0846   HBCAB Nonreactive   HEPBANG Nonreactive     Hepatitis C  Recent Labs   Lab Test 02/26/18  0846   HCVAB Nonreactive     Tuberculosis Screening  Recent Labs   Lab Test 01/17/22  0857 07/22/19  0805   TBRES Negative Negative       Immunization History     Immunization History    Administered Date(s) Administered    COVID-19 Bivalent 18+ (Moderna) 11/30/2022    COVID-19 Monovalent 18+ (Moderna) 04/19/2021, 05/17/2021, 11/29/2021, 06/27/2022    Influenza Vaccine 18-64 (Flublok) 10/18/2018, 10/01/2019, 10/16/2020    Influenza Vaccine >6 months (Alfuria,Fluzone) 10/01/2016    Pneumo Conj 13-V (2010&after) 10/05/2016    Pneumococcal 23 valent 10/31/2013, 03/11/2019    TDAP (Adacel,Boostrix) 10/25/2012, 12/10/2012    Zoster recombinant adjuvanted (SHINGRIX) 11/25/2019          Chart documentation done in part with Dragon Voice recognition Software. Although reviewed after completion, some word and grammatical error may remain.    Maurilio Hayes MD

## 2023-10-05 NOTE — PATIENT INSTRUCTIONS
RHEUMATOLOGY    [facility]  [address] [place] [address]  [place] [address]    Phone number: [phone]  Fax number: [phone]    If you need a medication refill, please contact us as you may need lab work and/or a follow up visit prior to your refill.      Thank you for choosing [facility]!    Elizabeth Haley CMA Rheumatology

## 2023-10-06 NOTE — TELEPHONE ENCOUNTER
FREE DRUG APPLICATION INITIATED    Medication:  Xeljanz  Free Drug Program Name:   [facility]   Date Submitted: 10/06/2023  Phone #: [phone]  Fax #: [phone]  Additional Information: Faxed to [employer]

## 2023-10-12 NOTE — TELEPHONE ENCOUNTER
Called [employer] confirmed the healthcare portion was received but not the patient information, called patient she stated her portion was completed online and sent with tax statements.  Patient will call [employer] back to make sure they received her portion and will call back to let me know once complete.

## 2023-10-19 NOTE — TELEPHONE ENCOUNTER
[facility]: requires pa approval active on file for PAP to process.    Medication: XELJANZ XR 11 MG PO TB24  Insurance Company: [employer] Part D - Phone [phone] Fax [phone]  Pharmacy Filling the Rx: [place], [place] - [address] [facility] [id number]  Filling Pharmacy Phone: [phone]  Filling Pharmacy Fax: [phone]  Start Date: 10/6/2023    PARI (Key: [id number]) - must wait until 60 days before exp date to renew. Reminder set for 11/01/2023        Thank You,     Aiden Griffin [facility]  Specialty Pharmacy Clinic [facility]  [email or web address]  [email or web address]  Phone: [phone]  Fax: [phone]

## 2023-10-19 NOTE — TELEPHONE ENCOUNTER
Hcp portion + rx sent 10/06    Called [facility] for status:   Ph [phone]  Phone tree BAD    Phone [phone]  Case inquiry  team: REVIEW BEGIN 01/2024     Per rep at [facility] case inquiry team, [facility] is requesting info needed for review due by 12/31/2023 to begin PAP reviews 01/2024 - 03/2024    Confirmed all forms were received by [employer] - review will begin 01/2024    Called patient to notify next status update 01/2024      Left generic voicemail summarizing above + my contact info    Thank You,     Aiden Griffin [facility]  Specialty Pharmacy Clinic [facility]  [email or web address]  [email or web address]  Phone: [phone]  Fax: [phone]

## 2023-10-30 ENCOUNTER — OFFICE VISIT (OUTPATIENT)
Dept: FAMILY MEDICINE | Facility: CLINIC | Age: 68
End: 2023-10-30
Payer: COMMERCIAL

## 2023-10-30 VITALS
TEMPERATURE: 97.8 F | OXYGEN SATURATION: 99 % | HEIGHT: 65 IN | RESPIRATION RATE: 16 BRPM | WEIGHT: 184.8 LBS | SYSTOLIC BLOOD PRESSURE: 139 MMHG | DIASTOLIC BLOOD PRESSURE: 85 MMHG | BODY MASS INDEX: 30.79 KG/M2 | HEART RATE: 67 BPM

## 2023-10-30 DIAGNOSIS — Z12.31 VISIT FOR SCREENING MAMMOGRAM: ICD-10-CM

## 2023-10-30 DIAGNOSIS — M06.09 RHEUMATOID ARTHRITIS OF MULTIPLE SITES WITHOUT RHEUMATOID FACTOR (H): ICD-10-CM

## 2023-10-30 DIAGNOSIS — C43.9 CUTANEOUS MALIGNANT MELANOMA (H): ICD-10-CM

## 2023-10-30 DIAGNOSIS — Z00.00 ENCOUNTER FOR MEDICARE ANNUAL WELLNESS EXAM: Primary | ICD-10-CM

## 2023-10-30 DIAGNOSIS — E03.4 HYPOTHYROIDISM DUE TO ACQUIRED ATROPHY OF THYROID: Chronic | ICD-10-CM

## 2023-10-30 DIAGNOSIS — Z78.0 MENOPAUSE: ICD-10-CM

## 2023-10-30 DIAGNOSIS — D84.9 IMMUNOCOMPROMISED (H): ICD-10-CM

## 2023-10-30 DIAGNOSIS — E78.5 HYPERLIPIDEMIA LDL GOAL <160: ICD-10-CM

## 2023-10-30 DIAGNOSIS — Z23 HIGH PRIORITY FOR 2019-NCOV VACCINE: ICD-10-CM

## 2023-10-30 PROCEDURE — 90480 ADMN SARSCOV2 VAC 1/ONLY CMP: CPT | Performed by: FAMILY MEDICINE

## 2023-10-30 PROCEDURE — 91320 SARSCV2 VAC 30MCG TRS-SUC IM: CPT | Performed by: FAMILY MEDICINE

## 2023-10-30 RX ORDER — IBUPROFEN 200 MG
200 TABLET ORAL EVERY 4 HOURS PRN
COMMUNITY

## 2023-10-30 ASSESSMENT — ENCOUNTER SYMPTOMS
SORE THROAT: 0
MYALGIAS: 0
FREQUENCY: 0
ARTHRALGIAS: 1
CONSTIPATION: 0
DIZZINESS: 0
BREAST MASS: 0
DIARRHEA: 0
HEMATURIA: 0
DYSURIA: 0
SHORTNESS OF BREATH: 0
COUGH: 0
HEADACHES: 0
PALPITATIONS: 0
NAUSEA: 0
NERVOUS/ANXIOUS: 0
FEVER: 0
HEMATOCHEZIA: 0
CHILLS: 0
EYE PAIN: 0
WEAKNESS: 0
JOINT SWELLING: 0
ABDOMINAL PAIN: 0
PARESTHESIAS: 0
HEARTBURN: 0

## 2023-10-30 ASSESSMENT — ACTIVITIES OF DAILY LIVING (ADL): CURRENT_FUNCTION: NO ASSISTANCE NEEDED

## 2023-10-30 NOTE — PATIENT INSTRUCTIONS
Patient Education   Personalized Prevention Plan  You are due for the preventive services outlined below.  Your care team is available to assist you in scheduling these services.  If you have already completed any of these items, please share that information with your care team to update in your medical record.  Health Maintenance Due   Topic Date Due     Osteoporosis Screening  Never done     URINE DRUG SCREEN  Never done     ANNUAL REVIEW OF HM ORDERS  Never done     Mammogram  Never done     Colorectal Cancer Screening  Never done     RSV VACCINE 60+ (1 - 1-dose 60+ series) Never done     Annual Wellness Visit  Never done     Thyroid Function Lab  03/11/2022     COVID-19 Vaccine (6 - 2023-24 season) 09/01/2023

## 2023-10-30 NOTE — PROGRESS NOTES
"SUBJECTIVE:   Jackie is a 68 year old who presents for Preventive Visit.      10/30/2023     9:30 AM   Additional Questions   Roomed by        Are you in the first 12 months of your Medicare coverage?  Yes,  Visual Acuity:  Right Eye: 20/25    Left Eye: 20/32  Both Eyes: 20/20    Healthy Habits:     In general, how would you rate your overall health?  Good    Frequency of exercise:  2-3 days/week    Duration of exercise:  Less than 15 minutes    Do you usually eat at least 4 servings of fruit and vegetables a day, include whole grains    & fiber and avoid regularly eating high fat or \"junk\" foods?  Yes    Taking medications regularly:  Yes    Medication side effects:  None    Ability to successfully perform activities of daily living:  No assistance needed    Home Safety:  No safety concerns identified    Hearing Impairment:  No hearing concerns    In the past 6 months, have you been bothered by leaking of urine?  No    In general, how would you rate your overall mental or emotional health?  Excellent    Additional concerns today:  No      Today's PHQ-2 Score:       10/30/2023     9:36 AM   PHQ-2 ( 1999 [employer])   Q1: Little interest or pleasure in doing things 0   Q2: Feeling down, depressed or hopeless 0   PHQ-2 Score 0   Q1: Little interest or pleasure in doing things Not at all   Q2: Feeling down, depressed or hopeless Not at all   PHQ-2 Score 0     Jackie comes in today for her annual exam.  We thought this perhaps could be a welcome to Medicare physical but patient was not entirely sure when she started Medicare.  I have not seen her in a few years.  She feels like she is doing okay.  She follows with a rheumatologist for rheumatoid arthritis and is now doing Xeljanz which is been helpful for her.  In the past, she has declined preventative cares.  We discussed this again in detail today.  She thinks that she be willing to do a bone density as this could make a difference in quality of life.  She will think " about doing the mammogram so I placed the order.  She declines the colonoscopy.  She has been taking the Lipitor as the Xeljanz made her cholesterol quite high.  It is now well controlled with medication.      Have you ever done Advance Care Planning? (For example, a Health Directive, POLST, or a discussion with a medical provider or your loved ones about your wishes): No, advance care planning information given to patient to review.  Advanced care planning was discussed at today's visit.       Fall risk  Fallen 2 or more times in the past year?: No  Any fall with injury in the past year?: No    Cognitive Screening   1) Repeat 3 items (Leader, Season, Table)    2) Clock draw: NORMAL  3) 3 item recall: Recalls 3 objects  Results: NORMAL clock, 1-2 items recalled: COGNITIVE IMPAIRMENT LESS LIKELY    Mini-CogTM Copyright S Rena. Licensed by the author for use in [facility]; reprinted with permission (jeri@[facility]). All rights reserved.      Do you have sleep apnea, excessive snoring or daytime drowsiness? : no    Reviewed and updated as needed this visit by clinical staff   Tobacco  Allergies  Meds              Reviewed and updated as needed this visit by Provider                 Social History     Tobacco Use    Smoking status: Never    Smokeless tobacco: Never   Substance Use Topics    Alcohol use: No             10/30/2023     9:35 AM   Alcohol Use   Prescreen: >3 drinks/day or >7 drinks/week? No     Do you have a current opioid prescription? No  Do you use any other controlled substances or medications that are not prescribed by a provider? None              Current providers sharing in care for this patient include:   Patient Care Team:  Talia Mejía MD as PCP - General (Family Practice)  Dru Horton MD as MD (Physical Medicine and Rehabilitation)  Griselda Hernandez PT as Physical Therapist (Physical Medicine and Rehabilitation)  Sury Mabry RN as Nurse Coordinator  (Physical Medicine and Rehabilitation)  Maurilio Hayes MD as Referring Physician (Rheumatology)  Brooke Kearney PA-C as Physician Assistant (Dermatology)  Maurilio Hayes MD as Assigned Rheumatology Provider  Rosa Whipple MD as Assigned PCP    The following health maintenance items are reviewed in Epic and correct as of today:  Health Maintenance   Topic Date Due    DEXA  Never done    URINE DRUG SCREEN  Never done    ANNUAL REVIEW OF HM ORDERS  Never done    ADVANCE CARE PLANNING  Never done    MAMMO SCREENING  Never done    COLORECTAL CANCER SCREENING  Never done    RSV VACCINE 60+ (1 - 1-dose 60+ series) Never done    MEDICARE ANNUAL WELLNESS VISIT  Never done    TSH W/FREE T4 REFLEX  03/11/2022    COVID-19 Vaccine (6 - 2023-24 season) 09/01/2023    Pneumococcal Vaccine: 65+ Years (3 - PPSV23 or PCV20) 03/11/2024    FALL RISK ASSESSMENT  10/30/2024    LIPID  03/15/2028    DTAP/TDAP/TD IMMUNIZATION (4 - Td or Tdap) 12/30/2032    HEPATITIS C SCREENING  Completed    PHQ-2 (once per calendar year)  Completed    INFLUENZA VACCINE  Completed    ZOSTER IMMUNIZATION  Completed    IPV IMMUNIZATION  Aged Out    HPV IMMUNIZATION  Aged Out    MENINGITIS IMMUNIZATION  Aged Out     Labs reviewed in [facility]  Current Outpatient Medications   Medication Sig Dispense Refill    atorvastatin (LIPITOR) 40 MG tablet Take 1 tablet (40 mg) by mouth daily 90 tablet 0    ibuprofen (ADVIL/MOTRIN) 200 MG tablet Take 200 mg by mouth every 4 hours as needed for pain      levothyroxine (SYNTHROID/LEVOTHROID) 112 MCG tablet TAKE 1 TABLET BY MOUTH ONCE DAILY PHYSICAL AND LABS REQUIRED FOR FUTURE REFILLS 90 tablet 0    tofacitinib (XELJANZ XR) 11 MG 24 hr tablet Take 1 tablet (11 mg) by mouth daily . Hold for signs of infection, then seek medical attention. 90 tablet 2    VITAMIN D, CHOLECALCIFEROL, PO Take by mouth daily               10/30/2023     9:37 AM   Breast CA Risk Assessment (FHS-7)   Do you have a family history of  "breast, colon, or ovarian cancer? No / Unknown         Mammogram Screening: Recommended mammography every 1-2 years with patient discussion and risk factor consideration  Pertinent mammograms are reviewed under the imaging tab.    Review of Systems   Constitutional:  Negative for chills and fever.   HENT:  Negative for congestion, ear pain, hearing loss and sore throat.    Eyes:  Negative for pain and visual disturbance.   Respiratory:  Negative for cough and shortness of breath.    Cardiovascular:  Negative for chest pain, palpitations and peripheral edema.   Gastrointestinal:  Negative for abdominal pain, constipation, diarrhea, heartburn, hematochezia and nausea.   Breasts:  Negative for tenderness, breast mass and discharge.   Genitourinary:  Negative for dysuria, frequency, genital sores, hematuria, pelvic pain, urgency, vaginal bleeding and vaginal discharge.   Musculoskeletal:  Positive for arthralgias. Negative for joint swelling and myalgias.   Skin:  Negative for rash.   Neurological:  Negative for dizziness, weakness, headaches and paresthesias.   Psychiatric/Behavioral:  Negative for mood changes. The patient is not nervous/anxious.          OBJECTIVE:   /85   Pulse 67   Temp 97.8  F (36.6  C)   Resp 16   Ht 1.657 m (5' 5.25\")   Wt 83.8 kg (184 lb 12.8 oz)   SpO2 99%   BMI 30.52 kg/m   Estimated body mass index is 30.52 kg/m  as calculated from the following:    Height as of this encounter: 1.657 m (5' 5.25\").    Weight as of this encounter: 83.8 kg (184 lb 12.8 oz).  Physical Exam  GENERAL APPEARANCE: healthy, alert and no distress  EYES: Eyes grossly normal to inspection, PERRL and conjunctivae and sclerae normal  HENT: ear canals and TM's normal, nose and mouth without ulcers or lesions, oropharynx clear and oral mucous membranes moist  NECK: no adenopathy, no asymmetry, masses, or scars and thyroid normal to palpation  RESP: lungs clear to auscultation - no rales, rhonchi or " wheezes  BREAST: normal without masses, tenderness or nipple discharge and no palpable axillary masses or adenopathy  CV: regular rate and rhythm, normal S1 S2, no S3 or S4, no murmur, click or rub, no peripheral edema and peripheral pulses strong  ABDOMEN: soft, nontender, no hepatosplenomegaly, no masses and bowel sounds normal  MS: no musculoskeletal defects are noted and gait is age appropriate without ataxia  SKIN: no suspicious lesions or rashes  NEURO: Normal strength and tone, sensory exam grossly normal, mentation intact and speech normal  PSYCH: mentation appears normal and affect normal/bright    Diagnostic Test Results:  Labs reviewed in Epic  none     ASSESSMENT / PLAN:   1. Encounter for Medicare annual wellness exam  Jackie presents for welcome to Medicare/annual wellness exam.  As far as healthcare maintenance, she has declined in the past but think she is willing to at least do the bone density this year and may be the mammogram.  Orders are placed.  She declines a colonoscopy.  We will update her COVID booster.    2. Visit for screening mammogram  - MA SCREENING DIGITAL BILAT - Future  (s+30); Future    3. Rheumatoid arthritis of multiple sites without rheumatoid factor (H)  She is following with rheumatology and is doing well on Xeljanz.    4. Immunocompromised (H24)  Secondary to above medication.    5. Cutaneous malignant melanoma (H)  She has been following with dermatology but she is due to see them.    6. Hypothyroidism due to acquired atrophy of thyroid  She is been fairly stable on her current dose of Synthroid but is due for TSH today.  - TSH with free T4 reflex; Future    7. Hyperlipidemia LDL goal <160  Her cholesterol is now under better control with starting the Lipitor.  This was thought to be elevated due to the Xeljanz.  - Lipid panel reflex to direct LDL Fasting; Future    8. Menopause  He is willing to do a bone density as we discussed this could impact her quality of life.  -  DEXA HIP/PELVIS/SPINE - Future; Future    9. High priority for 2019-nCoV vaccine       Patient has been advised of split billing requirements and indicates understanding: Yes      COUNSELING:  Reviewed preventive health counseling, as reflected in patient instructions       Vision screening       Hearing screening       Dental care       Osteoporosis prevention/bone health        She reports that she has never smoked. She has never used smokeless tobacco.      Appropriate preventive services were discussed with this patient, including applicable screening as appropriate for fall prevention, nutrition, physical activity, Tobacco-use cessation, weight loss and cognition.  Checklist reviewing preventive services available has been given to the patient.    Reviewed patients plan of care and provided an AVS. The Basic Care Plan (routine screening as documented in Health Maintenance) for Jackie meets the Care Plan requirement. This Care Plan has been established and reviewed with the Patient.          Talia Mejía MD  [facility]    Identified Health Risks:  I have reviewed Opioid Use Disorder and Substance Use Disorder risk factors and made any needed referrals.

## 2023-12-01 NOTE — TELEPHONE ENCOUNTER
PA Initiation    Medication: XELJANZ XR 11 MG PO TB24  Insurance Company: [employer] Part D - Phone [phone] Fax [phone]  [facility] [place] [id number]  Filling Pharmacy Phone: [phone]  Filling Pharmacy Fax: [phone]  Start Date: 12/1/2023    [id number]

## 2023-12-06 ENCOUNTER — HOSPITAL ENCOUNTER (OUTPATIENT)
Dept: BONE DENSITY | Facility: CLINIC | Age: 68
Discharge: HOME | End: 2023-12-06
Attending: FAMILY MEDICINE | Admitting: FAMILY MEDICINE
Payer: COMMERCIAL

## 2023-12-06 DIAGNOSIS — Z78.0 MENOPAUSE: ICD-10-CM

## 2023-12-06 PROCEDURE — 77080 DXA BONE DENSITY AXIAL: CPT

## 2023-12-08 DIAGNOSIS — E03.9 HYPOTHYROIDISM: ICD-10-CM

## 2023-12-11 RX ORDER — LEVOTHYROXINE SODIUM 112 UG/1
112 TABLET ORAL DAILY
Qty: 90 TABLET | Refills: 0 | Status: SHIPPED | OUTPATIENT
Start: 2023-12-11 | End: 2024-03-11

## 2023-12-13 ENCOUNTER — TELEPHONE (OUTPATIENT)
Dept: FAMILY MEDICINE | Facility: CLINIC | Age: 68
End: 2023-12-13
Payer: COMMERCIAL

## 2023-12-13 DIAGNOSIS — M81.0 OSTEOPOROSIS WITHOUT CURRENT PATHOLOGICAL FRACTURE, UNSPECIFIED OSTEOPOROSIS TYPE: Primary | ICD-10-CM

## 2023-12-13 NOTE — TELEPHONE ENCOUNTER
----- Message from Talia Mejía MD sent at 12/13/2023  9:56 AM CST -----  Patient know that her bone density shows osteoporosis.  I would recommend ruling out any secondary causes of osteoporosis with some additional blood work.  It looks like she already has a lab only appointment scheduled for January so I will add in some additional blood work.  Once that is back, we can discuss treatment options.

## 2024-01-04 ENCOUNTER — LAB (OUTPATIENT)
Dept: LAB | Facility: CLINIC | Age: 69
End: 2024-01-04
Payer: COMMERCIAL

## 2024-01-04 DIAGNOSIS — M06.09 RHEUMATOID ARTHRITIS OF MULTIPLE SITES WITHOUT RHEUMATOID FACTOR (H): ICD-10-CM

## 2024-01-04 DIAGNOSIS — E03.4 HYPOTHYROIDISM DUE TO ACQUIRED ATROPHY OF THYROID: Chronic | ICD-10-CM

## 2024-01-04 DIAGNOSIS — E78.5 HYPERLIPIDEMIA LDL GOAL <160: ICD-10-CM

## 2024-01-04 DIAGNOSIS — Z79.899 HIGH RISK MEDICATION USE: ICD-10-CM

## 2024-01-04 DIAGNOSIS — M81.0 OSTEOPOROSIS WITHOUT CURRENT PATHOLOGICAL FRACTURE, UNSPECIFIED OSTEOPOROSIS TYPE: ICD-10-CM

## 2024-01-04 LAB
ALBUMIN SERPL BCG-MCNC: 4.5 G/DL (ref 3.5–5.2)
ALP SERPL-CCNC: 84 U/L (ref 40–150)
ALT SERPL W P-5'-P-CCNC: 47 U/L (ref 0–50)
AST SERPL W P-5'-P-CCNC: 39 U/L (ref 0–45)
BASOPHILS # BLD AUTO: 0 10E3/UL (ref 0–0.2)
BASOPHILS NFR BLD AUTO: 1 %
BILIRUB DIRECT SERPL-MCNC: <0.2 MG/DL (ref 0–0.3)
BILIRUB SERPL-MCNC: 0.7 MG/DL
CHOLEST SERPL-MCNC: 190 MG/DL
CREAT SERPL-MCNC: 0.88 MG/DL (ref 0.51–0.95)
EGFRCR SERPLBLD CKD-EPI 2021: 72 ML/MIN/1.73M2
EOSINOPHIL # BLD AUTO: 0.1 10E3/UL (ref 0–0.7)
EOSINOPHIL NFR BLD AUTO: 2 %
ERYTHROCYTE [DISTWIDTH] IN BLOOD BY AUTOMATED COUNT: 12.6 % (ref 10–15)
FASTING STATUS PATIENT QL REPORTED: YES
HCT VFR BLD AUTO: 44.5 % (ref 35–47)
HDLC SERPL-MCNC: 55 MG/DL
HGB BLD-MCNC: 14.4 G/DL (ref 11.7–15.7)
IMM GRANULOCYTES # BLD: 0 10E3/UL
IMM GRANULOCYTES NFR BLD: 0 %
LDLC SERPL CALC-MCNC: 103 MG/DL
LYMPHOCYTES # BLD AUTO: 0.7 10E3/UL (ref 0.8–5.3)
LYMPHOCYTES NFR BLD AUTO: 14 %
MCH RBC QN AUTO: 31.6 PG (ref 26.5–33)
MCHC RBC AUTO-ENTMCNC: 32.4 G/DL (ref 31.5–36.5)
MCV RBC AUTO: 98 FL (ref 78–100)
MONOCYTES # BLD AUTO: 0.5 10E3/UL (ref 0–1.3)
MONOCYTES NFR BLD AUTO: 10 %
NEUTROPHILS # BLD AUTO: 3.9 10E3/UL (ref 1.6–8.3)
NEUTROPHILS NFR BLD AUTO: 73 %
NONHDLC SERPL-MCNC: 135 MG/DL
PLATELET # BLD AUTO: 218 10E3/UL (ref 150–450)
PROT SERPL-MCNC: 7 G/DL (ref 6.4–8.3)
PTH-INTACT SERPL-MCNC: 68 PG/ML (ref 15–65)
RBC # BLD AUTO: 4.55 10E6/UL (ref 3.8–5.2)
TOTAL PROTEIN SERUM FOR ELP: 6.5 G/DL (ref 6.4–8.3)
TRIGL SERPL-MCNC: 161 MG/DL
TSH SERPL DL<=0.005 MIU/L-ACNC: 0.82 UIU/ML (ref 0.3–4.2)
VIT D+METAB SERPL-MCNC: 63 NG/ML (ref 20–50)
WBC # BLD AUTO: 5.3 10E3/UL (ref 4–11)

## 2024-01-04 PROCEDURE — 83970 ASSAY OF PARATHORMONE: CPT

## 2024-01-04 PROCEDURE — 80061 LIPID PANEL: CPT

## 2024-01-04 PROCEDURE — 82306 VITAMIN D 25 HYDROXY: CPT

## 2024-01-04 PROCEDURE — 82565 ASSAY OF CREATININE: CPT

## 2024-01-04 PROCEDURE — 36415 COLL VENOUS BLD VENIPUNCTURE: CPT

## 2024-01-04 PROCEDURE — 84443 ASSAY THYROID STIM HORMONE: CPT

## 2024-01-04 PROCEDURE — 85025 COMPLETE CBC W/AUTO DIFF WBC: CPT

## 2024-01-04 PROCEDURE — 80076 HEPATIC FUNCTION PANEL: CPT

## 2024-01-04 PROCEDURE — 84165 PROTEIN E-PHORESIS SERUM: CPT | Performed by: PATHOLOGY

## 2024-01-04 PROCEDURE — 84155 ASSAY OF PROTEIN SERUM: CPT | Mod: 59

## 2024-01-05 LAB
ALBUMIN SERPL ELPH-MCNC: 4.3 G/DL (ref 3.7–5.1)
ALPHA1 GLOB SERPL ELPH-MCNC: 0.2 G/DL (ref 0.2–0.4)
ALPHA2 GLOB SERPL ELPH-MCNC: 0.5 G/DL (ref 0.5–0.9)
B-GLOBULIN SERPL ELPH-MCNC: 0.8 G/DL (ref 0.6–1)
GAMMA GLOB SERPL ELPH-MCNC: 0.7 G/DL (ref 0.7–1.6)
M PROTEIN SERPL ELPH-MCNC: 0 G/DL
PROT PATTERN SERPL ELPH-IMP: NORMAL

## 2024-01-05 NOTE — TELEPHONE ENCOUNTER
Called [facility] for status [phone]:    All forms received.    Patient got 90 day supply 11/02 refill due first week in February.    Review will be done end of January.

## 2024-01-21 PROCEDURE — 81050 URINALYSIS VOLUME MEASURE: CPT

## 2024-01-21 PROCEDURE — 82340 ASSAY OF CALCIUM IN URINE: CPT

## 2024-01-23 LAB
CALCIUM 24H UR-MRATE: 0.08 G/SPEC (ref 0.1–0.3)
CALCIUM UR-MCNC: 8.7 MG/DL
COLLECT DURATION TIME UR: 24 H
SPECIMEN VOL UR: 900 ML

## 2024-01-30 NOTE — TELEPHONE ENCOUNTER
[employer]:        Prompted benefit investigation with updated prior authorization.    Thank You,     Sissy Joseph CPhT  [facility]  [email or web address]  [email or web address]  Phone: [phone]  Fax: [phone]

## 2024-02-02 NOTE — TELEPHONE ENCOUNTER
"FREE DRUG APPLICATION APPROVED    Medication: XELJANZ XR 11 MG PO TB24  Program Name:  [employer]  Effective Date: 2/1/2024  Expiration Date: 12/31/2024  Pharmacy Filling the Rx: [place] [id number] [facility] [id number]  Patient Notified: yes  Additional Information: Scanned into Media tab \"Xeljanz PAP Approval 2024 - [employer]\"          Thank You,     Sissy Joseph [facility]  [facility]  [email or web address]  [email or web address]  Phone: [phone]  Fax: [phone]    "

## 2024-02-26 DIAGNOSIS — E78.00 HYPERCHOLESTEREMIA: ICD-10-CM

## 2024-02-27 RX ORDER — ATORVASTATIN CALCIUM 40 MG/1
40 TABLET, FILM COATED ORAL DAILY
Qty: 90 TABLET | Refills: 0 | Status: SHIPPED | OUTPATIENT
Start: 2024-02-27 | End: 2024-06-17

## 2024-03-11 DIAGNOSIS — E03.9 HYPOTHYROIDISM: ICD-10-CM

## 2024-03-11 RX ORDER — LEVOTHYROXINE SODIUM 112 UG/1
112 TABLET ORAL DAILY
Qty: 90 TABLET | Refills: 1 | Status: SHIPPED | OUTPATIENT
Start: 2024-03-11 | End: 2024-03-18

## 2024-03-18 ENCOUNTER — OFFICE VISIT (OUTPATIENT)
Dept: FAMILY MEDICINE | Facility: CLINIC | Age: 69
End: 2024-03-18
Payer: COMMERCIAL

## 2024-03-18 VITALS
WEIGHT: 191.8 LBS | SYSTOLIC BLOOD PRESSURE: 140 MMHG | HEIGHT: 65 IN | TEMPERATURE: 98 F | RESPIRATION RATE: 16 BRPM | OXYGEN SATURATION: 97 % | BODY MASS INDEX: 31.96 KG/M2 | DIASTOLIC BLOOD PRESSURE: 89 MMHG | HEART RATE: 75 BPM

## 2024-03-18 DIAGNOSIS — M06.09 RHEUMATOID ARTHRITIS OF MULTIPLE SITES WITHOUT RHEUMATOID FACTOR (H): ICD-10-CM

## 2024-03-18 DIAGNOSIS — M85.80 OSTEOPENIA, UNSPECIFIED LOCATION: Primary | ICD-10-CM

## 2024-03-18 DIAGNOSIS — D84.9 IMMUNOCOMPROMISED (H): ICD-10-CM

## 2024-03-18 DIAGNOSIS — E06.3 HYPOTHYROIDISM DUE TO HASHIMOTO'S THYROIDITIS: ICD-10-CM

## 2024-03-18 DIAGNOSIS — C43.9 MALIGNANT MELANOMA OF SKIN (H): ICD-10-CM

## 2024-03-18 PROCEDURE — 99214 OFFICE O/P EST MOD 30 MIN: CPT | Performed by: FAMILY MEDICINE

## 2024-03-18 RX ORDER — DORZOLAMIDE HYDROCHLORIDE AND TIMOLOL MALEATE 20; 5 MG/ML; MG/ML
1 SOLUTION/ DROPS OPHTHALMIC 2 TIMES DAILY
COMMUNITY
Start: 2024-03-08

## 2024-03-18 RX ORDER — RESPIRATORY SYNCYTIAL VIRUS VACCINE 120MCG/0.5
0.5 KIT INTRAMUSCULAR ONCE
Qty: 1 EACH | Refills: 0 | Status: CANCELLED | OUTPATIENT
Start: 2024-03-18 | End: 2024-03-18

## 2024-03-18 RX ORDER — ALENDRONATE SODIUM 70 MG/1
70 TABLET ORAL
Qty: 12 TABLET | Refills: 3 | Status: SHIPPED | OUTPATIENT
Start: 2024-03-18

## 2024-03-18 NOTE — PROGRESS NOTES
"  1. Osteopenia, unspecified location  Jackie comes in today to discuss treatment of her osteopenia.  Her FRAX score was high enough that treatment was recommended.  She did do a secondary workup which was unremarkable.  Reviewed potential adverse side effects and to hold medication if she is planning on major dental work.  Will start with Fosamax and then we will recheck a bone density in 2 years.  Also discussed proper dietary calcium and vitamin D along with weightbearing exercise.  - alendronate (FOSAMAX) 70 MG tablet; Take 1 tablet (70 mg) by mouth every 7 days  Dispense: 12 tablet; Refill: 3    2. Hypothyroidism due to Hashimoto's thyroiditis  She needs refills.  We just did blood work.  - levothyroxine (SYNTHROID/LEVOTHROID) 112 MCG tablet; Take 1 tablet (112 mcg) by mouth daily  Dispense: 90 tablet; Refill: 1    3. Immunocompromised (H24)  She follows with a rheumatologist and is getting Xeljanz    4. Rheumatoid arthritis of multiple sites without rheumatoid factor (H)  As above    5. Malignant melanoma of skin (H)  She follows with dermatology.      Candace Meija is a 69 year old, presenting for the following health issues:  Osteoporosis (Discuss treatment options)        3/18/2024     2:01 PM   Additional Questions   Roomed by Christie MESA LPN     History of Present Illness       Reason for visit:  Osteoporosis medication    She eats 2-3 servings of fruits and vegetables daily.She consumes 0 sweetened beverage(s) daily.She exercises with enough effort to increase her heart rate 9 or less minutes per day.  She exercises with enough effort to increase her heart rate 3 or less days per week.   She is taking medications regularly.                     Objective    BP (!) 140/89   Pulse 75   Temp 98  F (36.7  C) (Oral)   Resp 16   Ht 1.657 m (5' 5.25\")   Wt 87 kg (191 lb 12.8 oz)   SpO2 97%   BMI 31.67 kg/m    Body mass index is 31.67 kg/m .  Physical Exam   GENERAL: alert and no distress  PSYCH: " mentation appears normal, affect normal/bright            Signed Electronically by: Talia Mejía MD

## 2024-03-19 RX ORDER — LEVOTHYROXINE SODIUM 112 UG/1
112 TABLET ORAL DAILY
Qty: 90 TABLET | Refills: 1 | Status: SHIPPED | OUTPATIENT
Start: 2024-03-19

## 2024-04-01 ENCOUNTER — LAB (OUTPATIENT)
Dept: LAB | Facility: CLINIC | Age: 69
End: 2024-04-01
Payer: COMMERCIAL

## 2024-04-01 DIAGNOSIS — M06.09 RHEUMATOID ARTHRITIS OF MULTIPLE SITES WITHOUT RHEUMATOID FACTOR (H): ICD-10-CM

## 2024-04-01 DIAGNOSIS — Z79.899 HIGH RISK MEDICATION USE: ICD-10-CM

## 2024-04-01 LAB
ALBUMIN SERPL BCG-MCNC: 4.4 G/DL (ref 3.5–5.2)
ALP SERPL-CCNC: 91 U/L (ref 40–150)
ALT SERPL W P-5'-P-CCNC: 34 U/L (ref 0–50)
AST SERPL W P-5'-P-CCNC: 32 U/L (ref 0–45)
BASOPHILS # BLD AUTO: 0 10E3/UL (ref 0–0.2)
BASOPHILS NFR BLD AUTO: 1 %
BILIRUB DIRECT SERPL-MCNC: <0.2 MG/DL (ref 0–0.3)
BILIRUB SERPL-MCNC: 0.7 MG/DL
CHOLEST SERPL-MCNC: 173 MG/DL
CREAT SERPL-MCNC: 0.83 MG/DL (ref 0.51–0.95)
CRP SERPL-MCNC: <3 MG/L
EGFRCR SERPLBLD CKD-EPI 2021: 77 ML/MIN/1.73M2
EOSINOPHIL # BLD AUTO: 0.2 10E3/UL (ref 0–0.7)
EOSINOPHIL NFR BLD AUTO: 3 %
ERYTHROCYTE [DISTWIDTH] IN BLOOD BY AUTOMATED COUNT: 12.4 % (ref 10–15)
ERYTHROCYTE [SEDIMENTATION RATE] IN BLOOD BY WESTERGREN METHOD: 8 MM/HR (ref 0–30)
FASTING STATUS PATIENT QL REPORTED: YES
HCT VFR BLD AUTO: 42.5 % (ref 35–47)
HDLC SERPL-MCNC: 55 MG/DL
HGB BLD-MCNC: 14.1 G/DL (ref 11.7–15.7)
IMM GRANULOCYTES # BLD: 0 10E3/UL
IMM GRANULOCYTES NFR BLD: 0 %
LDLC SERPL CALC-MCNC: 88 MG/DL
LYMPHOCYTES # BLD AUTO: 0.8 10E3/UL (ref 0.8–5.3)
LYMPHOCYTES NFR BLD AUTO: 14 %
MCH RBC QN AUTO: 31.8 PG (ref 26.5–33)
MCHC RBC AUTO-ENTMCNC: 33.2 G/DL (ref 31.5–36.5)
MCV RBC AUTO: 96 FL (ref 78–100)
MONOCYTES # BLD AUTO: 0.6 10E3/UL (ref 0–1.3)
MONOCYTES NFR BLD AUTO: 10 %
NEUTROPHILS # BLD AUTO: 4.3 10E3/UL (ref 1.6–8.3)
NEUTROPHILS NFR BLD AUTO: 73 %
NONHDLC SERPL-MCNC: 118 MG/DL
PLATELET # BLD AUTO: 211 10E3/UL (ref 150–450)
PROT SERPL-MCNC: 6.6 G/DL (ref 6.4–8.3)
RBC # BLD AUTO: 4.43 10E6/UL (ref 3.8–5.2)
TRIGL SERPL-MCNC: 150 MG/DL
WBC # BLD AUTO: 5.9 10E3/UL (ref 4–11)

## 2024-04-01 PROCEDURE — 82565 ASSAY OF CREATININE: CPT

## 2024-04-01 PROCEDURE — 80061 LIPID PANEL: CPT

## 2024-04-01 PROCEDURE — 85025 COMPLETE CBC W/AUTO DIFF WBC: CPT

## 2024-04-01 PROCEDURE — 80076 HEPATIC FUNCTION PANEL: CPT

## 2024-04-01 PROCEDURE — 36415 COLL VENOUS BLD VENIPUNCTURE: CPT

## 2024-04-01 PROCEDURE — 85652 RBC SED RATE AUTOMATED: CPT

## 2024-04-01 PROCEDURE — 86140 C-REACTIVE PROTEIN: CPT

## 2024-04-04 ENCOUNTER — OFFICE VISIT (OUTPATIENT)
Dept: RHEUMATOLOGY | Facility: CLINIC | Age: 69
End: 2024-04-04
Payer: COMMERCIAL

## 2024-04-04 VITALS
SYSTOLIC BLOOD PRESSURE: 126 MMHG | DIASTOLIC BLOOD PRESSURE: 87 MMHG | HEART RATE: 77 BPM | OXYGEN SATURATION: 96 % | WEIGHT: 190.8 LBS | BODY MASS INDEX: 31.51 KG/M2

## 2024-04-04 DIAGNOSIS — Z79.899 HIGH RISK MEDICATION USE: ICD-10-CM

## 2024-04-04 DIAGNOSIS — M54.50 CHRONIC MIDLINE LOW BACK PAIN, UNSPECIFIED WHETHER SCIATICA PRESENT: ICD-10-CM

## 2024-04-04 DIAGNOSIS — G89.29 CHRONIC MIDLINE LOW BACK PAIN, UNSPECIFIED WHETHER SCIATICA PRESENT: ICD-10-CM

## 2024-04-04 DIAGNOSIS — M21.42 PES PLANUS OF BOTH FEET: ICD-10-CM

## 2024-04-04 DIAGNOSIS — M06.09 RHEUMATOID ARTHRITIS OF MULTIPLE SITES WITHOUT RHEUMATOID FACTOR (H): Primary | ICD-10-CM

## 2024-04-04 DIAGNOSIS — M21.41 PES PLANUS OF BOTH FEET: ICD-10-CM

## 2024-04-04 PROCEDURE — 99214 OFFICE O/P EST MOD 30 MIN: CPT | Performed by: INTERNAL MEDICINE

## 2024-04-04 PROCEDURE — G2211 COMPLEX E/M VISIT ADD ON: HCPCS | Performed by: INTERNAL MEDICINE

## 2024-04-04 RX ORDER — TOFACITINIB 11 MG/1
11 TABLET, FILM COATED, EXTENDED RELEASE ORAL DAILY
Qty: 90 TABLET | Refills: 2 | Status: SHIPPED | OUTPATIENT
Start: 2024-04-04

## 2024-04-04 NOTE — PROGRESS NOTES
Rheumatology Clinic Visit      Jackie Maguire MRN# [id number]   YOB: 1955 Age: 69 year old      Date of visit: 4/04/24   PCP: Dr. Talia Mejía at [facility]    Chief Complaint   Patient presents with:  Arthritis: 6 month follow up RA    Assessment and Plan     1. Rheumatoid Arthritis (CCP low positive, then negative): From record review: dx'd 2012; has followed with Kayley Jimenez, Jacob, and [facility]; hx of +DULCE, +dsDNA, CCP low positive then negative; hx of psoriasis; hx of C-spine fusion; initial presentation of pain/stiff/swollen joints involving the knees, feet, ankles, neck, back.  Previously on leflunomide (mouth sores), HCQ (ineffective), AZA (itching), Humira (ineffective at q7day and q14 days dosings), Remicade (associated with syncopal episodes), MTX (partially effective, stopped when doing well on Xeljanz, higher doses associated with LFT elevations). Sulfa allergy. Currently on Xeljanz XR 11mg daily. Hyperlipidemia is being managed by her primary care provider. RA controlled.  Chronic illness, stable.    - Continue Xeljanz XR 11 mg daily   - Labs in 3 months: CBC, Creatinine, Hepatic Panel  - Labs in 6 months: CBC, Creatinine, Hepatic Panel, ESR, CRP, QuantiFERON-TB gold plus    High risk medication requiring intensive toxicity monitoring at least quarterly.                Rapid 3, cumulative scores                      04/04/2024: 6    (Xeljanz XR 11mg daily) RA controlled.  Low back pain                      10/05/2023: 5    (Xeljanz XR 11mg daily)                       06/29/2023: 5    (Xeljanz XR 11mg daily)                      11/30/2022: 7    (Xeljanz XR 11mg daily; RA doing well; left knee OA and back pain contributing)                      1/21/2022: 6.2   (Xeljanz XR 11mg daily; RA doing well; left knee OA is main symptom)                      9/17/2021: 7.3   (MTX 10mg SQ wkly, Xeljanz XR 11mg daily)                      8/31/2020: RA doing well ((MTX 20mg SQ wkly, Xeljanz XR  11mg daily)                      02/17/2020:  7.3 (MTX 20mg SQ wkly, prednisone 5mg daily, Xeljanz XR 11mg daily)                      11/25/2019:  8    (MTX 20mg SQ wkly, prednisone 5mg daily, Xeljanz XR 11mg daily)    2. History of Positive DULCE and dsDNA (repeats negative): with inflammatory arthritis.  Symptoms fit better for seropositive rheumatoid arthritis; see #1.  DULCE and dsDNA negative previously when rechecked.     3. Neck pain hx: Neck spasms several years ago that was dx'd as dystonia at the [facility]; had several imaging studies especially given the RA dx where degenerative changes were seen.  Also saw a spine surgeon at [place].  PT ineffective.  Nonradiating neck pain has been stable per patient. Unchanged since last visit.     4.  Lower back pain: Degenerative in nature.  Home physical therapy exercises were effective, worse if she does not do the physical therapy exercises on a daily basis.  Has not been doing the physical therapy exercises recently.  Advised that she restart the physical therapy exercises and she agrees.    5. Hyperlipidemia: managed by PCP per patient.     6.  L>R knee osteoarthritis unable to fully extend the left knee, tricompartmental degenerative changes seen on 9/17/2021 x-rays: Reportedly has had difficulty extending the left knee for several years ever since she had a fall.  She is now following with orthopedic surgery regarding the left knee that is altering her gait and potentially affecting her chronic low back pain; she says that the surgeon does not believe surgery would help and she does not have pain in the left knee .  Continue following with orthopedics as needed.  Currently doing better since increasing physical activity and doing the exercises at home that were taught in physical therapy.    7.  Pes planus and bilateral ankle eversion, ankle pain: Degenerative ankle pain likely due to pes planus with ankle eversion.  Advised wearing supportive shoes whenever  ambulatory and if not improving then to consider seeing podiatry.    8.  High risk medications, immunocompromise status, and history of melanoma: Advised that she needs at least once yearly skin checks with dermatology.     9.  Vaccinations: Vaccinations reviewed with Ms. Maguire.      - Influenza: encouraged yearly vaccination  - [id number]: up to date  - [id number]: up to date   - Shingrix: up to date  - COVID-19: Advised keeping up-to-date, and to hold Xeljanz for 1-2 weeks afterward; due for second dose of the 23-24 COVID-19 vaccination     Total minutes spent in evaluation with patient, documentation, , and review of pertinent studies and chart notes: 18  The longitudinal plan of care for the rheumatology problem(s) were addressed during this visit.  Due to added complexity of care, we will continue to support the patient and the subsequent management of this condition with ongoing continuity of care.        Ms. Maguire verbalized agreement with and understanding of the rational for the diagnosis and treatment plan.  All questions were answered to best of my ability and the patient's satisfaction. Ms. Maguire was advised to contact the clinic with any questions that may arise after the clinic visit.      Thank you for involving me in the care of the patient    Return to clinic: 3 months      HPI   Jackie Maguire is a 69 year old female with a past medical history significant for hyperlipidemia, hypothyroidism, history of melanoma, history of humerus fracture, osteoporosis, psoriasis, and rheumatoid arthritis who presents for follow-up of rheumatoid arthritis.     10/5/2023: arthritis controlled at this time.  No joint pain/swelling.  Still with left knee extension issue but better since doing PT exercises regularly at home.  Morning stiffness <5 min.     Today, 4/4/2024: RA controlled.  Bilateral ankle pain with walking or standing for long periods of time.  Also with low back pain that is worse  with standing for long periods of time.  Has not been doing the physical therapy exercises for her back but plans to restart them.  She says that she has been busy since her mother  and her 's mother  within the last 3 months.    Denies fevers, chills, nausea, vomiting, constipation, diarrhea. No abdominal pain. No chest pain/pressure, palpitations, or shortness of breath. No LE swelling. No oral or nasal sores.  No rash. No sicca symptoms.    Patient's  is present with her today.    Tobacco: none  EtOH: none  Drugs: none    ROS   12 point review of system was completed and negative except as noted in the HPI     Active Problem List     Patient Active Problem List   Diagnosis    Hypothyroidism    Hyperlipidemia LDL goal <160    Inflammatory arthritis    Fracture, pelvis closed (H)    Malignant melanoma of skin (H)    Distal radius fracture    Closed fracture of part of humerus    Proximal humerus fracture    Aftercare for healing traumatic fracture of upper arm    Osteopetrosis    Cervical dystonia    Dystonia, torsion, fragments of    Psoriasis    Rheumatoid arthritis of multiple sites without rheumatoid factor (H)    Immunocompromised (H24)     Past Medical History     Past Medical History:   Diagnosis Date    Abscess of left jaw 2018    Basal cell carcinoma     Hyperlipidemia LDL goal <160 2011    Hypothyroidism 10/1/2010    Inflammatory arthritis 2011    Malignant melanoma (H)      Past Surgical History     Past Surgical History:   Procedure Laterality Date    APPENDECTOMY      ARTHRODESIS TOE(S) Left 2017    Procedure: ARTHRODESIS TOE(S);  Left foot 1st metatarsophalangeal realignment fusion, 2nd & 3rd digital corrections;  Surgeon: Jason Lamar DPM;  Location: [facility] OR    INCISION AND DRAINAGE OF WOUND N/A 2018    Procedure: INCISION AND DRAINAGE JAW ABSCESS;  Surgeon: Lennox Sullivan MD;  Location: [facility] OR;  Service:     IR CERVICAL EPIDURAL STEROID  INJECTION  5/9/2013    OPEN REDUCTION INTERNAL FIXATION HUMERUS PROXIMAL Left 9/30/2016    Procedure: OPEN REDUCTION INTERNAL FIXATION HUMERUS PROXIMAL;  Surgeon: Charly Bernal MD;  Location: [facility] OR     Allergy     Allergies   Allergen Reactions    Azathioprine Itching    Leflunomide Other (See Comments)     Mouth sores    Compazine Anxiety     Restless, anxious    Prochlorperazine Anxiety     Restless, anxious    Sulfa Antibiotics Rash     Current Medication List     Current Outpatient Medications   Medication Sig Dispense Refill    alendronate (FOSAMAX) 70 MG tablet Take 1 tablet (70 mg) by mouth every 7 days 12 tablet 3    atorvastatin (LIPITOR) 40 MG tablet Take 1 tablet by mouth once daily 90 tablet 0    dorzolamide-timolol (COSOPT) 2-0.5 % ophthalmic solution Place 1 drop into both eyes 2 times daily      ibuprofen (ADVIL/MOTRIN) 200 MG tablet Take 200 mg by mouth every 4 hours as needed for pain      levothyroxine (SYNTHROID/LEVOTHROID) 112 MCG tablet Take 1 tablet (112 mcg) by mouth daily 90 tablet 1    tofacitinib (XELJANZ XR) 11 MG 24 hr tablet Take 1 tablet (11 mg) by mouth daily . Hold for signs of infection, then seek medical attention. 90 tablet 2    VITAMIN D, CHOLECALCIFEROL, PO Take by mouth daily       No current facility-administered medications for this visit.         Social History   See HPI    Family History     Family History   Problem Relation Age of Onset    Cancer Father        Physical Exam     Temp Readings from Last 3 Encounters:   03/18/24 98  F (36.7  C) (Oral)   10/30/23 97.8  F (36.6  C)   10/05/23 97.6  F (36.4  C) (Oral)     BP Readings from Last 5 Encounters:   03/18/24 (!) 140/89   10/30/23 139/85   10/05/23 116/78   06/29/23 139/86   03/22/23 128/84     Pulse Readings from Last 1 Encounters:   03/18/24 75     Resp Readings from Last 1 Encounters:   03/18/24 16     Estimated body mass index is 31.67 kg/m  as calculated from the following:    Height as of 3/18/24: 1.657 m (5'  "5.25\").    Weight as of 3/18/24: 87 kg (191 lb 12.8 oz).    GEN: NAD.   HEENT:  Anicteric, noninjected sclera. No obvious external lesions of the ear and nose. Hearing intact.  PULM: No increased work of breathing.  MSK: MCPs, PIPs, DIPs without swelling or tenderness to palpation.  Wrists without swelling or tenderness to palpation.  Elbows and shoulders without swelling or tenderness to palpation.  Knees with medial joint line tenderness but no effusion or increased warmth; valgus deformity of the left knee.  Ankles and MTPs without swelling or tenderness to palpation.  Pes planus bilaterally; ankle eversion when standing.  SKIN: No rash or jaundice seen  PSYCH: Alert. Appropriate.      Labs / Imaging (select studies)     CBC  Recent Labs   Lab Test 04/01/24  0801 01/04/24  0829 09/28/23  0800 07/28/21  0928 05/21/21  1333 12/03/20  1019 08/27/20  1152   WBC 5.9 5.3 6.0   < > 5.9 5.6 8.1   RBC 4.43 4.55 4.47   < > 4.25 4.39 4.25   HGB 14.1 14.4 14.1   < > 14.1 14.6 14.1   HCT 42.5 44.5 42.7   < > 42.3 44.5 42.4   MCV 96 98 96   < > 100 101* 100   RDW 12.4 12.6 12.6   < > 13.2 13.6 13.3    218 202   < > 208 213 173   MCH 31.8 31.6 31.5   < > 33.2* 33.3* 33.2*   MCHC 33.2 32.4 33.0   < > 33.3 32.8 33.3   NEUTROPHIL 73 73 72   < > 79.9 75.2 83.4   LYMPH 14 14 15   < > 5.6 11.8 7.2   MONOCYTE 10 10 10   < > 11.6 10.7 6.9   EOSINOPHIL 3 2 2   < > 2.2 1.6 2.1   BASOPHIL 1 1 1   < > 0.7 0.7 0.4   ANEU  --   --   --   --  4.7 4.2 6.7   ALYM  --   --   --   --  0.3* 0.7* 0.6*   KAREN  --   --   --   --  0.7 0.6 0.6   AEOS  --   --   --   --  0.1 0.1 0.2   ABAS  --   --   --   --  0.0 0.0 0.0   ANEUTAUTO 4.3 3.9 4.3   < >  --   --   --    ALYMPAUTO 0.8 0.7* 0.9   < >  --   --   --    AMONOAUTO 0.6 0.5 0.6   < >  --   --   --    AEOSAUTO 0.2 0.1 0.1   < >  --   --   --    ABSBASO 0.0 0.0 0.0   < >  --   --   --     < > = values in this interval not displayed.     CMP  Recent Labs   Lab Test 04/01/24  0801 " 01/04/24  0829 09/28/23  0800 07/28/21  0928 05/21/21  1333 12/03/20  1019 08/27/20  1152 05/28/20  0928 10/25/19  1018 07/22/19  0805 12/19/18  0852 09/12/18  0923 06/25/18  0813 06/25/18  0626   NA  --   --   --   --   --   --   --   --   --  141  --  144  --  140   POTASSIUM  --   --   --   --   --   --   --   --   --  4.1  --  3.8  --  3.9   CHLORIDE  --   --   --   --   --   --   --   --   --  111*  --  109  --  109*   CO2  --   --   --   --   --   --   --   --   --  23  --  28  --  24   ANIONGAP  --   --   --   --   --   --   --   --   --  7  --  7  --  7   GLC  --   --   --   --   --   --   --  96  --  89  --  102*   < > 135*   BUN  --   --   --   --   --   --   --   --   --  15  --  15  --  20   CR 0.83 0.88 0.84   < > 0.88 0.81 0.83 0.74   < > 0.72   < > 0.91  --  0.69   GFRESTIMATED 77 72 76   < > 70 77 75 87   < > 90   < > 63  --  >60   GFRESTBLACK  --   --   --   --  81 89 87 >90   < > >90   < > 76  --  >60   HAZEL  --   --   --   --   --   --   --   --   --  9.1  --  9.4  --  9.1   BILITOTAL 0.7 0.7 0.7   < > 0.7 0.9 0.8 0.8   < > 0.3   < > 0.5  --   --    ALBUMIN 4.4 4.5 4.0   < > 4.0 4.2 4.0 4.0   < > 3.8   < > 3.6  --   --    PROTTOTAL 6.6 7.0 6.7   < > 6.8 7.1 6.9 6.8   < > 6.8   < > 6.9  --   --    ALKPHOS 91 84 83   < > 91 106 103 91   < > 86   < > 94  --   --    AST 32 39 30   < > 51* 45 36 32   < > 22   < > 27  --   --    ALT 34 47 28   < > 70* 67* 65* 61*   < > 30   < > 35  --   --     < > = values in this interval not displayed.     Calcium/VitaminD  Recent Labs   Lab Test 01/04/24  0829 07/22/19  0805 09/12/18  0923 06/25/18  0626 02/26/18  0846 10/05/16  0831   HAZEL  --  9.1 9.4 9.1   < >  --    VITDT 63*  --   --   --   --  29    < > = values in this interval not displayed.     ESR/CRP  Recent Labs   Lab Test 04/01/24  0801 09/28/23  0800 06/26/23  0934 11/22/22  0909 08/01/22  1346 04/22/22  0748 01/17/22  0857   SED 8 7 9   < > 9 8 6   CRP  --   --   --   --  <2.9 <2.9 <2.9   CRPI <3.00  <3.00 <3.00   < >  --   --   --     < > = values in this interval not displayed.     Lipid Panel  Recent Labs   Lab Test 04/01/24  0801 01/04/24  0829 03/15/23  0930   CHOL 173 190 169   TRIG 150* 161* 115   HDL 55 55 61   LDL 88 103* 85   NHDL 118 135* 108     Hepatitis B  Recent Labs   Lab Test 02/26/18  0846   HBCAB Nonreactive   HEPBANG Nonreactive     Hepatitis C  Recent Labs   Lab Test 02/26/18  0846   HCVAB Nonreactive     Tuberculosis Screening  Recent Labs   Lab Test 01/17/22  0857 07/22/19  0805   TBRES Negative Negative     Immunization History     Immunization History   Administered Date(s) Administered    COVID-19 12+ (2023-24) (Pfizer) 10/30/2023    COVID-19 Bivalent 18+ (Moderna) 11/30/2022    COVID-19 Monovalent 18+ (Moderna) 04/19/2021, 05/17/2021, 11/29/2021, 06/27/2022    Influenza Vaccine 18-64 (Flublok) 10/18/2018, 10/01/2019, 10/16/2020, 10/10/2021    Influenza Vaccine 65+ (Fluzone HD) 09/17/2022, 10/05/2023    Influenza Vaccine >6 months,quad, PF 10/01/2016    Pneumo Conj 13-V (2010&after) 10/05/2016    Pneumococcal 23 valent 10/31/2013, 03/11/2019    TDAP (Adacel,Boostrix) 10/25/2012, 12/10/2012, 12/30/2022    Zoster recombinant adjuvanted (SHINGRIX) 11/25/2019, 02/16/2022          Chart documentation done in part with Dragon Voice recognition Software. Although reviewed after completion, some word and grammatical error may remain.    Maurilio Hayes MD

## 2024-04-04 NOTE — NURSING NOTE
RAPID3 (0-30) Cumulative Score  6          RAPID3 Weighted Score (divide #4 by 3 and that is the weighted score)  2

## 2024-05-06 ENCOUNTER — TELEPHONE (OUTPATIENT)
Dept: FAMILY MEDICINE | Facility: CLINIC | Age: 69
End: 2024-05-06
Payer: COMMERCIAL

## 2024-05-06 NOTE — TELEPHONE ENCOUNTER
Patient has upcoming dental appointment this afternoon at 2pm to have a front tooth removed. She is wondering if she can proceed with this procedure given that she is on alendronate?  Please advise and call patient back at [phone], detailed voicemail okay.

## 2024-06-16 DIAGNOSIS — E78.00 HYPERCHOLESTEREMIA: ICD-10-CM

## 2024-06-17 RX ORDER — ATORVASTATIN CALCIUM 40 MG/1
40 TABLET, FILM COATED ORAL DAILY
Qty: 90 TABLET | Refills: 0 | Status: SHIPPED | OUTPATIENT
Start: 2024-06-17 | End: 2024-09-06

## 2024-06-19 ENCOUNTER — TELEPHONE (OUTPATIENT)
Dept: RHEUMATOLOGY | Facility: CLINIC | Age: 69
End: 2024-06-19
Payer: COMMERCIAL

## 2024-06-19 NOTE — TELEPHONE ENCOUNTER
[facility] Call Center    Phone Message    May a detailed message be left on voicemail: yes     Reason for Call: Medication Refill Request    Has the patient contacted the pharmacy for the refill? Yes   Name of medication being requested: triamcinolone acetonide cream  Provider who prescribed the medication: Dr Hayes  Pharmacy: [facility] [address] [place] [address] - [address]   Date medication is needed: Pt would like Dr Hayes to send a prescription over to pharmacy for this cream.     Please call  or pt back at [phone].     Action Taken: Message routed to:  Other: [facility]    Travel Screening: Not Applicable     Date of Service: 6/19

## 2024-06-20 NOTE — TELEPHONE ENCOUNTER
Returned call to pt and , dr rivera does not typically prescribe topical creams. Advised they can send the request to the primary care provider or a dermatologist if they have one.    THOMAS Ramirez RN 6/20/2024 10:08 AM

## 2024-07-08 ENCOUNTER — LAB (OUTPATIENT)
Dept: LAB | Facility: CLINIC | Age: 69
End: 2024-07-08
Payer: COMMERCIAL

## 2024-07-08 DIAGNOSIS — Z79.899 HIGH RISK MEDICATION USE: ICD-10-CM

## 2024-07-08 DIAGNOSIS — M06.09 RHEUMATOID ARTHRITIS OF MULTIPLE SITES WITHOUT RHEUMATOID FACTOR (H): ICD-10-CM

## 2024-07-08 LAB
ALBUMIN SERPL BCG-MCNC: 4.1 G/DL (ref 3.5–5.2)
ALP SERPL-CCNC: 85 U/L (ref 40–150)
ALT SERPL W P-5'-P-CCNC: 30 U/L (ref 0–50)
AST SERPL W P-5'-P-CCNC: 31 U/L (ref 0–45)
BASOPHILS # BLD AUTO: 0 10E3/UL (ref 0–0.2)
BASOPHILS NFR BLD AUTO: 0 %
BILIRUB DIRECT SERPL-MCNC: <0.2 MG/DL (ref 0–0.3)
BILIRUB SERPL-MCNC: 0.7 MG/DL
CREAT SERPL-MCNC: 0.84 MG/DL (ref 0.51–0.95)
EGFRCR SERPLBLD CKD-EPI 2021: 76 ML/MIN/1.73M2
EOSINOPHIL # BLD AUTO: 0.1 10E3/UL (ref 0–0.7)
EOSINOPHIL NFR BLD AUTO: 1 %
ERYTHROCYTE [DISTWIDTH] IN BLOOD BY AUTOMATED COUNT: 12.3 % (ref 10–15)
HCT VFR BLD AUTO: 42.8 % (ref 35–47)
HGB BLD-MCNC: 14 G/DL (ref 11.7–15.7)
IMM GRANULOCYTES # BLD: 0 10E3/UL
IMM GRANULOCYTES NFR BLD: 0 %
LYMPHOCYTES # BLD AUTO: 0.7 10E3/UL (ref 0.8–5.3)
LYMPHOCYTES NFR BLD AUTO: 7 %
MCH RBC QN AUTO: 32.2 PG (ref 26.5–33)
MCHC RBC AUTO-ENTMCNC: 32.7 G/DL (ref 31.5–36.5)
MCV RBC AUTO: 98 FL (ref 78–100)
MONOCYTES # BLD AUTO: 1 10E3/UL (ref 0–1.3)
MONOCYTES NFR BLD AUTO: 10 %
NEUTROPHILS # BLD AUTO: 8 10E3/UL (ref 1.6–8.3)
NEUTROPHILS NFR BLD AUTO: 81 %
PLATELET # BLD AUTO: 198 10E3/UL (ref 150–450)
PROT SERPL-MCNC: 6.5 G/DL (ref 6.4–8.3)
RBC # BLD AUTO: 4.35 10E6/UL (ref 3.8–5.2)
WBC # BLD AUTO: 9.9 10E3/UL (ref 4–11)

## 2024-07-08 PROCEDURE — 80076 HEPATIC FUNCTION PANEL: CPT

## 2024-07-08 PROCEDURE — 82565 ASSAY OF CREATININE: CPT

## 2024-07-08 PROCEDURE — 85025 COMPLETE CBC W/AUTO DIFF WBC: CPT

## 2024-07-08 PROCEDURE — 36415 COLL VENOUS BLD VENIPUNCTURE: CPT

## 2024-09-06 DIAGNOSIS — E78.00 HYPERCHOLESTEREMIA: ICD-10-CM

## 2024-09-06 RX ORDER — ATORVASTATIN CALCIUM 40 MG/1
40 TABLET, FILM COATED ORAL DAILY
Qty: 100 TABLET | Refills: 2 | Status: SHIPPED | OUTPATIENT
Start: 2024-09-06

## 2024-09-06 NOTE — TELEPHONE ENCOUNTER
Patient has [facility] coverage and with this insurance plan, the patient is eligible to receive certain prescriptions as a 100-day supply at the 90-day supply cost.      Prescriptions updated to 100-day supply per protocol: Hima ClintonD, [facility]  Population Health Pharmacist  [phone]

## 2024-10-03 ENCOUNTER — LAB (OUTPATIENT)
Dept: LAB | Facility: CLINIC | Age: 69
End: 2024-10-03
Payer: COMMERCIAL

## 2024-10-03 DIAGNOSIS — Z79.899 HIGH RISK MEDICATION USE: ICD-10-CM

## 2024-10-03 DIAGNOSIS — M06.09 RHEUMATOID ARTHRITIS OF MULTIPLE SITES WITHOUT RHEUMATOID FACTOR (H): ICD-10-CM

## 2024-10-03 LAB
ALBUMIN SERPL BCG-MCNC: 4.6 G/DL (ref 3.5–5.2)
ALP SERPL-CCNC: 72 U/L (ref 40–150)
ALT SERPL W P-5'-P-CCNC: 29 U/L (ref 0–50)
AST SERPL W P-5'-P-CCNC: 31 U/L (ref 0–45)
BASOPHILS # BLD AUTO: 0 10E3/UL (ref 0–0.2)
BASOPHILS NFR BLD AUTO: 1 %
BILIRUB DIRECT SERPL-MCNC: <0.2 MG/DL (ref 0–0.3)
BILIRUB SERPL-MCNC: 0.7 MG/DL
CREAT SERPL-MCNC: 0.86 MG/DL (ref 0.51–0.95)
CRP SERPL-MCNC: <3 MG/L
EGFRCR SERPLBLD CKD-EPI 2021: 74 ML/MIN/1.73M2
EOSINOPHIL # BLD AUTO: 0.1 10E3/UL (ref 0–0.7)
EOSINOPHIL NFR BLD AUTO: 2 %
ERYTHROCYTE [DISTWIDTH] IN BLOOD BY AUTOMATED COUNT: 12.5 % (ref 10–15)
ERYTHROCYTE [SEDIMENTATION RATE] IN BLOOD BY WESTERGREN METHOD: 6 MM/HR (ref 0–30)
HCT VFR BLD AUTO: 43.5 % (ref 35–47)
HGB BLD-MCNC: 14.1 G/DL (ref 11.7–15.7)
IMM GRANULOCYTES # BLD: 0 10E3/UL
IMM GRANULOCYTES NFR BLD: 0 %
LYMPHOCYTES # BLD AUTO: 0.7 10E3/UL (ref 0.8–5.3)
LYMPHOCYTES NFR BLD AUTO: 13 %
MCH RBC QN AUTO: 32 PG (ref 26.5–33)
MCHC RBC AUTO-ENTMCNC: 32.4 G/DL (ref 31.5–36.5)
MCV RBC AUTO: 99 FL (ref 78–100)
MONOCYTES # BLD AUTO: 0.6 10E3/UL (ref 0–1.3)
MONOCYTES NFR BLD AUTO: 10 %
NEUTROPHILS # BLD AUTO: 4.1 10E3/UL (ref 1.6–8.3)
NEUTROPHILS NFR BLD AUTO: 74 %
PLATELET # BLD AUTO: 176 10E3/UL (ref 150–450)
PROT SERPL-MCNC: 6.9 G/DL (ref 6.4–8.3)
RBC # BLD AUTO: 4.41 10E6/UL (ref 3.8–5.2)
WBC # BLD AUTO: 5.6 10E3/UL (ref 4–11)

## 2024-10-03 PROCEDURE — 36415 COLL VENOUS BLD VENIPUNCTURE: CPT

## 2024-10-03 PROCEDURE — 80076 HEPATIC FUNCTION PANEL: CPT

## 2024-10-03 PROCEDURE — 86140 C-REACTIVE PROTEIN: CPT

## 2024-10-03 PROCEDURE — 86481 TB AG RESPONSE T-CELL SUSP: CPT

## 2024-10-03 PROCEDURE — 85025 COMPLETE CBC W/AUTO DIFF WBC: CPT

## 2024-10-03 PROCEDURE — 82565 ASSAY OF CREATININE: CPT

## 2024-10-03 PROCEDURE — 85652 RBC SED RATE AUTOMATED: CPT

## 2024-10-04 LAB
GAMMA INTERFERON BACKGROUND BLD IA-ACNC: 0.04 IU/ML
M TB IFN-G BLD-IMP: NEGATIVE
M TB IFN-G CD4+ BCKGRND COR BLD-ACNC: 2.05 IU/ML
MITOGEN IGNF BCKGRD COR BLD-ACNC: 0 IU/ML
MITOGEN IGNF BCKGRD COR BLD-ACNC: 0 IU/ML
QUANTIFERON MITOGEN: 2.09 IU/ML
QUANTIFERON NIL TUBE: 0.04 IU/ML
QUANTIFERON TB1 TUBE: 0.04 IU/ML
QUANTIFERON TB2 TUBE: 0.04

## 2024-10-08 ENCOUNTER — OFFICE VISIT (OUTPATIENT)
Dept: RHEUMATOLOGY | Facility: CLINIC | Age: 69
End: 2024-10-08
Payer: COMMERCIAL

## 2024-10-08 VITALS
SYSTOLIC BLOOD PRESSURE: 142 MMHG | DIASTOLIC BLOOD PRESSURE: 87 MMHG | HEART RATE: 64 BPM | BODY MASS INDEX: 31.64 KG/M2 | RESPIRATION RATE: 16 BRPM | OXYGEN SATURATION: 95 % | WEIGHT: 191.6 LBS | TEMPERATURE: 97.6 F

## 2024-10-08 DIAGNOSIS — Z79.899 HIGH RISK MEDICATION USE: ICD-10-CM

## 2024-10-08 DIAGNOSIS — M06.09 RHEUMATOID ARTHRITIS OF MULTIPLE SITES WITHOUT RHEUMATOID FACTOR (H): Primary | ICD-10-CM

## 2024-10-08 DIAGNOSIS — D84.9 IMMUNOSUPPRESSION (H): ICD-10-CM

## 2024-10-08 PROCEDURE — G2211 COMPLEX E/M VISIT ADD ON: HCPCS | Performed by: INTERNAL MEDICINE

## 2024-10-08 PROCEDURE — 99214 OFFICE O/P EST MOD 30 MIN: CPT | Performed by: INTERNAL MEDICINE

## 2024-10-08 RX ORDER — TOFACITINIB 11 MG/1
11 TABLET, FILM COATED, EXTENDED RELEASE ORAL DAILY
Qty: 90 TABLET | Refills: 0 | OUTPATIENT
Start: 2024-10-08 | End: 2024-10-08

## 2024-10-08 RX ORDER — TOFACITINIB 11 MG/1
11 TABLET, FILM COATED, EXTENDED RELEASE ORAL DAILY
Qty: 30 TABLET | Refills: 6 | OUTPATIENT
Start: 2025-01-07

## 2024-10-08 RX ORDER — TOFACITINIB 11 MG/1
11 TABLET, FILM COATED, EXTENDED RELEASE ORAL DAILY
Qty: 90 TABLET | Refills: 0 | Status: SHIPPED | OUTPATIENT
Start: 2024-10-08 | End: 2025-01-06

## 2024-10-08 NOTE — PROGRESS NOTES
Rheumatology Clinic Visit      Jackie Maguire MRN# [id number]   YOB: 1955 Age: 69 year old      Date of visit: 10/08/24   PCP: Dr. Talia Mejía at [facility]    Chief Complaint   Patient presents with:  Rheumatoid Arthritis    Assessment and Plan     1. Rheumatoid Arthritis (CCP low positive, then negative): From record review: dx'd 2012; has followed with Kayley Jimenez, Jacob, and [facility]; hx of +DULCE, +dsDNA, CCP low positive then negative; hx of psoriasis; hx of C-spine fusion; initial presentation of pain/stiff/swollen joints involving the knees, feet, ankles, neck, back.  Previously on leflunomide (mouth sores), HCQ (ineffective), AZA (itching), Humira (ineffective at q7day and q14 days dosings), Remicade (associated with syncopal episodes), MTX (partially effective, stopped when doing well on Xeljanz, higher doses associated with LFT elevations). Sulfa allergy. Currently on Xeljanz XR 11mg daily. Hyperlipidemia is being managed by her primary care provider. RA controlled.  However, she says that she was told that she would not qualify for the free drug program from the Xeljanz  for next year; discussed changing to a different specialty pharmacy and may qualify for grants but would have to wait and see what her insurance allows and if she qualifies for those grants.  For now the plan will be to change to [facility] at the beginning of the year; 1 prescription for Xeljanz sent [facility] where she has been getting it filled and another prescription sent to [facility] to be dispensed with the beginning of 2025.  Also discussed MTM referral and she is in agreement..  Chronic illness, stable.    - Continue Xeljanz XR 11 mg daily  - MTM referral placed on 10/8/2024  - Labs in 3 months: CBC, Creatinine, Hepatic Panel, ESR, CRP    High risk medication requiring intensive toxicity monitoring at least quarterly.                Rapid 3, cumulative  scores                      10/08/2024: 1.7 (Xeljanz XR 11mg daily)                         04/04/2024: 6    (Xeljanz XR 11mg daily) RA controlled.  Low back pain                      10/05/2023: 5    (Xeljanz XR 11mg daily)                       06/29/2023: 5    (Xeljanz XR 11mg daily)                      11/30/2022: 7    (Xeljanz XR 11mg daily; RA doing well; left knee OA and back pain contributing)                      1/21/2022: 6.2   (Xeljanz XR 11mg daily; RA doing well; left knee OA is main symptom)                      9/17/2021: 7.3   (MTX 10mg SQ wkly, Xeljanz XR 11mg daily)                      8/31/2020: RA doing well ((MTX 20mg SQ wkly, Xeljanz XR 11mg daily)                      02/17/2020:  7.3 (MTX 20mg SQ wkly, prednisone 5mg daily, Xeljanz XR 11mg daily)                      11/25/2019:  8    (MTX 20mg SQ wkly, prednisone 5mg daily, Xeljanz XR 11mg daily)    2. History of Positive DULCE and dsDNA (repeats negative): with inflammatory arthritis.  Symptoms fit better for seropositive rheumatoid arthritis; see #1.  DULCE and dsDNA negative previously when rechecked.     3. Neck pain hx: Neck spasms several years ago that was dx'd as dystonia at the [facility]; had several imaging studies especially given the RA dx where degenerative changes were seen.  Also saw a spine surgeon at [facility].  PT ineffective.  Nonradiating neck pain has been stable per patient. Unchanged since last visit.     4.  Lower back pain: Degenerative in nature.  Home physical therapy exercises were effective, worse if she does not do the physical therapy exercises on a daily basis.  Has not been doing the physical therapy exercises recently.  Advised that she restart the physical therapy exercises and she agrees.    5. Hyperlipidemia: managed by PCP per patient.     6.  L>R knee osteoarthritis unable to fully extend the left knee, tricompartmental degenerative changes seen on 9/17/2021 x-rays: Reportedly has had difficulty extending  the left knee for several years ever since she had a fall.  She is now following with orthopedic surgery regarding the left knee that is altering her gait and potentially affecting her chronic low back pain; she says that the surgeon does not believe surgery would help and she does not have pain in the left knee .  Continue following with orthopedics as needed.  Currently doing better since increasing physical activity and doing the exercises at home that were taught in physical therapy.    7.  Pes planus and bilateral ankle eversion, ankle pain: Degenerative ankle pain likely due to pes planus with ankle eversion.  Continue supportive shoes whenever ambulatory, indoors and outdoors.    8.  High risk medications, immunocompromise status, and history of melanoma: Advised that she needs at least once yearly skin checks with dermatology.     9.  Vaccinations: Vaccinations reviewed with Ms. Maguire.      - Influenza: encouraged yearly vaccination  - [id number]: up to date  - [id number]: up to date   - Shingrix: up to date  - COVID-19: Advised keeping up-to-date, and to hold Xeljanz for 1-2 weeks afterward    10. Elevated blood pressure:  Noomie to follow up with Primary Care provider regarding elevated blood pressure.      Total minutes spent in evaluation with patient, documentation, , and review of pertinent studies and chart notes: 20  The longitudinal plan of care for the rheumatology problem(s) were addressed during this visit.  Due to added complexity of care, we will continue to support the patient and the subsequent management of this condition with ongoing continuity of care.      Ms. Maguire verbalized agreement with and understanding of the rational for the diagnosis and treatment plan.  All questions were answered to best of my ability and the patient's satisfaction. Ms. Maguire was advised to contact the clinic with any questions that may arise after the clinic visit.      Thank you for involving  me in the care of the patient    Return to clinic: 3 months      HPI   Jackie Maguire is a 69 year old female with a past medical history significant for hyperlipidemia, hypothyroidism, history of melanoma, history of humerus fracture, osteoporosis, psoriasis, and rheumatoid arthritis who presents for follow-up of rheumatoid arthritis.     10/5/2023: arthritis controlled at this time.  No joint pain/swelling.  Still with left knee extension issue but better since doing PT exercises regularly at home.  Morning stiffness <5 min.     2024: RA controlled.  Bilateral ankle pain with walking or standing for long periods of time.  Also with low back pain that is worse with standing for long periods of time.  Has not been doing the physical therapy exercises for her back but plans to restart them.  She says that she has been busy since her mother  and her 's mother  within the last 3 months.    Today, 10/8/2024: RA controlled.  No joint pain or swelling.  No morning stiffness or gelling phenomenon.  Xeljanz is effective and well-tolerated.  Concerned about not qualifying for the free drug program beginning in  and how she will afford Xeljanz    Denies fevers, chills, nausea, vomiting, constipation, diarrhea. No abdominal pain. No chest pain/pressure, palpitations, or shortness of breath. No LE swelling. No oral or nasal sores.  No rash. No sicca symptoms.    Patient's  is present with her today.    Tobacco: none  EtOH: none  Drugs: none    ROS   12 point review of system was completed and negative except as noted in the HPI     Active Problem List     Patient Active Problem List   Diagnosis    Hypothyroidism    Hyperlipidemia LDL goal <160    Inflammatory arthritis    Fracture, pelvis closed (H)    Malignant melanoma of skin (H)    Distal radius fracture    Closed fracture of part of humerus    Proximal humerus fracture    Aftercare for healing traumatic fracture of upper arm    Osteopetrosis     Cervical dystonia    Dystonia, torsion, fragments of    Psoriasis    Rheumatoid arthritis of multiple sites without rheumatoid factor (H)    Immunocompromised (H)     Past Medical History     Past Medical History:   Diagnosis Date    Abscess of left jaw 6/20/2018    Basal cell carcinoma     Hyperlipidemia LDL goal <160 1/5/2011    Hypothyroidism 10/1/2010    Inflammatory arthritis 1/20/2011    Malignant melanoma (H)      Past Surgical History     Past Surgical History:   Procedure Laterality Date    APPENDECTOMY      ARTHRODESIS TOE(S) Left 8/17/2017    Procedure: ARTHRODESIS TOE(S);  Left foot 1st metatarsophalangeal realignment fusion, 2nd & 3rd digital corrections;  Surgeon: Jason Lamar DPM;  Location: [facility] OR    INCISION AND DRAINAGE OF WOUND N/A 6/22/2018    Procedure: INCISION AND DRAINAGE JAW ABSCESS;  Surgeon: Lennox Sullivan MD;  Location: [facility];  Service:     IR CERVICAL EPIDURAL STEROID INJECTION  5/9/2013    OPEN REDUCTION INTERNAL FIXATION HUMERUS PROXIMAL Left 9/30/2016    Procedure: OPEN REDUCTION INTERNAL FIXATION HUMERUS PROXIMAL;  Surgeon: Charly Bernal MD;  Location: [facility] OR     Allergy     Allergies   Allergen Reactions    Azathioprine Itching    Leflunomide Other (See Comments)     Mouth sores    Compazine Anxiety     Restless, anxious    Prochlorperazine Anxiety     Restless, anxious    Sulfa Antibiotics Rash     Current Medication List     Current Outpatient Medications   Medication Sig Dispense Refill    alendronate (FOSAMAX) 70 MG tablet Take 1 tablet (70 mg) by mouth every 7 days 12 tablet 3    atorvastatin (LIPITOR) 40 MG tablet Take 1 tablet (40 mg) by mouth daily. 100 tablet 2    dorzolamide-timolol (COSOPT) 2-0.5 % ophthalmic solution Place 1 drop into both eyes 2 times daily      ibuprofen (ADVIL/MOTRIN) 200 MG tablet Take 200 mg by mouth every 4 hours as needed for pain      levothyroxine (SYNTHROID/LEVOTHROID) 112 MCG tablet Take 1 tablet (112 mcg) by mouth  "daily 90 tablet 1    tofacitinib (XELJANZ XR) 11 MG 24 hr tablet Take 1 tablet (11 mg) by mouth daily . Hold for signs of infection, then seek medical attention. 90 tablet 2    VITAMIN D, CHOLECALCIFEROL, PO Take by mouth daily       No current facility-administered medications for this visit.         Social History   See HPI    Family History     Family History   Problem Relation Age of Onset    Cancer Father        Physical Exam     Temp Readings from Last 3 Encounters:   03/18/24 98  F (36.7  C) (Oral)   10/30/23 97.8  F (36.6  C)   10/05/23 97.6  F (36.4  C) (Oral)     BP Readings from Last 5 Encounters:   04/04/24 126/87   03/18/24 (!) 140/89   10/30/23 139/85   10/05/23 116/78   06/29/23 139/86     Pulse Readings from Last 1 Encounters:   04/04/24 77     Resp Readings from Last 1 Encounters:   03/18/24 16     Estimated body mass index is 31.51 kg/m  as calculated from the following:    Height as of 3/18/24: 1.657 m (5' 5.25\").    Weight as of 4/4/24: 86.5 kg (190 lb 12.8 oz).    GEN: NAD.   HEENT:  Anicteric, noninjected sclera. No obvious external lesions of the ear and nose. Hearing intact.  CV: S1, S2.  RRR.  No murmurs or rubs  PULM: No increased work of breathing.  CTA bilaterally  MSK: MCPs, PIPs, DIPs without swelling or tenderness to palpation.  Squaring without tenderness to palpation of the bilateral first CMC joints; hyperextension at the first MCP joints.  Wrists without swelling or tenderness to palpation.  Elbows and shoulders without swelling or tenderness to palpation.  Knees with medial joint line tenderness but no effusion or increased warmth; valgus deformity of the left knee.  Ankles and MTPs without swelling or tenderness to palpation.  Pes planus bilaterally; ankle eversion when standing.  SKIN: No rash or jaundice seen  PSYCH: Alert. Appropriate.      Labs / Imaging (select studies)     CBC  Recent Labs   Lab Test 10/03/24  0932 07/08/24  0911 04/01/24  0801 07/28/21  0928 05/21/21  1333 " 12/03/20  1019 08/27/20  1152   WBC 5.6 9.9 5.9   < > 5.9 5.6 8.1   RBC 4.41 4.35 4.43   < > 4.25 4.39 4.25   HGB 14.1 14.0 14.1   < > 14.1 14.6 14.1   HCT 43.5 42.8 42.5   < > 42.3 44.5 42.4   MCV 99 98 96   < > 100 101* 100   RDW 12.5 12.3 12.4   < > 13.2 13.6 13.3    198 211   < > 208 213 173   MCH 32.0 32.2 31.8   < > 33.2* 33.3* 33.2*   MCHC 32.4 32.7 33.2   < > 33.3 32.8 33.3   NEUTROPHIL 74 81 73   < > 79.9 75.2 83.4   LYMPH 13 7 14   < > 5.6 11.8 7.2   MONOCYTE 10 10 10   < > 11.6 10.7 6.9   EOSINOPHIL 2 1 3   < > 2.2 1.6 2.1   BASOPHIL 1 0 1   < > 0.7 0.7 0.4   ANEU  --   --   --   --  4.7 4.2 6.7   ALYM  --   --   --   --  0.3* 0.7* 0.6*   KAREN  --   --   --   --  0.7 0.6 0.6   AEOS  --   --   --   --  0.1 0.1 0.2   ABAS  --   --   --   --  0.0 0.0 0.0   ANEUTAUTO 4.1 8.0 4.3   < >  --   --   --    ALYMPAUTO 0.7* 0.7* 0.8   < >  --   --   --    AMONOAUTO 0.6 1.0 0.6   < >  --   --   --    AEOSAUTO 0.1 0.1 0.2   < >  --   --   --    ABSBASO 0.0 0.0 0.0   < >  --   --   --     < > = values in this interval not displayed.     CMP  Recent Labs   Lab Test 10/03/24  0932 07/08/24  0911 04/01/24  0801 07/28/21  0928 05/21/21  1333 12/03/20  1019 08/27/20  1152 05/28/20  0928 10/25/19  1018 07/22/19  0805 12/19/18  0852 09/12/18  0923 06/25/18  0813 06/25/18  0626   NA  --   --   --   --   --   --   --   --   --  141  --  144  --  140   POTASSIUM  --   --   --   --   --   --   --   --   --  4.1  --  3.8  --  3.9   CHLORIDE  --   --   --   --   --   --   --   --   --  111*  --  109  --  109*   CO2  --   --   --   --   --   --   --   --   --  23  --  28  --  24   ANIONGAP  --   --   --   --   --   --   --   --   --  7  --  7  --  7   GLC  --   --   --   --   --   --   --  96  --  89  --  102*   < > 135*   BUN  --   --   --   --   --   --   --   --   --  15  --  15  --  20   CR 0.86 0.84 0.83   < > 0.88 0.81 0.83 0.74   < > 0.72   < > 0.91  --  0.69   GFRESTIMATED 74 76 77   < > 70 77 75 87   < > 90   < > 63   --  >60   GFRESTBLACK  --   --   --   --  81 89 87 >90   < > >90   < > 76  --  >60   HAZEL  --   --   --   --   --   --   --   --   --  9.1  --  9.4  --  9.1   BILITOTAL 0.7 0.7 0.7   < > 0.7 0.9 0.8 0.8   < > 0.3   < > 0.5  --   --    ALBUMIN 4.6 4.1 4.4   < > 4.0 4.2 4.0 4.0   < > 3.8   < > 3.6  --   --    PROTTOTAL 6.9 6.5 6.6   < > 6.8 7.1 6.9 6.8   < > 6.8   < > 6.9  --   --    ALKPHOS 72 85 91   < > 91 106 103 91   < > 86   < > 94  --   --    AST 31 31 32   < > 51* 45 36 32   < > 22   < > 27  --   --    ALT 29 30 34   < > 70* 67* 65* 61*   < > 30   < > 35  --   --     < > = values in this interval not displayed.     Calcium/VitaminD  Recent Labs   Lab Test 01/04/24  0829 07/22/19  0805 09/12/18  0923 06/25/18  0626 02/26/18  0846 10/05/16  0831   HAZEL  --  9.1 9.4 9.1   < >  --    VITDT 63*  --   --   --   --  29    < > = values in this interval not displayed.     ESR/CRP  Recent Labs   Lab Test 10/03/24  0932 04/01/24  0801 09/28/23  0800 11/22/22  0909 08/01/22  1346 04/22/22  0748 01/17/22  0857   SED 6 8 7   < > 9 8 6   CRP  --   --   --   --  <2.9 <2.9 <2.9   CRPI <3.00 <3.00 <3.00   < >  --   --   --     < > = values in this interval not displayed.     Lipid Panel  Recent Labs   Lab Test 04/01/24  0801 01/04/24  0829 03/15/23  0930   CHOL 173 190 169   TRIG 150* 161* 115   HDL 55 55 61   LDL 88 103* 85   NHDL 118 135* 108     Hepatitis B  Recent Labs   Lab Test 02/26/18  0846   HBCAB Nonreactive   HEPBANG Nonreactive     Hepatitis C  Recent Labs   Lab Test 02/26/18  0846   HCVAB Nonreactive     Tuberculosis Screening  Recent Labs   Lab Test 10/03/24  0932 01/17/22  0857 07/22/19  0805   TBRES Negative Negative Negative     Immunization History     Immunization History   Administered Date(s) Administered    COVID-19 12+ (MODERNA) 09/09/2024    COVID-19 12+ (Pfizer) 10/30/2023    COVID-19 Bivalent 18+ (Moderna) 11/30/2022    COVID-19 Monovalent 18+ (Moderna) 04/19/2021, 05/17/2021, 11/29/2021, 06/27/2022     Influenza Vaccine 18-64 (Flublok) 10/18/2018, 10/01/2019, 10/16/2020, 10/10/2021    Influenza Vaccine 65+ (Fluzone HD) 09/17/2022, 10/05/2023    Influenza Vaccine >6 months,quad, PF 10/01/2016    Pneumo Conj 13-V (2010&after) 10/05/2016    Pneumococcal 23 valent 10/31/2013, 03/11/2019    TDAP (Adacel,Boostrix) 10/25/2012, 12/10/2012, 12/30/2022    Zoster recombinant adjuvanted (SHINGRIX) 11/25/2019, 02/16/2022          Chart documentation done in part with Dragon Voice recognition Software. Although reviewed after completion, some word and grammatical error may remain.    Maurilio Hayes MD

## 2024-10-08 NOTE — NURSING NOTE
RAPID3 (0-30) Cumulative Score  1.7          RAPID3 Weighted Score (divide #4 by 3 and that is the weighted score)  0.5

## 2024-10-08 NOTE — PATIENT INSTRUCTIONS
RHEUMATOLOGY    [facility]  [address] [place] [address]  [place] [address]    Phone number: [phone]  Fax number: [phone]    If you need a medication refill, please contact us as you may need lab work and/or a follow up visit prior to your refill.      Thank you for choosing [facility]!    Elizabeth Haley CMA Rheumatology

## 2024-10-10 ENCOUNTER — VIRTUAL VISIT (OUTPATIENT)
Dept: PHARMACY | Facility: CLINIC | Age: 69
End: 2024-10-10
Attending: INTERNAL MEDICINE
Payer: COMMERCIAL

## 2024-10-10 DIAGNOSIS — Z78.9 TAKES DIETARY SUPPLEMENTS: ICD-10-CM

## 2024-10-10 DIAGNOSIS — M06.09 RHEUMATOID ARTHRITIS OF MULTIPLE SITES WITHOUT RHEUMATOID FACTOR (H): Primary | ICD-10-CM

## 2024-10-10 DIAGNOSIS — E78.5 HYPERLIPIDEMIA LDL GOAL <160: ICD-10-CM

## 2024-10-10 DIAGNOSIS — R03.0 ELEVATED BLOOD PRESSURE READING WITHOUT DIAGNOSIS OF HYPERTENSION: ICD-10-CM

## 2024-10-10 DIAGNOSIS — H40.9 GLAUCOMA: ICD-10-CM

## 2024-10-10 DIAGNOSIS — M81.0 OSTEOPOROSIS: ICD-10-CM

## 2024-10-10 DIAGNOSIS — E03.9 HYPOTHYROIDISM: Chronic | ICD-10-CM

## 2024-10-10 DIAGNOSIS — Z71.85 VACCINE COUNSELING: ICD-10-CM

## 2024-10-10 NOTE — Clinical Note
FYI - Patient should qualify for [facility] for 2025. I connected with Sissy and we will coordinate with patient. Thanks!!

## 2024-10-10 NOTE — PROGRESS NOTES
Medication Therapy Management (MTM) Encounter    ASSESSMENT:                            Medication Adherence/Access: No issues identified.    Rheumatoid Arthritis: Patient achieving positive response with Xeljanz and would benefit from continuing therapy. Currently receiving medication through  PAP, but is not eligible for program in 2025. Discussed alternative options and patient should qualify for [employer] based on reported income. MTM connected with pharmacy team to coordinate transition between assistance programs. Plan to start transition 11/1/24. Encouraged patient to refill medication one more time through , if able. Up to date on routine labs and patient to complete every 3 months.      Vaccines: Encouraged indicated non-live vaccines and avoidance of live vaccines. Per ACIP guidelines, patient is eligible for vaccination: RSV.     Hyperlipidemia/Elevated Blood Pressure: Patient is at LDL cholesterol goal < 100 mg/dL. Recent blood pressures in clinic were above goal < 130/80 mmHg. Patient may benefit from therapy and advised to discuss with PCP at follow-up.     Osteoporosis: Provided education on alendronate administration. Discussed counseling points of taking on an empty stomach before other medications or beverages, and staying upright for at least 30 minutes. Discussed calcium supplementation in osteoporosis and patient to consider supplementing with 1200 mg of calcium daily. Currently trying to hit calcium goals through her diet and agreed to discuss with PCP at follow-up.      Hypothyroidism: Stable.     Glaucoma: Encouraged patient to reach out with medication access issues. Will continue to follow-up with prescriber for management.     Takes Dietary Supplements: Stable.     PLAN:                            Continue Xeljanz XR 11 mg daily.   I recommend reordering the medication before the end of the year, if you are able.   I will connect with our pharmacy team to make  sure everything is set up for the [place] program for 2025.     Complete routine lab monitoring every 3 months.   Lab appointment is scheduled for 1/16/25.     Consider the following vaccine:  RSV vaccine  This is recommended for individuals 60 and older.      Consider starting a calcium supplement to help with bone strength.   The recommended dose is 600 mg twice daily. You can discuss this further with your primary care provider at your next appointment.     Discuss your blood pressure with your primary care provider at your next appointment.   Some of the recent clinic readings were elevated.      Take alendronate on Saturday mornings at least 30 minutes before other medications, food, and beverages other than water. Stay upright (stand/sitting upright) for at least 30 minutes after the dose.   You can take alendronate first, wait 30 minutes, then take levothyroxine. It is recommended to wait another 30 minutes after levothyroxine before eating.     Let me know if you are having issues getting your eye drop from the pharmacy. I am happy to call to see what is going on.     Follow-up: [facility] pharmacist/pharmacy team will reach out to assist with the emmanuel program for 2025. Follow-up with Dr. Hayes on 1/21/25.    SUBJECTIVE/OBJECTIVE:                          Jackie Maguire is a 69 year old female seen for an initial visit. She was referred to me from Maurilio Hayes MD. Patient was accompanied by her , Adolfo.     Reason for visit: Xeljanz education/2025 medication access assistance.    Allergies/ADRs: Reviewed in chart  Past Medical History: Reviewed in chart  Tobacco: She reports that she has never smoked. She has never used smokeless tobacco.  Alcohol: none    Medication Adherence/Access: no issues reported.    Rheumatoid Arthritis:   Xeljanz XR 11 mg daily   Ibuprofen 200 mg daily as needed   Patient reported being well controlled on Xeljanz. Tried multiple other medications in the past and Xeljanz was  the only one that worked. Currently receiving from the [employer] foundation, but was informed she is no longer eligible for the program in 2025. Concerned about potential costs and wondering what options are available. Has about a 3 month supply at home. Using ibuprofen 200 mg infrequently for mild headaches/minor aches.     Past Treatments:  - Methotrexate (Partial efficacy)  - Leflunomide (mouth sores)  - Hydroxychloroquine (ineffective)  - Azathioprine (itching)  - Humira (ineffective - Q7D and Q14D)  - Remicade (associated with syncopal episodes)    Vaccines:  Eligible for the RSV vaccine. Patient up to date on all other recommended vaccines.     Hyperlipidemia/Elevated Blood Pressure:  Atorvastatin 40 mg daily   Patient taking daily in the evening. Reported no noticeable adverse effects such as myalgias from therapy.     LDL Cholesterol Calculated   Date Value Ref Range Status   04/01/2024 88 <=100 mg/dL Final   12/03/2020 101 (H) <100 mg/dL Final     Comment:     Above desirable:  100-129 mg/dl  Borderline High:  130-159 mg/dL  High:             160-189 mg/dL  Very high:       >189 mg/dl       BP Readings from Last 3 Encounters:   10/08/24 (!) 142/87   04/04/24 126/87   03/18/24 (!) 140/89      Osteoporosis:   Alendronate 70 mg every 7 days  Vitamin D 25 mcg daily  Taking alendronate on Saturdays. Unaware of the dosing recommendations. Reported falling a few years ago and had multiple fractures. Not taking a calcium supplement and mentioned multivitamin has 60 mg of calcium. Eats yoghurt, cheese, and milk for additional calcium.     Hypothyroidism:   Levothyroxine 112 mcg daily   Taking first thing in the morning. Waits 20-30 minutes before eating breakfast. Reported no symptoms of hypothyroidism.     TSH   Date Value Ref Range Status   01/04/2024 0.82 0.30 - 4.20 uIU/mL Final   03/11/2021 0.44 0.30 - 5.00 uIU/mL Final   10/05/2016 4.58 (H) 0.40 - 4.00 mU/L Final     Glaucoma:  Dorzolamide-timolol 2-0.5%  ophthalmic solution   Experiences minor stinging with each administration. Reported stinging is tolerable. Patient's mother and grandmother both went blind near the end of their lives. No issues with medication cost but occasionally has issues picking it up from her pharmacy.     Takes Dietary Supplements:   Multivitamin daily   Taking daily with breakfast. No side effects or concerns reported.     Today's Vitals: There were no vitals taken for this visit.  ----------------    I spent 45 minutes with this patient today. I offer these suggestions for consideration by Maurilio Hayes MD. A copy of the visit note was provided to the patient's provider(s).    A summary of these recommendations was sent via [employer].    Hima GainesD  Medication Therapy Management Pharmacist  [facility]  Phone: [phone]     Telemedicine Visit Details  The patient's medications can be safely assessed via a telemedicine encounter.  Type of service:  Telephone visit  Originating Location (pt. Location): Home    Distant Location (provider location):  Off-site  Start Time:  9:30 AM  End Time:  10:15 AM     Medication Therapy Recommendations  Elevated blood pressure reading without diagnosis of hypertension    Rationale: Untreated condition - Needs additional medication therapy - Indication   Recommendation: Make Appt with PCP - Discuss blood pressure with PCP   Status: Patient Agreed - Adherence/Education         Osteoporosis    Current Medication: alendronate (FOSAMAX) 70 MG tablet   Rationale: Does not understand instructions - Adherence - Adherence   Recommendation: Provide Adherence Intervention - Reviewed dosing and administration   Status: Patient Agreed - Adherence/Education          Rationale: Synergistic therapy - Needs additional medication therapy - Indication   Recommendation: Make Appt with PCP - Discuss calcium supplement with PCP   Status: Patient Agreed - Adherence/Education         Vaccine  counseling    Rationale: Preventive therapy - Needs additional medication therapy - Indication   Recommendation: Provide Education - Discussed RSV vaccine   Status: Patient Agreed - Adherence/Education

## 2024-10-11 RX ORDER — MULTIPLE VITAMINS W/ MINERALS TAB 9MG-400MCG
1 TAB ORAL DAILY
COMMUNITY

## 2024-10-11 NOTE — PATIENT INSTRUCTIONS
"Recommendations from today's MTM visit:                                                    MTM (medication therapy management) is a service provided by a clinical pharmacist designed to help you get the most of out of your medicines.      Continue Xeljanz XR 11 mg daily.   I recommend reordering the medication before the end of the year, if you are able.   I will connect with our pharmacy team to make sure everything is set up for the [place] program for 2025.     Complete routine lab monitoring every 3 months.   Lab appointment is scheduled for 1/16/25.     Consider the following vaccine:  RSV vaccine  This is recommended for individuals 60 and older.      Consider starting a calcium supplement to help with bone strength.   The recommended dose is 600 mg twice daily. You can discuss this further with your primary care provider at your next appointment.     Discuss your blood pressure with your primary care provider at your next appointment.   Some of the recent clinic readings were elevated.      Take alendronate on Saturday mornings at least 30 minutes before other medications, food, and beverages other than water. Stay upright (stand/sitting upright) for at least 30 minutes after the dose.   You can take alendronate first, wait 30 minutes, then take levothyroxine. It is recommended to wait another 30 minutes after levothyroxine before eating.     Let me know if you are having issues getting your eye drop from the pharmacy. I am happy to call to see what is going on.     Follow-up: MTM pharmacist/pharmacy team will reach out to assist with the emmanuel program for 2025. Follow-up with Dr. Hayes on 1/21/25.    It was great speaking with you today.  I value your experience and would be very thankful for your time in providing feedback in our clinic survey. In the next few days, you may receive an email or text message from [employer] with a link to a survey related to your  clinical pharmacist.\"     To schedule " another MTM appointment, please call the clinic directly or you may call the MTM scheduling line at [phone].    My Clinical Pharmacist's contact information:                                                      Please feel free to contact me with any questions or concerns you have.      Job Owens PharmD  Medication Therapy Management Pharmacist  [facility]  Phone: [phone]

## 2024-10-12 ENCOUNTER — HEALTH MAINTENANCE LETTER (OUTPATIENT)
Age: 69
End: 2024-10-12

## 2024-10-18 ENCOUNTER — TELEPHONE (OUTPATIENT)
Dept: RHEUMATOLOGY | Facility: CLINIC | Age: 69
End: 2024-10-18

## 2024-10-18 NOTE — TELEPHONE ENCOUNTER
Xeljanz in MSOT with start date of 01/07/2025.  Patient seen by Rheum MTM 10/10/2024    Called patient to discuss POI    Patient filed taxes for October 2023  Household size: 2    Pt to email 2023 Federal 1040    Contact patient in November to complete FPAP 2025 application.    Thank You,     Sissy Joseph CPhT  Specialty Pharmacy Clinic [facility] Specialty  [email or web address]  [email or web address]  Phone: [phone]  Fax: [phone]

## 2024-11-27 ENCOUNTER — TELEPHONE (OUTPATIENT)
Dept: RHEUMATOLOGY | Facility: CLINIC | Age: 69
End: 2024-11-27
Payer: COMMERCIAL

## 2024-11-27 NOTE — TELEPHONE ENCOUNTER
Faxed request to be reviewed with start date of 01/01/2025:        Thank You,     Sissy Joseph [facility]  Specialty Pharmacy Clinic [facility] Specialty  [email or web address]  [email or web address]  Phone: [phone]  Fax: [phone]

## 2024-11-27 NOTE — TELEPHONE ENCOUNTER
PA Initiation    Medication: XELJANZ XR 11 MG PO TB24  Insurance Company: [employer] Part D - Phone [phone] Fax [phone]  Pharmacy Filling the Rx: [facility] - [place], [place] - [address]   Filling Pharmacy Phone: [phone]  Filling Pharmacy Fax: [phone]  Start Date: 11/27/2024    Unable to submit renewal via CMM - PA on file exp 12/31/2024    Providers contact us at [phone] for further assistance.    Called insurance to request PA for 2025.    POI received: 2023 1040    --------  Xeljanz in MSOT with start date of 01/07/2025.  Patient seen by Rheum MTM 10/10/2024     Called patient to discuss POI 10/18/2024     Patient filed taxes for October 2023  Household size: 2     Pt emailed 2023 Federal 1040     Contact patient in November to complete FPAP 2025 application.     Thank You,      Sissy Joseph [facility]  Specialty Pharmacy Clinic [facility] Specialty  [email or web address]  [email or web address]  Phone: [phone]  Fax: [phone]

## 2024-11-27 NOTE — TELEPHONE ENCOUNTER
Further documentation done in PA encounter started 11/27/2024.    Thank You,     Sissy Joseph CP  Specialty Pharmacy Clinic [facility] Specialty  [email or web address]  [email or web address]  Phone: [phone]  Fax: [phone]

## 2024-11-29 NOTE — TELEPHONE ENCOUNTER
Prior Authorization Approval    Medication: XELJANZ XR 11 MG PO TB24  Authorization Effective Date: 1/1/2025  Authorization Expiration Date: 12/31/2025  Approved Dose/Quantity: 30 tablets per 30 day supply  Reference #: [id number]   Insurance Company: [employer] D - Phone [phone] Fax [phone]  Expected CoPay: $    CoPay Card Available: No    Financial Assistance Needed: MFR PAP 2024 - FPAP switch 2025  Which Pharmacy is filling the prescription: [facility] - [place], [place] - [address] [place] [address]  Pharmacy Notified: NO - Rx start 01/01/2025  Patient Notified: MFR PAP 2024 - FPAP switch 2025        Thank You,     Sissy Joseph [facility]  Specialty Pharmacy Clinic [facility] Specialty  [email or web address]  [email or web address]  Phone: [phone]  Fax: [phone]

## 2024-11-29 NOTE — TELEPHONE ENCOUNTER
KARTHIK INITIATED    Medication: XELJANZ XR 11 MG PO TB24  Foundation Name: FPAP  Date submitted: 11/29/2024  4:11 PM   Foundation Phone:    [facility] Fax: [phone]  Seen by Rheum MTM 10/10/2024  Order in MSOT: Start 01/07/2025    Called patient and completed FPAP Disclosure over the phone.    Per Jackie she has two bottles of Xeljanz on hand to get through 2024.    Patient noted she does not use MyChart often, call with approval notice.    Thank You,     Aiden Joseph [facility]  Specialty Pharmacy Clinic [facility] Specialty  [email or web address]  [email or web address]  Phone: [phone]  Fax: [phone]

## 2024-12-02 NOTE — TELEPHONE ENCOUNTER
KARTHIK APPROVED    Medication: XELJANZ XR 11 MG PO TB24  Amount: $    Foundation Name: FPAP  Foundation Phone:    [facility] Fax: [phone]  Member ID: [id number]  BIN:   PCN:   Group:   Foundation Effective Date: 1/1/2025  Foundation Expiration Date: 12/31/2025  Additional Information: FPAP* APPROVED (XELJANZ), PRIMARY INS= (AARP PARD) SECONDARY INS= (PLAN NAME). FPAP IS APPROVED EFFECTIVE (01/01/2025) THROUGH (12/31/2025). UPDATED CPS AND PAYER FIELDS.  Patient Notified: Yes - MyChart  Seen by Rheum MTM 10/10/2024        Thank You,     Sissy Joseph [facility]  Specialty Pharmacy Clinic [facility] Specialty  [email or web address]  [email or web address]  Phone: [phone]  Fax: [phone]

## 2024-12-03 ENCOUNTER — TRANSFERRED RECORDS (OUTPATIENT)
Dept: HEALTH INFORMATION MANAGEMENT | Facility: CLINIC | Age: 69
End: 2024-12-03
Payer: COMMERCIAL

## 2024-12-04 DIAGNOSIS — E06.3 HYPOTHYROIDISM DUE TO HASHIMOTO'S THYROIDITIS: ICD-10-CM

## 2024-12-04 RX ORDER — LEVOTHYROXINE SODIUM 112 UG/1
112 TABLET ORAL DAILY
Qty: 30 TABLET | Refills: 0 | Status: SHIPPED | OUTPATIENT
Start: 2024-12-04

## 2024-12-16 ENCOUNTER — TELEPHONE (OUTPATIENT)
Dept: RHEUMATOLOGY | Facility: CLINIC | Age: 69
End: 2024-12-16
Payer: COMMERCIAL

## 2024-12-16 NOTE — TELEPHONE ENCOUNTER
[facility] Call Center    Phone Message    May a detailed message be left on voicemail: yes     Reason for Call: Other: Pt is having foot surgery on 1/10. Pt is calling with questions for Dr Hayes or the nurse about upcoming appointment and medications. Pt would like a call back at ph: [phone].       Action Taken: Message routed to:  Other: [facility] Rheum    Travel Screening: Not Applicable     Date of Service: 12/16

## 2024-12-17 NOTE — TELEPHONE ENCOUNTER
Called pt and spoke to her about her upcoming surgery orders given for holding xelijanz and appts adjusted for her follow up with dr rivera. She will call if she has any new questions    THOMAS Ramirez RN 12/17/2024 10:35 AM

## 2025-01-06 ENCOUNTER — LAB (OUTPATIENT)
Dept: LAB | Facility: CLINIC | Age: 70
End: 2025-01-06
Payer: COMMERCIAL

## 2025-01-06 DIAGNOSIS — Z79.899 HIGH RISK MEDICATION USE: ICD-10-CM

## 2025-01-06 DIAGNOSIS — M06.09 RHEUMATOID ARTHRITIS OF MULTIPLE SITES WITHOUT RHEUMATOID FACTOR (H): ICD-10-CM

## 2025-01-06 LAB
ALBUMIN SERPL BCG-MCNC: 4.2 G/DL (ref 3.5–5.2)
ALP SERPL-CCNC: 85 U/L (ref 40–150)
ALT SERPL W P-5'-P-CCNC: 29 U/L (ref 0–50)
AST SERPL W P-5'-P-CCNC: 25 U/L (ref 0–45)
BASOPHILS # BLD AUTO: 0 10E3/UL (ref 0–0.2)
BASOPHILS NFR BLD AUTO: 1 %
BILIRUB DIRECT SERPL-MCNC: <0.2 MG/DL (ref 0–0.3)
BILIRUB SERPL-MCNC: 0.5 MG/DL
CREAT SERPL-MCNC: 0.93 MG/DL (ref 0.51–0.95)
CRP SERPL-MCNC: <3 MG/L
EGFRCR SERPLBLD CKD-EPI 2021: 67 ML/MIN/1.73M2
EOSINOPHIL # BLD AUTO: 0.2 10E3/UL (ref 0–0.7)
EOSINOPHIL NFR BLD AUTO: 2 %
ERYTHROCYTE [DISTWIDTH] IN BLOOD BY AUTOMATED COUNT: 12.1 % (ref 10–15)
ERYTHROCYTE [SEDIMENTATION RATE] IN BLOOD BY WESTERGREN METHOD: 10 MM/HR (ref 0–30)
HCT VFR BLD AUTO: 43.3 % (ref 35–47)
HGB BLD-MCNC: 14.5 G/DL (ref 11.7–15.7)
IMM GRANULOCYTES # BLD: 0.1 10E3/UL
IMM GRANULOCYTES NFR BLD: 1 %
LYMPHOCYTES # BLD AUTO: 0.8 10E3/UL (ref 0.8–5.3)
LYMPHOCYTES NFR BLD AUTO: 12 %
MCH RBC QN AUTO: 32.7 PG (ref 26.5–33)
MCHC RBC AUTO-ENTMCNC: 33.5 G/DL (ref 31.5–36.5)
MCV RBC AUTO: 98 FL (ref 78–100)
MONOCYTES # BLD AUTO: 0.9 10E3/UL (ref 0–1.3)
MONOCYTES NFR BLD AUTO: 13 %
NEUTROPHILS # BLD AUTO: 4.8 10E3/UL (ref 1.6–8.3)
NEUTROPHILS NFR BLD AUTO: 71 %
PLATELET # BLD AUTO: 240 10E3/UL (ref 150–450)
PROT SERPL-MCNC: 6.7 G/DL (ref 6.4–8.3)
RBC # BLD AUTO: 4.44 10E6/UL (ref 3.8–5.2)
WBC # BLD AUTO: 6.7 10E3/UL (ref 4–11)

## 2025-01-06 PROCEDURE — 86140 C-REACTIVE PROTEIN: CPT

## 2025-01-06 PROCEDURE — 36415 COLL VENOUS BLD VENIPUNCTURE: CPT

## 2025-01-06 PROCEDURE — 82565 ASSAY OF CREATININE: CPT

## 2025-01-06 PROCEDURE — 80076 HEPATIC FUNCTION PANEL: CPT

## 2025-01-06 PROCEDURE — 85025 COMPLETE CBC W/AUTO DIFF WBC: CPT

## 2025-01-06 PROCEDURE — 85652 RBC SED RATE AUTOMATED: CPT

## 2025-01-21 ENCOUNTER — VIRTUAL VISIT (OUTPATIENT)
Dept: RHEUMATOLOGY | Facility: CLINIC | Age: 70
End: 2025-01-21
Payer: COMMERCIAL

## 2025-01-21 DIAGNOSIS — D84.9 IMMUNOSUPPRESSION: ICD-10-CM

## 2025-01-21 DIAGNOSIS — Z79.899 HIGH RISK MEDICATION USE: ICD-10-CM

## 2025-01-21 DIAGNOSIS — M06.09 RHEUMATOID ARTHRITIS OF MULTIPLE SITES WITHOUT RHEUMATOID FACTOR (H): Primary | ICD-10-CM

## 2025-01-21 DIAGNOSIS — Z85.820 HISTORY OF MELANOMA: ICD-10-CM

## 2025-01-21 PROCEDURE — G2211 COMPLEX E/M VISIT ADD ON: HCPCS | Performed by: INTERNAL MEDICINE

## 2025-01-21 PROCEDURE — 98006 SYNCH AUDIO-VIDEO EST MOD 30: CPT | Performed by: INTERNAL MEDICINE

## 2025-01-21 NOTE — PROGRESS NOTES
Jackie Maguire  is a 69 year old year old who is being evaluated via a billable video visit.      How would you like to obtain your AVS? MyChart  If the video visit is dropped, the invitation should be resent by: Text to cell phone: [phone]   Will anyone else be joining your video visit? No     Rheumatology Video Visit      Jackie Maguire MRN# [id number]   YOB: 1955 Age: 69 year old      Date of visit: 1/21/25   PCP: Dr. Talia Mejía at [facility]    Chief Complaint   Patient presents with:  Rheumatoid Arthritis    Assessment and Plan     1. Rheumatoid Arthritis (CCP low positive, then negative): From record review: dx'd 2012; has followed with Jacob Cano, and [facility]; hx of +DULCE, +dsDNA, CCP low positive then negative; hx of psoriasis; hx of C-spine fusion; initial presentation of pain/stiff/swollen joints involving the knees, feet, ankles, neck, back.  Previously on leflunomide (mouth sores), HCQ (ineffective), AZA (itching), Humira (ineffective at q7day and q14 days dosings), Remicade (associated with syncopal episodes), MTX (partially effective, stopped when doing well on Xeljanz, higher doses associated with LFT elevations). Sulfa allergy. Currently on Xeljanz XR 11mg daily. Hyperlipidemia is being managed by her primary care provider. RA controlled.   Chronic illness, stable.    - Continue Xeljanz XR 11 mg daily  - Historical: MTM referral placed on 10/8/2024  - Labs in 3 months: CBC, Creatinine, Hepatic Panel, ESR, CRP, fasting lipid panel    High risk medication requiring intensive toxicity monitoring at least quarterly.                Rapid 3, cumulative scores                      10/08/2024: 1.7 (Xeljanz XR 11mg daily)                         04/04/2024: 6    (Xeljanz XR 11mg daily) RA controlled.  Low back pain                      10/05/2023: 5    (Xeljanz XR 11mg daily)                       06/29/2023: 5    (Xeljanz XR 11mg daily)                      11/30/2022: 7     (Xeljanz XR 11mg daily; RA doing well; left knee OA and back pain contributing)                      1/21/2022: 6.2   (Xeljanz XR 11mg daily; RA doing well; left knee OA is main symptom)                      9/17/2021: 7.3   (MTX 10mg SQ wkly, Xeljanz XR 11mg daily)                      8/31/2020: RA doing well ((MTX 20mg SQ wkly, Xeljanz XR 11mg daily)                      02/17/2020:  7.3 (MTX 20mg SQ wkly, prednisone 5mg daily, Xeljanz XR 11mg daily)                      11/25/2019:  8    (MTX 20mg SQ wkly, prednisone 5mg daily, Xeljanz XR 11mg daily)    2. History of Positive DULCE and dsDNA (repeats negative): with inflammatory arthritis.  Symptoms fit better for seropositive rheumatoid arthritis; see #1.  DULCE and dsDNA negative previously when rechecked.     3. Neck pain hx: Neck spasms several years ago that was dx'd as dystonia at the [facility]; had several imaging studies especially given the RA dx where degenerative changes were seen.  Also saw a spine surgeon at [place].  PT ineffective.  Nonradiating neck pain has been stable per patient. Unchanged since last visit.     4.  Lower back pain: Degenerative in nature.  Home physical therapy exercises were effective, worse if she does not do the physical therapy exercises on a daily basis.  Not discussed today.    5. Hyperlipidemia: managed by PCP per patient.     6.  L>R knee osteoarthritis unable to fully extend the left knee, tricompartmental degenerative changes seen on 9/17/2021 x-rays: Reportedly has had difficulty extending the left knee for several years ever since she had a fall.  She is now following with orthopedic surgery regarding the left knee that is altering her gait and potentially affecting her chronic low back pain; she says that the surgeon does not believe surgery would help and she does not have pain in the left knee .  Continue following with orthopedics as needed.    7.  Pes planus and bilateral ankle eversion, ankle pain: Degenerative  ankle pain likely due to pes planus with ankle eversion.  Recently had foot surgery for bunion and toe fusions.  She held Xeljanz perioperatively.    8.  High risk medications, immunocompromise status, and history of melanoma: Advised that she needs at least once yearly skin checks with dermatology.  - Dermatology referral for skin check    9.  Vaccinations: Vaccinations reviewed with Ms. Maguire.      - Influenza: encouraged yearly vaccination  - [id number]: up to date  - [id number]: up to date   - Shingrix: up to date  - COVID-19: Advised keeping up-to-date, and to hold Xeljanz for 1-2 weeks afterward    10.  Bone health: Currently managed by PCP     Total minutes spent in evaluation with patient, documentation, , and review of pertinent studies and chart notes: 16  The longitudinal plan of care for the rheumatology problem(s) were addressed during this visit.  Due to added complexity of care, we will continue to support the patient and the subsequent management of this condition with ongoing continuity of care.      Ms. Maguire verbalized agreement with and understanding of the rational for the diagnosis and treatment plan.  All questions were answered to best of my ability and the patient's satisfaction. Ms. Maguire was advised to contact the clinic with any questions that may arise after the clinic visit.      Thank you for involving me in the care of the patient    Return to clinic: 3 months      HPI   Jackie Maguire is a 69 year old female with a past medical history significant for hyperlipidemia, hypothyroidism, history of melanoma, history of humerus fracture, osteoporosis, psoriasis, and rheumatoid arthritis who presents for follow-up of rheumatoid arthritis.     10/5/2023: arthritis controlled at this time.  No joint pain/swelling.  Still with left knee extension issue but better since doing PT exercises regularly at home.  Morning stiffness <5 min.     4/4/2024: RA controlled.  Bilateral  ankle pain with walking or standing for long periods of time.  Also with low back pain that is worse with standing for long periods of time.  Has not been doing the physical therapy exercises for her back but plans to restart them.  She says that she has been busy since her mother  and her 's mother  within the last 3 months.    10/8/2024: RA controlled.  No joint pain or swelling.  No morning stiffness or gelling phenomenon.  Xeljanz is effective and well-tolerated.  Concerned about not qualifying for the free drug program beginning in  and how she will afford Xeljanz    Today, 2025: RA controlled.  Held Xeljanz perioperatively for bunion surgery and toe fusions; currently recovering from that and unable to put weight on that foot at the moment.  Surgery was performed by [facility].  Otherwise doing well.  Patient changed today's in office visit to a video visit because of recent foot surgery and difficulty with ambulating because she is to remain off of her foot for now.    Denies fevers, chills, nausea, vomiting, constipation, diarrhea. No abdominal pain. No chest pain/pressure, palpitations, or shortness of breath. No LE swelling. No oral or nasal sores.  No rash. No sicca symptoms.    Patient's  is present with her today.    Tobacco: none  EtOH: none  Drugs: none    ROS   12 point review of system was completed and negative except as noted in the HPI     Active Problem List     Patient Active Problem List   Diagnosis    Hypothyroidism    Hyperlipidemia LDL goal <160    Inflammatory arthritis    Malignant melanoma of skin (H)    Osteopetrosis    Cervical dystonia    Dystonia, torsion, fragments of    Psoriasis    Rheumatoid arthritis of multiple sites without rheumatoid factor (H)    Immunocompromised     Past Medical History     Past Medical History:   Diagnosis Date    Abscess of left jaw 2018    Basal cell carcinoma     Closed fracture of part of humerus  09/27/2016    Left proximal humerus fracture, 2 part, closed, 9/25/16      Distal radius fracture 09/27/2016    Left distal radius fracture with volar apex angulation, comminuted, closed, 9/25/16        Fracture, pelvis closed (H) 09/25/2016    Hyperlipidemia LDL goal <160 01/05/2011    Hypothyroidism 10/01/2010    Inflammatory arthritis 01/20/2011    Malignant melanoma (H)     Proximal humerus fracture 09/30/2016     Past Surgical History     Past Surgical History:   Procedure Laterality Date    APPENDECTOMY      ARTHRODESIS TOE(S) Left 8/17/2017    Procedure: ARTHRODESIS TOE(S);  Left foot 1st metatarsophalangeal realignment fusion, 2nd & 3rd digital corrections;  Surgeon: Jason Lamar DPM;  Location: [facility] OR    INCISION AND DRAINAGE OF WOUND N/A 6/22/2018    Procedure: INCISION AND DRAINAGE JAW ABSCESS;  Surgeon: Lennox Sullivan MD;  Location: [facility] OR;  Service:     IR CERVICAL EPIDURAL STEROID INJECTION  5/9/2013    OPEN REDUCTION INTERNAL FIXATION HUMERUS PROXIMAL Left 9/30/2016    Procedure: OPEN REDUCTION INTERNAL FIXATION HUMERUS PROXIMAL;  Surgeon: Charly Bernal MD;  Location: [facility] OR     Allergy     Allergies   Allergen Reactions    Azathioprine Itching    Leflunomide Other (See Comments)     Mouth sores    Compazine Anxiety     Restless, anxious    Prochlorperazine Anxiety     Restless, anxious    Sulfa Antibiotics Rash     Current Medication List     Current Outpatient Medications   Medication Sig Dispense Refill    alendronate (FOSAMAX) 70 MG tablet Take 1 tablet (70 mg) by mouth every 7 days 12 tablet 3    atorvastatin (LIPITOR) 40 MG tablet Take 1 tablet (40 mg) by mouth daily. 100 tablet 2    dorzolamide-timolol (COSOPT) 2-0.5 % ophthalmic solution Place 1 drop into both eyes 2 times daily      levothyroxine (SYNTHROID/LEVOTHROID) 112 MCG tablet Take 1 tablet by mouth once daily 30 tablet 3    multivitamin w/minerals (THERA-VIT-M) tablet Take 1 tablet by mouth daily.       "tofacitinib (XELJANZ XR) 11 MG 24 hr tablet Take 1 tablet (11 mg) by mouth daily. Hold for signs of infection, then seek medical attention. 30 tablet 6    VITAMIN D, CHOLECALCIFEROL, PO Take by mouth daily      tofacitinib (XELJANZ XR) 11 MG 24 hr tablet Take 1 tablet (11 mg) by mouth daily. . Hold for signs of infection, then seek medical attention. 90 tablet 0     No current facility-administered medications for this visit.         Social History   See HPI    Family History     Family History   Problem Relation Age of Onset    Cancer Father        Physical Exam     Temp Readings from Last 3 Encounters:   01/03/25 98.5  F (36.9  C) (Tympanic)   10/08/24 97.6  F (36.4  C) (Oral)   03/18/24 98  F (36.7  C) (Oral)     BP Readings from Last 5 Encounters:   01/03/25 120/82   10/08/24 (!) 142/87   04/04/24 126/87   03/18/24 (!) 140/89   10/30/23 139/85     Pulse Readings from Last 1 Encounters:   01/03/25 69     Resp Readings from Last 1 Encounters:   01/03/25 16     Estimated body mass index is 31.45 kg/m  as calculated from the following:    Height as of 1/3/25: 1.651 m (5' 5\").    Weight as of 1/3/25: 85.7 kg (189 lb).      GEN: NAD.   HEENT:   Anicteric, noninjected sclera. No obvious external lesions of the ear and nose. Hearing intact.  PULM: No increased work of breathing  MSK:  Hands and wrists without swelling.  Wrists without swelling.  SKIN: No rash or jaundice seen  PSYCH: Alert. Appropriate.      Labs / Imaging (select studies)       CBC  Recent Labs   Lab Test 01/06/25  0832 10/03/24  0932 07/08/24  0911 07/28/21  0928 05/21/21  1333 12/03/20  1019 08/27/20  1152   WBC 6.7 5.6 9.9   < > 5.9 5.6 8.1   RBC 4.44 4.41 4.35   < > 4.25 4.39 4.25   HGB 14.5 14.1 14.0   < > 14.1 14.6 14.1   HCT 43.3 43.5 42.8   < > 42.3 44.5 42.4   MCV 98 99 98   < > 100 101* 100   RDW 12.1 12.5 12.3   < > 13.2 13.6 13.3    176 198   < > 208 213 173   MCH 32.7 32.0 32.2   < > 33.2* 33.3* 33.2*   MCHC 33.5 32.4 32.7   < > " 33.3 32.8 33.3   NEUTROPHIL 71 74 81   < > 79.9 75.2 83.4   LYMPH 12 13 7   < > 5.6 11.8 7.2   MONOCYTE 13 10 10   < > 11.6 10.7 6.9   EOSINOPHIL 2 2 1   < > 2.2 1.6 2.1   BASOPHIL 1 1 0   < > 0.7 0.7 0.4   ANEU  --   --   --   --  4.7 4.2 6.7   ALYM  --   --   --   --  0.3* 0.7* 0.6*   KAREN  --   --   --   --  0.7 0.6 0.6   AEOS  --   --   --   --  0.1 0.1 0.2   ABAS  --   --   --   --  0.0 0.0 0.0   ANEUTAUTO 4.8 4.1 8.0   < >  --   --   --    ALYMPAUTO 0.8 0.7* 0.7*   < >  --   --   --    AMONOAUTO 0.9 0.6 1.0   < >  --   --   --    AEOSAUTO 0.2 0.1 0.1   < >  --   --   --    ABSBASO 0.0 0.0 0.0   < >  --   --   --     < > = values in this interval not displayed.     CMP  Recent Labs   Lab Test 01/06/25  0832 10/03/24  0932 07/08/24  0911 07/28/21  0928 05/21/21  1333 12/03/20  1019 08/27/20  1152 05/28/20  0928 10/25/19  1018 07/22/19  0805 12/19/18  0852 09/12/18  0923 06/25/18  0813 06/25/18  0626   NA  --   --   --   --   --   --   --   --   --  141  --  144  --  140   POTASSIUM  --   --   --   --   --   --   --   --   --  4.1  --  3.8  --  3.9   CHLORIDE  --   --   --   --   --   --   --   --   --  111*  --  109  --  109*   CO2  --   --   --   --   --   --   --   --   --  23  --  28  --  24   ANIONGAP  --   --   --   --   --   --   --   --   --  7  --  7  --  7   GLC  --   --   --   --   --   --   --  96  --  89  --  102*   < > 135*   BUN  --   --   --   --   --   --   --   --   --  15  --  15  --  20   CR 0.93 0.86 0.84   < > 0.88 0.81 0.83 0.74   < > 0.72   < > 0.91  --  0.69   GFRESTIMATED 67 74 76   < > 70 77 75 87   < > 90   < > 63  --  >60   GFRESTBLACK  --   --   --   --  81 89 87 >90   < > >90   < > 76  --  >60   HAZEL  --   --   --   --   --   --   --   --   --  9.1  --  9.4  --  9.1   BILITOTAL 0.5 0.7 0.7   < > 0.7 0.9 0.8 0.8   < > 0.3   < > 0.5  --   --    ALBUMIN 4.2 4.6 4.1   < > 4.0 4.2 4.0 4.0   < > 3.8   < > 3.6  --   --    PROTTOTAL 6.7 6.9 6.5   < > 6.8 7.1 6.9 6.8   < > 6.8   < > 6.9  --    --    ALKPHOS 85 72 85   < > 91 106 103 91   < > 86   < > 94  --   --    AST 25 31 31   < > 51* 45 36 32   < > 22   < > 27  --   --    ALT 29 29 30   < > 70* 67* 65* 61*   < > 30   < > 35  --   --     < > = values in this interval not displayed.     Calcium/VitaminD  Recent Labs   Lab Test 01/04/24  0829 07/22/19  0805 09/12/18  0923 06/25/18  0626   HAZEL  --  9.1 9.4 9.1   VITDT 63*  --   --   --      ESR/CRP  Recent Labs   Lab Test 01/06/25  0832 10/03/24  0932 04/01/24  0801 11/22/22  0909 08/01/22  1346 04/22/22  0748 01/17/22  0857   SED 10 6 8   < > 9 8 6   CRP  --   --   --   --  <2.9 <2.9 <2.9   CRPI <3.00 <3.00 <3.00   < >  --   --   --     < > = values in this interval not displayed.     Lipid Panel  Recent Labs   Lab Test 04/01/24  0801 01/04/24  0829 03/15/23  0930   CHOL 173 190 169   TRIG 150* 161* 115   HDL 55 55 61   LDL 88 103* 85   NHDL 118 135* 108     Hepatitis B  Recent Labs   Lab Test 02/26/18  0846   HBCAB Nonreactive   HEPBANG Nonreactive     Hepatitis C  Recent Labs   Lab Test 02/26/18  0846   HCVAB Nonreactive       Tuberculosis Screening  Recent Labs   Lab Test 10/03/24  0932 01/17/22  0857 07/22/19  0805   TBRES Negative Negative Negative       Immunization History     Immunization History   Administered Date(s) Administered    COVID-19 12+ (MODERNA) 09/09/2024    COVID-19 12+ (Pfizer) 10/30/2023    COVID-19 Bivalent 18+ (Moderna) 11/30/2022    COVID-19 Monovalent 18+ (Moderna) 04/19/2021, 05/17/2021, 11/29/2021, 06/27/2022    Influenza (High Dose) Trivalent,PF (Fluzone) 09/09/2024    Influenza Vaccine 18-64 (Flublok) 10/18/2018, 10/01/2019, 10/16/2020, 10/10/2021    Influenza Vaccine 65+ (Fluzone HD) 09/17/2022, 10/05/2023    Influenza Vaccine >6 months,quad, PF 10/01/2016    Influenza Vaccine, 6+MO IM (QUADRIVALENT W/PRESERVATIVES) 10/10/2017    Pneumo Conj 13-V (2010&after) 10/05/2016    Pneumococcal 23 valent 10/31/2013, 03/11/2019    Pneumococcal Conjugate (PCV21) 09/09/2024    TDAP  (Adacel,Boostrix) 10/25/2012, 12/10/2012, 12/30/2022    Zoster recombinant adjuvanted (SHINGRIX) 11/25/2019, 02/16/2022          Chart documentation done in part with Dragon Voice recognition Software. Although reviewed after completion, some word and grammatical error may remain.      Video-Visit Details    Type of service:  Video Visit    Video Start Time: 7:54 AM    Video End Time: 8:04 AM    Originating Location (pt. Location): Home, [place]    Distant Location (provider location):  off site, [place]    Platform used for Video Visit: Louis Hayes MD

## 2025-01-21 NOTE — PATIENT INSTRUCTIONS
RHEUMATOLOGY    [facility] [place]  [address] [place] [address]  [place] [address]    Phone number: [phone]  Fax number: [phone]    If you need a medication refill, please contact us as you may need lab work and/or a follow up visit prior to your refill.      Thank you for choosing [facility]!    Elizabeth Haley CMA Rheumatology

## 2025-01-23 ENCOUNTER — TRANSFERRED RECORDS (OUTPATIENT)
Dept: HEALTH INFORMATION MANAGEMENT | Facility: CLINIC | Age: 70
End: 2025-01-23
Payer: COMMERCIAL

## 2025-02-18 ENCOUNTER — PATIENT OUTREACH (OUTPATIENT)
Dept: CARE COORDINATION | Facility: CLINIC | Age: 70
End: 2025-02-18
Payer: COMMERCIAL

## 2025-02-18 ENCOUNTER — TRANSFERRED RECORDS (OUTPATIENT)
Dept: HEALTH INFORMATION MANAGEMENT | Facility: CLINIC | Age: 70
End: 2025-02-18
Payer: COMMERCIAL

## 2025-04-22 ENCOUNTER — OFFICE VISIT (OUTPATIENT)
Dept: RHEUMATOLOGY | Facility: CLINIC | Age: 70
End: 2025-04-22
Payer: COMMERCIAL

## 2025-04-22 VITALS
BODY MASS INDEX: 31.48 KG/M2 | WEIGHT: 189.2 LBS | RESPIRATION RATE: 16 BRPM | DIASTOLIC BLOOD PRESSURE: 82 MMHG | OXYGEN SATURATION: 97 % | HEART RATE: 68 BPM | SYSTOLIC BLOOD PRESSURE: 131 MMHG

## 2025-04-22 DIAGNOSIS — M06.09 RHEUMATOID ARTHRITIS OF MULTIPLE SITES WITHOUT RHEUMATOID FACTOR (H): Primary | ICD-10-CM

## 2025-04-22 DIAGNOSIS — Z79.899 HIGH RISK MEDICATION USE: ICD-10-CM

## 2025-04-22 PROCEDURE — G2211 COMPLEX E/M VISIT ADD ON: HCPCS | Performed by: INTERNAL MEDICINE

## 2025-04-22 PROCEDURE — 3075F SYST BP GE 130 - 139MM HG: CPT | Performed by: INTERNAL MEDICINE

## 2025-04-22 PROCEDURE — 3079F DIAST BP 80-89 MM HG: CPT | Performed by: INTERNAL MEDICINE

## 2025-04-22 PROCEDURE — 99214 OFFICE O/P EST MOD 30 MIN: CPT | Performed by: INTERNAL MEDICINE

## 2025-04-22 RX ORDER — TOFACITINIB 11 MG/1
11 TABLET, FILM COATED, EXTENDED RELEASE ORAL DAILY
Qty: 30 TABLET | Refills: 7 | Status: SHIPPED | OUTPATIENT
Start: 2025-04-22

## 2025-04-22 NOTE — PROGRESS NOTES
Rheumatology Clinic Visit      Jackie Maguire MRN# [id number]   YOB: 1955 Age: 70 year old      Date of visit: 4/22/25   PCP: Dr. Talia Mejía at [facility]    Chief Complaint   Patient presents with:  Rheumatoid Arthritis    Assessment and Plan     1. Rheumatoid Arthritis (CCP low positive, then negative): From record review: dx'd 2012; has followed with Kayley Jimenez, Jacob, and [facility]; hx of +DULCE, +dsDNA, CCP low positive then negative; hx of psoriasis; hx of C-spine fusion; initial presentation of pain/stiff/swollen joints involving the knees, feet, ankles, neck, back.  Previously on leflunomide (mouth sores), HCQ (ineffective), AZA (itching), Humira (ineffective at q7day and q14 days dosings), Remicade (associated with syncopal episodes), MTX (partially effective, stopped when doing well on Xeljanz, higher doses associated with LFT elevations). Sulfa allergy. Currently on Xeljanz XR 11mg daily. Hyperlipidemia is being managed by her primary care provider; on a statin. RA controlled.   Chronic illness, stable.    - Continue Xeljanz XR 11 mg daily  - Historical: MTM referral placed on 10/8/2024 for DMARD education  - Labs in 3 months: CBC, Creatinine, Hepatic Panel, ESR, CRP    High risk medication requiring intensive toxicity monitoring at least quarterly.                Rapid 3, cumulative scores                      04/22/2025: 8.8 (Xeljanz XR 11mg daily) RA controlled; postoperative pain (s/p bunion fusion).                      10/08/2024: 1.7 (Xeljanz XR 11mg daily)                         04/04/2024: 6    (Xeljanz XR 11mg daily) RA controlled.  Low back pain                      10/05/2023: 5    (Xeljanz XR 11mg daily)                       06/29/2023: 5    (Xeljanz XR 11mg daily)                      11/30/2022: 7    (Xeljanz XR 11mg daily; RA doing well; left knee OA and back pain contributing)                      1/21/2022: 6.2   (Xeljanz XR 11mg daily; RA doing well; left knee OA is  main symptom)                      9/17/2021: 7.3   (MTX 10mg SQ wkly, Xeljanz XR 11mg daily)                      8/31/2020: RA doing well ((MTX 20mg SQ wkly, Xeljanz XR 11mg daily)                      02/17/2020:  7.3 (MTX 20mg SQ wkly, prednisone 5mg daily, Xeljanz XR 11mg daily)                      11/25/2019:  8    (MTX 20mg SQ wkly, prednisone 5mg daily, Xeljanz XR 11mg daily)    2. History of Positive DULCE and dsDNA (repeats negative): with inflammatory arthritis.  Symptoms fit better for seropositive rheumatoid arthritis; see #1.  DULCE and dsDNA negative previously when rechecked.     3. Neck pain hx: Neck spasms several years ago that was dx'd as dystonia at the [facility]; had several imaging studies especially given the RA dx where degenerative changes were seen.  Also saw a spine surgeon at [place].  PT ineffective.  Nonradiating neck pain has been stable per patient. Unchanged since last visit.     4.  Lower back pain: Degenerative in nature.  Home physical therapy exercises were effective, worse if she does not do the physical therapy exercises on a daily basis.  Not discussed today.    5. Hyperlipidemia: managed by PCP per patient.     6.  L>R knee osteoarthritis unable to fully extend the left knee, tricompartmental degenerative changes seen on 9/17/2021 x-rays: Reportedly has had difficulty extending the left knee for several years ever since she had a fall.  She is now following with orthopedic surgery regarding the left knee that is altering her gait and potentially affecting her chronic low back pain; she says that the surgeon does not believe surgery would help and she does not have pain in the left knee .  Continue following with orthopedics as needed.    7.  Pes planus and bilateral ankle eversion, ankle pain: Degenerative ankle pain likely due to pes planus with ankle eversion.  Recently had foot surgery for bunion and toe fusions.  Continues to follow with podiatry.    8.  Bilateral first CMC  joint osteoarthritis: Squaring and minimal range of motion at the first CMC joints.  Hyperextends at the bilateral first MCPs.  Patient reports no pain at these joints.    9.  High risk medications, immunocompromise status, and history of melanoma: Advised that she needs at least once yearly skin checks with dermatology.  - Dermatology referral for skin check given previously and she has scheduled with dermatology for August 2025    10.  Vaccinations: Vaccinations reviewed with Ms. Maguire.      - Influenza: encouraged yearly vaccination  - [id number]: up to date  - [id number]: up to date   - Shingrix: up to date  - COVID-19: Advised keeping up-to-date, and to hold Xeljanz for 1-2 weeks afterward    11.  Bone health: Currently managed by PCP     Total minutes spent in evaluation with patient, documentation, , and review of pertinent studies and chart notes: 18  The longitudinal plan of care for the rheumatology problem(s) were addressed during this visit.  Due to added complexity of care, we will continue to support the patient and the subsequent management of this condition with ongoing continuity of care.      Ms. Maguire verbalized agreement with and understanding of the rational for the diagnosis and treatment plan.  All questions were answered to best of my ability and the patient's satisfaction. Ms. Maguire was advised to contact the clinic with any questions that may arise after the clinic visit.      Thank you for involving me in the care of the patient    Return to clinic: 3 months      HPI   Jackie Maguire is a 70 year old female with a past medical history significant for hyperlipidemia, hypothyroidism, history of melanoma, history of humerus fracture, osteoporosis, psoriasis, and rheumatoid arthritis who presents for follow-up of rheumatoid arthritis.     10/5/2023: arthritis controlled at this time.  No joint pain/swelling.  Still with left knee extension issue but better since doing PT  exercises regularly at home.  Morning stiffness <5 min.     2024: RA controlled.  Bilateral ankle pain with walking or standing for long periods of time.  Also with low back pain that is worse with standing for long periods of time.  Has not been doing the physical therapy exercises for her back but plans to restart them.  She says that she has been busy since her mother  and her 's mother  within the last 3 months.    10/8/2024: RA controlled.  No joint pain or swelling.  No morning stiffness or gelling phenomenon.  Xeljanz is effective and well-tolerated.  Concerned about not qualifying for the free drug program beginning in  and how she will afford Xeljanz    2025: RA controlled.  Held Xeljanz perioperatively for bunion surgery and toe fusions; currently recovering from that and unable to put weight on that foot at the moment.  Surgery was performed by [facility].  Otherwise doing well.  Patient changed today's in office visit to a video visit because of recent foot surgery and difficulty with ambulating because she is to remain off of her foot for now.    Today, 2025: RA controlled.  She is very happy that she had the right foot surgeries and is going to follow-up with her podiatrist later today.  Feels like Xeljanz is working well; she says she has been very happy with Xeljanz because so many other medications have not worked as well for her but Xeljanz is keeping the RA controlled.    Denies fevers, chills, nausea, vomiting, constipation, diarrhea. No abdominal pain. No chest pain/pressure, palpitations, or shortness of breath. No LE swelling. No oral or nasal sores.  No rash. No sicca symptoms.    Patient's  is present with her today.    Tobacco: none  EtOH: none  Drugs: none    ROS   12 point review of system was completed and negative except as noted in the HPI     Active Problem List     Patient Active Problem List   Diagnosis    Hypothyroidism     Hyperlipidemia LDL goal <160    Inflammatory arthritis    Malignant melanoma of skin (H)    Osteopetrosis    Cervical dystonia    Dystonia, torsion, fragments of    Psoriasis    Rheumatoid arthritis of multiple sites without rheumatoid factor (H)    Immunocompromised     Past Medical History     Past Medical History:   Diagnosis Date    Abscess of left jaw 06/20/2018    Basal cell carcinoma     Closed fracture of part of humerus 09/27/2016    Left proximal humerus fracture, 2 part, closed, 9/25/16      Distal radius fracture 09/27/2016    Left distal radius fracture with volar apex angulation, comminuted, closed, 9/25/16        Fracture, pelvis closed (H) 09/25/2016    Hyperlipidemia LDL goal <160 01/05/2011    Hypothyroidism 10/01/2010    Inflammatory arthritis 01/20/2011    Malignant melanoma (H)     Proximal humerus fracture 09/30/2016     Past Surgical History     Past Surgical History:   Procedure Laterality Date    APPENDECTOMY      ARTHRODESIS TOE(S) Left 8/17/2017    Procedure: ARTHRODESIS TOE(S);  Left foot 1st metatarsophalangeal realignment fusion, 2nd & 3rd digital corrections;  Surgeon: Jason Lamar DPM;  Location: [facility] OR    INCISION AND DRAINAGE OF WOUND N/A 6/22/2018    Procedure: INCISION AND DRAINAGE JAW ABSCESS;  Surgeon: Lennox Sullivan MD;  Location: [facility];  Service:     IR CERVICAL EPIDURAL STEROID INJECTION  5/9/2013    OPEN REDUCTION INTERNAL FIXATION HUMERUS PROXIMAL Left 9/30/2016    Procedure: OPEN REDUCTION INTERNAL FIXATION HUMERUS PROXIMAL;  Surgeon: Charly Bernal MD;  Location: [facility] OR     Allergy     Allergies   Allergen Reactions    Azathioprine Itching    Leflunomide Other (See Comments)     Mouth sores    Compazine Anxiety     Restless, anxious    Prochlorperazine Anxiety     Restless, anxious    Sulfa Antibiotics Rash     Current Medication List     Current Outpatient Medications   Medication Sig Dispense Refill    alendronate (FOSAMAX) 70 MG tablet Take 1  "tablet (70 mg) by mouth every 7 days 12 tablet 3    atorvastatin (LIPITOR) 40 MG tablet Take 1 tablet (40 mg) by mouth daily. 100 tablet 2    dorzolamide-timolol (COSOPT) 2-0.5 % ophthalmic solution Place 1 drop into both eyes 2 times daily      levothyroxine (SYNTHROID/LEVOTHROID) 112 MCG tablet Take 1 tablet by mouth once daily 30 tablet 3    multivitamin w/minerals (THERA-VIT-M) tablet Take 1 tablet by mouth daily.      tofacitinib (XELJANZ XR) 11 MG 24 hr tablet Take 1 tablet (11 mg) by mouth daily. Hold for signs of infection, then seek medical attention. 30 tablet 6    VITAMIN D, CHOLECALCIFEROL, PO Take by mouth daily       No current facility-administered medications for this visit.         Social History   See HPI    Family History     Family History   Problem Relation Age of Onset    Cancer Father        Physical Exam     Temp Readings from Last 3 Encounters:   01/03/25 98.5  F (36.9  C) (Tympanic)   10/08/24 97.6  F (36.4  C) (Oral)   03/18/24 98  F (36.7  C) (Oral)     BP Readings from Last 5 Encounters:   04/22/25 131/82   01/03/25 120/82   10/08/24 (!) 142/87   04/04/24 126/87   03/18/24 (!) 140/89     Pulse Readings from Last 1 Encounters:   04/22/25 68     Resp Readings from Last 1 Encounters:   04/22/25 16     Estimated body mass index is 31.48 kg/m  as calculated from the following:    Height as of 1/3/25: 1.651 m (5' 5\").    Weight as of this encounter: 85.8 kg (189 lb 3.2 oz).      GEN: NAD. Healthy appearing adult.   HEENT:  Anicteric, noninjected sclera. No obvious external lesions of the ear and nose. Hearing intact.  CV: S1, S2. RRR. No m/r/g  PULM: No increased work of breathing. CTA bilaterally   MSK: MCPs, PIPs, DIPs without swelling or tenderness to palpation.  Squaring and minimal range of motion of the bilateral first CMC joints; hyperextends at the bilateral first MCP joints.  Wrists without swelling or tenderness to palpation.  Elbows and shoulders without swelling or tenderness to " palpation.  Knees, ankles, and MTPs without swelling or tenderness to palpation.    SKIN: No rash or jaundice seen.  PSYCH: Alert. Appropriate.      Labs / Imaging (select studies)     CBC  Recent Labs   Lab Test 04/18/25  0910 01/06/25  0832 10/03/24  0932 07/28/21  0928 05/21/21  1333 12/03/20  1019 08/27/20  1152   WBC 5.0 6.7 5.6   < > 5.9 5.6 8.1   RBC 4.41 4.44 4.41   < > 4.25 4.39 4.25   HGB 14.0 14.5 14.1   < > 14.1 14.6 14.1   HCT 42.4 43.3 43.5   < > 42.3 44.5 42.4   MCV 96 98 99   < > 100 101* 100   RDW 12.3 12.1 12.5   < > 13.2 13.6 13.3    240 176   < > 208 213 173   MCH 31.7 32.7 32.0   < > 33.2* 33.3* 33.2*   MCHC 33.0 33.5 32.4   < > 33.3 32.8 33.3   NEUTROPHIL 73 71 74   < > 79.9 75.2 83.4   LYMPH 13 12 13   < > 5.6 11.8 7.2   MONOCYTE 10 13 10   < > 11.6 10.7 6.9   EOSINOPHIL 3 2 2   < > 2.2 1.6 2.1   BASOPHIL 0 1 1   < > 0.7 0.7 0.4   ANEU  --   --   --   --  4.7 4.2 6.7   ALYM  --   --   --   --  0.3* 0.7* 0.6*   KAREN  --   --   --   --  0.7 0.6 0.6   AEOS  --   --   --   --  0.1 0.1 0.2   ABAS  --   --   --   --  0.0 0.0 0.0   ANEUTAUTO 3.7 4.8 4.1   < >  --   --   --    ALYMPAUTO 0.7* 0.8 0.7*   < >  --   --   --    AMONOAUTO 0.5 0.9 0.6   < >  --   --   --    AEOSAUTO 0.1 0.2 0.1   < >  --   --   --    ABSBASO 0.0 0.0 0.0   < >  --   --   --     < > = values in this interval not displayed.     CMP  Recent Labs   Lab Test 04/18/25  0910 01/06/25  0832 10/03/24  0932 07/28/21  0928 05/21/21  1333 12/03/20  1019 08/27/20  1152 05/28/20  0928 10/25/19  1018 07/22/19  0805 12/19/18  0852 09/12/18  0923 06/25/18  0813 06/25/18  0626   NA  --   --   --   --   --   --   --   --   --  141  --  144  --  140   POTASSIUM  --   --   --   --   --   --   --   --   --  4.1  --  3.8  --  3.9   CHLORIDE  --   --   --   --   --   --   --   --   --  111*  --  109  --  109*   CO2  --   --   --   --   --   --   --   --   --  23  --  28  --  24   ANIONGAP  --   --   --   --   --   --   --   --   --  7  --  7   --  7   GLC  --   --   --   --   --   --   --  96  --  89  --  102*   < > 135*   BUN  --   --   --   --   --   --   --   --   --  15  --  15  --  20   CR 0.79 0.93 0.86   < > 0.88 0.81 0.83 0.74   < > 0.72   < > 0.91  --  0.69   GFRESTIMATED 81 67 74   < > 70 77 75 87   < > 90   < > 63  --  >60   GFRESTBLACK  --   --   --   --  81 89 87 >90   < > >90   < > 76  --  >60   HAZEL  --   --   --   --   --   --   --   --   --  9.1  --  9.4  --  9.1   BILITOTAL 0.6 0.5 0.7   < > 0.7 0.9 0.8 0.8   < > 0.3   < > 0.5  --   --    ALBUMIN 4.1 4.2 4.6   < > 4.0 4.2 4.0 4.0   < > 3.8   < > 3.6  --   --    PROTTOTAL 6.5 6.7 6.9   < > 6.8 7.1 6.9 6.8   < > 6.8   < > 6.9  --   --    ALKPHOS 85 85 72   < > 91 106 103 91   < > 86   < > 94  --   --    AST 32 25 31   < > 51* 45 36 32   < > 22   < > 27  --   --    ALT 38 29 29   < > 70* 67* 65* 61*   < > 30   < > 35  --   --     < > = values in this interval not displayed.     Calcium/VitaminD  Recent Labs   Lab Test 01/04/24  0829 07/22/19  0805 09/12/18  0923 06/25/18  0626   HAZEL  --  9.1 9.4 9.1   VITDT 63*  --   --   --      ESR/CRP  Recent Labs   Lab Test 04/18/25  0910 01/06/25  0832 10/03/24  0932 11/22/22  0909 08/01/22  1346 04/22/22  0748 01/17/22  0857   SED 7 10 6   < > 9 8 6   CRP  --   --   --   --  <2.9 <2.9 <2.9   CRPI <3.00 <3.00 <3.00   < >  --   --   --     < > = values in this interval not displayed.     Lipid Panel  Recent Labs   Lab Test 04/18/25  0910 04/01/24  0801 01/04/24  0829   CHOL 154 173 190   TRIG 136 150* 161*   HDL 48* 55 55   LDL 79 88 103*   NHDL 106 118 135*     Hepatitis B  Recent Labs   Lab Test 02/26/18  0846   HBCAB Nonreactive   HEPBANG Nonreactive     Hepatitis C  Recent Labs   Lab Test 02/26/18  0846   HCVAB Nonreactive     Tuberculosis Screening  Recent Labs   Lab Test 10/03/24  0932 01/17/22  0857 07/22/19  0805   TBRES Negative Negative Negative     Immunization History     Immunization History   Administered Date(s) Administered    COVID-19  12+ (MODERNA) 09/09/2024    COVID-19 12+ (Pfizer) 10/30/2023    COVID-19 Bivalent 18+ (Moderna) 11/30/2022    COVID-19 Monovalent 18+ (Moderna) 04/19/2021, 05/17/2021, 11/29/2021, 06/27/2022    Influenza (High Dose) Trivalent,PF (Fluzone) 09/09/2024    Influenza Vaccine 18-64 (Flublok) 10/18/2018, 10/01/2019, 10/16/2020, 10/10/2021    Influenza Vaccine 65+ (Fluzone HD) 09/17/2022, 10/05/2023    Influenza Vaccine >6 months,quad, PF 10/01/2016    Influenza Vaccine, 6+MO IM (QUADRIVALENT W/PRESERVATIVES) 10/10/2017    Pneumo Conj 13-V (2010&after) 10/05/2016    Pneumococcal 23 valent 10/31/2013, 03/11/2019    Pneumococcal Conjugate (PCV21) 09/09/2024    TDAP (Adacel,Boostrix) 10/25/2012, 12/10/2012, 12/30/2022    Zoster recombinant adjuvanted (Shingrix) 11/25/2019, 02/16/2022          Chart documentation done in part with Dragon Voice recognition Software. Although reviewed after completion, some word and grammatical error may remain.    Maurilio Hayes MD

## 2025-04-22 NOTE — PATIENT INSTRUCTIONS
RHEUMATOLOGY    [facility] [place]  [address] [place] [address]  [place] [address]    Phone number: [phone]  Fax number: [phone]    If you need a medication refill, please contact us as you may need lab work and/or a follow up visit prior to your refill.      Thank you for choosing [facility]!    Elizabeth Haley CMA Rheumatology

## 2025-04-22 NOTE — NURSING NOTE
RAPID3 (0-30) Cumulative Score  8.8          RAPID3 Weighted Score (divide #4 by 3 and that is the weighted score)  2.9

## 2025-05-06 ENCOUNTER — TELEPHONE (OUTPATIENT)
Dept: FAMILY MEDICINE | Facility: CLINIC | Age: 70
End: 2025-05-06
Payer: COMMERCIAL

## 2025-05-06 NOTE — TELEPHONE ENCOUNTER
Patient Quality Outreach    Patient is due for the following:   Colon Cancer Screening  Breast Cancer Screening - Mammogram  Physical Annual Wellness Visit    Action(s) Taken:   Schedule a Annual Wellness Visit    Type of outreach:    Sent [employer] message. Was sent today.    Questions for provider review:    None         Gabriella Quinones MA  Chart routed to None.

## 2025-05-21 ENCOUNTER — TELEPHONE (OUTPATIENT)
Dept: RHEUMATOLOGY | Facility: CLINIC | Age: 70
End: 2025-05-21
Payer: COMMERCIAL

## 2025-05-21 NOTE — TELEPHONE ENCOUNTER
Patient called back, rescheduled.  Elizabeth Haley [facility] Rheumatology  5/21/2025

## 2025-05-21 NOTE — TELEPHONE ENCOUNTER
LUÍS, appointment on 8/5/2025 needs to be rescheduled as Dr. Hayes will be out of clinic. May use EDUARD spot. If patient asks they may do video visit.    Elizabeth Haley CMA Rheumatology  5/21/2025

## 2025-05-28 ENCOUNTER — OFFICE VISIT (OUTPATIENT)
Dept: FAMILY MEDICINE | Facility: CLINIC | Age: 70
End: 2025-05-28
Payer: COMMERCIAL

## 2025-05-28 VITALS
BODY MASS INDEX: 29.73 KG/M2 | WEIGHT: 185 LBS | HEART RATE: 56 BPM | OXYGEN SATURATION: 97 % | DIASTOLIC BLOOD PRESSURE: 84 MMHG | TEMPERATURE: 98.1 F | RESPIRATION RATE: 16 BRPM | SYSTOLIC BLOOD PRESSURE: 136 MMHG | HEIGHT: 66 IN

## 2025-05-28 DIAGNOSIS — C43.9 MALIGNANT MELANOMA OF SKIN (H): Chronic | ICD-10-CM

## 2025-05-28 DIAGNOSIS — Z12.11 SCREEN FOR COLON CANCER: ICD-10-CM

## 2025-05-28 DIAGNOSIS — M06.09 RHEUMATOID ARTHRITIS OF MULTIPLE SITES WITHOUT RHEUMATOID FACTOR (H): ICD-10-CM

## 2025-05-28 DIAGNOSIS — E78.00 HYPERCHOLESTEREMIA: ICD-10-CM

## 2025-05-28 DIAGNOSIS — Z12.31 VISIT FOR SCREENING MAMMOGRAM: ICD-10-CM

## 2025-05-28 DIAGNOSIS — M85.80 OSTEOPENIA, UNSPECIFIED LOCATION: ICD-10-CM

## 2025-05-28 DIAGNOSIS — Z00.00 ENCOUNTER FOR MEDICARE ANNUAL WELLNESS EXAM: Primary | ICD-10-CM

## 2025-05-28 DIAGNOSIS — E06.3 HYPOTHYROIDISM DUE TO HASHIMOTO THYROIDITIS: ICD-10-CM

## 2025-05-28 DIAGNOSIS — Z13.1 SCREENING FOR DIABETES MELLITUS: ICD-10-CM

## 2025-05-28 LAB
ERYTHROCYTE [DISTWIDTH] IN BLOOD BY AUTOMATED COUNT: 12.4 % (ref 10–15)
EST. AVERAGE GLUCOSE BLD GHB EST-MCNC: 123 MG/DL
HBA1C MFR BLD: 5.9 % (ref 0–5.6)
HCT VFR BLD AUTO: 42.9 % (ref 35–47)
HGB BLD-MCNC: 14.3 G/DL (ref 11.7–15.7)
MCH RBC QN AUTO: 31.4 PG (ref 26.5–33)
MCHC RBC AUTO-ENTMCNC: 33.3 G/DL (ref 31.5–36.5)
MCV RBC AUTO: 94 FL (ref 78–100)
PLATELET # BLD AUTO: 224 10E3/UL (ref 150–450)
RBC # BLD AUTO: 4.55 10E6/UL (ref 3.8–5.2)
WBC # BLD AUTO: 6 10E3/UL (ref 4–11)

## 2025-05-28 PROCEDURE — 84439 ASSAY OF FREE THYROXINE: CPT | Performed by: FAMILY MEDICINE

## 2025-05-28 PROCEDURE — G2211 COMPLEX E/M VISIT ADD ON: HCPCS | Performed by: FAMILY MEDICINE

## 2025-05-28 PROCEDURE — 3079F DIAST BP 80-89 MM HG: CPT | Performed by: FAMILY MEDICINE

## 2025-05-28 PROCEDURE — 85027 COMPLETE CBC AUTOMATED: CPT | Performed by: FAMILY MEDICINE

## 2025-05-28 PROCEDURE — 3075F SYST BP GE 130 - 139MM HG: CPT | Performed by: FAMILY MEDICINE

## 2025-05-28 PROCEDURE — 36415 COLL VENOUS BLD VENIPUNCTURE: CPT | Performed by: FAMILY MEDICINE

## 2025-05-28 PROCEDURE — 80053 COMPREHEN METABOLIC PANEL: CPT | Performed by: FAMILY MEDICINE

## 2025-05-28 PROCEDURE — 83036 HEMOGLOBIN GLYCOSYLATED A1C: CPT | Performed by: FAMILY MEDICINE

## 2025-05-28 PROCEDURE — G0438 PPPS, INITIAL VISIT: HCPCS | Performed by: FAMILY MEDICINE

## 2025-05-28 PROCEDURE — 84443 ASSAY THYROID STIM HORMONE: CPT | Performed by: FAMILY MEDICINE

## 2025-05-28 PROCEDURE — 99214 OFFICE O/P EST MOD 30 MIN: CPT | Mod: 25 | Performed by: FAMILY MEDICINE

## 2025-05-28 RX ORDER — ALENDRONATE SODIUM 70 MG/1
70 TABLET ORAL
Qty: 12 TABLET | Refills: 3 | Status: SHIPPED | OUTPATIENT
Start: 2025-05-28

## 2025-05-28 RX ORDER — ATORVASTATIN CALCIUM 40 MG/1
40 TABLET, FILM COATED ORAL DAILY
Qty: 100 TABLET | Refills: 2 | Status: SHIPPED | OUTPATIENT
Start: 2025-05-28

## 2025-05-28 SDOH — HEALTH STABILITY: PHYSICAL HEALTH: ON AVERAGE, HOW MANY DAYS PER WEEK DO YOU ENGAGE IN MODERATE TO STRENUOUS EXERCISE (LIKE A BRISK WALK)?: 0 DAYS

## 2025-05-28 ASSESSMENT — SOCIAL DETERMINANTS OF HEALTH (SDOH): HOW OFTEN DO YOU GET TOGETHER WITH FRIENDS OR RELATIVES?: ONCE A WEEK

## 2025-05-28 NOTE — PATIENT INSTRUCTIONS
Patient Education   Preventive Care Advice   This is general advice given by our system to help you stay healthy. However, your care team may have specific advice just for you. Please talk to your care team about your preventive care needs.  Nutrition  Eat 5 or more servings of fruits and vegetables each day.  Try wheat bread, brown rice and whole grain pasta (instead of white bread, rice, and pasta).  Get enough calcium and vitamin D. Check the label on foods and aim for 100% of the RDA (recommended daily allowance).  Lifestyle  Exercise at least 150 minutes each week  (30 minutes a day, 5 days a week).  Do muscle strengthening activities 2 days a week. These help control your weight and prevent disease.  No smoking.  Wear sunscreen to prevent skin cancer.  Have a dental exam and cleaning every 6 months.  Yearly exams  See your health care team every year to talk about:  Any changes in your health.  Any medicines your care team has prescribed.  Preventive care, family planning, and ways to prevent chronic diseases.  Shots (vaccines)   HPV shots (up to age 26), if you've never had them before.  Hepatitis B shots (up to age 59), if you've never had them before.  COVID-19 shot: Get this shot when it's due.  Flu shot: Get a flu shot every year.  Tetanus shot: Get a tetanus shot every 10 years.  Pneumococcal, hepatitis A, and RSV shots: Ask your care team if you need these based on your risk.  Shingles shot (for age 50 and up)  General health tests  Diabetes screening:  Starting at age 35, Get screened for diabetes at least every 3 years.  If you are younger than age 35, ask your care team if you should be screened for diabetes.  Cholesterol test: At age 39, start having a cholesterol test every 5 years, or more often if advised.  Bone density scan (DEXA): At age 50, ask your care team if you should have this scan for osteoporosis (brittle bones).  Hepatitis C: Get tested at least once in your life.  STIs (sexually  transmitted infections)  Before age 24: Ask your care team if you should be screened for STIs.  After age 24: Get screened for STIs if you're at risk. You are at risk for STIs (including HIV) if:  You are sexually active with more than one person.  You don't use condoms every time.  You or a partner was diagnosed with a sexually transmitted infection.  If you are at risk for HIV, ask about PrEP medicine to prevent HIV.  Get tested for HIV at least once in your life, whether you are at risk for HIV or not.  Cancer screening tests  Cervical cancer screening: If you have a cervix, begin getting regular cervical cancer screening tests starting at age 21.  Breast cancer scan (mammogram): If you've ever had breasts, begin having regular mammograms starting at age 40. This is a scan to check for breast cancer.  Colon cancer screening: It is important to start screening for colon cancer at age 45.  Have a colonoscopy test every 10 years (or more often if you're at risk) Or, ask your provider about stool tests like a FIT test every year or Cologuard test every 3 years.  To learn more about your testing options, visit:   .  For help making a decision, visit:   https://bit.ly/fs21090.  Prostate cancer screening test: If you have a prostate, ask your care team if a prostate cancer screening test (PSA) at age 55 is right for you.  Lung cancer screening: If you are a current or former smoker ages 50 to 80, ask your care team if ongoing lung cancer screenings are right for you.  For informational purposes only. Not to replace the advice of your health care provider. Copyright   2023 [facility]. All rights reserved. Clinically reviewed by the [facility]. [employer] [id number] - REV 01/24.  Preventing Falls: Care Instructions  Injuries and health problems such as trouble walking or poor eyesight can increase your risk of falling. So can some medicines. But there are things you can do to help  "prevent falls. You can exercise to get stronger. You can also arrange your home to make it safer.    Talk to your doctor about the medicines you take. Ask if any of them increase the risk of falls and whether they can be changed or stopped.   Try to exercise regularly. It can help improve your strength and balance. This can help lower your risk of falling.         Practice fall safety and prevention.   Wear low-heeled shoes that fit well and give your feet good support. Talk to your doctor if you have foot problems that make this hard.  Carry a cellphone or wear a medical alert device that you can use to call for help.  Use stepladders instead of chairs to reach high objects. Don't climb if you're at risk for falls. Ask for help, if needed.  Wear the correct eyeglasses, if you need them.        Make your home safer.   Remove rugs, cords, clutter, and furniture from walkways.  Keep your house well lit. Use night-lights in hallways and bathrooms.  Install and use sturdy handrails on stairways.  Wear nonskid footwear, even inside. Don't walk barefoot or in socks without shoes.        Be safe outside.   Use handrails, curb cuts, and ramps whenever possible.  Keep your hands free by using a shoulder bag or backpack.  Try to walk in well-lit areas. Watch out for uneven ground, changes in pavement, and debris.  Be careful in the winter. Walk on the grass or gravel when sidewalks are slippery. Use de-icer on steps and walkways. Add non-slip devices to shoes.    Put grab bars and nonskid mats in your shower or tub and near the toilet. Try to use a shower chair or bath bench when bathing.   Get into a tub or shower by putting in your weaker leg first. Get out with your strong side first. Have a phone or medical alert device in the bathroom with you.   Where can you learn more?  Go to https://www.[employer].net/patiented  Enter G117 in the search box to learn more about \"Preventing Falls: Care Instructions.\"  Current as of: " July 31, 2024  Content Version: 14.4    1611-5494 [employer].   Care instructions adapted under license by your healthcare professional. If you have questions about a medical condition or this instruction, always ask your healthcare professional. [employer] disclaims any warranty or liability for your use of this information.

## 2025-05-28 NOTE — PROGRESS NOTES
Preventive Care Visit  [facility]  Talia Mejía MD, Family Medicine  May 28, 2025      1. Encounter for Medicare annual wellness exam (Primary)  Jackie comes in today for her annual wellness exam.  I discussed with her she is due for mammogram and colonoscopy.  She declines both.  She declines Cologuard.  She feels like at this point she would rather not do any preventative cares.  She is up-to-date on immunizations.    2. Visit for screening mammogram  She declines    3. Screen for colon cancer  She declines colonoscopy and Cologuard.    4. Screening for diabetes mellitus    - Hemoglobin A1c; Future  - Hemoglobin A1c    5. Hypothyroidism due to Hashimoto thyroiditis  She has been fairly stable on her current dose of Synthroid.  Will check her TSH levels today.  - TSH WITH FREE T4 REFLEX; Future  - TSH WITH FREE T4 REFLEX    6. Malignant melanoma of skin (H)  She follows with dermatology.    7. Rheumatoid arthritis of multiple sites without rheumatoid factor (H)  She follows with neurology and is currently on Xeljanz.    8. Hypercholesteremia  Has been stable on her current dose of atorvastatin.  She is due for lipids today and refills.  - atorvastatin (LIPITOR) 40 MG tablet; Take 1 tablet (40 mg) by mouth daily.  Dispense: 100 tablet; Refill: 2  - CBC with platelets; Future  - Comprehensive metabolic panel; Future  - CBC with platelets  - Comprehensive metabolic panel    9. Osteopenia, unspecified location  She had a good response to the Fosamax and will be due for another bone density in December.  She tries to walk a little bit every day but she is somewhat limited.  - alendronate (FOSAMAX) 70 MG tablet; Take 1 tablet (70 mg) by mouth every 7 days.  Dispense: 12 tablet; Refill: 3  Appropriate preventive services were addressed with this patient via screening, questionnaire, or discussion as appropriate for fall prevention, nutrition, physical activity, Tobacco-use cessation, social  engagement, weight loss and cognition.  Checklist reviewing preventive services available has been given to the patient.  Reviewed patient's diet, addressing concerns and/or questions.   The patient was instructed to see the dentist every 6 months.   The patient was provided with written information regarding signs of hearing loss.   I have reviewed Opioid Use Disorder and Substance Use Disorder risk factors and made any needed referrals.     Candace Mejia is a 70 year old, presenting for the following:  Physical (Fasting. )        5/28/2025    10:33 AM   Additional Questions   Roomed by          HPI  She comes in today for her annual exam.  Overall she feels like she is doing okay.  She has pretty severe rheumatoid arthritis for which she follows with rheumatology.  She had foot surgery since I saw her last and has now recovered.  She has had a good response to the Fosamax and will continue on that.  She tries to walk a little bit several times per week.  We had a long discussion about preventative cares and she really does not want to do mammograms or colonoscopies anymore.    The longitudinal plan of care for the diagnosis(es)/condition(s) as documented were addressed during this visit. Due to the added complexity in care, I will continue to support Jackie in the subsequent management and with ongoing continuity of care.         Advance Care Planning    Discussed advance care planning with patient; informed AVS has link to [facility].        5/28/2025   General Health   How would you rate your overall physical health? Good   Feel stress (tense, anxious, or unable to sleep) Not at all         5/28/2025   Nutrition   Diet: Regular (no restrictions)         5/28/2025   Exercise   Days per week of moderate/strenous exercise 0 days   (!) EXERCISE CONCERN      5/28/2025   Social Factors   Frequency of gathering with friends or relatives Once a week   Worry food won't last until get money to buy more No    Food not last or not have enough money for food? No   Do you have housing? (Housing is defined as stable permanent housing and does not include staying outside in a car, in a tent, in an abandoned building, in an overnight shelter, or couch-surfing.) Yes   Are you worried about losing your housing? No   Lack of transportation? No   Unable to get utilities (heat,electricity)? No         5/28/2025   Fall Risk   Fallen 2 or more times in the past year? No   Trouble with walking or balance? Yes   Gait Speed Test (Document in seconds) 4.35   Gait Speed Test Interpretation Less than or equal to 5.00 seconds - PASS          5/28/2025   Activities of Daily Living- Home Safety   Needs help with the following daily activites None of the above   Safety concerns in the home None of the above         5/28/2025   Dental   Dentist two times every year? Yes         5/28/2025   Hearing Screening   Hearing concerns? None of the above         5/28/2025   Driving Risk Screening   Patient/family members have concerns about driving No         5/28/2025   General Alertness/Fatigue Screening   Have you been more tired than usual lately? No         5/28/2025   Urinary Incontinence Screening   Bothered by leaking urine in past 6 months No         Today's PHQ-2 Score:       5/28/2025    10:36 AM   PHQ-2 ( 1999 Pf[employer])   Q1: Little interest or pleasure in doing things 0   Q2: Feeling down, depressed or hopeless 0   PHQ-2 Score 0    Q1: Little interest or pleasure in doing things Not at all   Q2: Feeling down, depressed or hopeless Not at all   PHQ-2 Score 0       Patient-reported           5/28/2025   Substance Use   Alcohol more than 3/day or more than 7/wk No   Do you have a current opioid prescription? No   How severe/bad is pain from 1 to 10? 2/10   Do you use any other substances recreationally? No     Social History     Tobacco Use    Smoking status: Never    Smokeless tobacco: Never   Vaping Use    Vaping status: Never Used    Substance Use Topics    Alcohol use: No    Drug use: No          Mammogram Screening - Mammography discussed and declined      History of abnormal Pap smear: no       ASCVD Risk   The 10-year ASCVD risk score (Becki CUNNINGHAM, et al., 2019) is: 8.1%    Values used to calculate the score:      Age: 70 years      Sex: Female      Is Non- : No      Diabetic: No      Tobacco smoker: No      Systolic Blood Pressure: 136 mmHg      Is BP treated: No      HDL Cholesterol: 48 mg/dL      Total Cholesterol: 154 mg/dL            Reviewed and updated as needed this visit by Provider                    Lab work is in process  Current Outpatient Medications   Medication Sig Dispense Refill    alendronate (FOSAMAX) 70 MG tablet Take 1 tablet (70 mg) by mouth every 7 days. 12 tablet 3    atorvastatin (LIPITOR) 40 MG tablet Take 1 tablet (40 mg) by mouth daily. 100 tablet 2    dorzolamide-timolol (COSOPT) 2-0.5 % ophthalmic solution Place 1 drop into both eyes 2 times daily      levothyroxine (SYNTHROID/LEVOTHROID) 112 MCG tablet Take 1 tablet by mouth once daily 30 tablet 0    multivitamin w/minerals (THERA-VIT-M) tablet Take 1 tablet by mouth daily.      tofacitinib (XELJANZ XR) 11 MG 24 hr tablet Take 1 tablet (11 mg) by mouth daily. Hold for signs of infection, then seek medical attention. 30 tablet 7    VITAMIN D, CHOLECALCIFEROL, PO Take by mouth daily       Current providers sharing in care for this patient include:  Patient Care Team:  Talia Mejía MD as PCP - General (Family Practice)  Dru Horton MD as MD (Physical Medicine and Rehabilitation)  Griselda Hernandez PT as Physical Therapist (Physical Medicine and Rehabilitation)  Sury Mabry RN as Nurse Coordinator (Physical Medicine and Rehabilitation)  Maurilio Hayes MD as Referring Physician (Rheumatology)  Brooke Kearney PA-C as Physician Assistant (Dermatology)  Maurilio Hayes MD as Assigned Rheumatology  "Provider  Talia Mejía MD as Assigned PCP  Job Owens RPH as Pharmacist  Job Owens RPH as Assigned MTM Pharmacist    The following health maintenance items are reviewed in Epic and correct as of today:  Health Maintenance   Topic Date Due    URINE DRUG SCREEN  Never done    ANNUAL REVIEW OF  ORDERS  Never done    MAMMO SCREENING  Never done    COLORECTAL CANCER SCREENING  Never done    RSV VACCINE (1 - Risk 60-74 years 1-dose series) Never done    MEDICARE ANNUAL WELLNESS VISIT  Never done    DIABETES SCREENING  05/28/2023    TSH W/FREE T4 REFLEX  01/04/2025    COVID-19 VACCINE (8 - Moderna risk 2024-25 season) 03/09/2025    LIPID  04/18/2026    FALL RISK ASSESSMENT  05/28/2026    ADVANCE CARE PLANNING  01/03/2030    DTAP/TDAP/TD VACCINE (4 - Td or Tdap) 12/30/2032    DEXA  12/06/2038    HEPATITIS C SCREENING  Completed    PHQ-2 (once per calendar year)  Completed    INFLUENZA VACCINE  Completed    PNEUMOCOCCAL VACCINE 50+ YEARS  Completed    ZOSTER VACCINE  Completed    HPV VACCINE  Aged Out    MENINGITIS VACCINE  Aged Out         Review of Systems  Constitutional, HEENT, cardiovascular, pulmonary, gi and gu systems are negative, except as otherwise noted.     Objective    Exam  /84   Pulse 56   Temp 98.1  F (36.7  C)   Resp 16   Ht 1.664 m (5' 5.5\")   Wt 83.9 kg (185 lb)   LMP  (LMP Unknown)   SpO2 97%   BMI 30.32 kg/m     Estimated body mass index is 30.32 kg/m  as calculated from the following:    Height as of this encounter: 1.664 m (5' 5.5\").    Weight as of this encounter: 83.9 kg (185 lb).    Physical Exam  GENERAL: alert and no distress  EYES: Eyes grossly normal to inspection, PERRL and conjunctivae and sclerae normal  HENT: ear canals and TM's normal, nose and mouth without ulcers or lesions  NECK: no adenopathy, no asymmetry, masses, or scars, thyroid normal to palpation, and kyphosis  RESP: lungs clear to auscultation - no rales, rhonchi or wheezes  CV: regular rate and " rhythm, normal S1 S2, no S3 or S4, no murmur, click or rub, no peripheral edema  ABDOMEN: soft, nontender, no hepatosplenomegaly, no masses and bowel sounds normal  MS: arthritis of the hands  SKIN: no suspicious lesions or rashes  NEURO: Normal strength and tone, mentation intact and speech normal  PSYCH: mentation appears normal, affect normal/bright         5/28/2025   Mini Cog   Clock Draw Score 2 Normal   3 Item Recall 3 objects recalled   Mini Cog Total Score 5              Signed Electronically by: Talia Mejía MD

## 2025-05-29 ENCOUNTER — RESULTS FOLLOW-UP (OUTPATIENT)
Dept: FAMILY MEDICINE | Facility: CLINIC | Age: 70
End: 2025-05-29

## 2025-05-29 LAB
ALBUMIN SERPL BCG-MCNC: 4.3 G/DL (ref 3.5–5.2)
ALP SERPL-CCNC: 78 U/L (ref 40–150)
ALT SERPL W P-5'-P-CCNC: 37 U/L (ref 0–50)
ANION GAP SERPL CALCULATED.3IONS-SCNC: 10 MMOL/L (ref 7–15)
AST SERPL W P-5'-P-CCNC: 30 U/L (ref 0–45)
BILIRUB SERPL-MCNC: 0.5 MG/DL
BUN SERPL-MCNC: 15.9 MG/DL (ref 8–23)
CALCIUM SERPL-MCNC: 9.7 MG/DL (ref 8.8–10.4)
CHLORIDE SERPL-SCNC: 109 MMOL/L (ref 98–107)
CREAT SERPL-MCNC: 0.81 MG/DL (ref 0.51–0.95)
EGFRCR SERPLBLD CKD-EPI 2021: 79 ML/MIN/1.73M2
GLUCOSE SERPL-MCNC: 98 MG/DL (ref 70–99)
HCO3 SERPL-SCNC: 23 MMOL/L (ref 22–29)
POTASSIUM SERPL-SCNC: 4.4 MMOL/L (ref 3.4–5.3)
PROT SERPL-MCNC: 6.7 G/DL (ref 6.4–8.3)
SODIUM SERPL-SCNC: 142 MMOL/L (ref 135–145)
T4 FREE SERPL-MCNC: 1.71 NG/DL (ref 0.9–1.7)
TSH SERPL DL<=0.005 MIU/L-ACNC: 0.21 UIU/ML (ref 0.3–4.2)

## 2025-06-04 DIAGNOSIS — E06.3 HYPOTHYROIDISM DUE TO HASHIMOTO'S THYROIDITIS: ICD-10-CM

## 2025-06-04 RX ORDER — LEVOTHYROXINE SODIUM 112 UG/1
112 TABLET ORAL DAILY
Qty: 90 TABLET | Refills: 3 | Status: SHIPPED | OUTPATIENT
Start: 2025-06-04

## 2025-07-14 ENCOUNTER — VIRTUAL VISIT (OUTPATIENT)
Dept: FAMILY MEDICINE | Facility: CLINIC | Age: 70
End: 2025-07-14
Payer: COMMERCIAL

## 2025-07-14 DIAGNOSIS — M54.50 CHRONIC BILATERAL LOW BACK PAIN WITHOUT SCIATICA: Primary | ICD-10-CM

## 2025-07-14 DIAGNOSIS — G89.29 CHRONIC BILATERAL LOW BACK PAIN WITHOUT SCIATICA: Primary | ICD-10-CM

## 2025-07-14 PROCEDURE — 98005 SYNCH AUDIO-VIDEO EST LOW 20: CPT | Performed by: FAMILY MEDICINE

## 2025-07-14 RX ORDER — GABAPENTIN 100 MG/1
100 CAPSULE ORAL 3 TIMES DAILY
Qty: 90 CAPSULE | Refills: 1 | Status: SHIPPED | OUTPATIENT
Start: 2025-07-14

## 2025-07-14 NOTE — PROGRESS NOTES
Jackie is a 70 year old who is being evaluated via a billable video visit.    How would you like to obtain your AVS? MyChart  If the video visit is dropped, the invitation should be resent by:   Will anyone else be joining your video visit? No      1. Chronic bilateral low back pain without sciatica (Primary)  Jackie-intermittent low back pain for the last 2 to 3 years.  Is been more constant now for the last 2 weeks to the point where she cannot function very well.  There is no red flag symptoms or radicular symptoms.  Will have her try gabapentin as this worked well for her when she had previous neck issues.  Referral to spine care for more definitive treatment.  - gabapentin (NEURONTIN) 100 MG capsule; Take 1 capsule (100 mg) by mouth 3 times daily.  Dispense: 90 capsule; Refill: 1  - Spine  Referral; Future      Subjective   Jackie is a 70 year old, presenting for the following health issues:  Back pain (Intermittent issue for years. Flared up this past week. Very painful. )      7/14/2025    12:50 PM   Additional Questions   Roomed by      Video Start Time: 1:50    History of Present Illness       Back Pain:  She presents for follow up of back pain. Patient's back pain is a recurring problem.  Location of back pain:  Left middle of back  Description of back pain: shooting  Back pain spreads: nowhere    Since patient first noticed back pain, pain is: always present, but gets better and worse  Does back pain interfere with her job:  Not applicable      She is taking medications regularly.      She presents virtually today to talk about her back pain.  She states she has been struggling with it on and off for the last 2 to 3 years.  Typically they will flare and she will do conservative measures and will seem to go away within a week or so.  Now at this time, it has lasted longer than 2 weeks.  She flew down to [place] last week and she is not sure if that exacerbated it it was hard for her to do almost  anything.  It is worse with standing.  She states that the shooting pain but it does not go down her legs.  Is not having any numbness or tingling.  No bowel or bladder dysfunction.  She had pretty severe cervical disc disease a few years ago and ended up going to the pain clinic and had a type of procedure done and things are feeling better.  She is wondering if she is a candidate for something like that.  The longitudinal plan of care for the diagnosis(es)/condition(s) as documented were addressed during this visit. Due to the added complexity in care, I will continue to support Jackie in the subsequent management and with ongoing continuity of care.               Objective           Vitals:  No vitals were obtained today due to virtual visit.    Physical Exam   GENERAL: alert and no distress  PSYCH: Appropriate affect, tone, and pace of words          Video-Visit Details    Type of service:  Video Visit   Video End Time:1:12 PM  Originating Location (pt. Location): Home    Distant Location (provider location):  Off-site  Platform used for Video Visit: Louis  Signed Electronically by: Talia Mejía MD

## 2025-07-15 ENCOUNTER — PATIENT OUTREACH (OUTPATIENT)
Dept: CARE COORDINATION | Facility: CLINIC | Age: 70
End: 2025-07-15
Payer: COMMERCIAL

## 2025-07-17 ENCOUNTER — TRANSFERRED RECORDS (OUTPATIENT)
Dept: HEALTH INFORMATION MANAGEMENT | Facility: CLINIC | Age: 70
End: 2025-07-17
Payer: COMMERCIAL

## 2025-07-17 ENCOUNTER — PATIENT OUTREACH (OUTPATIENT)
Dept: CARE COORDINATION | Facility: CLINIC | Age: 70
End: 2025-07-17
Payer: COMMERCIAL

## 2025-07-24 ENCOUNTER — TRANSFERRED RECORDS (OUTPATIENT)
Dept: HEALTH INFORMATION MANAGEMENT | Facility: CLINIC | Age: 70
End: 2025-07-24
Payer: COMMERCIAL

## 2025-08-05 ENCOUNTER — TRANSFERRED RECORDS (OUTPATIENT)
Dept: HEALTH INFORMATION MANAGEMENT | Facility: CLINIC | Age: 70
End: 2025-08-05

## 2025-08-18 ENCOUNTER — LAB (OUTPATIENT)
Dept: LAB | Facility: CLINIC | Age: 70
End: 2025-08-18
Payer: COMMERCIAL

## 2025-08-18 DIAGNOSIS — Z79.899 HIGH RISK MEDICATION USE: ICD-10-CM

## 2025-08-18 DIAGNOSIS — M06.09 RHEUMATOID ARTHRITIS OF MULTIPLE SITES WITHOUT RHEUMATOID FACTOR (H): ICD-10-CM

## 2025-08-18 LAB
ALBUMIN SERPL BCG-MCNC: 4.1 G/DL (ref 3.5–5.2)
ALP SERPL-CCNC: 61 U/L (ref 40–150)
ALT SERPL W P-5'-P-CCNC: 26 U/L (ref 0–50)
AST SERPL W P-5'-P-CCNC: 37 U/L (ref 0–45)
BASOPHILS # BLD AUTO: <0.04 10E3/UL (ref 0–0.2)
BASOPHILS NFR BLD AUTO: 0.2 %
BILIRUB DIRECT SERPL-MCNC: 0.11 MG/DL (ref 0–0.3)
BILIRUB SERPL-MCNC: 0.8 MG/DL
CREAT SERPL-MCNC: 0.88 MG/DL (ref 0.51–0.95)
CRP SERPL-MCNC: <3 MG/L
EGFRCR SERPLBLD CKD-EPI 2021: 71 ML/MIN/1.73M2
EOSINOPHIL # BLD AUTO: 0.12 10E3/UL (ref 0–0.7)
EOSINOPHIL NFR BLD AUTO: 1.4 %
ERYTHROCYTE [DISTWIDTH] IN BLOOD BY AUTOMATED COUNT: 13 % (ref 10–15)
ERYTHROCYTE [SEDIMENTATION RATE] IN BLOOD BY WESTERGREN METHOD: 7 MM/HR (ref 0–30)
HCT VFR BLD AUTO: 43.1 % (ref 35–47)
HGB BLD-MCNC: 14.3 G/DL (ref 11.7–15.7)
IMM GRANULOCYTES # BLD: 0.06 10E3/UL
IMM GRANULOCYTES NFR BLD: 0.7 %
LYMPHOCYTES # BLD AUTO: 0.83 10E3/UL (ref 0.8–5.3)
LYMPHOCYTES NFR BLD AUTO: 9.8 %
MCH RBC QN AUTO: 31.9 PG (ref 26.5–33)
MCHC RBC AUTO-ENTMCNC: 33.2 G/DL (ref 31.5–36.5)
MCV RBC AUTO: 96.2 FL (ref 78–100)
MONOCYTES # BLD AUTO: 0.67 10E3/UL (ref 0–1.3)
MONOCYTES NFR BLD AUTO: 7.9 %
NEUTROPHILS # BLD AUTO: 6.73 10E3/UL (ref 1.6–8.3)
NEUTROPHILS NFR BLD AUTO: 80 %
PLATELET # BLD AUTO: 208 10E3/UL (ref 150–450)
PROT SERPL-MCNC: 6.5 G/DL (ref 6.4–8.3)
RBC # BLD AUTO: 4.48 10E6/UL (ref 3.8–5.2)
WBC # BLD AUTO: 8.43 10E3/UL (ref 4–11)

## 2025-08-18 PROCEDURE — 85025 COMPLETE CBC W/AUTO DIFF WBC: CPT

## 2025-08-18 PROCEDURE — 82565 ASSAY OF CREATININE: CPT

## 2025-08-18 PROCEDURE — 80076 HEPATIC FUNCTION PANEL: CPT

## 2025-08-18 PROCEDURE — 85652 RBC SED RATE AUTOMATED: CPT

## 2025-08-18 PROCEDURE — 36415 COLL VENOUS BLD VENIPUNCTURE: CPT

## 2025-08-18 PROCEDURE — 86140 C-REACTIVE PROTEIN: CPT

## 2025-08-19 ENCOUNTER — OFFICE VISIT (OUTPATIENT)
Dept: DERMATOLOGY | Facility: CLINIC | Age: 70
End: 2025-08-19
Attending: INTERNAL MEDICINE
Payer: COMMERCIAL

## 2025-08-19 DIAGNOSIS — L82.1 SEBORRHEIC KERATOSES: ICD-10-CM

## 2025-08-19 DIAGNOSIS — D84.9 IMMUNOSUPPRESSION: ICD-10-CM

## 2025-08-19 DIAGNOSIS — Z85.820 HISTORY OF MELANOMA: ICD-10-CM

## 2025-08-19 DIAGNOSIS — D22.9 MULTIPLE BENIGN NEVI: Primary | ICD-10-CM

## 2025-08-19 DIAGNOSIS — L82.0 INFLAMED SEBORRHEIC KERATOSIS: ICD-10-CM

## 2025-08-19 DIAGNOSIS — L81.4 LENTIGO: ICD-10-CM

## 2025-08-19 DIAGNOSIS — D23.9 DERMAL NEVUS: ICD-10-CM

## 2025-08-19 DIAGNOSIS — D18.01 ANGIOMA OF SKIN: ICD-10-CM

## 2025-08-19 PROCEDURE — 99203 OFFICE O/P NEW LOW 30 MIN: CPT | Mod: 25 | Performed by: DERMATOLOGY

## 2025-08-19 PROCEDURE — 17110 DESTRUCTION B9 LES UP TO 14: CPT | Performed by: DERMATOLOGY

## 2025-08-21 ENCOUNTER — TRANSFERRED RECORDS (OUTPATIENT)
Dept: HEALTH INFORMATION MANAGEMENT | Facility: CLINIC | Age: 70
End: 2025-08-21
Payer: COMMERCIAL

## (undated) DEVICE — BNDG ELASTIC 4"X5YDS UNSTERILE 6611-40

## (undated) DEVICE — DRSG JUMPSTART ANTIMICROBIAL 4X4" ABS-4004

## (undated) DEVICE — PACK EXTREMITY LATEX FREE SOP32HFFCS

## (undated) DEVICE — DECANTER VIAL 2006S

## (undated) DEVICE — GLOVE PROTEXIS W/NEU-THERA 8.0  2D73TE80

## (undated) DEVICE — SOL NACL 0.9% IRRIG 1000ML BOTTLE 07138-09

## (undated) DEVICE — BNDG ELASTIC 3"X5YDS UNSTERILE 6611-30

## (undated) DEVICE — DRAPE C-ARM MINI 110788

## (undated) DEVICE — PREP CHLORAPREP 26ML TINTED ORANGE  260815

## (undated) DEVICE — DRAPE EXTREMITY W/ARMBOARD 29405

## (undated) DEVICE — SYR 10ML FINGER CONTROL W/O NDL 309695

## (undated) DEVICE — DRILL BIT ARTHREX MTP 2.0MM AR-8944-22

## (undated) DEVICE — SU VICRYL 2-0 SH 27" UND J417H

## (undated) DEVICE — DRAPE STERI TOWEL SM 1000

## (undated) DEVICE — SU VICRYL 3-0 FS-1 27" J442H

## (undated) DEVICE — NDL 22GA 1.5"

## (undated) DEVICE — BLADE SAW OSCILLATING STRYK MED 9.0X25X0.38MM 2296-003-111

## (undated) DEVICE — Device

## (undated) DEVICE — DRILL BIT ARTHREX LPS CAN 2.0MM AR-8933-20C

## (undated) DEVICE — DRILL BIT ARTHREX BB TAK MTP THREADED AR-13226T

## (undated) DEVICE — GOWN LG DISP 9515

## (undated) DEVICE — DRSG ADAPTIC 3X8" 6113

## (undated) DEVICE — GLOVE PROTEXIS POWDER FREE 7.5 ORTHOPEDIC 2D73ET75

## (undated) DEVICE — DRAPE SHEET REV FOLD 3/4 9349

## (undated) DEVICE — SU ETHILON 3-0 FS-1 18" 669H

## (undated) DEVICE — DRSG GAUZE 4X4" TRAY

## (undated) DEVICE — CAST PADDING 4" UNSTERILE 9044

## (undated) DEVICE — CAST PADDING 4" STERILE 9044S

## (undated) RX ORDER — BUPIVACAINE HYDROCHLORIDE 5 MG/ML
INJECTION, SOLUTION PERINEURAL
Status: DISPENSED
Start: 2017-08-17

## (undated) RX ORDER — OXYCODONE AND ACETAMINOPHEN 5; 325 MG/1; MG/1
TABLET ORAL
Status: DISPENSED
Start: 2017-08-17

## (undated) RX ORDER — KETOROLAC TROMETHAMINE 30 MG/ML
INJECTION, SOLUTION INTRAMUSCULAR; INTRAVENOUS
Status: DISPENSED
Start: 2017-08-17

## (undated) RX ORDER — FENTANYL CITRATE 50 UG/ML
INJECTION, SOLUTION INTRAMUSCULAR; INTRAVENOUS
Status: DISPENSED
Start: 2017-08-17

## (undated) RX ORDER — LIDOCAINE HYDROCHLORIDE 20 MG/ML
INJECTION, SOLUTION INFILTRATION; PERINEURAL
Status: DISPENSED
Start: 2017-08-17

## (undated) RX ORDER — PROPOFOL 10 MG/ML
INJECTION, EMULSION INTRAVENOUS
Status: DISPENSED
Start: 2017-08-17